# Patient Record
Sex: MALE | Race: WHITE | Employment: OTHER | URBAN - METROPOLITAN AREA
[De-identification: names, ages, dates, MRNs, and addresses within clinical notes are randomized per-mention and may not be internally consistent; named-entity substitution may affect disease eponyms.]

---

## 2017-01-16 ENCOUNTER — TELEPHONE (OUTPATIENT)
Dept: SLEEP MEDICINE | Age: 72
End: 2017-01-16

## 2017-01-16 NOTE — TELEPHONE ENCOUNTER
90 Day Adherence Download           Patients PAP machine was  remotely downloaded. Download was added to patients chart.

## 2017-01-16 NOTE — TELEPHONE ENCOUNTER
Patient is completing DOT physical and they are in need of pap download. Do we have remote access?  If not we can schedule clinic visit for download

## 2017-01-16 NOTE — TELEPHONE ENCOUNTER
Spoke with patients wife and notified her that download is with front staff and he could  download, I could mail it or fax it.  He will call back to let me know which one works best for him

## 2017-02-06 ENCOUNTER — TELEPHONE (OUTPATIENT)
Dept: SLEEP MEDICINE | Age: 72
End: 2017-02-06

## 2017-02-06 NOTE — TELEPHONE ENCOUNTER
90 Day Adherence Download           Patients PAP machine was downloaded per patient request .         Patient to  copy on Wednesday 2/8/2017

## 2017-02-07 ENCOUNTER — TELEPHONE (OUTPATIENT)
Dept: SLEEP MEDICINE | Age: 72
End: 2017-02-07

## 2017-02-07 ENCOUNTER — DOCUMENTATION ONLY (OUTPATIENT)
Dept: SLEEP MEDICINE | Age: 72
End: 2017-02-07

## 2017-02-07 DIAGNOSIS — G47.33 OBSTRUCTIVE SLEEP APNEA (ADULT) (PEDIATRIC): Primary | ICD-10-CM

## 2017-02-14 ENCOUNTER — OFFICE VISIT (OUTPATIENT)
Dept: CARDIOLOGY CLINIC | Age: 72
End: 2017-02-14

## 2017-02-14 VITALS
BODY MASS INDEX: 39.82 KG/M2 | HEIGHT: 72 IN | RESPIRATION RATE: 18 BRPM | HEART RATE: 75 BPM | DIASTOLIC BLOOD PRESSURE: 90 MMHG | SYSTOLIC BLOOD PRESSURE: 150 MMHG | WEIGHT: 294 LBS | OXYGEN SATURATION: 96 %

## 2017-02-14 DIAGNOSIS — Z95.5 S/P CORONARY ARTERY STENT PLACEMENT: ICD-10-CM

## 2017-02-14 DIAGNOSIS — I25.810 ATHEROSCLEROSIS OF CORONARY ARTERY BYPASS GRAFT OF NATIVE HEART WITHOUT ANGINA PECTORIS: ICD-10-CM

## 2017-02-14 DIAGNOSIS — E78.2 HYPERLIPIDEMIA, MIXED: ICD-10-CM

## 2017-02-14 DIAGNOSIS — R06.09 DOE (DYSPNEA ON EXERTION): Primary | ICD-10-CM

## 2017-02-14 RX ORDER — AMLODIPINE BESYLATE 2.5 MG/1
TABLET ORAL DAILY
COMMUNITY
End: 2017-12-08 | Stop reason: SDUPTHER

## 2017-02-14 NOTE — PROGRESS NOTES
Craven Cogan NP  Subjective/HPI:     Dixon Whittington is a 66 y.o. male is here for routine f/u. The patient denies chest pain/resting shortness of breath, orthopnea, PND, LE edema, palpitations, syncope, presyncope or fatigue. 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 He denies chest pain resting shortness of breath. He does report some fatigue and has some mild dyspnea on exertion that he feels is attributable to his weight. PCP Provider  Gregoria Sanders MD  Past Medical History   Diagnosis Date    AF (atrial fibrillation) [facility])      s/p DCCV after his CABG, no recurrence    Anxiety     Arrhythmia      a fib    Arthritis     CAD (coronary artery disease)      s/p CABG in 1997    GERD (gastroesophageal reflux disease)     Goiter, nontoxic, multinodular     Hypertension     Low blood potassium     Myocardial infarct ([facility] [address] ? 1997    MARIA ISABEL on CPAP     Other and unspecified hyperlipidemia     Unspecified sleep apnea       Past Surgical History   Procedure Laterality Date    Hx septoplasty  1980's    Hx cholecystectomy  1970's    Hx cataract removal  2006     bilateral    Pr cardiac surg procedure unlist  1997     bypass    Pr rt/lt heart catheters  12/2013     PTCA    Heart Dr. Pardeep Parker Pr right heart catheterization       x3 states pt. (can't remember when)    Hx shoulder arthroscopy  2012     left    Hx orthopaedic       Arthoscopic L. knee surgery      Allergies   Allergen Reactions    Demerol [Meperidine] Other (comments)     hallucinations      Family History   Problem Relation Age of Onset    Cancer Father      in his shoulder    Heart Attack Father     Heart Disease Father     Heart Disease Mother     Cancer Mother      abd     Cancer Son      wilm's tumor    Cancer Sister      abd    Thyroid Disease Neg Hx       Current Outpatient Prescriptions   Medication Sig    amLODIPine (NORVASC) 2.5 mg tablet Take  by mouth daily.  atorvastatin (LIPITOR) 40 mg tablet Take 1 Tab by mouth daily. Stop simvastatin    losartan (COZAAR) 50 mg tablet Take 1 Tab by mouth daily.  metoprolol succinate (TOPROL-XL) 50 mg XL tablet Take 1 Tab by mouth nightly.  isosorbide mononitrate ER (IMDUR) 60 mg CR tablet TAKE 1 TABLET EVERY DAY    pantoprazole (PROTONIX) 40 mg tablet Take 1 Tab by mouth daily. Stop prilosec.  nitroglycerin (NITROLINGUAL) 400 mcg/spray spray 1 Spray by SubLINGual route every five (5) minutes as needed for Chest Pain. Max 3 spray daily prn    Potassium Gluconate 595 mg (99 mg) tablet Take 595 mg by mouth two (2) times a day.  hydroxychloroquine (PLAQUENIL) 200 mg tablet Take 200 mg by mouth two (2) times a day.  OXYGEN-AIR DELIVERY SYSTEMS ([employer] NASAL CPAP SYSTEM) by Does Not Apply route.  acetaminophen (TYLENOL ARTHRITIS PAIN) 650 mg CR tablet Take 650 mg by mouth four (4) times daily.  ferrous sulfate (IRON) 325 mg (65 mg iron) tablet Take 325 mg by mouth Daily (before breakfast).  citalopram (CELEXA) 10 mg tablet Take 10 mg by mouth daily.  omega-3 fatty acids-vitamin e (FISH OIL) 1,000 mg Cap Take 2 Caps by mouth.  multivitamin, stress formula (STRESS TAB) tablet Take 1 Tab by mouth daily.  aspirin 81 mg tablet Take 162 mg by mouth daily.  GLUC HCL/GLUC DESHPANDE/AC-D-GLUCOS (GLUCOSAMINE COMPLEX PO) Take 1 Tab by mouth two (2) times a day.  ANTIOX #11/OM3/DHA/EPA/LUT/VIDYA (OCUVITE ADULT 50+ PO) Take 2 Caps by mouth daily.  hydrocortisone (HYTONE) 2.5 % topical cream      No current facility-administered medications for this visit. Vitals:    02/14/17 1427   BP: 150/90   Pulse: 75   Resp: 18   SpO2: 96%   Weight: 294 lb (133.4 kg)   Height: 6' (1.829 m)     Social History     Social History    Marital status:      Spouse name: N/A    Number of children: N/A    Years of education: N/A     Occupational History    Not on file.      Social History Main Topics    Smoking status: Former Smoker     Packs/day: 4.50     Years: 20.00     Types: Cigarettes     Quit date: 4/17/1970    Smokeless tobacco: Never Used    Alcohol use 0.0 oz/week     0 Standard drinks or equivalent per week      Comment: once a month may have some vodka    Drug use: No    Sexual activity: Not on file     Other Topics Concern    Not on file     Social History Narrative    Lives in [address] [place] [address] with wife. Has 2 sons and 1 daughter. Works part time for [employer]. Worked at [employer] as an . Likes to travel and fish. I have reviewed the nurses notes, vitals, problem list, allergy list, medical history, family, social history and medications. Review of Symptoms:    General: Pt denies excessive weight gain or loss. Pt is able to conduct ADL's  HEENT: Denies blurred vision, headaches, epistaxis and difficulty swallowing. Respiratory: Denies shortness of breath, HUSAIN, wheezing or stridor. Cardiovascular: Denies precordial pain, palpitations, edema or PND  Gastrointestinal: Denies poor appetite, indigestion, abdominal pain or blood in stool  Musculoskeletal: Denies pain or swelling from muscles or joints  Neurologic: Denies tremor, paresthesias, or sensory motor disturbance  Skin: Denies rash, itching or texture change. Physical Exam:      General: Well developed, in no acute distress, cooperative and alert  HEENT: No carotid bruits, no JVD, trach is midline. Neck Supple, PEERL, EOM intact. Heart:  Normal S1/S2 negative S3 or S4. Regular, no murmur, gallop or rub.   Respiratory: Clear bilaterally x 4, no wheezing or rales  Abdomen:   Soft, non-tender, no masses, bowel sounds are active.   Extremities:  No edema, normal cap refill, no cyanosis, atraumatic. Neuro: A&Ox3, speech clear, gait stable. Skin: Skin color is normal. No rashes or lesions.  Non diaphoretic  Vascular: 2+ pulses symmetric in all extremities    Cardiographics    ECG: Normal sinus rhythm  Results for orders placed or performed during the hospital encounter of 09/11/15   EKG, 12 LEAD, INITIAL   Result Value Ref Range    Ventricular Rate 67 BPM    Atrial Rate 67 BPM    P-R Interval 156 ms    QRS Duration 114 ms    Q-T Interval 440 ms    QTC Calculation (Bezet) 464 ms    Calculated P Axis 62 degrees    Calculated R Axis 15 degrees    Calculated T Axis 39 degrees    Diagnosis       Normal sinus rhythm  When compared with ECG of 11-SEP-2015 13:52,  No significant change was found  Confirmed by Stephanie Ramires ([id number]) on 9/12/2015 10:37:04 AM           Cardiology Labs:  Lab Results   Component Value Date/Time    Cholesterol, total 109 12/21/2013 03:33 AM    HDL Cholesterol 33 12/21/2013 03:33 AM    LDL, calculated 51.8 12/21/2013 03:33 AM    Triglyceride 121 12/21/2013 03:33 AM    CHOL/HDL Ratio 3.3 12/21/2013 03:33 AM       Lab Results   Component Value Date/Time    Sodium 135 09/12/2015 05:39 AM    Potassium 4.3 09/12/2015 05:39 AM    Chloride 103 09/12/2015 05:39 AM    CO2 25 09/12/2015 05:39 AM    Anion gap 7 09/12/2015 05:39 AM    Glucose 84 09/12/2015 05:39 AM    BUN 13 09/12/2015 05:39 AM    Creatinine 0.84 09/12/2015 05:39 AM    BUN/Creatinine ratio 15 09/12/2015 05:39 AM    GFR est AA >60 09/12/2015 05:39 AM    GFR est non-AA >60 09/12/2015 05:39 AM    Calcium 8.7 09/12/2015 05:39 AM    Bilirubin, total 0.6 09/09/2015 02:27 PM    AST (SGOT) 20 09/09/2015 02:27 PM    Alk. phosphatase 53 09/09/2015 02:27 PM    Protein, total 6.9 09/09/2015 02:27 PM    Albumin 4.6 09/09/2015 02:27 PM    Globulin 3.5 05/17/2015 10:15 AM    A-G Ratio 2.0 09/09/2015 02:27 PM    ALT (SGPT) 26 09/09/2015 02:27 PM           Assessment:     Assessment:     Mk Lagos was seen today for employment physical.    Diagnoses and all orders for this visit:    HUSAIN (dyspnea on exertion)    Hyperlipidemia, mixed  -     AMB POC EKG ROUTINE W/ 12 LEADS, INTER & REP  -     STRESS TEST CARDIOLITE, Clinic Performed;  Future    Atherosclerosis of coronary artery bypass graft of native heart without angina pectoris  -     STRESS TEST CARDIOLITE, Clinic Performed; Future    S/P coronary artery stent placement  -     STRESS TEST CARDIOLITE, Clinic Performed; Future        ICD-10-CM ICD-9-CM    1. HUSAIN (dyspnea on exertion) R06.09 786.09    2. Hyperlipidemia, mixed E78.2 272.2 AMB POC EKG ROUTINE W/ 12 LEADS, INTER & REP      STRESS TEST CARDIOLITE   3. Atherosclerosis of coronary artery bypass graft of native heart without angina pectoris I25.810 414.05 STRESS TEST CARDIOLITE   4. S/P coronary artery stent placement Z95.5 V45.82 STRESS TEST CARDIOLITE     Orders Placed This Encounter    AMB POC EKG ROUTINE W/ 12 LEADS, INTER & REP     Order Specific Question:   Reason for Exam:     Answer:   routine    STRESS TEST CARDIOLITE, Clinic Performed     Standing Status:   Future     Standing Expiration Date:   8/14/2017     Order Specific Question:   Reason for Exam:     Answer:   HUSAIN    amLODIPine (NORVASC) 2.5 mg tablet     Sig: Take  by mouth daily. Plan:     1. Atherosclerotic heart disease: Patient reports dyspnea on exertion will evaluate with stress Myoview. 2.  Hypertension: Repeat 132/76 right arm discussed with patient diet exercise and weight loss  3. Hyperlipidemia: LDL at target last year labs followed by primary care  4. 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  5.  Patient questions history of memory loss and chart: Discussed with patient this was part of the neurological Associates workup for when he went there for tremors he had also stated that he had some memory loss issues. Reviewed MRI with patient showing microvascular changes no significant cerebral disease indicated at that time. Patient presents awake alert orientated ×3 short-term and long-term memory are intact with basic questions answered today.   Discussed with patient he may benefit from following up with neurology regards to his initial consultation to further explain the diagnosis in his chart.     Palma Mohs, NP

## 2017-02-14 NOTE — MR AVS SNAPSHOT
Visit Information Date & Time Provider Department Dept. Phone Encounter #  
 2/14/2017  2:00 PM Nino Eddy, [address] [facility] [id number] Your Appointments 2/16/2017  9:00 AM  
STRESS TEST with NUCLEAR, [facility] [address] [facility]) Appt Note: Per Tevin $0CP REM 294LBS, 6', STRESS TEST CARDIOLITE [CTA1606 Custom] [address] [facility]  
[phone] [address] [address]  
  
    
 2/21/2017  9:00 AM  
STRESS TEST with NUCLEAR, [facility] [address] [place] [address]) Appt Note: Per Tevin $0CP REM 294LBS, 6', STRESS TEST CARDIOLITE [[id number] Custom] [address] [address]  
[phone]  
  
    
 5/8/2017  9:40 AM  
Follow Up with Jimena Stack MD  
Neurology Clinic at [address]) Appt Note: f/u tremors, $0cp jrb 10/10/2016  
 [address] [place] [address], 
[address] [place] [address], [address] [address]  
[address] [facility], [address] [place] [address], [address] [address] Upcoming Health Maintenance Date Due DTaP/Tdap/Td series (1 - Tdap) 11/26/1964 FOBT Q 1 YEAR AGE 50-75 11/26/1993 ZOSTER VACCINE AGE 60> 11/26/2003 GLAUCOMA SCREENING Q2Y 11/26/2008 Pneumococcal 65+ Low/Medium Risk (1 of 2 - PCV13) 11/26/2008 MEDICARE YEARLY EXAM 11/26/2008 INFLUENZA AGE 9 TO ADULT 8/1/2016 Allergies as of 2/14/2017  Review Complete On: 2/14/2017 By: Nino Eddy NP Severity Noted Reaction Type Reactions Demerol [Meperidine]  08/24/2010    Other (comments)  
 hallucinations Current Immunizations  Reviewed on 1/9/2014 Name Date Influenza Vaccine PF 12/21/2013 10:50 AM  
  
 Not reviewed this visit You Were Diagnosed With   
  
 Codes Comments  HUSAIN (dyspnea on exertion)    -  Primary ICD-10-CM: R06.09 
ICD-9-CM: 786.09   
 Hyperlipidemia, mixed     ICD-10-CM: E78.2 ICD-9-CM: 272.2 Atherosclerosis of coronary artery bypass graft of native heart without angina pectoris     ICD-10-CM: I25.810 ICD-9-CM: 414.05 S/P coronary artery stent placement     ICD-10-CM: Z95.5 ICD-9-CM: V45.82 Vitals BP Pulse Resp Height(growth percentile) Weight(growth percentile) SpO2  
 150/90 (BP 1 Location: Right arm, BP Patient Position: Sitting) 75 18 6' (1.829 m) 294 lb (133.4 kg) 96% BMI Smoking Status 39.87 kg/m2 Former Smoker Vitals History BMI and BSA Data Body Mass Index Body Surface Area  
 39.87 kg/m 2 2.6 m 2 Preferred Pharmacy Pharmacy Name Phone [facility] [address] [place] [address] [address] [facility] [address] [phone] Your Updated Medication List  
  
   
This list is accurate as of: 2/14/17  3:02 PM.  Always use your most recent med list.  
  
  
  
  
 aspirin 81 mg tablet Take 162 mg by mouth daily. atorvastatin 40 mg tablet Commonly known as:  LIPITOR Take 1 Tab by mouth daily. Stop simvastatin  
  
 citalopram 10 mg tablet Commonly known as:  Verlin Varun Take 10 mg by mouth daily. FISH OIL 1,000 mg Cap Generic drug:  omega-3 fatty acids-vitamin e Take 2 Caps by mouth. GLUCOSAMINE COMPLEX PO Take 1 Tab by mouth two (2) times a day. [employer] NASAL CPAP SYSTEM  
by Does Not Apply route. hydrocortisone 2.5 % topical cream  
Commonly known as:  HYTONE  
  
 hydroxychloroquine 200 mg tablet Commonly known as:  PLAQUENIL Take 200 mg by mouth two (2) times a day. Iron 325 mg (65 mg iron) tablet Generic drug:  ferrous sulfate Take 325 mg by mouth Daily (before breakfast). isosorbide mononitrate ER 60 mg CR tablet Commonly known as:  IMDUR  
TAKE 1 TABLET EVERY DAY  
  
 losartan 50 mg tablet Commonly known as:  COZAAR Take 1 Tab by mouth daily. metoprolol succinate 50 mg XL tablet Commonly known as:  TOPROL-XL Take 1 Tab by mouth nightly. multivitamin, stress formula tablet Commonly known as:  STRESS TAB Take 1 Tab by mouth daily. nitroglycerin 400 mcg/spray spray Commonly known as:  NITROLINGUAL  
1 [place] by SubLINGual route every five (5) minutes as needed for Chest Pain. Max 3 spray daily prn NORVASC 2.5 mg tablet Generic drug:  amLODIPine Take  by mouth daily. OCUVITE ADULT 50+ PO Take 2 Caps by mouth daily. pantoprazole 40 mg tablet Commonly known as:  PROTONIX Take 1 Tab by mouth daily. Stop prilosec. Potassium Gluconate 595 mg (99 mg) tablet Take 595 mg by mouth two (2) times a day. TYLENOL ARTHRITIS PAIN 650 mg CR tablet Generic drug:  acetaminophen Take 650 mg by mouth four (4) times daily. We Performed the Following AMB POC EKG ROUTINE W/ 12 LEADS, INTER & REP [06278 CPT(R)] To-Do List   
 02/15/2017 ECG:  STRESS TEST CARDIOLITE Introducing [facility]! [facility] introduces [employer] patient portal. Now you can access parts of your medical record, email your doctor's office, and request medication refills online. 1. In your internet browser, go to [email or web address] 2. Click on the First Time User? Click Here link in the Sign In box. You will see the New Member Sign Up page. 3. Enter your [employer] Access Code exactly as it appears below. You will not need to use this code after youve completed the sign-up process. If you do not sign up before the expiration date, you must request a new code. · [employer] Access Code: [email or web address] Expires: 5/15/2017  2:54 PM 
 
4. Enter the last four digits of your Social Security Number (xxxx) and Date of Birth (mm/dd/yyyy) as indicated and click Submit. You will be taken to the next sign-up page. 5. Create a [employer] ID.  This will be your [employer] login ID and cannot be changed, so think of one that is secure and easy to remember. 6. Create a [employer] password. You can change your password at any time. 7. Enter your Password Reset Question and Answer. This can be used at a later time if you forget your password. 8. Enter your e-mail address. You will receive e-mail notification when new information is available in [address] 9. Click Sign Up. You can now view and download portions of your medical record. 10. Click the Download Summary menu link to download a portable copy of your medical information. If you have questions, please visit the Frequently Asked Questions section of the [employer] website. Remember, [employer] is NOT to be used for urgent needs. For medical emergencies, dial 911. Now available from your iPhone and Android! Please provide this summary of care documentation to your next provider. Your primary care clinician is listed as [address]. If you have any questions after today's visit, please call [phone].

## 2017-02-16 ENCOUNTER — CLINICAL SUPPORT (OUTPATIENT)
Dept: CARDIOLOGY CLINIC | Age: 72
End: 2017-02-16

## 2017-02-16 DIAGNOSIS — R06.09 DOE (DYSPNEA ON EXERTION): Primary | ICD-10-CM

## 2017-02-17 ENCOUNTER — TELEPHONE (OUTPATIENT)
Dept: CARDIOLOGY CLINIC | Age: 72
End: 2017-02-17

## 2017-02-17 ENCOUNTER — CLINICAL SUPPORT (OUTPATIENT)
Dept: CARDIOLOGY CLINIC | Age: 72
End: 2017-02-17

## 2017-02-17 DIAGNOSIS — R93.1 ABNORMAL NUCLEAR CARDIAC IMAGING TEST: Primary | ICD-10-CM

## 2017-02-17 DIAGNOSIS — E78.2 HYPERLIPIDEMIA, MIXED: ICD-10-CM

## 2017-02-17 DIAGNOSIS — Z95.5 S/P CORONARY ARTERY STENT PLACEMENT: ICD-10-CM

## 2017-02-17 DIAGNOSIS — I25.810 ATHEROSCLEROSIS OF CORONARY ARTERY BYPASS GRAFT OF NATIVE HEART WITHOUT ANGINA PECTORIS: ICD-10-CM

## 2017-02-17 NOTE — TELEPHONE ENCOUNTER
Notes Recorded by Marylen Canner, LPN on 3/55/7605 at 9:24 PM  Verified patient with two identifiers.  Spoke with Natalie Riddle patient's wife informed her his stress test OK, spoke with Dr Cierra Stinson, he is cleared for DOT. She verbalized understanding.

## 2017-02-17 NOTE — PROGRESS NOTES
Verified patient with two identifiers. Spoke with Cait Hawkins patient's wife informed her his stress test OK, spoke with Dr Tom Maldonado, he is cleared for DOT. She verbalized understanding.

## 2017-02-17 NOTE — TELEPHONE ENCOUNTER
----- Message from Debra Mayorga MD sent at 2/17/2017  1:26 PM EST -----  His stress test is only mildly suspicious.   Have him come in to discuss and see how he is doing

## 2017-02-19 RX ORDER — METOPROLOL SUCCINATE 50 MG/1
TABLET, EXTENDED RELEASE ORAL
Qty: 90 TAB | Refills: 3 | Status: SHIPPED | OUTPATIENT
Start: 2017-02-19 | End: 2018-02-23 | Stop reason: SDUPTHER

## 2017-03-03 RX ORDER — PANTOPRAZOLE SODIUM 40 MG/1
TABLET, DELAYED RELEASE ORAL
Qty: 90 TAB | Refills: 3 | Status: SHIPPED | OUTPATIENT
Start: 2017-03-03 | End: 2018-02-22 | Stop reason: SDUPTHER

## 2017-03-23 RX ORDER — ISOSORBIDE MONONITRATE 60 MG/1
TABLET, EXTENDED RELEASE ORAL
Qty: 90 TAB | Refills: 2 | Status: SHIPPED | OUTPATIENT
Start: 2017-03-23 | End: 2018-02-22 | Stop reason: SDUPTHER

## 2017-12-08 RX ORDER — AMLODIPINE BESYLATE 2.5 MG/1
TABLET ORAL
Qty: 90 TAB | Refills: 3 | Status: SHIPPED | OUTPATIENT
Start: 2017-12-08 | End: 2018-10-12 | Stop reason: SDUPTHER

## 2018-01-15 RX ORDER — LOSARTAN POTASSIUM 50 MG/1
TABLET ORAL
Qty: 90 TAB | Refills: 3 | Status: SHIPPED | OUTPATIENT
Start: 2018-01-15 | End: 2019-01-17 | Stop reason: SDUPTHER

## 2018-01-15 RX ORDER — ATORVASTATIN CALCIUM 40 MG/1
TABLET, FILM COATED ORAL
Qty: 90 TAB | Refills: 3 | Status: SHIPPED | OUTPATIENT
Start: 2018-01-15 | End: 2019-01-17 | Stop reason: SDUPTHER

## 2018-02-22 RX ORDER — PANTOPRAZOLE SODIUM 40 MG/1
TABLET, DELAYED RELEASE ORAL
Qty: 90 TAB | Refills: 3 | Status: SHIPPED | OUTPATIENT
Start: 2018-02-22 | End: 2019-04-03 | Stop reason: SDUPTHER

## 2018-02-22 RX ORDER — ISOSORBIDE MONONITRATE 60 MG/1
TABLET, EXTENDED RELEASE ORAL
Qty: 90 TAB | Refills: 2 | Status: SHIPPED | OUTPATIENT
Start: 2018-02-22 | End: 2018-10-12 | Stop reason: SDUPTHER

## 2018-02-23 RX ORDER — METOPROLOL SUCCINATE 50 MG/1
TABLET, EXTENDED RELEASE ORAL
Qty: 90 TAB | Refills: 2 | Status: SHIPPED | OUTPATIENT
Start: 2018-02-23 | End: 2018-10-12 | Stop reason: SDUPTHER

## 2018-03-06 ENCOUNTER — OFFICE VISIT (OUTPATIENT)
Dept: CARDIOLOGY CLINIC | Age: 73
End: 2018-03-06

## 2018-03-06 VITALS
BODY MASS INDEX: 36.9 KG/M2 | DIASTOLIC BLOOD PRESSURE: 62 MMHG | RESPIRATION RATE: 20 BRPM | OXYGEN SATURATION: 96 % | HEART RATE: 68 BPM | HEIGHT: 72 IN | SYSTOLIC BLOOD PRESSURE: 110 MMHG | WEIGHT: 272.4 LBS

## 2018-03-06 DIAGNOSIS — I48.0 PAROXYSMAL ATRIAL FIBRILLATION (HCC): ICD-10-CM

## 2018-03-06 DIAGNOSIS — R41.3 MEMORY DIFFICULTIES: ICD-10-CM

## 2018-03-06 DIAGNOSIS — E78.2 HYPERLIPIDEMIA, MIXED: ICD-10-CM

## 2018-03-06 DIAGNOSIS — Z95.5 S/P CORONARY ARTERY STENT PLACEMENT: ICD-10-CM

## 2018-03-06 DIAGNOSIS — I25.810 ATHEROSCLEROSIS OF CORONARY ARTERY BYPASS GRAFT OF NATIVE HEART WITHOUT ANGINA PECTORIS: Primary | ICD-10-CM

## 2018-03-06 RX ORDER — NITROGLYCERIN 0.4 MG/1
0.4 TABLET SUBLINGUAL
Qty: 25 TAB | Refills: 1 | Status: SHIPPED | OUTPATIENT
Start: 2018-03-06 | End: 2020-12-04

## 2018-03-06 RX ORDER — OXYCODONE AND ACETAMINOPHEN 5; 325 MG/1; MG/1
TABLET ORAL
COMMUNITY
End: 2020-12-04

## 2018-03-06 NOTE — PROGRESS NOTES
1. Have you been to the ER, urgent care clinic since your last visit? Hospitalized since your last visit? YES [facility].     2. Have you seen or consulted any other health care providers outside of the [facility] since your last visit? Include any pap smears or colon screening. YES PCP. NO CARDIAC C/O.

## 2018-03-06 NOTE — PROGRESS NOTES
Prachi Werner DNP, ANP-BC  Subjective/HPI:     Surya Noriega is a 74 y.o. male is here for routine f/u. The patient denies chest pain/ shortness of breath, orthopnea, PND, LE edema, palpitations, syncope, presyncope or fatigue. Patient reports feeling well in his usual state of health, able to perform his outdoor working chores such as cutting wood with a chainsaw without difficulty. Patient states he has had lab work from primary care within the last 3 months. Wife is also accompanied patient for exam today, she mentions he is having increasing memory disturbances. Patient is wearing CPAP nightly, will snore at times when supine however resolves when turning over on his side. He has been keeping busy making wooden pens. PCP Provider  Lavell Beal MD  Past Medical History:   Diagnosis Date    AF (atrial fibrillation) (HCC)     s/p DCCV after his CABG, no recurrence    Anxiety     Arrhythmia     a fib    Arthritis     CAD (coronary artery disease)     s/p CABG in 1997    GERD (gastroesophageal reflux disease)     Goiter, nontoxic, multinodular     Hypertension     Low blood potassium     Myocardial infarct ? 1997    MARIA ISABEL on CPAP     Other and unspecified hyperlipidemia     Unspecified sleep apnea       Past Surgical History:   Procedure Laterality Date    CARDIAC SURG PROCEDURE UNLIST  1997    bypass    HX CATARACT REMOVAL  2006    bilateral    HX CHOLECYSTECTOMY  1970's    HX ORTHOPAEDIC      Arthoscopic L. knee surgery     HX SEPTOPLASTY  1980's    HX SHOULDER ARTHROSCOPY  2012    left    RIGHT HEART CATHETERIZATION      x3 states pt. (can't remember when)    RT/LT HEART CATHETERS  12/2013    PTCA    Heart Dr. Jessica Vega     Allergies   Allergen Reactions    Demerol [Meperidine] Other (comments)     hallucinations  hallucinations      Family History   Problem Relation Age of Onset    Cancer Father      in his shoulder    Heart Attack Father     Heart Disease Father     Heart Disease Mother     Cancer Mother      abd     Cancer Son      wilm's tumor    Cancer Sister      abd    Thyroid Disease Neg Hx       Current Outpatient Prescriptions   Medication Sig    oxyCODONE-acetaminophen (PERCOCET) 5-325 mg per tablet Take  by mouth every four (4) hours as needed for Pain.  metoprolol succinate (TOPROL-XL) 50 mg XL tablet TAKE 1 TABLET EVERY NIGHT    isosorbide mononitrate ER (IMDUR) 60 mg CR tablet TAKE 1 TABLET EVERY DAY    pantoprazole (PROTONIX) 40 mg tablet TAKE 1 TABLET EVERY DAY  (STOP  PRILOSEC)    losartan (COZAAR) 50 mg tablet TAKE 1 TABLET EVERY DAY    atorvastatin (LIPITOR) 40 mg tablet TAKE 1 TABLET EVERY DAY  STOP  SIMVASTATIN    amLODIPine (NORVASC) 2.5 mg tablet TAKE 1 TABLET EVERY DAY    Potassium Gluconate 595 mg (99 mg) tablet Take 595 mg by mouth two (2) times a day.  ANTIOX #11/OM3/DHA/EPA/LUT/VIDYA (OCUVITE ADULT 50+ PO) Take 2 Caps by mouth daily.  hydroxychloroquine (PLAQUENIL) 200 mg tablet Take 200 mg by mouth two (2) times a day.  hydrocortisone (HYTONE) 2.5 % topical cream     OXYGEN-AIR DELIVERY SYSTEMS (HORIZON NASAL CPAP SYSTEM) by Does Not Apply route.  acetaminophen (TYLENOL ARTHRITIS PAIN) 650 mg CR tablet Take 650 mg by mouth four (4) times daily.  ferrous sulfate (IRON) 325 mg (65 mg iron) tablet Take 325 mg by mouth Daily (before breakfast).  citalopram (CELEXA) 10 mg tablet Take 10 mg by mouth daily.  omega-3 fatty acids-vitamin e (FISH OIL) 1,000 mg Cap Take 2 Caps by mouth.  multivitamin, stress formula (STRESS TAB) tablet Take 1 Tab by mouth daily.  aspirin 81 mg tablet Take 162 mg by mouth daily.  GLUC HCL/GLUC DESHPANDE/AC-D-GLUCOS (GLUCOSAMINE COMPLEX PO) Take 1 Tab by mouth two (2) times a day.  nitroglycerin (NITROLINGUAL) 400 mcg/spray spray 1 Spray by SubLINGual route every five (5) minutes as needed for Chest Pain. Max 3 spray daily prn     No current facility-administered medications for this visit. Vitals:    03/06/18 1308 03/06/18 1322   BP: 110/64 110/62   Pulse: 68    Resp: 20    SpO2: 96%    Weight: 272 lb 6.4 oz (123.6 kg)    Height: 6' (1.829 m)      Social History     Social History    Marital status:      Spouse name: N/A    Number of children: N/A    Years of education: N/A     Occupational History    Not on file. Social History Main Topics    Smoking status: Former Smoker     Packs/day: 4.50     Years: 20.00     Types: Cigarettes     Quit date: 4/17/1970    Smokeless tobacco: Never Used    Alcohol use 0.0 oz/week     0 Standard drinks or equivalent per week      Comment: once a month may have some vodka    Drug use: No    Sexual activity: Not on file     Other Topics Concern    Not on file     Social History Narrative    Lives in [address] [place] [address] with wife. Has 2 sons and 1 daughter. Works part time for [employer]. Worked at [employer] as an . Likes to travel and fish. I have reviewed the nurses notes, vitals, problem list, allergy list, medical history, family, social history and medications. Review of Symptoms:    General: Pt denies excessive weight gain or loss. Pt is able to conduct ADL's  HEENT: Denies blurred vision, headaches, epistaxis and difficulty swallowing. Respiratory: Denies shortness of breath, HUSAIN, wheezing or stridor. Cardiovascular: Denies precordial pain, palpitations, edema or PND  Gastrointestinal: Denies poor appetite, indigestion, abdominal pain or blood in stool  Musculoskeletal: Denies pain or swelling from muscles or joints  Neurologic: Denies tremor, paresthesias, or sensory motor disturbance  Skin: Denies rash, itching or texture change. Physical Exam:      General: Well developed, in no acute distress, cooperative and alert  HEENT: No carotid bruits, no JVD, trach is midline. Neck Supple, PEERL, EOM intact. Heart:  Normal S1/S2 negative S3 or S4.  Regular, no murmur, gallop or rub.   Respiratory: Clear bilaterally x 4, no wheezing or rales  Abdomen:   Soft, non-tender, no masses, bowel sounds are active.   Extremities:  No edema, normal cap refill, no cyanosis, atraumatic. Neuro: A&Ox3, speech clear, gait stable. Skin: Skin color is normal. No rashes or lesions. Non diaphoretic. Diffuse bilateral forearm ecchymosis on contact points no other bruising. Vascular: 2+ pulses symmetric in all extremities    Cardiographics    ECG: Sinus rhythm with unifocal PVCs. Results for orders placed or performed during the hospital encounter of 09/11/15   EKG, 12 LEAD, INITIAL   Result Value Ref Range    Ventricular Rate 67 BPM    Atrial Rate 67 BPM    P-R Interval 156 ms    QRS Duration 114 ms    Q-T Interval 440 ms    QTC Calculation (Bezet) 464 ms    Calculated P Axis 62 degrees    Calculated R Axis 15 degrees    Calculated T Axis 39 degrees    Diagnosis       Normal sinus rhythm  When compared with ECG of 11-SEP-2015 13:52,  No significant change was found  Confirmed by Lazarus Benes ([id number]) on 9/12/2015 10:37:04 AM           Cardiology Labs:  Lab Results   Component Value Date/Time    Cholesterol, total 109 12/21/2013 03:33 AM    HDL Cholesterol 33 12/21/2013 03:33 AM    LDL, calculated 51.8 12/21/2013 03:33 AM    Triglyceride 121 12/21/2013 03:33 AM    CHOL/HDL Ratio 3.3 12/21/2013 03:33 AM       Lab Results   Component Value Date/Time    Sodium 135 (L) 09/12/2015 05:39 AM    Potassium 4.3 09/12/2015 05:39 AM    Chloride 103 09/12/2015 05:39 AM    CO2 25 09/12/2015 05:39 AM    Anion gap 7 09/12/2015 05:39 AM    Glucose 84 09/12/2015 05:39 AM    BUN 13 09/12/2015 05:39 AM    Creatinine 0.84 09/12/2015 05:39 AM    BUN/Creatinine ratio 15 09/12/2015 05:39 AM    GFR est AA >60 09/12/2015 05:39 AM    GFR est non-AA >60 09/12/2015 05:39 AM    Calcium 8.7 09/12/2015 05:39 AM    Bilirubin, total 0.6 09/09/2015 02:27 PM    AST (SGOT) 20 09/09/2015 02:27 PM    Alk.  phosphatase 53 09/09/2015 02:27 PM    Protein, total 6.9 09/09/2015 02:27 PM    Albumin 4.6 09/09/2015 02:27 PM    Globulin 3.5 05/17/2015 10:15 AM    A-G Ratio 2.0 09/09/2015 02:27 PM    ALT (SGPT) 26 09/09/2015 02:27 PM           Assessment:     Assessment:     Diagnoses and all orders for this visit:    1. Atherosclerosis of coronary artery bypass graft of native heart without angina pectoris    2. Paroxysmal atrial fibrillation (HCC)  -     AMB POC EKG ROUTINE W/ 12 LEADS, INTER & REP    3. Hyperlipidemia, mixed    4. S/P coronary artery stent placement    5. Memory difficulties        ICD-10-CM ICD-9-CM    1. Atherosclerosis of coronary artery bypass graft of native heart without angina pectoris I25.810 414.05    2. Paroxysmal atrial fibrillation (HCC) I48.0 427.31 AMB POC EKG ROUTINE W/ 12 LEADS, INTER & REP   3. Hyperlipidemia, mixed E78.2 272.2    4. S/P coronary artery stent placement Z95.5 V45.82    5. Memory difficulties R41.3 780.93      Orders Placed This Encounter    AMB POC EKG ROUTINE W/ 12 LEADS, INTER & REP     Order Specific Question:   Reason for Exam:     Answer:   routine    oxyCODONE-acetaminophen (PERCOCET) 5-325 mg per tablet     Sig: Take  by mouth every four (4) hours as needed for Pain. Plan:     1. Atherosclerotic heart disease: Clinically stable asymptomatic continue current medications, renew PRN nitrates  2. Hypertension: Controlled 110/62  3. Hyperlipidemia: On statin therapy, followed by primary care will request copy of labs. 4.  Paroxysmal atrial fibrillation: (transient post CABG) Maintaining NSR, on ASA. Fausto Chase NP    This note was created using voice recognition software. Despite editing, there may be syntax errors.

## 2018-03-06 NOTE — MR AVS SNAPSHOT
Ziggy Lopez [address] [facility] 
[phone] Patient: Keri Figueroa MRN: [id number] :1945 Visit Information Date & Time Provider Department Dept. Phone Encounter #  
 3/6/2018  1:15 PM Nery Altamirano NP [facility] [phone] [id number] Upcoming Health Maintenance Date Due DTaP/Tdap/Td series (1 - Tdap) 1964 FOBT Q 1 YEAR AGE 50-75 1993 ZOSTER VACCINE AGE 60> 2003 GLAUCOMA SCREENING Q2Y 2008 Pneumococcal 65+ Low/Medium Risk (1 of 2 - PCV13) 2008 MEDICARE YEARLY EXAM 2008 Influenza Age 5 to Adult 2017 Allergies as of 3/6/2018  Review Complete On: 3/6/2018 By: Nery Altamirano NP Severity Noted Reaction Type Reactions Demerol [Meperidine]  2010    Other (comments)  
 hallucinations 
hallucinations Current Immunizations  Reviewed on 2014 Name Date Influenza Vaccine PF 2013 10:50 AM  
  
 Not reviewed this visit You Were Diagnosed With   
  
 Codes Comments Atherosclerosis of coronary artery bypass graft of native heart without angina pectoris    -  Primary ICD-10-CM: I25.810 ICD-9-CM: 414.05 Paroxysmal atrial fibrillation (HCC)     ICD-10-CM: I48.0 ICD-9-CM: 427.31 Hyperlipidemia, mixed     ICD-10-CM: E78.2 ICD-9-CM: 272.2 S/P coronary artery stent placement     ICD-10-CM: Z95.5 ICD-9-CM: V45.82 Memory difficulties     ICD-10-CM: R41.3 ICD-9-CM: 780.93 Vitals BP Pulse Resp Height(growth percentile) Weight(growth percentile) SpO2  
 110/62 (BP 1 Location: Right arm, BP Patient Position: Sitting) 68 20 6' (1.829 m) 272 lb 6.4 oz (123.6 kg) 96% BMI Smoking Status 36.94 kg/m2 Former Smoker Vitals History BMI and BSA Data Body Mass Index Body Surface Area  
 36.94 kg/m 2 2.51 m 2 Preferred Pharmacy Pharmacy Name Phone [address] [phone] Your Updated Medication List  
  
   
This list is accurate as of 3/6/18  1:56 PM.  Always use your most recent med list. amLODIPine 2.5 mg tablet Commonly known as:  Guille Sigala TAKE 1 TABLET EVERY DAY  
  
 aspirin 81 mg tablet Take 162 mg by mouth daily. atorvastatin 40 mg tablet Commonly known as:  LIPITOR  
TAKE 1 TABLET EVERY DAY  STOP  SIMVASTATIN  
  
 citalopram 10 mg tablet Commonly known as:  Chris Beverly Take 10 mg by mouth daily. FISH OIL 1,000 mg Cap Generic drug:  omega-3 fatty acids-vitamin e Take 2 Caps by mouth. GLUCOSAMINE COMPLEX PO Take 1 Tab by mouth two (2) times a day. HORIZON NASAL CPAP SYSTEM  
by Does Not Apply route. hydrocortisone 2.5 % topical cream  
Commonly known as:  HYTONE  
  
 hydroxychloroquine 200 mg tablet Commonly known as:  PLAQUENIL Take 200 mg by mouth two (2) times a day. Iron 325 mg (65 mg iron) tablet Generic drug:  ferrous sulfate Take 325 mg by mouth Daily (before breakfast). isosorbide mononitrate ER 60 mg CR tablet Commonly known as:  IMDUR  
TAKE 1 TABLET EVERY DAY  
  
 losartan 50 mg tablet Commonly known as:  COZAAR  
TAKE 1 TABLET EVERY DAY  
  
 metoprolol succinate 50 mg XL tablet Commonly known as:  TOPROL-XL  
TAKE 1 TABLET EVERY NIGHT  
  
 multivitamin, stress formula tablet Commonly known as:  STRESS TAB Take 1 Tab by mouth daily. nitroglycerin 400 mcg/spray spray Commonly known as:  NITROLINGUAL  
1 [place] by SubLINGual route every five (5) minutes as needed for Chest Pain. Max 3 spray daily prn  
  
 OCUVITE ADULT 50+ PO Take 2 Caps by mouth daily. pantoprazole 40 mg tablet Commonly known as:  PROTONIX  
TAKE 1 TABLET EVERY DAY  (STOP  PRILOSEC) PERCOCET 5-325 mg per tablet Generic drug:  oxyCODONE-acetaminophen Take  by mouth every four (4) hours as needed for Pain. Potassium Gluconate 595 mg (99 mg) tablet Take 595 mg by mouth two (2) times a day. TYLENOL ARTHRITIS PAIN 650 mg Mercedez Spencer Generic drug:  acetaminophen Take 650 mg by mouth four (4) times daily. We Performed the Following AMB POC EKG ROUTINE W/ 12 LEADS, INTER & REP [88137 CPT(R)] Introducing [facility] & [facility] SERVICES! [address] introduces [employer] patient portal. Now you can access parts of your medical record, email your doctor's office, and request medication refills online. 1. In your internet browser, go to [email or web address] 2. Click on the First Time User? Click Here link in the Sign In box. You will see the New Member Sign Up page. 3. Enter your [employer] Access Code exactly as it appears below. You will not need to use this code after youve completed the sign-up process. If you do not sign up before the expiration date, you must request a new code. · [employer] Access Code: [email or web address] Expires: 6/4/2018  1:56 PM 
 
4. Enter the last four digits of your Social Security Number (xxxx) and Date of Birth (mm/dd/yyyy) as indicated and click Submit. You will be taken to the next sign-up page. 5. Create a [employer] ID. This will be your [employer] login ID and cannot be changed, so think of one that is secure and easy to remember. 6. Create a [employer] password. You can change your password at any time. 7. Enter your Password Reset Question and Answer. This can be used at a later time if you forget your password. 8. Enter your e-mail address. You will receive e-mail notification when new information is available in [address] 9. Click Sign Up. You can now view and download portions of your medical record. 10. Click the Download Summary menu link to download a portable copy of your medical information.  
 
If you have questions, please visit the Frequently Asked Questions section of the [employer]. Remember, [employer] is NOT to be used for urgent needs. For medical emergencies, dial 911. Now available from your iPhone and Android! Please provide this summary of care documentation to your next provider. Your primary care clinician is listed as [address]. If you have any questions after today's visit, please call [phone].

## 2018-09-04 ENCOUNTER — OFFICE VISIT (OUTPATIENT)
Dept: CARDIOLOGY CLINIC | Age: 73
End: 2018-09-04

## 2018-09-04 VITALS
HEIGHT: 72 IN | HEART RATE: 70 BPM | BODY MASS INDEX: 39.5 KG/M2 | RESPIRATION RATE: 16 BRPM | OXYGEN SATURATION: 97 % | DIASTOLIC BLOOD PRESSURE: 70 MMHG | WEIGHT: 291.6 LBS | SYSTOLIC BLOOD PRESSURE: 130 MMHG

## 2018-09-04 DIAGNOSIS — E78.2 HYPERLIPIDEMIA, MIXED: ICD-10-CM

## 2018-09-04 DIAGNOSIS — I25.810 ATHEROSCLEROSIS OF CORONARY ARTERY BYPASS GRAFT OF NATIVE HEART WITHOUT ANGINA PECTORIS: ICD-10-CM

## 2018-09-04 DIAGNOSIS — R06.09 DOE (DYSPNEA ON EXERTION): Primary | ICD-10-CM

## 2018-09-04 DIAGNOSIS — I48.0 PAROXYSMAL ATRIAL FIBRILLATION (HCC): ICD-10-CM

## 2018-09-04 NOTE — MR AVS SNAPSHOT
Ziggy Strange Jessica [address] [facility] 
[phone] Patient: Jessica Casanova MRN: [id number] :1945 Visit Information Date & Time Provider Department Dept. Phone Encounter #  
 2018  1:30 PM Larence Heimlich, NP [facility] [phone] Follow-up Instructions Return in about 6 months (around 3/4/2019). Routing History Follow-up and Disposition History Your Appointments 2019  1:00 PM  
ESTABLISHED PATIENT with Larence Heimlich, NP [facility]) Appt Note: p/MA scdl 6month fu. will contact sooner if needed [address] [facility]  
[phone] [address] [facility] Upcoming Health Maintenance Date Due DTaP/Tdap/Td series (1 - Tdap) 1964 FOBT Q 1 YEAR AGE 50-75 1993 ZOSTER VACCINE AGE 60> 2003 GLAUCOMA SCREENING Q2Y 2008 Pneumococcal 65+ Low/Medium Risk (1 of 2 - PCV13) 2008 MEDICARE YEARLY EXAM 3/14/2018 Influenza Age 5 to Adult 2018 Allergies as of 2018  Review Complete On: 2018 By: Larence Heimlich, NP Severity Noted Reaction Type Reactions Demerol [Meperidine]  2010    Other (comments)  
 hallucinations 
hallucinations Current Immunizations  Reviewed on 2014 Name Date Influenza Vaccine PF 2013 10:50 AM  
  
 Not reviewed this visit You Were Diagnosed With   
  
 Codes Comments HUSAIN (dyspnea on exertion)    -  Primary ICD-10-CM: R06.09 
ICD-9-CM: 786.09 Hyperlipidemia, mixed     ICD-10-CM: E78.2 ICD-9-CM: 272.2 Atherosclerosis of coronary artery bypass graft of native heart without angina pectoris     ICD-10-CM: I25.810 ICD-9-CM: 414.05 Paroxysmal atrial fibrillation (HCC)     ICD-10-CM: I48.0 ICD-9-CM: 427.31 Vitals BP Pulse Resp Height(growth percentile) Weight(growth percentile) SpO2  
 130/70 (BP Patient Position: Sitting) 70 16 6' (1.829 m) 291 lb 9.6 oz (132.3 kg) 97% BMI Smoking Status 39.55 kg/m2 Former Smoker BMI and BSA Data Body Mass Index Body Surface Area  
 39.55 kg/m 2 2.59 m 2 Preferred Pharmacy Pharmacy Name Phone [facility] [phone] Your Updated Medication List  
  
   
This list is accurate as of 9/4/18  2:24 PM.  Always use your most recent med list. amLODIPine 2.5 mg tablet Commonly known as:  Arlyss [place] TAKE 1 TABLET EVERY DAY  
  
 aspirin 81 mg tablet Take 162 mg by mouth daily. atorvastatin 40 mg tablet Commonly known as:  LIPITOR  
TAKE 1 TABLET EVERY DAY  STOP  SIMVASTATIN  
  
 citalopram 10 mg tablet Commonly known as:  Shilpi Mutters Take 10 mg by mouth daily. FISH OIL 1,000 mg Cap Generic drug:  omega-3 fatty acids-vitamin e Take 2 Caps by mouth. GLUCOSAMINE COMPLEX PO Take 1 Tab by mouth daily. HORIZON NASAL CPAP SYSTEM  
by Does Not Apply route. hydrocortisone 2.5 % topical cream  
Commonly known as:  HYTONE  
  
 hydroxychloroquine 200 mg tablet Commonly known as:  PLAQUENIL Take 200 mg by mouth two (2) times a day. Iron 325 mg (65 mg iron) tablet Generic drug:  ferrous sulfate Take 325 mg by mouth two (2) times a day. isosorbide mononitrate ER 60 mg CR tablet Commonly known as:  IMDUR  
TAKE 1 TABLET EVERY DAY  
  
 losartan 50 mg tablet Commonly known as:  COZAAR  
TAKE 1 TABLET EVERY DAY  
  
 metoprolol succinate 50 mg XL tablet Commonly known as:  TOPROL-XL  
TAKE 1 TABLET EVERY NIGHT  
  
 multivitamin, stress formula tablet Commonly known as:  STRESS TAB Take 1 Tab by mouth daily. nitroglycerin 0.4 mg SL tablet Commonly known as:  NITROSTAT 1 Tab by SubLINGual route every five (5) minutes as needed for Chest Pain (max 3 daily for chest pain). pantoprazole 40 mg tablet Commonly known as:  PROTONIX  
TAKE 1 TABLET EVERY DAY  (STOP  PRILOSEC) PERCOCET 5-325 mg per tablet Generic drug:  oxyCODONE-acetaminophen Take  by mouth every four (4) hours as needed for Pain. Potassium Gluconate 595 mg (99 mg) tablet Take 595 mg by mouth two (2) times a day. PRESERVISION AREDS 2 PO Take  by mouth two (2) times a day. TYLENOL ARTHRITIS PAIN 650 mg Davidson Sigala Generic drug:  acetaminophen Take 650 mg by mouth four (4) times daily. We Performed the Following 2D ECHO COMPLETE ADULT (TTE) W OR WO CONTR [87676 CPT(R)] AMB POC EKG ROUTINE W/ 12 LEADS, INTER & REP [43954 CPT(R)] CBC WITH AUTOMATED DIFF [29576 CPT(R)] LIPID PANEL [15198 CPT(R)] METABOLIC PANEL, COMPREHENSIVE [00221 CPT(R)] THYROID PANEL W/TSH [53537 CPT(R)] VITAMIN B12 & FOLATE [90748 CPT(R)] Follow-up Instructions Return in about 6 months (around 3/4/2019). To-Do List   
 09/12/2018 ECG:  STRESS TEST LEXISCAN/CARDIOLITE Introducing \[facility] [place] [facility]\"" & HEALTH SERVICES! Yudelka Lancaster introduces [employer] patient portal. Now you can access parts of your medical record, email your doctor's office, and request medication refills online. 1. In your internet browser, go to [email or web address] 2. Click on the First Time User? Click Here link in the Sign In box. You will see the New Member Sign Up page. 3. Enter your [employer] Access Code exactly as it appears below. You will not need to use this code after youve completed the sign-up process. If you do not sign up before the expiration date, you must request a new code. · [employer] Access Code: [email or web address] Expires: 12/3/2018  2:24 PM 
 
4.  Enter the last four digits of your Social Security Number (xxxx) and Date of Birth (mm/dd/yyyy) as indicated and click Submit. You will be taken to the next sign-up page. 5. Create a [employer] ID. This will be your [employer] login ID and cannot be changed, so think of one that is secure and easy to remember. 6. Create a [employer] password. You can change your password at any time. 7. Enter your Password Reset Question and Answer. This can be used at a later time if you forget your password. 8. Enter your e-mail address. You will receive e-mail notification when new information is available in [address] 9. Click Sign Up. You can now view and download portions of your medical record. 10. Click the Download Summary menu link to download a portable copy of your medical information. If you have questions, please visit the Frequently Asked Questions section of the [employer] website. Remember, [employer] is NOT to be used for urgent needs. For medical emergencies, dial 911. Now available from your iPhone and Android! Please provide this summary of care documentation to your next provider. Your primary care clinician is listed as [address]. If you have any questions after today's visit, please call [phone].

## 2018-09-04 NOTE — PROGRESS NOTES
Jay Agosto DNP, ANP-BC  Subjective/HPI:     Lou Schaumann is a 74 y.o. male is here for routine f/u. The patient denies resting chest pain/ shortness of breath, orthopnea, PND, LE edema, palpitations, syncope. Babatunde Laureano reports in the last 6 months increasing generalized fatigue weakness with dyspnea on exertion. He denies lightheadedness or dizziness. He is taking all medications as directed. We did discuss his concern and worry over his wife's upcoming aVR. PCP Provider  Namrata Ponce MD  Past Medical History:   Diagnosis Date    AF (atrial fibrillation) (HCC)     s/p DCCV after his CABG, no recurrence    Anxiety     Arrhythmia     a fib    Arthritis     CAD (coronary artery disease)     s/p CABG in 1997    GERD (gastroesophageal reflux disease)     Goiter, nontoxic, multinodular     Hypertension     Low blood potassium     Myocardial infarct ([address] [address] ? 1997    MARIA ISABEL on CPAP     Other and unspecified hyperlipidemia     Unspecified sleep apnea       Past Surgical History:   Procedure Laterality Date    CARDIAC SURG PROCEDURE UNLIST  1997    bypass    HX CATARACT REMOVAL  2006    bilateral    HX CHOLECYSTECTOMY  1970's    HX ORTHOPAEDIC      Arthoscopic L. knee surgery     HX SEPTOPLASTY  1980's    HX SHOULDER ARTHROSCOPY  2012    left    RIGHT HEART CATHETERIZATION      x3 states pt. (can't remember when)    RT/LT HEART CATHETERS  12/2013    PTCA    Heart Dr. Ramirez Rising   Allergen Reactions    Demerol [Meperidine] Other (comments)     hallucinations  hallucinations      Family History   Problem Relation Age of Onset    Cancer Father      in his shoulder    Heart Attack Father     Heart Disease Father     Heart Disease Mother     Cancer Mother      abd     Cancer Son      wilm's tumor    Cancer Sister      abd    Thyroid Disease Neg Hx       Current Outpatient Prescriptions   Medication Sig    vit C/E/Zn/coppr/lutein/zeaxan (PRESERVISION AREDS 2 PO) Take  by mouth two (2) times a day.  oxyCODONE-acetaminophen (PERCOCET) 5-325 mg per tablet Take  by mouth every four (4) hours as needed for Pain.  nitroglycerin (NITROSTAT) 0.4 mg SL tablet 1 Tab by SubLINGual route every five (5) minutes as needed for Chest Pain (max 3 daily for chest pain).  metoprolol succinate (TOPROL-XL) 50 mg XL tablet TAKE 1 TABLET EVERY NIGHT    isosorbide mononitrate ER (IMDUR) 60 mg CR tablet TAKE 1 TABLET EVERY DAY    pantoprazole (PROTONIX) 40 mg tablet TAKE 1 TABLET EVERY DAY  (STOP  PRILOSEC)    losartan (COZAAR) 50 mg tablet TAKE 1 TABLET EVERY DAY    atorvastatin (LIPITOR) 40 mg tablet TAKE 1 TABLET EVERY DAY  STOP  SIMVASTATIN    amLODIPine (NORVASC) 2.5 mg tablet TAKE 1 TABLET EVERY DAY    hydroxychloroquine (PLAQUENIL) 200 mg tablet Take 200 mg by mouth two (2) times a day.  hydrocortisone (HYTONE) 2.5 % topical cream     OXYGEN-AIR DELIVERY SYSTEMS (HORIZON NASAL CPAP SYSTEM) by Does Not Apply route.  acetaminophen (TYLENOL ARTHRITIS PAIN) 650 mg CR tablet Take 650 mg by mouth four (4) times daily.  ferrous sulfate (IRON) 325 mg (65 mg iron) tablet Take 325 mg by mouth two (2) times a day.  citalopram (CELEXA) 10 mg tablet Take 10 mg by mouth daily.  omega-3 fatty acids-vitamin e (FISH OIL) 1,000 mg Cap Take 2 Caps by mouth.  multivitamin, stress formula (STRESS TAB) tablet Take 1 Tab by mouth daily.  aspirin 81 mg tablet Take 162 mg by mouth daily.  GLUC HCL/GLUC DESHPANDE/AC-D-GLUCOS (GLUCOSAMINE COMPLEX PO) Take 1 Tab by mouth daily.  Potassium Gluconate 595 mg (99 mg) tablet Take 595 mg by mouth two (2) times a day. No current facility-administered medications for this visit.        Vitals:    09/04/18 1347   BP: 130/70   Pulse: 70   Resp: 16   SpO2: 97%   Weight: 291 lb 9.6 oz (132.3 kg)   Height: 6' (1.829 m)     Social History     Social History    Marital status:      Spouse name: N/A    Number of children: N/A    Years of education: N/A     Occupational History    Not on file. Social History Main Topics    Smoking status: Former Smoker     Packs/day: 4.50     Years: 20.00     Types: Cigarettes     Quit date: 4/17/1970    Smokeless tobacco: Never Used    Alcohol use 0.0 oz/week     0 Standard drinks or equivalent per week      Comment: once a month may have some vodka    Drug use: No    Sexual activity: Not on file     Other Topics Concern    Not on file     Social History Narrative    Lives in [address] [place] [address],[address] with wife. Has 2 sons and 1 daughter. Works part time for [employer]. Worked at [employer] as an . Likes to travel and fish. I have reviewed the nurses notes, vitals, problem list, allergy list, medical history, family, social history and medications. Review of Symptoms:    General: Pt denies excessive weight gain or loss. Pt is able to conduct ADL's + fatigue  HEENT: Denies blurred vision, headaches, epistaxis and difficulty swallowing. Respiratory: Denies shortness of breath, + HUSAIN, wheezing or stridor. Cardiovascular: Denies precordial pain, palpitations, edema or PND  Gastrointestinal: Denies poor appetite, indigestion, abdominal pain or blood in stool  Musculoskeletal: Denies pain or swelling from muscles or joints  Neurologic: Denies tremor, paresthesias, or sensory motor disturbance  Skin: Denies rash, itching or texture change. Physical Exam:      General: Well developed, in no acute distress, cooperative and alert  HEENT: No carotid bruits, no JVD, trach is midline. Neck Supple, PEERL, EOM intact. Heart:  Normal S1/S2 negative S3 or S4. Regular, no murmur, gallop or rub.   Respiratory: Clear bilaterally x 4, no wheezing or rales  Abdomen:   Soft, non-tender, no masses, bowel sounds are active.   Extremities:  No edema, normal cap refill, no cyanosis, atraumatic. Neuro: A&Ox3, speech clear, gait stable. Skin: Skin color is normal. No rashes or lesions.  Non diaphoretic  Vascular: 2+ pulses symmetric in all extremities    Cardiographics    ECG: Normal sinus rhythm  Results for orders placed or performed during the hospital encounter of 09/11/15   EKG, 12 LEAD, INITIAL   Result Value Ref Range    Ventricular Rate 67 BPM    Atrial Rate 67 BPM    P-R Interval 156 ms    QRS Duration 114 ms    Q-T Interval 440 ms    QTC Calculation (Bezet) 464 ms    Calculated P Axis 62 degrees    Calculated R Axis 15 degrees    Calculated T Axis 39 degrees    Diagnosis       Normal sinus rhythm  When compared with ECG of 11-SEP-2015 13:52,  No significant change was found  Confirmed by Andreas Rae ([id number]) on 9/12/2015 10:37:04 AM           Cardiology Labs:  Lab Results   Component Value Date/Time    Cholesterol, total 109 12/21/2013 03:33 AM    HDL Cholesterol 33 12/21/2013 03:33 AM    LDL, calculated 51.8 12/21/2013 03:33 AM    Triglyceride 121 12/21/2013 03:33 AM    CHOL/HDL Ratio 3.3 12/21/2013 03:33 AM       Lab Results   Component Value Date/Time    Sodium 135 (L) 09/12/2015 05:39 AM    Potassium 4.3 09/12/2015 05:39 AM    Chloride 103 09/12/2015 05:39 AM    CO2 25 09/12/2015 05:39 AM    Anion gap 7 09/12/2015 05:39 AM    Glucose 84 09/12/2015 05:39 AM    BUN 13 09/12/2015 05:39 AM    Creatinine 0.84 09/12/2015 05:39 AM    BUN/Creatinine ratio 15 09/12/2015 05:39 AM    GFR est AA >60 09/12/2015 05:39 AM    GFR est non-AA >60 09/12/2015 05:39 AM    Calcium 8.7 09/12/2015 05:39 AM    Bilirubin, total 0.6 09/09/2015 02:27 PM    AST (SGOT) 20 09/09/2015 02:27 PM    Alk. phosphatase 53 09/09/2015 02:27 PM    Protein, total 6.9 09/09/2015 02:27 PM    Albumin 4.6 09/09/2015 02:27 PM    Globulin 3.5 05/17/2015 10:15 AM    A-G Ratio 2.0 09/09/2015 02:27 PM    ALT (SGPT) 26 09/09/2015 02:27 PM           Assessment:     Assessment:     Diagnoses and all orders for this visit:    1.  HUSAIN (dyspnea on exertion)  -     METABOLIC PANEL, COMPREHENSIVE  -     CBC WITH AUTOMATED DIFF  -     THYROID PANEL W/TSH  - LIPID PANEL  -     VITAMIN B12 & FOLATE  -     2D ECHO COMPLETE ADULT (TTE) W OR WO CONTR  -     LEXISCAN/CARDIOLITE, Clinic Performed; Future    2. Hyperlipidemia, mixed  -     AMB POC EKG ROUTINE W/ 12 LEADS, INTER & REP  -     METABOLIC PANEL, COMPREHENSIVE  -     CBC WITH AUTOMATED DIFF  -     THYROID PANEL W/TSH  -     LIPID PANEL  -     VITAMIN B12 & FOLATE  -     2D ECHO COMPLETE ADULT (TTE) W OR WO CONTR  -     LEXISCAN/CARDIOLITE, Clinic Performed; Future    3. Atherosclerosis of coronary artery bypass graft of native heart without angina pectoris  -     METABOLIC PANEL, COMPREHENSIVE  -     CBC WITH AUTOMATED DIFF  -     THYROID PANEL W/TSH  -     LIPID PANEL  -     VITAMIN B12 & FOLATE  -     2D ECHO COMPLETE ADULT (TTE) W OR WO CONTR  -     LEXISCAN/CARDIOLITE, Clinic Performed; Future    4. Paroxysmal atrial fibrillation (HCC)  -     METABOLIC PANEL, COMPREHENSIVE  -     CBC WITH AUTOMATED DIFF  -     THYROID PANEL W/TSH  -     LIPID PANEL  -     VITAMIN B12 & FOLATE  -     2D ECHO COMPLETE ADULT (TTE) W OR WO CONTR  -     LEXISCAN/CARDIOLITE, Clinic Performed; Future        ICD-10-CM ICD-9-CM    1. HUSANI (dyspnea on exertion) F85.84 081.54 METABOLIC PANEL, COMPREHENSIVE      CBC WITH AUTOMATED DIFF      THYROID PANEL W/TSH      LIPID PANEL      VITAMIN B12 & FOLATE      2D ECHO COMPLETE ADULT (TTE) W OR WO CONTR      STRESS TEST LEXISCAN/CARDIOLITE   2. Hyperlipidemia, mixed E78.2 272.2 AMB POC EKG ROUTINE W/ 12 LEADS, INTER & REP      METABOLIC PANEL, COMPREHENSIVE      CBC WITH AUTOMATED DIFF      THYROID PANEL W/TSH      LIPID PANEL      VITAMIN B12 & FOLATE      2D ECHO COMPLETE ADULT (TTE) W OR WO CONTR      STRESS TEST LEXISCAN/CARDIOLITE   3.  Atherosclerosis of coronary artery bypass graft of native heart without angina pectoris R80.651 824.25 METABOLIC PANEL, COMPREHENSIVE      CBC WITH AUTOMATED DIFF      THYROID PANEL W/TSH      LIPID PANEL      VITAMIN B12 & FOLATE      2D ECHO COMPLETE ADULT (TTE) W OR WO CONTR      STRESS TEST LEXISCAN/CARDIOLITE   4. Paroxysmal atrial fibrillation (HCC) S93.2 957.23 METABOLIC PANEL, COMPREHENSIVE      CBC WITH AUTOMATED DIFF      THYROID PANEL W/TSH      LIPID PANEL      VITAMIN B12 & FOLATE      2D ECHO COMPLETE ADULT (TTE) W OR WO CONTR      STRESS TEST LEXISCAN/CARDIOLITE     Orders Placed This Encounter    METABOLIC PANEL, COMPREHENSIVE    CBC WITH AUTOMATED DIFF    THYROID PANEL W/TSH    LIPID PANEL    VITAMIN B12 & FOLATE    AMB POC EKG ROUTINE W/ 12 LEADS, INTER & REP     Order Specific Question:   Reason for Exam:     Answer:   routine    LEXISCAN/CARDIOLITE, Clinic Performed     Standing Status:   Future     Standing Expiration Date:   3/4/2019     Order Specific Question:   Reason for Exam:     Answer:   HUSAIN    2D ECHO COMPLETE ADULT (TTE) W OR WO CONTR     Order Specific Question:   Reason for Exam:     Answer:   HUSAIN     Order Specific Question:   Contrast Enhancement (Bubble Study, Definity, Optison) may be used if criteria listed in established evidence-based protocol has been identified. Answer: Yes    vit C/E/Zn/coppr/lutein/zeaxan (PRESERVISION AREDS 2 PO)     Sig: Take  by mouth two (2) times a day. Plan:     1. Atherosclerotic heart disease: History of multivessel PTCA stenting reporting increasing fatigue weakness dyspnea on exertion. Will evaluate for ischemia with Lexiscan nuclear stress test, he is to remain on beta-blocker. 2.  Hypertension controlled 130/70 continue current medications  3. Dyspnea on exertion: Checking CBC echocardiogram.  4.  Fatigue: We will check vitamin B12 CBC thyroid panel  5. Hyperlipidemia: On statin therapy will check lipid panel. We will follow up with patient once test results are in. Patient scheduled for general six-month follow-up. Spencer Chang NP    This note was created using voice recognition software. Despite editing, there may be syntax errors.

## 2018-09-04 NOTE — PROGRESS NOTES
1. Have you been to the ER, urgent care clinic since your last visit? Hospitalized since your last visit? No    2. Have you seen or consulted any other health care providers outside of the [address] [place] [address] since your last visit? Include any pap smears or colon screening. Yes [facility] 6/2018    Patient has C/O fatigue and SOB.

## 2018-09-06 ENCOUNTER — HOSPITAL ENCOUNTER (OUTPATIENT)
Dept: LAB | Age: 73
Discharge: HOME | End: 2018-09-06
Payer: MEDICARE

## 2018-09-06 ENCOUNTER — CLINICAL SUPPORT (OUTPATIENT)
Dept: CARDIOLOGY CLINIC | Age: 73
End: 2018-09-06

## 2018-09-06 DIAGNOSIS — R06.09 DOE (DYSPNEA ON EXERTION): Primary | ICD-10-CM

## 2018-09-06 PROCEDURE — 85025 COMPLETE CBC W/AUTO DIFF WBC: CPT

## 2018-09-06 PROCEDURE — 84443 ASSAY THYROID STIM HORMONE: CPT

## 2018-09-06 PROCEDURE — 82607 VITAMIN B-12: CPT

## 2018-09-06 PROCEDURE — 36415 COLL VENOUS BLD VENIPUNCTURE: CPT

## 2018-09-06 PROCEDURE — 80053 COMPREHEN METABOLIC PANEL: CPT

## 2018-09-06 PROCEDURE — 80061 LIPID PANEL: CPT

## 2018-09-07 LAB
ALBUMIN SERPL-MCNC: 4.5 G/DL (ref 3.5–4.8)
ALBUMIN/GLOB SERPL: 1.9 {RATIO} (ref 1.2–2.2)
ALP SERPL-CCNC: 46 IU/L (ref 39–117)
ALT SERPL-CCNC: 26 IU/L (ref 0–44)
AST SERPL-CCNC: 21 IU/L (ref 0–40)
BASOPHILS # BLD AUTO: 0 X10E3/UL (ref 0–0.2)
BASOPHILS NFR BLD AUTO: 0 %
BILIRUB SERPL-MCNC: 0.8 MG/DL (ref 0–1.2)
BUN SERPL-MCNC: 15 MG/DL (ref 8–27)
BUN/CREAT SERPL: 17 (ref 10–24)
CALCIUM SERPL-MCNC: 9.8 MG/DL (ref 8.6–10.2)
CHLORIDE SERPL-SCNC: 100 MMOL/L (ref 96–106)
CHOLEST SERPL-MCNC: 88 MG/DL (ref 100–199)
CO2 SERPL-SCNC: 24 MMOL/L (ref 20–29)
CREAT SERPL-MCNC: 0.86 MG/DL (ref 0.76–1.27)
EOSINOPHIL # BLD AUTO: 0.3 X10E3/UL (ref 0–0.4)
EOSINOPHIL NFR BLD AUTO: 6 %
ERYTHROCYTE [DISTWIDTH] IN BLOOD BY AUTOMATED COUNT: 13.7 % (ref 12.3–15.4)
FOLATE SERPL-MCNC: >20 NG/ML
FT4I SERPL CALC-MCNC: 1.6 (ref 1.2–4.9)
GLOBULIN SER CALC-MCNC: 2.4 G/DL (ref 1.5–4.5)
GLUCOSE SERPL-MCNC: 83 MG/DL (ref 65–99)
HCT VFR BLD AUTO: 41.4 % (ref 37.5–51)
HDLC SERPL-MCNC: 39 MG/DL
HGB BLD-MCNC: 13.7 G/DL (ref 13–17.7)
IMM GRANULOCYTES # BLD: 0 X10E3/UL (ref 0–0.1)
IMM GRANULOCYTES NFR BLD: 0 %
INTERPRETATION, 910389: NORMAL
LDLC SERPL CALC-MCNC: 30 MG/DL (ref 0–99)
LYMPHOCYTES # BLD AUTO: 1.3 X10E3/UL (ref 0.7–3.1)
LYMPHOCYTES NFR BLD AUTO: 28 %
MCH RBC QN AUTO: 31.8 PG (ref 26.6–33)
MCHC RBC AUTO-ENTMCNC: 33.1 G/DL (ref 31.5–35.7)
MCV RBC AUTO: 96 FL (ref 79–97)
MONOCYTES # BLD AUTO: 0.4 X10E3/UL (ref 0.1–0.9)
MONOCYTES NFR BLD AUTO: 8 %
NEUTROPHILS # BLD AUTO: 2.8 X10E3/UL (ref 1.4–7)
NEUTROPHILS NFR BLD AUTO: 58 %
PLATELET # BLD AUTO: 178 X10E3/UL (ref 150–379)
POTASSIUM SERPL-SCNC: 4.7 MMOL/L (ref 3.5–5.2)
PROT SERPL-MCNC: 6.9 G/DL (ref 6–8.5)
RBC # BLD AUTO: 4.31 X10E6/UL (ref 4.14–5.8)
SODIUM SERPL-SCNC: 139 MMOL/L (ref 134–144)
T3RU NFR SERPL: 26 % (ref 24–39)
T4 SERPL-MCNC: 6.3 UG/DL (ref 4.5–12)
TRIGL SERPL-MCNC: 93 MG/DL (ref 0–149)
TSH SERPL DL<=0.005 MIU/L-ACNC: 2.08 UIU/ML (ref 0.45–4.5)
VIT B12 SERPL-MCNC: 529 PG/ML (ref 232–1245)
VLDLC SERPL CALC-MCNC: 19 MG/DL (ref 5–40)
WBC # BLD AUTO: 4.7 X10E3/UL (ref 3.4–10.8)

## 2018-09-07 NOTE — PROGRESS NOTES
Daja: Please call patient let him know his lab work for fatigue is all normal, cholesterol is at goal, proceed with stress test and echocardiogram as planned.

## 2018-09-11 ENCOUNTER — TELEPHONE (OUTPATIENT)
Dept: CARDIOLOGY CLINIC | Age: 73
End: 2018-09-11

## 2018-09-11 NOTE — TELEPHONE ENCOUNTER
Patient returned your call. Read message from [facility] note. Patient coming in Friday for the [facility]. Thanks!

## 2018-09-11 NOTE — PROGRESS NOTES
Spoke to patient's spouse, Tara Macias on HIPAA using 2 identifiers. Per Titus Scheuermann, NP, she was made aware that lab work for fatigue is all normal, cholesterol at goal, proceed with stress test and echocardiogram as planned. She verbalized understanding.

## 2018-09-11 NOTE — PROGRESS NOTES
Spoke to patient's spouse, Cait Grew on HIPAA using 2 identifiers. Per Shelton Lazcano NP, she was made aware that echo is normal for him.

## 2018-09-14 ENCOUNTER — CLINICAL SUPPORT (OUTPATIENT)
Dept: CARDIOLOGY CLINIC | Age: 73
End: 2018-09-14

## 2018-09-14 DIAGNOSIS — R06.09 DOE (DYSPNEA ON EXERTION): Primary | ICD-10-CM

## 2018-09-17 ENCOUNTER — CLINICAL SUPPORT (OUTPATIENT)
Dept: CARDIOLOGY CLINIC | Age: 73
End: 2018-09-17

## 2018-09-17 DIAGNOSIS — E78.2 HYPERLIPIDEMIA, MIXED: ICD-10-CM

## 2018-09-17 DIAGNOSIS — I48.0 PAROXYSMAL ATRIAL FIBRILLATION (HCC): ICD-10-CM

## 2018-09-17 DIAGNOSIS — I25.810 ATHEROSCLEROSIS OF CORONARY ARTERY BYPASS GRAFT OF NATIVE HEART WITHOUT ANGINA PECTORIS: ICD-10-CM

## 2018-09-17 DIAGNOSIS — R06.09 DOE (DYSPNEA ON EXERTION): ICD-10-CM

## 2018-09-18 NOTE — PROGRESS NOTES
Spoke with patient's wife(verified HIPPA)  Verified patient with 2 patient identifiers  Informed stress test normal per Aimee Lowe NP  Wife verbalized understanding

## 2018-10-12 RX ORDER — AMLODIPINE BESYLATE 2.5 MG/1
TABLET ORAL
Qty: 90 TAB | Refills: 3 | Status: SHIPPED | OUTPATIENT
Start: 2018-10-12 | End: 2019-09-06 | Stop reason: SDUPTHER

## 2018-10-12 RX ORDER — METOPROLOL SUCCINATE 50 MG/1
TABLET, EXTENDED RELEASE ORAL
Qty: 90 TAB | Refills: 2 | Status: SHIPPED | OUTPATIENT
Start: 2018-10-12 | End: 2019-09-06 | Stop reason: SDUPTHER

## 2018-10-12 RX ORDER — ISOSORBIDE MONONITRATE 60 MG/1
TABLET, EXTENDED RELEASE ORAL
Qty: 90 TAB | Refills: 3 | Status: SHIPPED | OUTPATIENT
Start: 2018-10-12 | End: 2019-11-26

## 2019-01-17 RX ORDER — LOSARTAN POTASSIUM 50 MG/1
TABLET ORAL
Qty: 90 TAB | Refills: 3 | Status: SHIPPED | OUTPATIENT
Start: 2019-01-17 | End: 2019-03-06

## 2019-01-17 RX ORDER — ATORVASTATIN CALCIUM 40 MG/1
TABLET, FILM COATED ORAL
Qty: 90 TAB | Refills: 3 | Status: SHIPPED | OUTPATIENT
Start: 2019-01-17 | End: 2020-01-09

## 2019-03-06 ENCOUNTER — OFFICE VISIT (OUTPATIENT)
Dept: CARDIOLOGY CLINIC | Age: 74
End: 2019-03-06

## 2019-03-06 VITALS
DIASTOLIC BLOOD PRESSURE: 64 MMHG | HEIGHT: 72 IN | RESPIRATION RATE: 16 BRPM | WEIGHT: 292 LBS | OXYGEN SATURATION: 96 % | BODY MASS INDEX: 39.55 KG/M2 | HEART RATE: 68 BPM | SYSTOLIC BLOOD PRESSURE: 118 MMHG

## 2019-03-06 DIAGNOSIS — I25.810 ATHEROSCLEROSIS OF CORONARY ARTERY BYPASS GRAFT OF NATIVE HEART WITHOUT ANGINA PECTORIS: ICD-10-CM

## 2019-03-06 DIAGNOSIS — I48.0 PAROXYSMAL ATRIAL FIBRILLATION (HCC): ICD-10-CM

## 2019-03-06 DIAGNOSIS — I25.10 ASHD (ARTERIOSCLEROTIC HEART DISEASE): Primary | ICD-10-CM

## 2019-03-06 DIAGNOSIS — R06.09 DOE (DYSPNEA ON EXERTION): ICD-10-CM

## 2019-03-06 DIAGNOSIS — Z95.5 S/P CORONARY ARTERY STENT PLACEMENT: ICD-10-CM

## 2019-03-06 RX ORDER — LOSARTAN POTASSIUM 25 MG/1
25 TABLET ORAL DAILY
Qty: 90 TAB | Refills: 1 | Status: SHIPPED | OUTPATIENT
Start: 2019-03-06 | End: 2019-03-06 | Stop reason: SDUPTHER

## 2019-03-06 RX ORDER — LOSARTAN POTASSIUM 25 MG/1
25 TABLET ORAL DAILY
Qty: 90 TAB | Refills: 1 | Status: SHIPPED | OUTPATIENT
Start: 2019-03-06 | End: 2019-09-06 | Stop reason: SDUPTHER

## 2019-03-06 NOTE — PROGRESS NOTES
Saji Connolly DNP, ANP-BC  Subjective/HPI:     Andrew Lilly is a 74 y.o. male is here for routine f/u. The patient denies chest pain/resting shortness of breath, orthopnea, PND, LE edema, palpitations, syncope, presyncope or fatigue. Patient reports he has some mild shortness of breath when climbing the stairs. He also has some intermittent dizziness with positional change. PCP Provider  Den Hernández MD  Past Medical History:   Diagnosis Date    AF (atrial fibrillation) (HCC)     s/p DCCV after his CABG, no recurrence    Anxiety     Arrhythmia     a fib    Arthritis     CAD (coronary artery disease)     s/p CABG in 1997    GERD (gastroesophageal reflux disease)     Goiter, nontoxic, multinodular     Hypertension     Low blood potassium     Myocardial infarct ([address] ? 1997    MARIA ISABEL on CPAP     Other and unspecified hyperlipidemia     Unspecified sleep apnea       Past Surgical History:   Procedure Laterality Date    CARDIAC SURG PROCEDURE UNLIST  1997    bypass    HX CATARACT REMOVAL  2006    bilateral    HX CHOLECYSTECTOMY  1970's    HX ORTHOPAEDIC      Arthoscopic L. knee surgery     HX SEPTOPLASTY  1980's    HX SHOULDER ARTHROSCOPY  2012    left    RIGHT HEART CATHETERIZATION      x3 states pt. (can't remember when)    RT/LT HEART CATHETERS  12/2013    PTCA    Heart Dr. Jamee Vides     Allergies   Allergen Reactions    Demerol [Meperidine] Other (comments)     hallucinations  hallucinations      Family History   Problem Relation Age of Onset    Cancer Father         in his shoulder    Heart Attack Father     Heart Disease Father     Heart Disease Mother     Cancer Mother         abd     Cancer Son         wilm's tumor    Cancer Sister         abd    Thyroid Disease Neg Hx       Current Outpatient Medications   Medication Sig    losartan (COZAAR) 25 mg tablet Take 1 Tab by mouth daily.  Reduced 3/6/19    atorvastatin (LIPITOR) 40 mg tablet TAKE 1 TABLET EVERY DAY.  (STOP SIMVASTATIN)    amLODIPine (NORVASC) 2.5 mg tablet TAKE 1 TABLET EVERY DAY    isosorbide mononitrate ER (IMDUR) 60 mg CR tablet TAKE 1 TABLET EVERY DAY    metoprolol succinate (TOPROL-XL) 50 mg XL tablet TAKE 1 TABLET EVERY NIGHT    vit C/E/Zn/coppr/lutein/zeaxan (PRESERVISION AREDS 2 PO) Take  by mouth two (2) times a day.  nitroglycerin (NITROSTAT) 0.4 mg SL tablet 1 Tab by SubLINGual route every five (5) minutes as needed for Chest Pain (max 3 daily for chest pain).  pantoprazole (PROTONIX) 40 mg tablet TAKE 1 TABLET EVERY DAY  (STOP  PRILOSEC)    Potassium Gluconate 595 mg (99 mg) tablet Take 595 mg by mouth two (2) times a day.  hydrocortisone (HYTONE) 2.5 % topical cream     OXYGEN-AIR DELIVERY SYSTEMS (HORIZON NASAL CPAP SYSTEM) by Does Not Apply route.  acetaminophen (TYLENOL ARTHRITIS PAIN) 650 mg CR tablet Take 650 mg by mouth four (4) times daily.  ferrous sulfate (IRON) 325 mg (65 mg iron) tablet Take 325 mg by mouth two (2) times a day.  citalopram (CELEXA) 10 mg tablet Take 10 mg by mouth daily.  omega-3 fatty acids-vitamin e (FISH OIL) 1,000 mg Cap Take 2 Caps by mouth.  multivitamin, stress formula (STRESS TAB) tablet Take 1 Tab by mouth daily.  aspirin 81 mg tablet Take 162 mg by mouth daily.  GLUC HCL/GLUC DESHPANDE/AC-D-GLUCOS (GLUCOSAMINE COMPLEX PO) Take 1 Tab by mouth daily.  oxyCODONE-acetaminophen (PERCOCET) 5-325 mg per tablet Take  by mouth every four (4) hours as needed for Pain.  hydroxychloroquine (PLAQUENIL) 200 mg tablet Take 200 mg by mouth two (2) times a day. No current facility-administered medications for this visit.        Vitals:    03/06/19 1307 03/06/19 1317 03/06/19 1318   BP: 130/70 120/66 118/64   Pulse: 68     Resp: 16     SpO2: 96%     Weight: 292 lb (132.5 kg)     Height: 6' (1.829 m)       Social History     Socioeconomic History    Marital status:      Spouse name: Not on file    Number of children: Not on file    Years of education: Not on file    Highest education level: Not on file   Social Needs    Financial resource strain: Not on file    Food insecurity - worry: Not on file    Food insecurity - inability: Not on file    Transportation needs - medical: Not on file   [employer] needs - non-medical: Not on file   Occupational History    Not on file   Tobacco Use    Smoking status: Former Smoker     Packs/day: 4.50     Years: 20.00     Pack years: 90.00     Types: Cigarettes     Last attempt to quit: 1970     Years since quittin.9    Smokeless tobacco: Never Used   Substance and Sexual Activity    Alcohol use: Yes     Alcohol/week: 0.0 oz     Comment: once a month may have some vodka    Drug use: No    Sexual activity: Not on file   Other Topics Concern    Not on file   Social History Narrative    Lives in [address] [place] [address],[address] with wife. Has 2 sons and 1 daughter. Works part time for [employer]. Worked at [employer] and [employer] as an . Likes to travel and fish. I have reviewed the nurses notes, vitals, problem list, allergy list, medical history, family, social history and medications. Review of Symptoms:    General: Pt denies excessive weight gain or loss. Pt is able to conduct ADL's  HEENT: Denies blurred vision, headaches, epistaxis and difficulty swallowing. Respiratory: Denies shortness of breath, + mild HUSAIN, wheezing or stridor. Cardiovascular: Denies precordial pain, palpitations, edema or PND  Gastrointestinal: Denies poor appetite, indigestion, abdominal pain or blood in stool  Musculoskeletal: Denies pain or swelling from muscles or joints  Neurologic: Denies tremor, paresthesias, or sensory motor disturbance the exception of some very mild dizziness with positional change  Skin: Denies rash, itching or texture change. Physical Exam:      General: Well developed, in no acute distress, cooperative and alert  HEENT: No carotid bruits, no JVD, trach is midline.  Neck Supple, PEERL, EOM intact. Heart:  Normal S1/S2 negative S3 or S4. Regular, no murmur, gallop or rub.   Respiratory: Clear bilaterally x 4, no wheezing or rales  Abdomen:   Soft, non-tender, no masses, bowel sounds are active.   Extremities:  No edema, normal cap refill, no cyanosis, atraumatic. Neuro: A&Ox3, speech clear, gait stable. Skin: Skin color is normal. No rashes or lesions. Non diaphoretic  Vascular: 2+ pulses symmetric in all extremities,     Cardiographics    ECG: Normal sinus rhythm  Results for orders placed or performed during the hospital encounter of 09/11/15   EKG, 12 LEAD, INITIAL   Result Value Ref Range    Ventricular Rate 67 BPM    Atrial Rate 67 BPM    P-R Interval 156 ms    QRS Duration 114 ms    Q-T Interval 440 ms    QTC Calculation (Bezet) 464 ms    Calculated P Axis 62 degrees    Calculated R Axis 15 degrees    Calculated T Axis 39 degrees    Diagnosis       Normal sinus rhythm  When compared with ECG of 11-SEP-2015 13:52,  No significant change was found  Confirmed by Mohinder Carey ([id number]) on 9/12/2015 10:37:04 AM           Cardiology Labs:  Lab Results   Component Value Date/Time    Cholesterol, total 88 (L) 09/06/2018 09:40 AM    HDL Cholesterol 39 (L) 09/06/2018 09:40 AM    LDL, calculated 30 09/06/2018 09:40 AM    Triglyceride 93 09/06/2018 09:40 AM    CHOL/HDL Ratio 3.3 12/21/2013 03:33 AM       Lab Results   Component Value Date/Time    Sodium 139 09/06/2018 09:40 AM    Potassium 4.7 09/06/2018 09:40 AM    Chloride 100 09/06/2018 09:40 AM    CO2 24 09/06/2018 09:40 AM    Anion gap 7 09/12/2015 05:39 AM    Glucose 83 09/06/2018 09:40 AM    BUN 15 09/06/2018 09:40 AM    Creatinine 0.86 09/06/2018 09:40 AM    BUN/Creatinine ratio 17 09/06/2018 09:40 AM    GFR est AA 99 09/06/2018 09:40 AM    GFR est non-AA 85 09/06/2018 09:40 AM    Calcium 9.8 09/06/2018 09:40 AM    Bilirubin, total 0.8 09/06/2018 09:40 AM    AST (SGOT) 21 09/06/2018 09:40 AM    Alk.  phosphatase 46 09/06/2018 09:40 AM    Protein, total 6.9 09/06/2018 09:40 AM    Albumin 4.5 09/06/2018 09:40 AM    Globulin 3.5 05/17/2015 10:15 AM    A-G Ratio 1.9 09/06/2018 09:40 AM    ALT (SGPT) 26 09/06/2018 09:40 AM           Assessment:     Assessment:     Diagnoses and all orders for this visit:    1. ASHD (arteriosclerotic heart disease)    2. Paroxysmal atrial fibrillation (HCC)  -     AMB POC EKG ROUTINE W/ 12 LEADS, INTER & REP    3. Atherosclerosis of coronary artery bypass graft of native heart without angina pectoris    4. HUSAIN (dyspnea on exertion)    5. S/P coronary artery stent placement    Other orders  -     losartan (COZAAR) 25 mg tablet; Take 1 Tab by mouth daily. Reduced 3/6/19        ICD-10-CM ICD-9-CM    1. ASHD (arteriosclerotic heart disease) I25.10 414.00    2. Paroxysmal atrial fibrillation (HCC) I48.0 427.31 AMB POC EKG ROUTINE W/ 12 LEADS, INTER & REP   3. Atherosclerosis of coronary artery bypass graft of native heart without angina pectoris I25.810 414.05    4. HUSAIN (dyspnea on exertion) R06.09 786.09    5. S/P coronary artery stent placement Z95.5 V45.82      Orders Placed This Encounter    AMB POC EKG ROUTINE W/ 12 LEADS, INTER & REP     Order Specific Question:   Reason for Exam:     Answer:   ROUTINE    DISCONTD: losartan (COZAAR) 25 mg tablet     Sig: Take 1 Tab by mouth daily. Reduced 3/6/19     Dispense:  90 Tab     Refill:  1    losartan (COZAAR) 25 mg tablet     Sig: Take 1 Tab by mouth daily. Reduced 3/6/19     Dispense:  90 Tab     Refill:  1        Plan:     1. Atherosclerotic heart disease: History of CABG and multivessel PTCA stenting: September 2018 Lexiscan negative for ischemia. 2.  Paroxysmal atrial fibrillation: Transient postop CABG has remained in sinus rhythm, continue aspirin therapy  3. Hypertension: Controlled 118/64 has mild positional dizziness will decrease losartan from 50 mg down to 25 mg daily  4.   Hyperlipidemia: LDL at target September 2018 repeat at next 6-month follow-up if not performed by primary care  5. Obesity: Discussed with patient dyspnea on exertion given recent cardiac workup all normal is secondary to elevated BMI. His wife informs me she is putting him on Nutrisystem for 2 months striving to get him to lose 40 pounds this year. Follow-up for regular visit in 6 months. Discussed with Charansera Jinny once he reaches a 20 pound weight loss to come back for blood pressure recheck and will consider reducing some of his antihypertensives. Jesi Maria NP    This note was created using voice recognition software. Despite editing, there may be syntax errors.

## 2019-03-06 NOTE — PROGRESS NOTES
1. Have you been to the ER, urgent care clinic since your last visit? Hospitalized since your last visit? NO    2. Have you seen or consulted any other health care providers outside of the [facility] since your last visit? Include any pap smears or colon screening. YES, PCP.      6 MONTH FOLLOW UP. C/O LIGHTHEADEDNESS, SOB.

## 2019-04-04 RX ORDER — PANTOPRAZOLE SODIUM 40 MG/1
TABLET, DELAYED RELEASE ORAL
Qty: 90 TAB | Refills: 3 | Status: SHIPPED | OUTPATIENT
Start: 2019-04-04 | End: 2020-04-16

## 2019-09-03 ENCOUNTER — CLINICAL SUPPORT (OUTPATIENT)
Dept: CARDIOLOGY CLINIC | Age: 74
End: 2019-09-03

## 2019-09-03 ENCOUNTER — OFFICE VISIT (OUTPATIENT)
Dept: CARDIOLOGY CLINIC | Age: 74
End: 2019-09-03

## 2019-09-03 VITALS
SYSTOLIC BLOOD PRESSURE: 128 MMHG | OXYGEN SATURATION: 97 % | BODY MASS INDEX: 40.09 KG/M2 | HEART RATE: 71 BPM | WEIGHT: 296 LBS | HEIGHT: 72 IN | DIASTOLIC BLOOD PRESSURE: 72 MMHG | RESPIRATION RATE: 20 BRPM

## 2019-09-03 DIAGNOSIS — I25.810 ATHEROSCLEROSIS OF CORONARY ARTERY BYPASS GRAFT OF NATIVE HEART WITHOUT ANGINA PECTORIS: ICD-10-CM

## 2019-09-03 DIAGNOSIS — I48.91 ATRIAL FIBRILLATION, UNSPECIFIED TYPE (HCC): ICD-10-CM

## 2019-09-03 DIAGNOSIS — Z95.5 S/P CORONARY ARTERY STENT PLACEMENT: ICD-10-CM

## 2019-09-03 DIAGNOSIS — E78.2 HYPERLIPIDEMIA, MIXED: ICD-10-CM

## 2019-09-03 DIAGNOSIS — I25.810 ATHEROSCLEROSIS OF CORONARY ARTERY BYPASS GRAFT OF NATIVE HEART WITHOUT ANGINA PECTORIS: Primary | ICD-10-CM

## 2019-09-03 DIAGNOSIS — R42 DIZZINESS: ICD-10-CM

## 2019-09-03 DIAGNOSIS — I48.0 PAROXYSMAL ATRIAL FIBRILLATION (HCC): ICD-10-CM

## 2019-09-03 DIAGNOSIS — I49.9 IRREGULAR HEARTBEAT: ICD-10-CM

## 2019-09-03 RX ORDER — NAPROXEN 500 MG/1
250 TABLET ORAL 2 TIMES DAILY WITH MEALS
COMMUNITY
Start: 2019-08-15 | End: 2019-12-21

## 2019-09-03 NOTE — PROGRESS NOTES
1. Have you been to the ER, urgent care clinic since your last visit? Hospitalized since your last visit? No    2. Have you seen or consulted any other health care providers outside of the [address] [place] [address] since your last visit? Include any pap smears or colon screening.  No    Chief Complaint   Patient presents with    Cholesterol Problem     6 mo f/u    Irregular Heart Beat     6 mo f/u    Chest Pain (Angina)     pt c/o occasional chest discomfort and tightness x 6 wks    Shortness of Breath

## 2019-09-03 NOTE — PROGRESS NOTES
Carlos Enrique Groves DNP, ANP-BC  Subjective/HPI:     Junior Hearn is a 74 y.o. male is here for routine f/u. [facility] reports for the last 6 weeks he has been having intermittent episodes of chest tightness and discomfort that is precipitated by physical activity such as walking or going up some stairs. Reports intermittent dyspnea unchanged from previous visit. States he has been compliant with all medications. He is also reporting intermittent dizziness, occurs on a daily basis without any specific trigger. Denies any fall, syncope or presyncopal episodes. Has a history of obstructive sleep apnea states he is fully compliant wearing CPAP device, sleeping anywhere from 10 to 12 hours daily. He is also reporting fatigue. Reviewed flow chart regarding weight, he lost 6 pounds while he using Slim fast however gained the weight back. He is currently on Nutrisystem with his wife however admits to eating additional food than what is supplied by [facility] and has not seen any weight loss. Per patient he labs recently performed at PCP \"all normal\". PCP Provider  Rommel Rodgres MD  Past Medical History:   Diagnosis Date    [facility]at[facility] ([facility])     s/p DCCV after his CABG, no recurrence    Anxiety     Arrhythmia     a fib    Arthritis     CAD (coronary artery disease)     s/p CABG in 1997    GERD (gastroesophageal reflux disease)     Goiter, nontoxic, multinodular     Hypertension     Low blood potassium     Myocardial infarct ([facility] [address] ? 1997    MARIA ISABEL on CPAP     Other and unspecified hyperlipidemia     Unspecified sleep apnea       Past Surgical History:   Procedure Laterality Date    CARDIAC SURG PROCEDURE UNLIST  1997    bypass    HX CATARACT REMOVAL  2006    bilateral    HX CHOLECYSTECTOMY  1970's    HX ORTHOPAEDIC      Arthoscopic L. knee surgery     HX SEPTOPLASTY  1980's    HX SHOULDER ARTHROSCOPY  2012    left    RIGHT HEART CATHETERIZATION      x3 states pt. (can't remember when)  RT/LT HEART CATHETERS  12/2013    PTCA    Heart Dr. Chaz Valverde [Meper[facility]] Other (comments)     hallucinations  hallucinations      Family History   Problem Relation Age of Onset    Cancer Father         in his shoulder    Heart Attack Father     Heart Disease Father     Heart Disease Mother     Cancer Mother         abd     Cancer Son         wilm's tumor    Cancer Sister         abd    Thyroid Disease Neg Hx       Current Outpatient Medications   Medication Sig    naproxen (NAPROSYN) 500 mg tablet     pantoprazole (PROTONIX) 40 mg tablet TAKE 1 TABLET EVERY DAY  (STOP  PRILOSEC)    losartan (COZAAR) 25 mg tablet Take 1 Tab by mouth daily. Reduced 3/6/19    atorvastatin (LIPITOR) 40 mg tablet TAKE 1 TABLET EVERY DAY.  (STOP  SIMVASTATIN)    amLODIPine (NORVASC) 2.5 mg tablet TAKE 1 TABLET EVERY DAY    isosorbide mononitrate ER (IMDUR) 60 mg CR tablet TAKE 1 TABLET EVERY DAY    metoprolol succinate (TOPROL-XL) 50 mg XL tablet TAKE 1 TABLET EVERY NIGHT    vit C/E/Zn/coppr/lutein/zeaxan (PRESERVISION AREDS 2 PO) Take  by mouth two (2) times a day.  oxyCODONE-acetaminophen (PERCOCET) 5-325 mg per tablet Take  by mouth every four (4) hours as needed for Pain.  nitroglycerin (NITROSTAT) 0.4 mg SL tablet 1 Tab by SubLINGual route every five (5) minutes as needed for Chest Pain (max 3 daily for chest pain).  hydroxychloroquine (PLAQUENIL) 200 mg tablet Take 200 mg by mouth two (2) times a day.  hydrocortisone (HYTONE) 2.5 % topical cream     OXYGEN-AIR DELIVERY SYSTEMS (HORIZON NASAL CPAP SYSTEM) by Does Not Apply route.  acetaminophen (TYLENOL ARTHRITIS PAIN) 650 mg CR tablet Take 650 mg by mouth four (4) times daily.  ferrous sulfate (IRON) 325 mg (65 mg iron) tablet Take 325 mg by mouth two (2) times a day.  citalopram (CELEXA) 10 mg tablet Take 10 mg by mouth daily.     omega-3 fatty acids-vitamin e (FISH OIL) 1,000 mg Cap Take 2 Caps by mouth.  aspirin 81 mg tablet Take 162 mg by mouth daily.  GLUC HCL/GLUC DESHPANDE/AC-D-GLUCOS (GLUCOSAMINE COMPLEX PO) Take 1 Tab by mouth daily.  Potassium Gluconate 595 mg (99 mg) tablet Take 595 mg by mouth two (2) times a day.  multivitamin, stress formula (STRESS TAB) tablet Take 1 Tab by mouth daily. No current facility-administered medications for this visit. Vitals:    19 1332 19 1343   BP: 130/70 128/72   Pulse: 71    Resp: 20    SpO2: 97%    Weight: 296 lb (134.3 kg)    Height: 6' (1.829 m)      Social History     Socioeconomic History    Marital status:      Spouse name: Not on file    Number of children: Not on file    Years of education: Not on file    Highest education level: Not on file   Occupational History    Not on file   Social Needs    Financial resource strain: Not on file    Food insecurity:     Worry: Not on file     Inability: Not on file    Transportation needs:     Medical: Not on file     Non-medical: Not on file   Tobacco Use    Smoking status: Former Smoker     Packs/day: 4.50     Years: 20.00     Pack years: 90.00     Types: Cigarettes     Last attempt to quit: 1970     Years since quittin.4    Smokeless tobacco: Never Used   Substance and Sexual Activity    Alcohol use:  Yes     Alcohol/week: 0.0 standard drinks     Comment: once a month may have some vodka    Drug use: No    Sexual activity: Not on file   Lifestyle    Physical activity:     Days per week: Not on file     Minutes per session: Not on file    Stress: Not on file   Relationships    Social connections:     Talks on phone: Not on file     Gets together: Not on file     Attends [employer] service: Not on file     Active member of club or organization: Not on file     Attends meetings of clubs or organizations: Not on file     Relationship status: Not on file    Intimate partner violence:     Fear of current or ex partner: Not on file     Emotionally abused: Not on file     Physically abused: Not on file     Forced sexual activity: Not on file   Other Topics Concern    Not on file   Social History Narrative    Lives in [address] [place] [address] with wife. Has 2 sons and 1 daughter. Works part time for [employer]. Worked at [employer] as an . Likes to travel and fish. I have reviewed the nurses notes, vitals, problem list, allergy list, medical history, family, social history and medications. Review of Symptoms:    General: Pt denies excessive weight gain or loss. Pt is able to conduct ADL's  HEENT: Denies blurred vision, headaches, epistaxis and difficulty swallowing. Respiratory: Denies shortness of breath, HUSAIN, wheezing or stridor. Cardiovascular: Denies precordial pain, palpitations, edema or PND  Gastrointestinal: Denies poor appetite, indigestion, abdominal pain or blood in stool  Musculoskeletal: Denies pain or swelling from muscles or joints  Neurologic: Denies tremor, paresthesias, + intermittent dizziness see HPI  Skin: Denies rash, itching or texture change. Physical Exam:      General: Well developed, in no acute distress, cooperative and alert  HEENT: No carotid bruits, no JVD, trach is midline. Neck Supple, PEERL, EOM intact. Heart:  Normal S1/S2 negative S3 or S4. Regular, no murmur, gallop or rub.   Respiratory: Clear bilaterally x 4, no wheezing or rales  Abdomen:   Soft, non-tender, no masses, bowel sounds are active.   Extremities:  No edema, normal cap refill, no cyanosis, atraumatic. Neuro: A&Ox3, speech clear, gait stable. Skin: Skin color is normal. No rashes or lesions.  Non diaphoretic  Vascular: 2+ pulses symmetric in all extremities    Cardiographics    ECG: Right bundle branch block  Results for orders placed or performed during the hospital encounter of 09/11/15   EKG, 12 LEAD, INITIAL   Result Value Ref Range    Ventricular Rate 67 BPM    Atrial Rate 67 BPM    P-R Interval 156 ms    QRS Duration 114 ms    Q-T Interval 440 ms    QTC Calculation (Bezet) 464 ms    Calculated P Axis 62 degrees    Calculated R Axis 15 degrees    Calculated T Axis 39 degrees    Diagnosis       Normal sinus rhythm  When compared with ECG of 11-SEP-2015 13:52,  No significant change was found  Confirmed by Cyndie Bates ([id number]) on 9/12/2015 10:37:04 AM           Cardiology Labs:  Lab Results   Component Value Date/Time    Cholesterol, total 88 (L) 09/06/2018 09:40 AM    HDL Cholesterol 39 (L) 09/06/2018 09:40 AM    LDL, calculated 30 09/06/2018 09:40 AM    Triglyceride 93 09/06/2018 09:40 AM    CHOL/HDL Ratio 3.3 12/21/2013 03:33 AM       Lab Results   Component Value Date/Time    Sodium 139 09/06/2018 09:40 AM    Potassium 4.7 09/06/2018 09:40 AM    Chloride 100 09/06/2018 09:40 AM    CO2 24 09/06/2018 09:40 AM    Anion gap 7 09/12/2015 05:39 AM    Glucose 83 09/06/2018 09:40 AM    BUN 15 09/06/2018 09:40 AM    Creatinine 0.86 09/06/2018 09:40 AM    BUN/Creatinine ratio 17 09/06/2018 09:40 AM    GFR est AA 99 09/06/2018 09:40 AM    GFR est non-AA 85 09/06/2018 09:40 AM    Calcium 9.8 09/06/2018 09:40 AM    Bilirubin, total 0.8 09/06/2018 09:40 AM    AST (SGOT) 21 09/06/2018 09:40 AM    Alk. phosphatase 46 09/06/2018 09:40 AM    Protein, total 6.9 09/06/2018 09:40 AM    Albumin 4.5 09/06/2018 09:40 AM    Globulin 3.5 05/17/2015 10:15 AM    A-G Ratio 1.9 09/06/2018 09:40 AM    ALT (SGPT) 26 09/06/2018 09:40 AM           Assessment:     Assessment:     Diagnoses and all orders for this visit:    1. Atherosclerosis of coronary artery bypass graft of native heart without angina pectoris  -     NUCLEAR CARDIAC STRESS TEST; Future  -     DUPLEX CAROTID BILATERAL; Future  -     CARDIAC HOLTER MONITOR; Future    2. Hyperlipidemia, mixed  -     AMB POC EKG ROUTINE W/ 12 LEADS, INTER & REP  -     NUCLEAR CARDIAC STRESS TEST; Future  -     DUPLEX CAROTID BILATERAL; Future  -     CARDIAC HOLTER MONITOR; Future    3.  Paroxysmal atrial fibrillation ([facility] [address]  -     NUCLEAR CARDIAC STRESS TEST; Future  -     DUPLEX CAROTID BILATERAL; Future  -     CARDIAC HOLTER MONITOR; Future    4. S/P coronary artery stent placement  -     NUCLEAR CARDIAC STRESS TEST; Future  -     DUPLEX CAROTID BILATERAL; Future  -     CARDIAC HOLTER MONITOR; Future    5. Dizziness        ICD-10-CM ICD-9-CM    1. Atherosclerosis of coronary artery bypass graft of native heart without angina pectoris I25.810 414.05 NUCLEAR CARDIAC STRESS TEST      DUPLEX CAROTID BILATERAL      CARDIAC HOLTER MONITOR   2. Hyperlipidemia, mixed E78.2 272.2 AMB POC EKG ROUTINE W/ 12 LEADS, INTER & REP      NUCLEAR CARDIAC STRESS TEST      DUPLEX CAROTID BILATERAL      CARDIAC HOLTER MONITOR   3. Paroxysmal atrial fibrillation (HCC) I48.0 427.31 NUCLEAR CARDIAC STRESS TEST      DUPLEX CAROTID BILATERAL      CARDIAC HOLTER MONITOR   4. S/P coronary artery stent placement Z95.5 V45.82 NUCLEAR CARDIAC STRESS TEST      DUPLEX CAROTID BILATERAL      CARDIAC HOLTER MONITOR   5. Dizziness R42 780.4      Orders Placed This Encounter    AMB POC EKG ROUTINE W/ 12 LEADS, INTER & REP     Order Specific Question:   Reason for Exam:     Answer:   Routine    naproxen (NAPROSYN) 500 mg tablet        Plan:     1. Atherosclerotic heart disease: History of CABG, PTCA stenting reporting 6-week history of exertional related chest discomfort currently on 3 antianginal therapies will proceed with Lexiscan nuclear stress test to evaluate for ischemia  2. Hypertension: Controlled 128/72 continue current medications  3. Hyperlipidemia: On statin therapy labs checked by PCP, requesting copy  4. Dizziness: We will evaluate with Holter monitor as he states dizziness can occur daily, carotid duplex given history of vascular disease.   Follow-up when testing complete,                                                                                                                       ((6-month visit scheduled for 2020.))  Tony Gregg NP    This note was created using voice recognition software. Despite editing, there may be syntax errors.

## 2019-09-06 RX ORDER — ISOSORBIDE MONONITRATE 60 MG/1
TABLET, EXTENDED RELEASE ORAL
Qty: 90 TAB | Refills: 3 | Status: CANCELLED | OUTPATIENT
Start: 2019-09-06

## 2019-09-06 NOTE — PROGRESS NOTES
Spoke to patient using 2 identifiers. Per Kwame Rainey NP, patient was made aware he wants to reduce metoprolol to 12.5 mg. His monitor shows intermittent heart block with junctional rhythm which is probably the cause of his dizziness. Please schedule an appointment with Dr. Ankita Bhatt to discuss probable pacemaker placement. He will need to be on a beta-blocker given his heart history. Proceed with stress test and carotid duplex. Patient stated that he is switching to a new insurance perhaps around Oct. 1, 19. He would like to postpone probable pacemaker placement, stress test and carotid duplex until he receives new insurance.

## 2019-09-06 NOTE — PROGRESS NOTES
Daja: Please call patient, want to reduce metoprolol to 12.5 mg. His monitor shows intermittent heart block with junctional rhythm which is probably the cause of his dizziness. Please schedule an appointment with Dr. Ara Acharya to discuss probable pacemaker placement. He will need to be on a beta-blocker given his heart history. Proceed with stress test and carotid duplex. Wound Edema?: mild Detail Level: Simple Follow Up Units (Optional): 0 Wound Color?: red Sutures?: partially intact Patient To Follow-Up With?: our clinic Add 51129 Cpt? (Important Note: In 2017 The Use Of 12494 Is Being Tracked By Cms To Determine Future Global Period Reimbursement For Global Periods): yes Wound Granulation?: early Wound Crusting?: clean Wound Evaluated By (Optional): Dr. Goldberg

## 2019-09-09 ENCOUNTER — TELEPHONE (OUTPATIENT)
Dept: CARDIOLOGY CLINIC | Age: 74
End: 2019-09-09

## 2019-09-09 RX ORDER — AMLODIPINE BESYLATE 2.5 MG/1
TABLET ORAL
Qty: 90 TAB | Refills: 1 | Status: SHIPPED | OUTPATIENT
Start: 2019-09-09 | End: 2020-05-12

## 2019-09-09 RX ORDER — METOPROLOL SUCCINATE 25 MG/1
TABLET, EXTENDED RELEASE ORAL
Qty: 45 TAB | Refills: 1 | Status: SHIPPED | OUTPATIENT
Start: 2019-09-09 | End: 2020-01-14 | Stop reason: SDUPTHER

## 2019-09-09 RX ORDER — LOSARTAN POTASSIUM 25 MG/1
25 TABLET ORAL DAILY
Qty: 90 TAB | Refills: 1 | Status: SHIPPED | OUTPATIENT
Start: 2019-09-09 | End: 2020-04-16

## 2019-09-13 ENCOUNTER — TELEPHONE (OUTPATIENT)
Dept: CARDIOLOGY CLINIC | Age: 74
End: 2019-09-13

## 2019-09-13 NOTE — PROGRESS NOTES
Spoke to GERARD, pt's spouse on HIPAA using 2 identifiers. I discussed with her that he needs to proceed with stress test and carotid duplex. Per Ramya Coelho NP, she was made aware that he is at risk for passing out and does not recommend he wait until October. Changing insurance does not impact his plan of care. She stated she will tell him what we discussed and will contact the office for any questions.

## 2019-09-13 NOTE — TELEPHONE ENCOUNTER
Spoke to patient using 2 identifiers. Pt stated that he will proceed with stress test scheduled on 9/24/19-9/25/19. Per patient's request, will call imaging/US dept to schedule carotid duplex. Carotid duplex scheduled for 9/16/19 at 2:00 pm.  Patient was given all information needed.

## 2019-09-16 ENCOUNTER — HOSPITAL ENCOUNTER (OUTPATIENT)
Dept: VASCULAR SURGERY | Age: 74
Discharge: HOME | End: 2019-09-16
Attending: NURSE PRACTITIONER
Payer: MEDICARE

## 2019-09-16 DIAGNOSIS — I25.810 ATHEROSCLEROSIS OF CORONARY ARTERY BYPASS GRAFT OF NATIVE HEART WITHOUT ANGINA PECTORIS: ICD-10-CM

## 2019-09-16 DIAGNOSIS — E78.2 HYPERLIPIDEMIA, MIXED: ICD-10-CM

## 2019-09-16 DIAGNOSIS — I48.0 PAROXYSMAL ATRIAL FIBRILLATION (HCC): ICD-10-CM

## 2019-09-16 DIAGNOSIS — Z95.5 S/P CORONARY ARTERY STENT PLACEMENT: ICD-10-CM

## 2019-09-16 PROCEDURE — 93880 EXTRACRANIAL BILAT STUDY: CPT

## 2019-09-17 LAB
LEFT CCA DIST DIAS: 12.5 CM/S
LEFT CCA DIST SYS: 81.3 CM/S
LEFT CCA PROX DIAS: 13.6 CM/S
LEFT CCA PROX SYS: 99.2 CM/S
LEFT ECA DIAS: 0 CM/S
LEFT ECA SYS: 77.1 CM/S
LEFT ICA DIST DIAS: 12.7 CM/S
LEFT ICA DIST SYS: 43 CM/S
LEFT ICA MID DIAS: 13.4 CM/S
LEFT ICA MID SYS: 59.5 CM/S
LEFT ICA PROX DIAS: 10.6 CM/S
LEFT ICA PROX SYS: 36.1 CM/S
LEFT ICA/CCA SYS: 0.7
LEFT SUBCLAVIAN DIAS: 0 CM/S
LEFT SUBCLAVIAN SYS: 84.8 CM/S
LEFT VERTEBRAL DIAS: 7.7 CM/S
LEFT VERTEBRAL SYS: 40.8 CM/S
RIGHT CCA DIST DIAS: 13.1 CM/S
RIGHT CCA DIST SYS: 76.8 CM/S
RIGHT CCA PROX DIAS: 21.7 CM/S
RIGHT CCA PROX SYS: 85.4 CM/S
RIGHT ECA DIAS: 14.2 CM/S
RIGHT ECA SYS: 77.9 CM/S
RIGHT ICA DIST DIAS: 20.8 CM/S
RIGHT ICA DIST SYS: 69.1 CM/S
RIGHT ICA MID DIAS: 10.9 CM/S
RIGHT ICA MID SYS: 71.3 CM/S
RIGHT ICA PROX DIAS: 10 CM/S
RIGHT ICA PROX SYS: 56 CM/S
RIGHT ICA/CCA SYS: 0.9
RIGHT SUBCLAVIAN DIAS: 0 CM/S
RIGHT SUBCLAVIAN SYS: 86.2 CM/S
RIGHT VERTEBRAL DIAS: 12 CM/S
RIGHT VERTEBRAL SYS: 47.2 CM/S

## 2019-09-18 NOTE — PROGRESS NOTES
Spoke to patient using 2 identifiers. Per Nancy Landry NP, patient was made aware carotid duplex shows mild stenosis on the left side, normal on the right side.   Follow up as suggested with Dr. John Zeng.

## 2019-09-18 NOTE — PROGRESS NOTES
Daja: Please call patient carotid duplex shows mild stenosis on the left side, normal on the right side.   Follow-up as suggested with Dr. Tyrone Cervantes.

## 2019-09-20 ENCOUNTER — TELEPHONE (OUTPATIENT)
Dept: CARDIOLOGY CLINIC | Age: 74
End: 2019-09-20

## 2019-09-20 NOTE — TELEPHONE ENCOUNTER
Called patient to confirm Nuclear stress test for 09/24. Reviewed with patient the need to hold all caffeine containing food, beverages and medicines for 24 hours. Pt verbalized understanding.

## 2019-10-03 ENCOUNTER — TELEPHONE (OUTPATIENT)
Dept: SCHEDULING | Age: 74
End: 2019-10-03

## 2019-10-03 ENCOUNTER — OFFICE VISIT (OUTPATIENT)
Dept: CARDIOLOGY CLINIC | Age: 74
End: 2019-10-03

## 2019-10-03 VITALS
DIASTOLIC BLOOD PRESSURE: 62 MMHG | RESPIRATION RATE: 18 BRPM | SYSTOLIC BLOOD PRESSURE: 112 MMHG | HEART RATE: 84 BPM | HEIGHT: 72 IN | WEIGHT: 290.8 LBS | BODY MASS INDEX: 39.39 KG/M2 | OXYGEN SATURATION: 93 %

## 2019-10-03 DIAGNOSIS — I44.2 AIVR (ACCELERATED IDIOVENTRICULAR RHYTHM) (HCC): ICD-10-CM

## 2019-10-03 DIAGNOSIS — I49.9 IRREGULAR HEARTBEAT: Primary | ICD-10-CM

## 2019-10-03 DIAGNOSIS — E66.01 SEVERE OBESITY (HCC): ICD-10-CM

## 2019-10-03 DIAGNOSIS — I48.0 PAROXYSMAL ATRIAL FIBRILLATION (HCC): ICD-10-CM

## 2019-10-03 DIAGNOSIS — R00.1 JUNCTIONAL BRADYCARDIA: Primary | ICD-10-CM

## 2019-10-03 DIAGNOSIS — R00.1 JUNCTIONAL BRADYCARDIA: ICD-10-CM

## 2019-10-03 DIAGNOSIS — E78.2 HYPERLIPIDEMIA, MIXED: ICD-10-CM

## 2019-10-03 DIAGNOSIS — I44.1 MOBITZ II: ICD-10-CM

## 2019-10-03 DIAGNOSIS — Z95.5 S/P CORONARY ARTERY STENT PLACEMENT: ICD-10-CM

## 2019-10-03 NOTE — PROGRESS NOTES
Subjective:      Mert Sabillon is a 74 y.o. male is here for EP consult. He has sob and fatigue. Patient Active Problem List    Diagnosis Date Noted    Severe obesity ([address] 10/03/2019    Irregular heartbeat 10/11/2016    Benign essential tremor syndrome 10/10/2016    Memory difficulties 10/10/2016    B12 deficiency 10/10/2016    Vitamin D deficiency 10/10/2016    Hypothyroidism due to acquired atrophy of thyroid 10/10/2016    HUSAIN (dyspnea on exertion) 08/08/2014    Unstable angina ([address] 12/20/2013    S/P coronary artery stent placement 12/20/2013    Goiter, nontoxic, multinodular     Hyperlipidemia, mixed 07/11/2012    Atherosclerosis of coronary artery bypass graft 07/11/2012    Atrial fibrillation ([address] 07/11/2012      Darryle Bade, MD  Past Medical History:   Diagnosis Date    AF (atrial fibrillation) (HCC)     s/p DCCV after his CABG, no recurrence    Anxiety     Arrhythmia     a fib    Arthritis     CAD (coronary artery disease)     s/p CABG in 1997    GERD (gastroesophageal reflux disease)     Goiter, nontoxic, multinodular     Hypertension     Low blood potassium     Myocardial infarct ([address] ? 1997    MARIA ISABEL on CPAP     Other and unspecified hyperlipidemia     Unspecified sleep apnea       Past Surgical History:   Procedure Laterality Date    CARDIAC SURG PROCEDURE UNLIST  1997    bypass    HX CATARACT REMOVAL  2006    bilateral    HX CHOLECYSTECTOMY  1970's    HX ORTHOPAEDIC      Arthoscopic L. knee surgery     HX SEPTOPLASTY  1980's    HX SHOULDER ARTHROSCOPY  2012    left    RIGHT HEART CATHETERIZATION      x3 states pt. (can't remember when)    RT/LT HEART CATHETERS  12/2013    PTCA    Heart Dr. Yates Patches     Allergies   Allergen Reactions    Demerol [Meperidine] Other (comments)     hallucinations  hallucinations      Family History   Problem Relation Age of Onset    Cancer Father         in his shoulder    Heart Attack Father     Heart Disease Father    [facility] Heart Disease Mother     Cancer Mother         abd    [facility] Cancer Son         wilm's tumor    Cancer Sister         abd    Thyroid Disease Neg Hx     negative for cardiac disease  Social History     Socioeconomic History    Marital status:      Spouse name: Not on file    Number of children: Not on file    Years of education: Not on file    Highest education level: Not on file   Tobacco Use    Smoking status: Former Smoker     Packs/day: 4.50     Years: 20.00     Pack years: 90.00     Types: Cigarettes     Last attempt to quit: 1970     Years since quittin.4    Smokeless tobacco: Never Used   Substance and Sexual Activity    Alcohol use: Yes     Alcohol/week: 0.0 standard drinks     Comment: once a month may have some vodka    Drug use: No   Social History Narrative    Lives in [address] [place] [address],[address] with wife. Has 2 sons and 1 daughter. Works part time for [employer]. Worked at [employer] as an . Likes to travel and fish. Current Outpatient Medications   Medication Sig    amLODIPine (NORVASC) 2.5 mg tablet TAKE 1 TABLET EVERY DAY    metoprolol succinate (TOPROL-XL) 25 mg XL tablet TAKE ONE-HALF TABLET EVERY NIGHT.  losartan (COZAAR) 25 mg tablet Take 1 Tab by mouth daily. Reduced 3/6/19    naproxen (NAPROSYN) 500 mg tablet Take 250 mg by mouth two (2) times daily (with meals).  pantoprazole (PROTONIX) 40 mg tablet TAKE 1 TABLET EVERY DAY  (STOP  PRILOSEC)    atorvastatin (LIPITOR) 40 mg tablet TAKE 1 TABLET EVERY DAY.  (STOP  SIMVASTATIN)    isosorbide mononitrate ER (IMDUR) 60 mg CR tablet TAKE 1 TABLET EVERY DAY    vit C/E/Zn/coppr/lutein/zeaxan (PRESERVISION AREDS 2 PO) Take  by mouth two (2) times a day.  oxyCODONE-acetaminophen (PERCOCET) 5-325 mg per tablet Take  by mouth every four (4) hours as needed for Pain.     nitroglycerin (NITROSTAT) 0.4 mg SL tablet 1 Tab by SubLINGual route every five (5) minutes as needed for Chest Pain (max 3 daily for chest pain).  Potassium Gluconate 595 mg (99 mg) tablet Take 595 mg by mouth daily.  hydroxychloroquine (PLAQUENIL) 200 mg tablet Take 200 mg by mouth two (2) times a day.  hydrocortisone (HYTONE) 2.5 % topical cream     OXYGEN-AIR DELIVERY SYSTEMS (HORIZON NASAL CPAP SYSTEM) by Does Not Apply route.  acetaminophen (TYLENOL ARTHRITIS PAIN) 650 mg CR tablet Take 650 mg by mouth four (4) times daily.  ferrous sulfate (IRON) 325 mg (65 mg iron) tablet Take 325 mg by mouth two (2) times a day.  citalopram (CELEXA) 10 mg tablet Take 10 mg by mouth daily.  omega-3 fatty acids-vitamin e (FISH OIL) 1,000 mg Cap Take 2 Caps by mouth.  multivitamin, stress formula (STRESS TAB) tablet Take 1 Tab by mouth daily.  aspirin 81 mg tablet Take 162 mg by mouth daily.  GLUC HCL/GLUC DESHPANDE/AC-D-GLUCOS (GLUCOSAMINE COMPLEX PO) Take 1 Tab by mouth daily. No current facility-administered medications for this visit. Vitals:    10/03/19 1128   BP: 112/62   Pulse: 84   Resp: 18   SpO2: 93%   Weight: 290 lb 12.8 oz (131.9 kg)   Height: 6' (1.829 m)       I have reviewed the nurses notes, vitals, problem list, allergy list, medical history, family, social history and medications. Review of Symptoms:    General: Pt denies excessive weight gain or loss. Pt is able to conduct ADL's  HEENT: Denies blurred vision, headaches, epistaxis and difficulty swallowing. Respiratory: Denies shortness of breath, HUSAIN, wheezing or stridor. Cardiovascular: Denies precordial pain, palpitations, edema or PND  Gastrointestinal: Denies poor appetite, indigestion, abdominal pain or blood in stool  Urinary: Denies dysuria, pyuria  Musculoskeletal: Denies pain or swelling from muscles or joints  Neurologic: Denies tremor, paresthesias, or sensory motor disturbance  Skin: Denies rash, itching or texture change. Psych: Denies depression      Physical Exam:      General: Well developed, in no acute distress.   HEENT: Eyes - PERRL, no jvd  Heart:  Normal S1/S2 negative S3 or S4. Regular, no murmur, gallop or rub. Respiratory: Clear bilaterally x 4, no wheezing or rales  Abdomen:   Soft, non-tender, bowel sounds are active. Extremities:  No edema, normal cap refill, no cyanosis. Musculoskeletal: No clubbing  Neuro: A&Ox3, speech clear, gait stable. Skin: Skin color is normal. No rashes or lesions. Non diaphoretic  Vascular: 2+ pulses symmetric in all extremities    Cardiographics    Ekg: nsr, rbbb    Results for orders placed or performed during the hospital encounter of 09/11/15   EKG, 12 LEAD, INITIAL   Result Value Ref Range    Ventricular Rate 67 BPM    Atrial Rate 67 BPM    P-R Interval 156 ms    QRS Duration 114 ms    Q-T Interval 440 ms    QTC Calculation (Bezet) 464 ms    Calculated P Axis 62 degrees    Calculated R Axis 15 degrees    Calculated T Axis 39 degrees    Diagnosis       Normal sinus rhythm  When compared with ECG of 11-SEP-2015 13:52,  No significant change was found  Confirmed by Milla Avila ([id number]) on 9/12/2015 10:37:04 AM           Lab Results   Component Value Date/Time    WBC 4.7 09/06/2018 09:40 AM    Hemoglobin (POC) 15.0 02/10/2011 02:08 PM    HGB 13.7 09/06/2018 09:40 AM    HCT 41.4 09/06/2018 09:40 AM    PLATELET 809 70/78/1775 09:40 AM    MCV 96 09/06/2018 09:40 AM      Lab Results   Component Value Date/Time    Sodium 139 09/06/2018 09:40 AM    Potassium 4.7 09/06/2018 09:40 AM    Chloride 100 09/06/2018 09:40 AM    CO2 24 09/06/2018 09:40 AM    Anion gap 7 09/12/2015 05:39 AM    Glucose 83 09/06/2018 09:40 AM    BUN 15 09/06/2018 09:40 AM    Creatinine 0.86 09/06/2018 09:40 AM    BUN/Creatinine ratio 17 09/06/2018 09:40 AM    GFR est AA 99 09/06/2018 09:40 AM    GFR est non-AA 85 09/06/2018 09:40 AM    Calcium 9.8 09/06/2018 09:40 AM    Bilirubin, total 0.8 09/06/2018 09:40 AM    AST (SGOT) 21 09/06/2018 09:40 AM    Alk.  phosphatase 46 09/06/2018 09:40 AM    Protein, total 6.9 09/06/2018 09:40 AM    Albumin 4.5 09/06/2018 09:40 AM    Globulin 3.5 05/17/2015 10:15 AM    A-G Ratio 1.9 09/06/2018 09:40 AM    ALT (SGPT) 26 09/06/2018 09:40 AM         Assessment:     Assessment:        ICD-10-CM ICD-9-CM    1. Irregular heartbeat I49.9 427.9 AMB POC EKG ROUTINE W/ 12 LEADS, INTER & REP   2. Severe obesity ([address] E66.01 278.01    3. Paroxysmal atrial fibrillation (HCC) I48.0 427.31    4. S/P coronary artery stent placement Z95.5 V45.82    5. Mobitz II I44.1 426.12    6. AIVR (accelerated idioventricular rhythm) (HCC) I44.2 427.89    7. Hyperlipidemia, mixed E78.2 272.2      Orders Placed This Encounter    AMB POC EKG ROUTINE W/ 12 LEADS, INTER & REP     Order Specific Question:   Reason for Exam:     Answer:   routine        Plan:   Mr. Fidel Jimenez is here for EP consult after holter monitor demonstrated intermittent junctional bradycardia. He has recent complaints of dizziness and must be on bb therapy d/t CAD. EKG shows sinus rhythm with RBBB. He is candidate for pacemaker. I discussed the risks/benefits/alternatives of the procedure with the patient. Risks include (but are not limited to) bleeding, infection, cva/mi/tamponade/death. The patient understands and agrees to proceed. Thank you for this interesting consultation. We will schedule. Continue medical management for CAD. Thank you for allowing me to participate in Katherine Kruse 's care. Katharina Arndt NP    Patient seen and examined by me with nurse practitioner. I personally performed all components of the history, physical, and medical decision making and agree with the assessment and plan with minor modifications as noted. Pt with dizziness and brief mobitz II heart block and episodes of AIVR on his monitor. Needs to be on BB due to CAD. Candidate for pacemaker. Pt understands and agrees to proceed.     Denise Rod MD, [facility], [address] [address]

## 2019-10-03 NOTE — PROGRESS NOTES
1. Have you been to the ER, urgent care clinic since your last visit? Hospitalized since your last visit? No    2. Have you seen or consulted any other health care providers outside of the [address] [place] [address] since your last visit? Include any pap smears or colon screening. No    Chief Complaint   Patient presents with    Irregular Heart Beat     NP, ref by LIYAH White NP for possible PM.   C/O fatigue. See monitor results.

## 2019-10-03 NOTE — H&P (VIEW-ONLY)
Subjective:  
  
Mei Hi is a 74 y.o. male is here for EP consult. He has sob and fatigue. Patient Active Problem List  
 Diagnosis Date Noted  Severe obesity ([facility] 10/03/2019  Irregular heartbeat 10/11/2016  Benign essential tremor syndrome 10/10/2016  Memory difficulties 10/10/2016  
 B12 deficiency 10/10/2016  Vitamin D deficiency 10/10/2016  Hypothyroidism due to acquired atrophy of thyroid 10/10/2016  HUSAIN (dyspnea on exertion) 08/08/2014  Unstable angina ([address] 12/20/2013  S/P coronary artery stent placement 12/20/2013  Goiter, nontoxic, multinodular  Hyperlipidemia, mixed 07/11/2012  Atherosclerosis of coronary artery bypass graft 07/11/2012  Atrial fibrillation ([address] 07/11/2012 Rekha Dailey MD 
Past Medical History:  
Diagnosis Date  AF (atrial fibrillation) (HCC)   
 s/p DCCV after his CABG, no recurrence  Anxiety  Arrhythmia   
 a fib  Arthritis  CAD (coronary artery disease) s/p CABG in 1997  GERD (gastroesophageal reflux disease)  Goiter, nontoxic, multinodular  Hypertension  Low blood potassium  Myocardial infarct [facility]) ? 1997  MARIA ISABEL on CPAP   
 Other and unspecified hyperlipidemia  Unspecified sleep apnea Past Surgical History:  
Procedure Laterality Date [address]  
 bypass  HX CATARACT REMOVAL  2006  
 bilateral  
 HX CHOLECYSTECTOMY  1970's  HX ORTHOPAEDIC Arthoscopic L. knee surgery  HX SEPTOPLASTY  [id number]  HX SHOULDER ARTHROSCOPY  2012  
 left  RIGHT HEART CATHETERIZATION    
 x3 states pt. (can't remember when)  RT/LT HEART CATHETERS  12/2013 PTCA    Heart Dr. Dina Dyer Allergies Allergen Reactions  Demerol [Meperidine] Other (comments)  
  hallucinations 
hallucinations Family History Problem Relation Age of Onset  Cancer Father   
     in his shoulder  Heart Attack Father  Heart Disease Father  Heart Disease Mother  Cancer Mother   
     abd  Cancer Son   
     wilm's tumor  Cancer Sister   
     abd  Thyroid Disease Neg Hx   
 negative for cardiac disease Social History Socioeconomic History  Marital status:  Spouse name: Not on file  Number of children: Not on file  Years of education: Not on file  Highest education level: Not on file Tobacco Use  Smoking status: Former Smoker Packs/day: 4.50 Years: 20.00 Pack years: 90.00 Types: Cigarettes Last attempt to quit: 1970 Years since quittin.4  Smokeless tobacco: Never Used Substance and Sexual Activity  Alcohol use: Yes Alcohol/week: 0.0 standard drinks Comment: once a month may have some vodka  Drug use: No  
Social History Narrative Lives in [address] [place] [address],[address] with wife. Has 2 sons and 1 daughter. Works part time for [employer]. Worked at [employer] as an . Likes to travel and fish. Current Outpatient Medications Medication Sig  
 amLODIPine (NORVASC) 2.5 mg tablet TAKE 1 TABLET EVERY DAY  metoprolol succinate (TOPROL-XL) 25 mg XL tablet TAKE ONE-HALF TABLET EVERY NIGHT.  losartan (COZAAR) 25 mg tablet Take 1 Tab by mouth daily. Reduced 3/6/19  
 naproxen (NAPROSYN) 500 mg tablet Take 250 mg by mouth two (2) times daily (with meals).  pantoprazole (PROTONIX) 40 mg tablet TAKE 1 TABLET EVERY DAY  (STOP  PRILOSEC)  atorvastatin (LIPITOR) 40 mg tablet TAKE 1 TABLET EVERY DAY.  (STOP  SIMVASTATIN)  isosorbide mononitrate ER (IMDUR) 60 mg CR tablet TAKE 1 TABLET EVERY DAY  vit C/E/Zn/coppr/lutein/zeaxan (PRESERVISION AREDS 2 PO) Take  by mouth two (2) times a day.  oxyCODONE-acetaminophen (PERCOCET) 5-325 mg per tablet Take  by mouth every four (4) hours as needed for Pain.   
 nitroglycerin (NITROSTAT) 0.4 mg SL tablet 1 Tab by SubLINGual route every five (5) minutes as needed for Chest Pain (max 3 daily for chest pain).  Potassium Gluconate 595 mg (99 mg) tablet Take 595 mg by mouth daily.  hydroxychloroquine (PLAQUENIL) 200 mg tablet Take 200 mg by mouth two (2) times a day.  hydrocortisone (HYTONE) 2.5 % topical cream   
 OXYGEN-AIR DELIVERY SYSTEMS (HORIZON NASAL CPAP SYSTEM) by Does Not Apply route.  acetaminophen (TYLENOL ARTHRITIS PAIN) 650 mg CR tablet Take 650 mg by mouth four (4) times daily.  ferrous sulfate (IRON) 325 mg (65 mg iron) tablet Take 325 mg by mouth two (2) times a day.  citalopram (CELEXA) 10 mg tablet Take 10 mg by mouth daily.  omega-3 fatty acids-vitamin e (FISH OIL) 1,000 mg Cap Take 2 Caps by mouth.  multivitamin, stress formula (STRESS TAB) tablet Take 1 Tab by mouth daily.  aspirin 81 mg tablet Take 162 mg by mouth daily.  GLUC HCL/GLUC DESHPANDE/AC-D-GLUCOS (GLUCOSAMINE COMPLEX PO) Take 1 Tab by mouth daily. No current facility-administered medications for this visit. Vitals:  
 10/03/19 1128 BP: 112/62 Pulse: 84 Resp: 18 SpO2: 93% Weight: 290 lb 12.8 oz (131.9 kg) Height: 6' (1.829 m) I have reviewed the nurses notes, vitals, problem list, allergy list, medical history, family, social history and medications. Review of Symptoms: 
 
General: Pt denies excessive weight gain or loss. Pt is able to conduct ADL's HEENT: Denies blurred vision, headaches, epistaxis and difficulty swallowing. Respiratory: Denies shortness of breath, HUSAIN, wheezing or stridor. Cardiovascular: Denies precordial pain, palpitations, edema or PND Gastrointestinal: Denies poor appetite, indigestion, abdominal pain or blood in stool Urinary: Denies dysuria, pyuria Musculoskeletal: Denies pain or swelling from muscles or joints Neurologic: Denies tremor, paresthesias, or sensory motor disturbance Skin: Denies rash, itching or texture change. Psych: Denies depression Physical Exam: General: Well developed, in no acute distress. HEENT: Eyes - PERRL, no jvd Heart:  Normal S1/S2 negative S3 or S4. Regular, no murmur, gallop or rub. Respiratory: Clear bilaterally x 4, no wheezing or rales Abdomen:   Soft, non-tender, bowel sounds are active. Extremities:  No edema, normal cap refill, no cyanosis. Musculoskeletal: No clubbing Neuro: A&Ox3, speech clear, gait stable. Skin: Skin color is normal. No rashes or lesions. Non diaphoretic Vascular: 2+ pulses symmetric in all extremities Cardiographics Ekg: nsr, rbbb Results for orders placed or performed during the hospital encounter of 09/11/15 EKG, 12 LEAD, INITIAL Result Value Ref Range Ventricular Rate 67 BPM  
 Atrial Rate 67 BPM  
 P-R Interval 156 ms QRS Duration 114 ms Q-T Interval 440 ms QTC Calculation (Bezet) 464 ms Calculated P Axis 62 degrees Calculated R Axis 15 degrees Calculated T Axis 39 degrees Diagnosis Normal sinus rhythm When compared with ECG of 11-SEP-2015 13:52, No significant change was found Confirmed by Demarcus Mcnamara ([id number]) on 9/12/2015 10:37:04 AM 
  
 
 
 
Lab Results Component Value Date/Time WBC 4.7 09/06/2018 09:40 AM  
 Hemoglobin (POC) 15.0 02/10/2011 02:08 PM  
 HGB 13.7 09/06/2018 09:40 AM  
 HCT 41.4 09/06/2018 09:40 AM  
 PLATELET 228 90/04/5456 09:40 AM  
 MCV 96 09/06/2018 09:40 AM  
  
Lab Results Component Value Date/Time  Sodium 139 09/06/2018 09:40 AM  
 Potassium 4.7 09/06/2018 09:40 AM  
 Chloride 100 09/06/2018 09:40 AM  
 CO2 24 09/06/2018 09:40 AM  
 Anion gap 7 09/12/2015 05:39 AM  
 Glucose 83 09/06/2018 09:40 AM  
 BUN 15 09/06/2018 09:40 AM  
 Creatinine 0.86 09/06/2018 09:40 AM  
 BUN/Creatinine ratio 17 09/06/2018 09:40 AM  
 GFR est AA 99 09/06/2018 09:40 AM  
 GFR est non-AA 85 09/06/2018 09:40 AM  
 Calcium 9.8 09/06/2018 09:40 AM  
 Bilirubin, total 0.8 09/06/2018 09:40 AM  
 AST (SGOT) 21 09/06/2018 09:40 AM  
 Alk. phosphatase 46 09/06/2018 09:40 AM  
 Protein, total 6.9 09/06/2018 09:40 AM  
 Albumin 4.5 09/06/2018 09:40 AM  
 Globulin 3.5 05/17/2015 10:15 AM  
 A-G Ratio 1.9 09/06/2018 09:40 AM  
 ALT (SGPT) 26 09/06/2018 09:40 AM  
  
 
 Assessment: 
 
 Assessment: ICD-10-CM ICD-9-CM 1. Irregular heartbeat I49.9 427.9 AMB POC EKG ROUTINE W/ 12 LEADS, INTER & REP 2. Severe obesity ([address] E66.01 278.01   
3. Paroxysmal atrial fibrillation (HCC) I48.0 427.31   
4. S/P coronary artery stent placement Z95.5 V45.82   
5. Mobitz II I44.1 426.12   
6. AIVR (accelerated idioventricular rhythm) ([facility]) I44.2 427.89   
7. Hyperlipidemia, mixed E78.2 272.2 Orders Placed This Encounter  AMB POC EKG ROUTINE W/ 12 LEADS, INTER & REP Order Specific Question:   Reason for Exam: Answer:   routine Plan:  
Mr. Britton Corea is here for EP consult after holter monitor demonstrated intermittent junctional bradycardia. He has recent complaints of dizziness and must be on bb therapy d/t CAD. EKG shows sinus rhythm with RBBB. He is candidate for pacemaker. I discussed the risks/benefits/alternatives of the procedure with the patient. Risks include (but are not limited to) bleeding, infection, cva/mi/tamponade/death. The patient understands and agrees to proceed. Thank you for this interesting consultation. We will schedule. Continue medical management for CAD. Thank you for allowing me to participate in [facility] 's care. Luis Hopper NP Patient seen and examined by me with nurse practitioner. I personally performed all components of the history, physical, and medical decision making and agree with the assessment and plan with minor modifications as noted. Pt with dizziness and brief mobitz II heart block and episodes of AIVR on his monitor. Needs to be on BB due to CAD. Candidate for pacemaker. Pt understands and agrees to proceed. Ivon Marin MD, [facility] Cpt [id number]

## 2019-10-04 ENCOUNTER — OFFICE VISIT (OUTPATIENT)
Dept: CARDIOLOGY CLINIC | Age: 74
End: 2019-10-04

## 2019-10-04 VITALS
OXYGEN SATURATION: 96 % | HEIGHT: 72 IN | BODY MASS INDEX: 39.37 KG/M2 | HEART RATE: 83 BPM | DIASTOLIC BLOOD PRESSURE: 70 MMHG | WEIGHT: 290.7 LBS | RESPIRATION RATE: 20 BRPM | SYSTOLIC BLOOD PRESSURE: 122 MMHG

## 2019-10-04 DIAGNOSIS — I25.708 ATHEROSCLEROSIS OF CORONARY ARTERY BYPASS GRAFT OF NATIVE HEART WITH OTHER FORMS OF ANGINA PECTORIS (HCC): Primary | ICD-10-CM

## 2019-10-04 DIAGNOSIS — I48.91 ATRIAL FIBRILLATION, UNSPECIFIED TYPE (HCC): ICD-10-CM

## 2019-10-04 DIAGNOSIS — R06.09 DOE (DYSPNEA ON EXERTION): ICD-10-CM

## 2019-10-04 NOTE — H&P (VIEW-ONLY)
Sunday Round DNP, ANP-BC Subjective/HPI:  
 
Kyra Law is a 74 y.o. male is here for stress test follow-up showing progression of infarct compared to previous study. Patient remains symptomatic with dyspnea on exertion, fatigue and chest tightness with exertional activities. Event monitor showed intermittent junctional rhythm corresponding to dizziness is pending pacemaker placement at the end of the month. PCP Provider Maris Bustamante MD 
Past Medical History:  
Diagnosis Date  AF (atrial fibrillation) (HCC)   
 s/p DCCV after his CABG, no recurrence  Anxiety  Arrhythmia   
 a fib  Arthritis  CAD (coronary artery disease) s/p CABG in 1997  GERD (gastroesophageal reflux disease)  Goiter, nontoxic, multinodular  Hypertension  Low blood potassium  Myocardial infarct [facility]) ? 1997  MARIA ISABEL on CPAP   
 Other and unspecified hyperlipidemia  Unspecified sleep apnea Past Surgical History:  
Procedure Laterality Date [facility]  
 bypass  HX CATARACT REMOVAL  2006  
 bilateral  
 HX CHOLECYSTECTOMY  1970's  HX ORTHOPAEDIC Arthoscopic L. knee surgery  HX SEPTOPLASTY  [id number]  HX SHOULDER ARTHROSCOPY  2012  
 left  RIGHT HEART CATHETERIZATION    
 x3 states pt. (can't remember when)  RT/LT HEART CATHETERS  12/2013 [facility]    Heart Dr. Princess Strange Allergies Allergen Reactions  Demerol [Meperidine] Other (comments)  
  hallucinations 
hallucinations Family History Problem Relation Age of Onset  Cancer Father   
     in his shoulder  Heart Attack Father  Heart Disease Father  Heart Disease Mother  Cancer Mother   
     abd  Cancer Son   
     wilm's tumor  Cancer Sister   
     abd  Thyroid Disease Neg Hx Current Outpatient Medications Medication Sig  
 amLODIPine (NORVASC) 2.5 mg tablet TAKE 1 TABLET EVERY DAY  
  metoprolol succinate (TOPROL-XL) 25 mg XL tablet TAKE ONE-HALF TABLET EVERY NIGHT.  losartan (COZAAR) 25 mg tablet Take 1 Tab by mouth daily. Reduced 3/6/19  
 naproxen (NAPROSYN) 500 mg tablet Take 250 mg by mouth two (2) times daily (with meals).  pantoprazole (PROTONIX) 40 mg tablet TAKE 1 TABLET EVERY DAY  (STOP  PRILOSEC)  atorvastatin (LIPITOR) 40 mg tablet TAKE 1 TABLET EVERY DAY.  (STOP  SIMVASTATIN)  isosorbide mononitrate ER (IMDUR) 60 mg CR tablet TAKE 1 TABLET EVERY DAY  vit C/E/Zn/coppr/lutein/zeaxan (PRESERVISION AREDS 2 PO) Take  by mouth two (2) times a day.  oxyCODONE-acetaminophen (PERCOCET) 5-325 mg per tablet Take  by mouth every four (4) hours as needed for Pain.  nitroglycerin (NITROSTAT) 0.4 mg SL tablet 1 Tab by SubLINGual route every five (5) minutes as needed for Chest Pain (max 3 daily for chest pain).  Potassium Gluconate 595 mg (99 mg) tablet Take 595 mg by mouth daily.  hydroxychloroquine (PLAQUENIL) 200 mg tablet Take 200 mg by mouth two (2) times a day.  hydrocortisone (HYTONE) 2.5 % topical cream   
 OXYGEN-AIR DELIVERY SYSTEMS (HORIZON NASAL CPAP SYSTEM) by Does Not Apply route.  acetaminophen (TYLENOL ARTHRITIS PAIN) 650 mg CR tablet Take 650 mg by mouth four (4) times daily.  ferrous sulfate (IRON) 325 mg (65 mg iron) tablet Take 325 mg by mouth two (2) times a day.  citalopram (CELEXA) 10 mg tablet Take 10 mg by mouth daily.  omega-3 fatty acids-vitamin e (FISH OIL) 1,000 mg Cap Take 2 Caps by mouth.  multivitamin, stress formula (STRESS TAB) tablet Take 1 Tab by mouth daily.  aspirin 81 mg tablet Take 162 mg by mouth daily.  GLUC HCL/GLUC DESHPANDE/AC-D-GLUCOS (GLUCOSAMINE COMPLEX PO) Take 1 Tab by mouth daily. No current facility-administered medications for this visit. Vitals:  
 10/04/19 1046 10/04/19 1052 BP: 126/78 122/70 Pulse: 83 Resp: 20 SpO2: 96% Weight: 290 lb 11.2 oz (131.9 kg) Height: 6' (1.829 m) Social History Socioeconomic History  Marital status:  Spouse name: Not on file  Number of children: Not on file  Years of education: Not on file  Highest education level: Not on file Occupational History  Not on file Social Needs  Financial resource strain: Not on file  Food insecurity:  
  Worry: Not on file Inability: Not on file  Transportation needs:  
  Medical: Not on file Non-medical: Not on file Tobacco Use  Smoking status: Former Smoker Packs/day: 4.50 Years: 20.00 Pack years: 90.00 Types: Cigarettes Last attempt to quit: 1970 Years since quittin.4  Smokeless tobacco: Never Used Substance and Sexual Activity  Alcohol use: Yes Alcohol/week: 0.0 standard drinks Comment: once a month may have some vodka  Drug use: No  
 Sexual activity: Not on file Lifestyle  Physical activity:  
  Days per week: Not on file Minutes per session: Not on file  Stress: Not on file Relationships  Social connections:  
  Talks on phone: Not on file Gets together: Not on file Attends Yazdanism service: Not on file Active member of club or organization: Not on file Attends meetings of clubs or organizations: Not on file Relationship status: Not on file  Intimate partner violence:  
  Fear of current or ex partner: Not on file Emotionally abused: Not on file Physically abused: Not on file Forced sexual activity: Not on file Other Topics Concern  Not on file Social History Narrative Lives in [address] [place] [address],[address] with wife. Has 2 sons and 1 daughter. Works part time for [employer]. Worked at [employer] as an . Likes to travel and fish. I have reviewed the nurses notes, vitals, problem list, allergy list, medical history, family, social history and medications. Review of Symptoms: General: Pt denies excessive weight gain or loss. Pt is able to conduct ADL's HEENT: Denies blurred vision, headaches, epistaxis and difficulty swallowing. Respiratory: Denies shortness of breath, + HUSAIN, wheezing or stridor. Cardiovascular: + Chest discomfort, denies palpitations, edema or PND Gastrointestinal: Denies poor appetite, indigestion, abdominal pain or blood in stool Musculoskeletal: Denies pain or swelling from muscles or joints Neurologic: Denies tremor, paresthesias, or sensory motor disturbance Skin: Denies rash, itching or texture change. Physical Exam:   
 
General: Well developed, in no acute distress, cooperative and alert HEENT: No carotid bruits, no JVD, trach is midline. Neck Supple, . Heart:  Normal S1/S2 negative S3 or S4. Regular, no murmur, gallop or rub. Respiratory: Clear bilaterally x 4, no wheezing or rales Abdomen:   Soft, non-tender, no masses, bowel sounds are active. Extremities:  No edema, normal cap refill, no cyanosis, atraumatic. Neuro: A&Ox3, speech clear, gait stable. Skin: Skin color is normal. No rashes or lesions. Non diaphoretic Vascular: 2+ pulses symmetric in all extremities Cardiographics ECG: Sinus right bundle Results for orders placed or performed during the hospital encounter of 09/11/15 EKG, 12 LEAD, INITIAL Result Value Ref Range Ventricular Rate 67 BPM  
 Atrial Rate 67 BPM  
 P-R Interval 156 ms QRS Duration 114 ms Q-T Interval 440 ms QTC Calculation (Bezet) 464 ms Calculated P Axis 62 degrees Calculated R Axis 15 degrees Calculated T Axis 39 degrees Diagnosis Normal sinus rhythm When compared with ECG of 11-SEP-2015 13:52, No significant change was found Confirmed by Britney Mcdonald ([id number]) on 9/12/2015 10:37:04 AM 
  
 
 
 
Cardiology Labs: 
Lab Results Component Value Date/Time  Cholesterol, total 88 (L) 09/06/2018 09:40 AM  
 HDL Cholesterol 39 (L) 09/06/2018 09:40 AM  
 LDL, calculated 30 09/06/2018 09:40 AM  
 Triglyceride 93 09/06/2018 09:40 AM  
 CHOL/HDL Ratio 3.3 12/21/2013 03:33 AM  
 
 
Lab Results Component Value Date/Time Sodium 139 09/06/2018 09:40 AM  
 Potassium 4.7 09/06/2018 09:40 AM  
 Chloride 100 09/06/2018 09:40 AM  
 CO2 24 09/06/2018 09:40 AM  
 Anion gap 7 09/12/2015 05:39 AM  
 Glucose 83 09/06/2018 09:40 AM  
 BUN 15 09/06/2018 09:40 AM  
 Creatinine 0.86 09/06/2018 09:40 AM  
 BUN/Creatinine ratio 17 09/06/2018 09:40 AM  
 GFR est AA 99 09/06/2018 09:40 AM  
 GFR est non-AA 85 09/06/2018 09:40 AM  
 Calcium 9.8 09/06/2018 09:40 AM  
 Bilirubin, total 0.8 09/06/2018 09:40 AM  
 AST (SGOT) 21 09/06/2018 09:40 AM  
 Alk. phosphatase 46 09/06/2018 09:40 AM  
 Protein, total 6.9 09/06/2018 09:40 AM  
 Albumin 4.5 09/06/2018 09:40 AM  
 Globulin 3.5 05/17/2015 10:15 AM  
 A-G Ratio 1.9 09/06/2018 09:40 AM  
 ALT (SGPT) 26 09/06/2018 09:40 AM  
  
 
 
 Assessment: 
 
 Assessment:  
 
Diagnoses and all orders for this visit: 1. Atherosclerosis of coronary artery bypass graft of native heart with other forms of angina pectoris ([facility] [place] [facility] -     METABOLIC PANEL, COMPREHENSIVE 
-     CBC WITH AUTOMATED DIFF 
-     PROTHROMBIN TIME + INR 
-     CASE REQUEST CATH LAB; Future 2. Atrial fibrillation, unspecified type ([facility] [place] [facility] -     METABOLIC PANEL, COMPREHENSIVE 
-     CBC WITH AUTOMATED DIFF 
-     PROTHROMBIN TIME + INR 
-     CASE REQUEST CATH LAB; Future 3. HUSAIN (dyspnea on exertion) -     METABOLIC PANEL, COMPREHENSIVE 
-     CBC WITH AUTOMATED DIFF 
-     PROTHROMBIN TIME + INR 
-     CASE REQUEST CATH LAB; Future ICD-10-CM ICD-9-CM 1. Atherosclerosis of coronary artery bypass graft of native heart with other forms of angina pectoris (HCC) V61.250 975.44 METABOLIC PANEL, COMPREHENSIVE  
  413.9 CBC WITH AUTOMATED DIFF PROTHROMBIN TIME + INR  
   CASE REQUEST CATH LAB 2.  Atrial fibrillation, unspecified type ([facility] [place] [facility] K85.36 042.22 METABOLIC PANEL, COMPREHENSIVE  
   CBC WITH AUTOMATED DIFF PROTHROMBIN TIME + INR  
   CASE REQUEST CATH LAB 3. HUSAIN (dyspnea on exertion) M28.88 880.02 METABOLIC PANEL, COMPREHENSIVE  
   CBC WITH AUTOMATED DIFF PROTHROMBIN TIME + INR  
   CASE REQUEST CATH LAB Orders Placed This Encounter  METABOLIC PANEL, COMPREHENSIVE  
 CBC WITH AUTOMATED DIFF  
 PROTHROMBIN TIME + INR Plan:  
 
Patient is a 75-year-old male with history of atherosclerotic heart disease CABG x3 presenting with larger infarct on nuclear stress test reporting dyspnea on exertion and exertional chest pain. Discussed risk and benefits of coronary angiography PTCA stenting and is willing to proceed. Hypertension: Controlled 122/70 Junctional rhythm: Seen by EP yesterday pending pacemaker placement at the end of the month Follow-up after catheterization complete CPT code 33597 Krishan Flor MD 
 
This note was created using voice recognition software. Despite editing, there may be syntax errors.

## 2019-10-04 NOTE — PROGRESS NOTES
Bree Verma DNP, ANP-BC  Subjective/HPI:     Robert Fabian is a 74 y.o. male is here for stress test follow-up showing progression of infarct compared to previous study. Patient remains symptomatic with dyspnea on exertion, fatigue and chest tightness with exertional activities. Event monitor showed intermittent junctional rhythm corresponding to dizziness is pending pacemaker placement at the end of the month. PCP Provider  Raymundo Avilez MD  Past Medical History:   Diagnosis Date    AF (atrial fibrillation) (HCC)     s/p DCCV after his CABG, no recurrence    Anxiety     Arrhythmia     a fib    Arthritis     CAD (coronary artery disease)     s/p CABG in 1997    GERD (gastroesophageal reflux disease)     Goiter, nontoxic, multinodular     Hypertension     Low blood potassium     Myocardial infarct ([address] ? 1997    MARIA ISABEL on CPAP     Other and unspecified hyperlipidemia     Unspecified sleep apnea       Past Surgical History:   Procedure Laterality Date    CARDIAC SURG PROCEDURE UNLIST  1997    bypass    HX CATARACT REMOVAL  2006    bilateral    HX CHOLECYSTECTOMY  1970's    HX ORTHOPAEDIC      Arthoscopic L. knee surgery     HX SEPTOPLASTY  1980's    HX SHOULDER ARTHROSCOPY  2012    left    RIGHT HEART CATHETERIZATION      x3 states pt. (can't remember when)    RT/LT HEART CATHETERS  12/2013    [facility]    Heart Dr. Tyson Slice   Allergen Reactions    Demerol [Meperidine] Other (comments)     hallucinations  hallucinations      Family History   Problem Relation Age of Onset    Cancer Father         in his shoulder    Heart Attack Father     Heart Disease Father     Heart Disease Mother     Cancer Mother         abd     Cancer Son         wilm's tumor    Cancer Sister         abd    Thyroid Disease Neg Hx       Current Outpatient Medications   Medication Sig    amLODIPine (NORVASC) 2.5 mg tablet TAKE 1 TABLET EVERY DAY    metoprolol succinate (TOPROL-XL) 25 mg XL tablet TAKE ONE-HALF TABLET EVERY NIGHT.  losartan (COZAAR) 25 mg tablet Take 1 Tab by mouth daily. Reduced 3/6/19    naproxen (NAPROSYN) 500 mg tablet Take 250 mg by mouth two (2) times daily (with meals).  pantoprazole (PROTONIX) 40 mg tablet TAKE 1 TABLET EVERY DAY  (STOP  PRILOSEC)    atorvastatin (LIPITOR) 40 mg tablet TAKE 1 TABLET EVERY DAY.  (STOP  SIMVASTATIN)    isosorbide mononitrate ER (IMDUR) 60 mg CR tablet TAKE 1 TABLET EVERY DAY    vit C/E/Zn/coppr/lutein/zeaxan (PRESERVISION AREDS 2 PO) Take  by mouth two (2) times a day.  oxyCODONE-acetaminophen (PERCOCET) 5-325 mg per tablet Take  by mouth every four (4) hours as needed for Pain.  nitroglycerin (NITROSTAT) 0.4 mg SL tablet 1 Tab by SubLINGual route every five (5) minutes as needed for Chest Pain (max 3 daily for chest pain).  Potassium Gluconate 595 mg (99 mg) tablet Take 595 mg by mouth daily.  hydroxychloroquine (PLAQUENIL) 200 mg tablet Take 200 mg by mouth two (2) times a day.  hydrocortisone (HYTONE) 2.5 % topical cream     OXYGEN-AIR DELIVERY SYSTEMS (HORIZON NASAL CPAP SYSTEM) by Does Not Apply route.  acetaminophen (TYLENOL ARTHRITIS PAIN) 650 mg CR tablet Take 650 mg by mouth four (4) times daily.  ferrous sulfate (IRON) 325 mg (65 mg iron) tablet Take 325 mg by mouth two (2) times a day.  citalopram (CELEXA) 10 mg tablet Take 10 mg by mouth daily.  omega-3 fatty acids-vitamin e (FISH OIL) 1,000 mg Cap Take 2 Caps by mouth.  multivitamin, stress formula (STRESS TAB) tablet Take 1 Tab by mouth daily.  aspirin 81 mg tablet Take 162 mg by mouth daily.  GLUC HCL/GLUC DESHPANDE/AC-D-GLUCOS (GLUCOSAMINE COMPLEX PO) Take 1 Tab by mouth daily. No current facility-administered medications for this visit.        Vitals:    10/04/19 1046 10/04/19 1052   BP: 126/78 122/70   Pulse: 83    Resp: 20    SpO2: 96%    Weight: 290 lb 11.2 oz (131.9 kg)    Height: 6' (1.829 m)      Social History     Socioeconomic History    Marital status:      Spouse name: Not on file    Number of children: Not on file    Years of education: Not on file    Highest education level: Not on file   Occupational History    Not on file   Social Needs    Financial resource strain: Not on file    Food insecurity:     Worry: Not on file     Inability: Not on file    Transportation needs:     Medical: Not on file     Non-medical: Not on file   Tobacco Use    Smoking status: Former Smoker     Packs/day: 4.50     Years: 20.00     Pack years: 90.00     Types: Cigarettes     Last attempt to quit: 1970     Years since quittin.4    Smokeless tobacco: Never Used   Substance and Sexual Activity    Alcohol use: Yes     Alcohol/week: 0.0 standard drinks     Comment: once a month may have some vodka    Drug use: No    Sexual activity: Not on file   Lifestyle    Physical activity:     Days per week: Not on file     Minutes per session: Not on file    Stress: Not on file   Relationships    Social connections:     Talks on phone: Not on file     Gets together: Not on file     Attends Zoroastrian service: Not on file     Active member of club or organization: Not on file     Attends meetings of clubs or organizations: Not on file     Relationship status: Not on file    Intimate partner violence:     Fear of current or ex partner: Not on file     Emotionally abused: Not on file     Physically abused: Not on file     Forced sexual activity: Not on file   Other Topics Concern    Not on file   Social History Narrative    Lives in [address] [place] [address],[address] with wife. Has 2 sons and 1 daughter. Works part time for [employer]. Worked at [employer] as an . Likes to travel and fish. I have reviewed the nurses notes, vitals, problem list, allergy list, medical history, family, social history and medications. Review of Symptoms:    General: Pt denies excessive weight gain or loss.  Pt is able to conduct ADL's  HEENT: Denies blurred vision, headaches, epistaxis and difficulty swallowing. Respiratory: Denies shortness of breath, + HUSAIN, wheezing or stridor. Cardiovascular: + Chest discomfort, denies palpitations, edema or PND  Gastrointestinal: Denies poor appetite, indigestion, abdominal pain or blood in stool  Musculoskeletal: Denies pain or swelling from muscles or joints  Neurologic: Denies tremor, paresthesias, or sensory motor disturbance  Skin: Denies rash, itching or texture change. Physical Exam:      General: Well developed, in no acute distress, cooperative and alert  HEENT: No carotid bruits, no JVD, trach is midline. Neck Supple, . Heart:  Normal S1/S2 negative S3 or S4. Regular, no murmur, gallop or rub. Respiratory: Clear bilaterally x 4, no wheezing or rales  Abdomen:   Soft, non-tender, no masses, bowel sounds are active. Extremities:  No edema, normal cap refill, no cyanosis, atraumatic. Neuro: A&Ox3, speech clear, gait stable. Skin: Skin color is normal. No rashes or lesions.  Non diaphoretic  Vascular: 2+ pulses symmetric in all extremities    Cardiographics    ECG: Sinus right bundle  Results for orders placed or performed during the hospital encounter of 09/11/15   EKG, 12 LEAD, INITIAL   Result Value Ref Range    Ventricular Rate 67 BPM    Atrial Rate 67 BPM    P-R Interval 156 ms    QRS Duration 114 ms    Q-T Interval 440 ms    QTC Calculation (Bezet) 464 ms    Calculated P Axis 62 degrees    Calculated R Axis 15 degrees    Calculated T Axis 39 degrees    Diagnosis       Normal sinus rhythm  When compared with ECG of 11-SEP-2015 13:52,  No significant change was found  Confirmed by Franklin Horowitz ([id number]) on 9/12/2015 10:37:04 AM           Cardiology Labs:  Lab Results   Component Value Date/Time    Cholesterol, total 88 (L) 09/06/2018 09:40 AM    HDL Cholesterol 39 (L) 09/06/2018 09:40 AM    LDL, calculated 30 09/06/2018 09:40 AM    Triglyceride 93 09/06/2018 09:40 AM    CHOL/HDL Ratio 3.3 12/21/2013 03:33 AM       Lab Results   Component Value Date/Time    Sodium 139 09/06/2018 09:40 AM    Potassium 4.7 09/06/2018 09:40 AM    Chloride 100 09/06/2018 09:40 AM    CO2 24 09/06/2018 09:40 AM    Anion gap 7 09/12/2015 05:39 AM    Glucose 83 09/06/2018 09:40 AM    BUN 15 09/06/2018 09:40 AM    Creatinine 0.86 09/06/2018 09:40 AM    BUN/Creatinine ratio 17 09/06/2018 09:40 AM    GFR est AA 99 09/06/2018 09:40 AM    GFR est non-AA 85 09/06/2018 09:40 AM    Calcium 9.8 09/06/2018 09:40 AM    Bilirubin, total 0.8 09/06/2018 09:40 AM    AST (SGOT) 21 09/06/2018 09:40 AM    Alk. phosphatase 46 09/06/2018 09:40 AM    Protein, total 6.9 09/06/2018 09:40 AM    Albumin 4.5 09/06/2018 09:40 AM    Globulin 3.5 05/17/2015 10:15 AM    A-G Ratio 1.9 09/06/2018 09:40 AM    ALT (SGPT) 26 09/06/2018 09:40 AM           Assessment:     Assessment:     Diagnoses and all orders for this visit:    1. Atherosclerosis of coronary artery bypass graft of native heart with other forms of angina pectoris ([facility]  -     METABOLIC PANEL, COMPREHENSIVE  -     CBC WITH AUTOMATED DIFF  -     PROTHROMBIN TIME + INR  -     CASE REQUEST CATH LAB; Future    2. Atrial fibrillation, unspecified type ([facility]  -     METABOLIC PANEL, COMPREHENSIVE  -     CBC WITH AUTOMATED DIFF  -     PROTHROMBIN TIME + INR  -     CASE REQUEST CATH LAB; Future    3. HUSAIN (dyspnea on exertion)  -     METABOLIC PANEL, COMPREHENSIVE  -     CBC WITH AUTOMATED DIFF  -     PROTHROMBIN TIME + INR  -     CASE REQUEST CATH LAB; Future        ICD-10-CM ICD-9-CM    1. Atherosclerosis of coronary artery bypass graft of native heart with other forms of angina pectoris (HCC) V35.425 298.78 METABOLIC PANEL, COMPREHENSIVE     413.9 CBC WITH AUTOMATED DIFF      PROTHROMBIN TIME + INR      CASE REQUEST CATH LAB   2.  Atrial fibrillation, unspecified type ([facility] M69.77 918.37 METABOLIC PANEL, COMPREHENSIVE      CBC WITH AUTOMATED DIFF      PROTHROMBIN TIME + INR      CASE REQUEST CATH LAB 3. HUSAIN (dyspnea on exertion) N05.25 173.02 METABOLIC PANEL, COMPREHENSIVE      CBC WITH AUTOMATED DIFF      PROTHROMBIN TIME + INR      CASE REQUEST CATH LAB     Orders Placed This Encounter    METABOLIC PANEL, COMPREHENSIVE    CBC WITH AUTOMATED DIFF    PROTHROMBIN TIME + INR        Plan:     Patient is a 75-year-old male with history of atherosclerotic heart disease CABG x3 presenting with larger infarct on nuclear stress test reporting dyspnea on exertion and exertional chest pain. Discussed risk and benefits of coronary angiography PTCA stenting and is willing to proceed. Hypertension: Controlled 122/70  Junctional rhythm: Seen by EP yesterday pending pacemaker placement at the end of the month  Follow-up after catheterization complete    CPT code 286 ALINA Suazo MD    This note was created using voice recognition software. Despite editing, there may be syntax errors.

## 2019-10-04 NOTE — PROGRESS NOTES
1. Have you been to the ER, urgent care clinic since your last visit? Hospitalized since your last visit? No    2. Have you seen or consulted any other health care providers outside of the [address] [place] [address] since your last visit? Include any pap smears or colon screening.  No    Chief Complaint   Patient presents with    Results     for abnormal stress test

## 2019-10-07 PROBLEM — I20.8 ANGINA OF EFFORT (HCC): Status: ACTIVE | Noted: 2019-10-07

## 2019-10-08 ENCOUNTER — HOSPITAL ENCOUNTER (OUTPATIENT)
Age: 74
Discharge: HOME | End: 2019-10-09
Attending: INTERNAL MEDICINE | Admitting: INTERNAL MEDICINE
Payer: MEDICARE

## 2019-10-08 DIAGNOSIS — R06.09 DOE (DYSPNEA ON EXERTION): ICD-10-CM

## 2019-10-08 DIAGNOSIS — I20.8 ANGINA OF EFFORT (HCC): ICD-10-CM

## 2019-10-08 DIAGNOSIS — I25.708 ATHEROSCLEROSIS OF CORONARY ARTERY BYPASS GRAFT OF NATIVE HEART WITH OTHER FORMS OF ANGINA PECTORIS (HCC): ICD-10-CM

## 2019-10-08 DIAGNOSIS — I48.91 ATRIAL FIBRILLATION, UNSPECIFIED TYPE (HCC): ICD-10-CM

## 2019-10-08 LAB
ALBUMIN SERPL-MCNC: 4.4 G/DL (ref 3.5–5)
ALBUMIN/GLOB SERPL: 1.3 {RATIO} (ref 1.1–2.2)
ALP SERPL-CCNC: 58 U/L (ref 45–117)
ALT SERPL-CCNC: 38 U/L (ref 12–78)
ANION GAP SERPL CALC-SCNC: 3 MMOL/L (ref 5–15)
AST SERPL-CCNC: 23 U/L (ref 15–37)
BASOPHILS # BLD: 0 K/UL (ref 0–0.1)
BASOPHILS NFR BLD: 0 % (ref 0–1)
BILIRUB SERPL-MCNC: 0.9 MG/DL (ref 0.2–1)
BUN SERPL-MCNC: 22 MG/DL (ref 6–20)
BUN/CREAT SERPL: 24 (ref 12–20)
CALCIUM SERPL-MCNC: 9.4 MG/DL (ref 8.5–10.1)
CHLORIDE SERPL-SCNC: 105 MMOL/L (ref 97–108)
CO2 SERPL-SCNC: 28 MMOL/L (ref 21–32)
CREAT SERPL-MCNC: 0.92 MG/DL (ref 0.7–1.3)
DIFFERENTIAL METHOD BLD: NORMAL
EOSINOPHIL # BLD: 0.1 K/UL (ref 0–0.4)
EOSINOPHIL NFR BLD: 2 % (ref 0–7)
ERYTHROCYTE [DISTWIDTH] IN BLOOD BY AUTOMATED COUNT: 12.6 % (ref 11.5–14.5)
GLOBULIN SER CALC-MCNC: 3.3 G/DL (ref 2–4)
GLUCOSE SERPL-MCNC: 86 MG/DL (ref 65–100)
HCT VFR BLD AUTO: 43.4 % (ref 36.6–50.3)
HGB BLD-MCNC: 14.4 G/DL (ref 12.1–17)
IMM GRANULOCYTES # BLD AUTO: 0 K/UL (ref 0–0.04)
IMM GRANULOCYTES NFR BLD AUTO: 0 % (ref 0–0.5)
INR PPP: 1 (ref 0.9–1.1)
LYMPHOCYTES # BLD: 1.3 K/UL (ref 0.8–3.5)
LYMPHOCYTES NFR BLD: 28 % (ref 12–49)
MCH RBC QN AUTO: 31.8 PG (ref 26–34)
MCHC RBC AUTO-ENTMCNC: 33.2 G/DL (ref 30–36.5)
MCV RBC AUTO: 95.8 FL (ref 80–99)
MONOCYTES # BLD: 0.4 K/UL (ref 0–1)
MONOCYTES NFR BLD: 8 % (ref 5–13)
NEUTS SEG # BLD: 3 K/UL (ref 1.8–8)
NEUTS SEG NFR BLD: 62 % (ref 32–75)
NRBC # BLD: 0 K/UL (ref 0–0.01)
NRBC BLD-RTO: 0 PER 100 WBC
PLATELET # BLD AUTO: 180 K/UL (ref 150–400)
PMV BLD AUTO: 10.2 FL (ref 8.9–12.9)
POTASSIUM SERPL-SCNC: 4.3 MMOL/L (ref 3.5–5.1)
PROT SERPL-MCNC: 7.7 G/DL (ref 6.4–8.2)
PROTHROMBIN TIME: 10.4 SEC (ref 9–11.1)
RBC # BLD AUTO: 4.53 M/UL (ref 4.1–5.7)
SODIUM SERPL-SCNC: 136 MMOL/L (ref 136–145)
WBC # BLD AUTO: 4.8 K/UL (ref 4.1–11.1)

## 2019-10-08 PROCEDURE — 99153 MOD SED SAME PHYS/QHP EA: CPT | Performed by: INTERNAL MEDICINE

## 2019-10-08 PROCEDURE — 74011250637 HC RX REV CODE- 250/637: Performed by: NURSE PRACTITIONER

## 2019-10-08 PROCEDURE — 74011636320 HC RX REV CODE- 636/320: Performed by: INTERNAL MEDICINE

## 2019-10-08 PROCEDURE — C1769 GUIDE WIRE: HCPCS | Performed by: INTERNAL MEDICINE

## 2019-10-08 PROCEDURE — C1894 INTRO/SHEATH, NON-LASER: HCPCS | Performed by: INTERNAL MEDICINE

## 2019-10-08 PROCEDURE — 85610 PROTHROMBIN TIME: CPT

## 2019-10-08 PROCEDURE — 74011250636 HC RX REV CODE- 250/636: Performed by: INTERNAL MEDICINE

## 2019-10-08 PROCEDURE — 74011000258 HC RX REV CODE- 258: Performed by: INTERNAL MEDICINE

## 2019-10-08 PROCEDURE — 76937 US GUIDE VASCULAR ACCESS: CPT | Performed by: INTERNAL MEDICINE

## 2019-10-08 PROCEDURE — C1874 STENT, COATED/COV W/DEL SYS: HCPCS | Performed by: INTERNAL MEDICINE

## 2019-10-08 PROCEDURE — 74011250637 HC RX REV CODE- 250/637: Performed by: INTERNAL MEDICINE

## 2019-10-08 PROCEDURE — 74011250636 HC RX REV CODE- 250/636: Performed by: NURSE PRACTITIONER

## 2019-10-08 PROCEDURE — 77030019569 HC BND COMPR RAD TERU -B: Performed by: INTERNAL MEDICINE

## 2019-10-08 PROCEDURE — 77030004549 HC CATH ANGI DX PRF MRTM -A: Performed by: INTERNAL MEDICINE

## 2019-10-08 PROCEDURE — 85025 COMPLETE CBC W/AUTO DIFF WBC: CPT

## 2019-10-08 PROCEDURE — 77030019697 HC SYR ANGI INFL MRTM -B: Performed by: INTERNAL MEDICINE

## 2019-10-08 PROCEDURE — C1753 CATH, INTRAVAS ULTRASOUND: HCPCS | Performed by: INTERNAL MEDICINE

## 2019-10-08 PROCEDURE — 90471 IMMUNIZATION ADMIN: CPT

## 2019-10-08 PROCEDURE — 77030019698 HC SYR ANGI MDLON MRTM -A: Performed by: INTERNAL MEDICINE

## 2019-10-08 PROCEDURE — 93005 ELECTROCARDIOGRAM TRACING: CPT

## 2019-10-08 PROCEDURE — 36415 COLL VENOUS BLD VENIPUNCTURE: CPT

## 2019-10-08 PROCEDURE — 92928 PRQ TCAT PLMT NTRAC ST 1 LES: CPT | Performed by: INTERNAL MEDICINE

## 2019-10-08 PROCEDURE — 77030015766: Performed by: INTERNAL MEDICINE

## 2019-10-08 PROCEDURE — 77030010221 HC SPLNT WR POS TELE -B: Performed by: INTERNAL MEDICINE

## 2019-10-08 PROCEDURE — 77030012468 HC VLV BLEEDBK CNTRL ABBT -B: Performed by: INTERNAL MEDICINE

## 2019-10-08 PROCEDURE — 74011000250 HC RX REV CODE- 250: Performed by: INTERNAL MEDICINE

## 2019-10-08 PROCEDURE — C1725 CATH, TRANSLUMIN NON-LASER: HCPCS | Performed by: INTERNAL MEDICINE

## 2019-10-08 PROCEDURE — 80053 COMPREHEN METABOLIC PANEL: CPT

## 2019-10-08 PROCEDURE — 99152 MOD SED SAME PHYS/QHP 5/>YRS: CPT | Performed by: INTERNAL MEDICINE

## 2019-10-08 PROCEDURE — C1887 CATHETER, GUIDING: HCPCS | Performed by: INTERNAL MEDICINE

## 2019-10-08 PROCEDURE — 77030008543 HC TBNG MON PRSS MRTM -A: Performed by: INTERNAL MEDICINE

## 2019-10-08 PROCEDURE — 93571 IV DOP VEL&/PRESS C FLO 1ST: CPT | Performed by: INTERNAL MEDICINE

## 2019-10-08 PROCEDURE — 90686 IIV4 VACC NO PRSV 0.5 ML IM: CPT | Performed by: NURSE PRACTITIONER

## 2019-10-08 DEVICE — XIENCE SIERRA™ EVEROLIMUS ELUTING CORONARY STENT SYSTEM 3.00 MM X 23 MM / RAPID-EXCHANGE
Type: IMPLANTABLE DEVICE | Status: FUNCTIONAL
Brand: XIENCE SIERRA™

## 2019-10-08 RX ORDER — CITALOPRAM 20 MG/1
10 TABLET, FILM COATED ORAL DAILY
Status: DISCONTINUED | OUTPATIENT
Start: 2019-10-09 | End: 2019-10-09 | Stop reason: HOSPADM

## 2019-10-08 RX ORDER — LANOLIN ALCOHOL/MO/W.PET/CERES
325 CREAM (GRAM) TOPICAL 2 TIMES DAILY
Status: DISCONTINUED | OUTPATIENT
Start: 2019-10-08 | End: 2019-10-09 | Stop reason: HOSPADM

## 2019-10-08 RX ORDER — ASPIRIN 81 MG/1
81 TABLET ORAL DAILY
Status: DISCONTINUED | OUTPATIENT
Start: 2019-10-09 | End: 2019-10-09 | Stop reason: HOSPADM

## 2019-10-08 RX ORDER — MIDAZOLAM HYDROCHLORIDE 1 MG/ML
INJECTION, SOLUTION INTRAMUSCULAR; INTRAVENOUS AS NEEDED
Status: DISCONTINUED | OUTPATIENT
Start: 2019-10-08 | End: 2019-10-08 | Stop reason: HOSPADM

## 2019-10-08 RX ORDER — LIDOCAINE HYDROCHLORIDE 10 MG/ML
INJECTION, SOLUTION EPIDURAL; INFILTRATION; INTRACAUDAL; PERINEURAL AS NEEDED
Status: DISCONTINUED | OUTPATIENT
Start: 2019-10-08 | End: 2019-10-08 | Stop reason: HOSPADM

## 2019-10-08 RX ORDER — HEPARIN SODIUM 200 [USP'U]/100ML
INJECTION, SOLUTION INTRAVENOUS
Status: COMPLETED | OUTPATIENT
Start: 2019-10-08 | End: 2019-10-08

## 2019-10-08 RX ORDER — CLOPIDOGREL BISULFATE 75 MG/1
75 TABLET ORAL DAILY
Status: DISCONTINUED | OUTPATIENT
Start: 2019-10-09 | End: 2019-10-09 | Stop reason: HOSPADM

## 2019-10-08 RX ORDER — SODIUM CHLORIDE 0.9 % (FLUSH) 0.9 %
5-40 SYRINGE (ML) INJECTION AS NEEDED
Status: DISCONTINUED | OUTPATIENT
Start: 2019-10-08 | End: 2019-10-09 | Stop reason: HOSPADM

## 2019-10-08 RX ORDER — ATORVASTATIN CALCIUM 40 MG/1
40 TABLET, FILM COATED ORAL
Status: DISCONTINUED | OUTPATIENT
Start: 2019-10-08 | End: 2019-10-09 | Stop reason: HOSPADM

## 2019-10-08 RX ORDER — OXYCODONE AND ACETAMINOPHEN 5; 325 MG/1; MG/1
1 TABLET ORAL
Status: DISCONTINUED | OUTPATIENT
Start: 2019-10-08 | End: 2019-10-09 | Stop reason: HOSPADM

## 2019-10-08 RX ORDER — SODIUM CHLORIDE 0.9 % (FLUSH) 0.9 %
5-40 SYRINGE (ML) INJECTION EVERY 8 HOURS
Status: DISCONTINUED | OUTPATIENT
Start: 2019-10-08 | End: 2019-10-09 | Stop reason: HOSPADM

## 2019-10-08 RX ORDER — VERAPAMIL HYDROCHLORIDE 2.5 MG/ML
INJECTION, SOLUTION INTRAVENOUS AS NEEDED
Status: DISCONTINUED | OUTPATIENT
Start: 2019-10-08 | End: 2019-10-08 | Stop reason: HOSPADM

## 2019-10-08 RX ORDER — GUAIFENESIN 100 MG/5ML
81 LIQUID (ML) ORAL ONCE
Status: COMPLETED | OUTPATIENT
Start: 2019-10-08 | End: 2019-10-08

## 2019-10-08 RX ORDER — LOSARTAN POTASSIUM 25 MG/1
25 TABLET ORAL DAILY
Status: DISCONTINUED | OUTPATIENT
Start: 2019-10-09 | End: 2019-10-09 | Stop reason: HOSPADM

## 2019-10-08 RX ORDER — HEPARIN SODIUM 1000 [USP'U]/ML
INJECTION, SOLUTION INTRAVENOUS; SUBCUTANEOUS AS NEEDED
Status: DISCONTINUED | OUTPATIENT
Start: 2019-10-08 | End: 2019-10-08 | Stop reason: HOSPADM

## 2019-10-08 RX ORDER — ISOSORBIDE MONONITRATE 30 MG/1
30 TABLET, EXTENDED RELEASE ORAL DAILY
Status: DISCONTINUED | OUTPATIENT
Start: 2019-10-09 | End: 2019-10-09 | Stop reason: HOSPADM

## 2019-10-08 RX ORDER — CLOPIDOGREL BISULFATE 75 MG/1
TABLET ORAL AS NEEDED
Status: DISCONTINUED | OUTPATIENT
Start: 2019-10-08 | End: 2019-10-08 | Stop reason: HOSPADM

## 2019-10-08 RX ORDER — AMLODIPINE BESYLATE 2.5 MG/1
2.5 TABLET ORAL DAILY
Status: DISCONTINUED | OUTPATIENT
Start: 2019-10-09 | End: 2019-10-09 | Stop reason: HOSPADM

## 2019-10-08 RX ORDER — METOPROLOL SUCCINATE 25 MG/1
12.5 TABLET, EXTENDED RELEASE ORAL
Status: DISCONTINUED | OUTPATIENT
Start: 2019-10-08 | End: 2019-10-09 | Stop reason: HOSPADM

## 2019-10-08 RX ORDER — PANTOPRAZOLE SODIUM 40 MG/1
40 TABLET, DELAYED RELEASE ORAL
Status: DISCONTINUED | OUTPATIENT
Start: 2019-10-09 | End: 2019-10-09 | Stop reason: HOSPADM

## 2019-10-08 RX ORDER — PANTOPRAZOLE SODIUM 40 MG/1
40 TABLET, DELAYED RELEASE ORAL ONCE
Status: COMPLETED | OUTPATIENT
Start: 2019-10-08 | End: 2019-10-08

## 2019-10-08 RX ORDER — POTASSIUM CHLORIDE 750 MG/1
10 TABLET, FILM COATED, EXTENDED RELEASE ORAL DAILY
Status: DISCONTINUED | OUTPATIENT
Start: 2019-10-09 | End: 2019-10-09 | Stop reason: HOSPADM

## 2019-10-08 RX ORDER — ACETAMINOPHEN 325 MG/1
650 TABLET ORAL
Status: DISCONTINUED | OUTPATIENT
Start: 2019-10-08 | End: 2019-10-09 | Stop reason: HOSPADM

## 2019-10-08 RX ORDER — FENTANYL CITRATE 50 UG/ML
INJECTION, SOLUTION INTRAMUSCULAR; INTRAVENOUS AS NEEDED
Status: DISCONTINUED | OUTPATIENT
Start: 2019-10-08 | End: 2019-10-08 | Stop reason: HOSPADM

## 2019-10-08 RX ADMIN — ASPIRIN 81 MG CHEWABLE TABLET 81 MG: 81 TABLET CHEWABLE at 13:34

## 2019-10-08 RX ADMIN — METOPROLOL SUCCINATE 12.5 MG: 25 TABLET, EXTENDED RELEASE ORAL at 21:16

## 2019-10-08 RX ADMIN — PANTOPRAZOLE SODIUM 40 MG: 40 TABLET, DELAYED RELEASE ORAL at 18:12

## 2019-10-08 RX ADMIN — INFLUENZA VIRUS VACCINE 0.5 ML: 15; 15; 15; 15 SUSPENSION INTRAMUSCULAR at 21:16

## 2019-10-08 RX ADMIN — ATORVASTATIN CALCIUM 40 MG: 40 TABLET, FILM COATED ORAL at 21:16

## 2019-10-08 RX ADMIN — FERROUS SULFATE TAB 325 MG (65 MG ELEMENTAL FE) 325 MG: 325 (65 FE) TAB at 18:12

## 2019-10-08 RX ADMIN — OXYCODONE AND ACETAMINOPHEN 1 TABLET: 5; 325 TABLET ORAL at 21:16

## 2019-10-08 RX ADMIN — Medication 10 ML: at 21:18

## 2019-10-08 RX ADMIN — Medication 10 ML: at 18:53

## 2019-10-08 NOTE — INTERVAL H&P NOTE
H&P Update:  Del Dill was seen and examined. History and physical has been reviewed. The patient has been examined.  There have been no significant clinical changes since the completion of the originally dated History and Physical.

## 2019-10-08 NOTE — Clinical Note
Lesion located in the Mid LAD. Balloon inflated using multiple inflations inflation technique. Pressure = 18 socorro; Duration = 10 sec. Inflation 2: Pressure: 18 socorro; Duration: 10 sec.

## 2019-10-08 NOTE — Clinical Note
Lesion: Located in the Mid LAD. Stent deployed. Multiple inflations used. First inflation pressure = 14 socorro; inflation time: 16 sec. Second inflation pressure: 16 socorro; inflation time: 12 sec.

## 2019-10-08 NOTE — ROUTINE PROCESS
1717: Pt just arrived to IVCU, site CDI, no complaints    1800: confirmed FULL code status wish with pt.

## 2019-10-08 NOTE — Clinical Note
TRANSFER - OUT REPORT:  
 
Verbal report given to: Daphnie Christensen RN. Report consisted of patient's Situation, Background, Assessment and  
Recommendations(SBAR). Opportunity for questions and clarification was provided. Patient transported to: IVCU.

## 2019-10-08 NOTE — Clinical Note
Lesion located in the Mid LAD. Balloon inflated using single inflation technique. Pressure = 12 socorro; Duration = 15 sec.

## 2019-10-09 VITALS
TEMPERATURE: 98.4 F | SYSTOLIC BLOOD PRESSURE: 114 MMHG | DIASTOLIC BLOOD PRESSURE: 77 MMHG | BODY MASS INDEX: 37.93 KG/M2 | WEIGHT: 280 LBS | HEIGHT: 72 IN | OXYGEN SATURATION: 97 % | HEART RATE: 77 BPM | RESPIRATION RATE: 16 BRPM

## 2019-10-09 LAB
ANION GAP SERPL CALC-SCNC: 4 MMOL/L (ref 5–15)
ATRIAL RATE: 57 BPM
BUN SERPL-MCNC: 20 MG/DL (ref 6–20)
BUN/CREAT SERPL: 19 (ref 12–20)
CALCIUM SERPL-MCNC: 8.7 MG/DL (ref 8.5–10.1)
CALCULATED P AXIS, ECG09: 69 DEGREES
CALCULATED R AXIS, ECG10: 32 DEGREES
CALCULATED T AXIS, ECG11: -37 DEGREES
CHLORIDE SERPL-SCNC: 105 MMOL/L (ref 97–108)
CO2 SERPL-SCNC: 29 MMOL/L (ref 21–32)
CREAT SERPL-MCNC: 1.05 MG/DL (ref 0.7–1.3)
DIAGNOSIS, 93000: NORMAL
GLUCOSE SERPL-MCNC: 81 MG/DL (ref 65–100)
P-R INTERVAL, ECG05: 184 MS
POTASSIUM SERPL-SCNC: 4 MMOL/L (ref 3.5–5.1)
Q-T INTERVAL, ECG07: 500 MS
QRS DURATION, ECG06: 150 MS
QTC CALCULATION (BEZET), ECG08: 486 MS
SODIUM SERPL-SCNC: 138 MMOL/L (ref 136–145)
VENTRICULAR RATE, ECG03: 57 BPM

## 2019-10-09 PROCEDURE — 80048 BASIC METABOLIC PNL TOTAL CA: CPT

## 2019-10-09 PROCEDURE — 36415 COLL VENOUS BLD VENIPUNCTURE: CPT

## 2019-10-09 PROCEDURE — 74011250637 HC RX REV CODE- 250/637: Performed by: NURSE PRACTITIONER

## 2019-10-09 RX ORDER — CLOPIDOGREL BISULFATE 75 MG/1
75 TABLET ORAL DAILY
Qty: 30 TAB | Refills: 11 | Status: SHIPPED | OUTPATIENT
Start: 2019-10-09 | End: 2019-11-26 | Stop reason: SDUPTHER

## 2019-10-09 RX ADMIN — ASPIRIN 81 MG: 81 TABLET, COATED ORAL at 08:09

## 2019-10-09 RX ADMIN — LOSARTAN POTASSIUM 25 MG: 25 TABLET ORAL at 08:11

## 2019-10-09 RX ADMIN — Medication 10 ML: at 05:31

## 2019-10-09 RX ADMIN — PANTOPRAZOLE SODIUM 40 MG: 40 TABLET, DELAYED RELEASE ORAL at 08:12

## 2019-10-09 RX ADMIN — FERROUS SULFATE TAB 325 MG (65 MG ELEMENTAL FE) 325 MG: 325 (65 FE) TAB at 08:09

## 2019-10-09 RX ADMIN — CLOPIDOGREL BISULFATE 75 MG: 75 TABLET ORAL at 08:10

## 2019-10-09 RX ADMIN — ACETAMINOPHEN 650 MG: 325 TABLET ORAL at 08:11

## 2019-10-09 NOTE — PROGRESS NOTES
[address] [place] [address]  [phone]      Cardiology Progress Note      10/9/2019 8:56 AM    Admit Date: 10/8/2019    Admit Diagnosis:   Angina of effort [facility]) [I20.8]    Subjective:     Chevy Lockett is s/p PCI/KARRIE to LAD. No c/o CP, SOB    Visit Vitals  /77 (BP 1 Location: Left arm, BP Patient Position: At rest)   Pulse 77   Temp 98.4 °F (36.9 °C)   Resp 16   Ht 6' (1.829 m)   Wt 127 kg (280 lb)   SpO2 97%   BMI 37.97 kg/m²       Current Facility-Administered Medications   Medication Dose Route Frequency    amLODIPine (NORVASC) tablet 2.5 mg  2.5 mg Oral DAILY    aspirin delayed-release tablet 81 mg  81 mg Oral DAILY    atorvastatin (LIPITOR) tablet 40 mg  40 mg Oral QHS    citalopram (CELEXA) tablet 10 mg  10 mg Oral DAILY    ferrous sulfate tablet 325 mg  325 mg Oral BID    isosorbide mononitrate ER (IMDUR) tablet 30 mg  30 mg Oral DAILY    losartan (COZAAR) tablet 25 mg  25 mg Oral DAILY    metoprolol succinate (TOPROL-XL) XL tablet 12.5 mg  12.5 mg Oral QHS    oxyCODONE-acetaminophen (PERCOCET) 5-325 mg per tablet 1 Tab  1 Tab Oral Q4H PRN    pantoprazole (PROTONIX) tablet 40 mg  40 mg Oral ACB    potassium chloride SR (KLOR-CON 10) tablet 10 mEq  10 mEq Oral DAILY    clopidogrel (PLAVIX) tablet 75 mg  75 mg Oral DAILY    sodium chloride (NS) flush 5-40 mL  5-40 mL IntraVENous Q8H    sodium chloride (NS) flush 5-40 mL  5-40 mL IntraVENous PRN    acetaminophen (TYLENOL) tablet 650 mg  650 mg Oral Q4H PRN       Objective:      Physical Exam:  General Appearance:  obese older  male in on acute distress  Chest:   Clear  Cardiovascular:  Regular rate and rhythm, no murmur. Abdomen:   Soft, non-tender, bowel sounds are active. Extremities: right radial site D/I, no hematoma  Skin:  Warm and dry.      Data Review:   Recent Labs     10/08/19  1401   WBC 4.8   HGB 14.4   HCT 43.4        Recent Labs     10/09/19  0354 10/08/19  1401    136 K 4.0 4.3    105   CO2 29 28   GLU 81 86   BUN 20 22*   CREA 1.05 0.92   CA 8.7 9.4   ALB  --  4.4   TBILI  --  0.9   SGOT  --  23   ALT  --  38   INR  --  1.0       No results for input(s): TROIQ, CPK, CKMB in the last 72 hours. Intake/Output Summary (Last 24 hours) at 10/9/2019 0856  Last data filed at 10/8/2019 1853  Gross per 24 hour   Intake --   Output 500 ml   Net -500 ml        Telemetry: SR, paced      Assessment:     Principal Problem:    Angina of effort ([facility] [address] (10/7/2019)      Overview: Added automatically from request for surgery [id number]    Active Problems:    S/P coronary artery stent placement (12/20/2013)      Overview: 10/8/19: PCI LAD      9/11/15 PCI/KARRIE to LAD      12/20/13 75% ostial LAD with abnormal FFR (0.67); successful KARRIE              Plan:     Angina class III:  S/p PCI/KARRIE to LAD  Start Plavix 75 mg daily x 1 year, continue on ASA, BB, statin.      Discharge to home with f/u Dr. Brittany Wan 2 weeks or as scheduled    Jayne Avendaño [facility]  Cardiology

## 2019-10-09 NOTE — DISCHARGE INSTRUCTIONS
z                 [address]  [phone]        Patient ID:  Miranda Mckeon  [id number]  89 y.o.  1945    Admit Date: 10/8/2019    Discharge Date: 10/9/2019     Admitting Physician: Levi Lin MD     Discharge Physician: Gab Juares NP    Admission Diagnoses:   Angina of effort [facility]) [I20.8]    Discharge Diagnoses:   Principal Problem:    Angina of effort [facility]) (10/7/2019)      Overview: Added automatically from request for surgery [id number]    Active Problems:    S/P coronary artery stent placement (12/20/2013)      Overview: 10/8/19: PCI LAD      9/11/15 PCI/KARRIE to LAD      12/20/13 75% ostial LAD with abnormal FFR (0.67); successful KARRIE              Discharge Condition: Good    Cardiology Procedures this Admission:  Left heart catheterization with PCI    Disposition: home    Reference discharge instructions provided by nursing for diet and activity. Signed:  Gab Juares NP  10/9/2019  8:03 AM      Radial Cardiac Catheterization/Angiography Discharge Instructions    It is normal to feel tired the first couple days. Take it easy and follow the physicians instructions. CHECK THE CATHETER INSERTION SITE DAILY:    Remove the wrist dressing 24 hours after the procedure. You may shower 24 hours after the procedure. Wash with soap and water and pat dry. Gentle cleaning of the site with soap and water is sufficient, cover with a dry clean dressing or bandage. Do not apply creams or powders to the area. No soaking the wrist for 3 days. Leave the puncture site open to air after 24 hours post-procedure. CALL THE PHYSICIANS:     If the site becomes red, swollen or feels warm to the touch  If there is bleeding or drainage or if there is unusual pain at the radial site. If there is any minor oozing, you may apply a band-aid and remove after 12 hours.    If the bleeding continues, hold pressure with the middle finger against the puncture site and the thumb against the back of the wrist,call 911 to be transported to the hospital.  DO NOT DRIVE YOURSELF, [id number]. ACTIVITY:   For the first 24 hours do not manipulate the wrist.  No lifting, pushing or pulling over 3-5 pounds with the affected wrist for 7 daysand no straining the insertion site. Do not life grocery bags or the garbage can, do not run the vacuum  or  for 7 days. Start with short walks as in the hospital and gradually increase as tolerated each day. It is recommended to walk 30 minutes 5-7 days per week. Follow your physicians instructions on activity. Avoid walking outside in extremes of heat or cold. Walk inside when it is cold and windy or hot and humid. Things to keep in mind:  No driving for at least 24 hours, or as designated by your physician. Limit the number of times you go up and down the stairs  Take rests and pace yourself with activity. Be careful and do not strain with bowel movements. MEDICATIONS:    Take all medications as prescribed  Call your physician if you have any questions  Keep an updated list of your medications with you at all times and give a list to your physician and pharmacist    SIGNS AND SYMPTOMS:   Be cautious of symptoms of angina or recurrent symptoms such as chest discomfort, unusual shortness of breath or fatigue. These could be symptoms of restenosis, a new blockage or a heart attack. If your symptoms are relieved with rest it is still recommended that you notify your physician of recurrent chest pain or discomfort. For CHEST PAIN or symptoms of angina not relieved with rest:  If the discomfort is not relieved with rest, and you have been prescribed Nitroglycerin, take as directed (taken under the tongue, one at a time 5 minutes apart for a total of 3 doses). If the discomfort is not relieved after the 3rd nitroglycerin, call 911.   If you have not been prescribed Nitroglycerin  and your chest discomfort is not relieved with rest, call 911. AFTER CARE:   Follow up with your physician as instructed. Follow a heart healthy diet with proper portion control, daily stress management, daily exercise, blood pressure and cholesterol control , and smoking cessation.

## 2019-10-09 NOTE — CARDIO/PULMONARY
Cardiac Rehab Note: chart review     S/P PCI/stenting 10/8/2019    Smoking history assessed. Patient is a former smoker. Previous participant in Cardiac rehab -states he will try it again but did not like it a few years ago when he participated. encouraged to give it another try. EF 51%  on 9/25/2019 per nuc stress    CAD education folder, with heart heathy diet, warning signs, heart facts, catheterization brochure, and out patient cardiac rehab program provided to Mei Hi on 10/9/2019                                              Educated using teach back method. Reviewed CAD diagnosis definition and purpose of intervention. Discussed risk factors for CAD to include the following: family history, elevated BMI, hyperlipidemia, hypertension, diabetes, and stress. Discussed Heart Healthy/Low Sodium (less than 2000 mg) diet. Reviewed the importance of medication compliance(Plavix), follow up appointments with cardiologist, signs and symptoms of angina, and what to report to physician after discharge. Emphasized the value of cardiac rehab. Discussed Cardiac Rehab Program format, benefits, and encouraged enrollment to assist with risk modification and management. Initial Cardiac Rehab Program intake appointment scheduled for November 19,2019 at 1 pm and appointment information is on the AVS. Patient provided orientation packet, instructed to complete packet a couple days prior to appointment, wear gym appropriate clothing and shoes, and bring current list of medications. Patient is to call if unable to keep appointment, need to reschedule or additional questions. Mei Hi verbalized understanding with questions answered.   Ammon Novoa RN

## 2019-10-09 NOTE — PROGRESS NOTES
1900: Received report from day shift nurseCharisse. Pt resting in bed, family/visitor at bedside. TR band on R Radial site, CDI, no bleeding/hematoma. [place] stopped at 1900. No complaints. 2100: Pt ambulated in the room. Pt ambulated with steady gait. Ambulated from the bed to the bathroom to void. No complaints. No SOB or CP.     2150: Pt returned to bed. TR band off, gauze and tegaderm to cover. CDI, no bleeding/hematoma. Pt resting in bed. Pt reporting L shoulder pain 8/10 from past injury. PRN percocet given. 2300: Pt resting in bed. R Radial site CDI, no bleeding/hematoma. No complaints. 0400: VSS. Labs drawn. Pt ambulated from the bed to the bathroom to void. No complaints. 0700: Bedside report given to day shift nurseHeydi. Reviewed MAR, site information, and discharge planning.

## 2019-10-09 NOTE — PROGRESS NOTES
7:10 AM Bedside report received from HEIKE El.    7:30 AM Patient up ambulating in hallway with standby assist and tolerating well. Patient specifically denies pain, shortness of breath and dizziness. Patient assisted back to room and sitting up in chair for breakfast. Will continue to monitor. 8:00 AM PRN PO tylenol given for patients c/o pain. See MAR.    9:45 AM PIV removed, tip intact. Discharge instructions reviewed with patient including, but not limited to, post cath site care, medications and follow up appointments. Patient given opportunity for questions and verbalized understanding of all instructions. All belongings and discharge instructions with patient on discharge. Patient escorted to private vehicle via wheelchair for discharge. Patient's son to drive him home.

## 2019-10-10 ENCOUNTER — HOSPITAL ENCOUNTER (EMERGENCY)
Age: 74
Discharge: HOME | End: 2019-10-10
Attending: EMERGENCY MEDICINE
Payer: MEDICARE

## 2019-10-10 ENCOUNTER — APPOINTMENT (OUTPATIENT)
Dept: GENERAL RADIOLOGY | Age: 74
End: 2019-10-10
Attending: EMERGENCY MEDICINE
Payer: MEDICARE

## 2019-10-10 VITALS
HEART RATE: 76 BPM | WEIGHT: 290 LBS | RESPIRATION RATE: 15 BRPM | HEIGHT: 72 IN | OXYGEN SATURATION: 97 % | BODY MASS INDEX: 39.28 KG/M2 | SYSTOLIC BLOOD PRESSURE: 128 MMHG | TEMPERATURE: 98.2 F | DIASTOLIC BLOOD PRESSURE: 79 MMHG

## 2019-10-10 DIAGNOSIS — I20.9 ANGINA PECTORIS (HCC): Primary | ICD-10-CM

## 2019-10-10 LAB
ALBUMIN SERPL-MCNC: 3.8 G/DL (ref 3.5–5)
ALBUMIN/GLOB SERPL: 1.3 {RATIO} (ref 1.1–2.2)
ALP SERPL-CCNC: 54 U/L (ref 45–117)
ALT SERPL-CCNC: 35 U/L (ref 12–78)
ANION GAP SERPL CALC-SCNC: 6 MMOL/L (ref 5–15)
AST SERPL-CCNC: 21 U/L (ref 15–37)
BASOPHILS # BLD: 0 K/UL (ref 0–0.1)
BASOPHILS NFR BLD: 0 % (ref 0–1)
BILIRUB SERPL-MCNC: 0.5 MG/DL (ref 0.2–1)
BUN SERPL-MCNC: 25 MG/DL (ref 6–20)
BUN/CREAT SERPL: 29 (ref 12–20)
CALCIUM SERPL-MCNC: 8.7 MG/DL (ref 8.5–10.1)
CHLORIDE SERPL-SCNC: 104 MMOL/L (ref 97–108)
CO2 SERPL-SCNC: 25 MMOL/L (ref 21–32)
CREAT SERPL-MCNC: 0.85 MG/DL (ref 0.7–1.3)
DIFFERENTIAL METHOD BLD: NORMAL
EOSINOPHIL # BLD: 0.2 K/UL (ref 0–0.4)
EOSINOPHIL NFR BLD: 3 % (ref 0–7)
ERYTHROCYTE [DISTWIDTH] IN BLOOD BY AUTOMATED COUNT: 12.5 % (ref 11.5–14.5)
GLOBULIN SER CALC-MCNC: 2.9 G/DL (ref 2–4)
GLUCOSE SERPL-MCNC: 88 MG/DL (ref 65–100)
HCT VFR BLD AUTO: 39.7 % (ref 36.6–50.3)
HGB BLD-MCNC: 13.7 G/DL (ref 12.1–17)
IMM GRANULOCYTES # BLD AUTO: 0 K/UL (ref 0–0.04)
IMM GRANULOCYTES NFR BLD AUTO: 0 % (ref 0–0.5)
LYMPHOCYTES # BLD: 1.4 K/UL (ref 0.8–3.5)
LYMPHOCYTES NFR BLD: 23 % (ref 12–49)
MCH RBC QN AUTO: 33 PG (ref 26–34)
MCHC RBC AUTO-ENTMCNC: 34.5 G/DL (ref 30–36.5)
MCV RBC AUTO: 95.7 FL (ref 80–99)
MONOCYTES # BLD: 0.6 K/UL (ref 0–1)
MONOCYTES NFR BLD: 10 % (ref 5–13)
NEUTS SEG # BLD: 3.8 K/UL (ref 1.8–8)
NEUTS SEG NFR BLD: 64 % (ref 32–75)
NRBC # BLD: 0 K/UL (ref 0–0.01)
NRBC BLD-RTO: 0 PER 100 WBC
PLATELET # BLD AUTO: 167 K/UL (ref 150–400)
PMV BLD AUTO: 9.9 FL (ref 8.9–12.9)
POTASSIUM SERPL-SCNC: 3.8 MMOL/L (ref 3.5–5.1)
PROT SERPL-MCNC: 6.7 G/DL (ref 6.4–8.2)
RBC # BLD AUTO: 4.15 M/UL (ref 4.1–5.7)
SODIUM SERPL-SCNC: 135 MMOL/L (ref 136–145)
TROPONIN I SERPL-MCNC: 0.05 NG/ML
TROPONIN I SERPL-MCNC: <0.05 NG/ML
WBC # BLD AUTO: 5.9 K/UL (ref 4.1–11.1)

## 2019-10-10 PROCEDURE — 99285 EMERGENCY DEPT VISIT HI MDM: CPT

## 2019-10-10 PROCEDURE — 85025 COMPLETE CBC W/AUTO DIFF WBC: CPT

## 2019-10-10 PROCEDURE — 80053 COMPREHEN METABOLIC PANEL: CPT

## 2019-10-10 PROCEDURE — 71046 X-RAY EXAM CHEST 2 VIEWS: CPT

## 2019-10-10 PROCEDURE — 36415 COLL VENOUS BLD VENIPUNCTURE: CPT

## 2019-10-10 PROCEDURE — 93005 ELECTROCARDIOGRAM TRACING: CPT

## 2019-10-10 PROCEDURE — 84484 ASSAY OF TROPONIN QUANT: CPT

## 2019-10-11 LAB
ATRIAL RATE: 70 BPM
ATRIAL RATE: 72 BPM
CALCULATED P AXIS, ECG09: 58 DEGREES
CALCULATED P AXIS, ECG09: 61 DEGREES
CALCULATED R AXIS, ECG10: -38 DEGREES
CALCULATED R AXIS, ECG10: -39 DEGREES
CALCULATED T AXIS, ECG11: 57 DEGREES
CALCULATED T AXIS, ECG11: 63 DEGREES
DIAGNOSIS, 93000: NORMAL
DIAGNOSIS, 93000: NORMAL
P-R INTERVAL, ECG05: 168 MS
P-R INTERVAL, ECG05: 184 MS
Q-T INTERVAL, ECG07: 438 MS
Q-T INTERVAL, ECG07: 466 MS
QRS DURATION, ECG06: 148 MS
QRS DURATION, ECG06: 152 MS
QTC CALCULATION (BEZET), ECG08: 479 MS
QTC CALCULATION (BEZET), ECG08: 503 MS
VENTRICULAR RATE, ECG03: 70 BPM
VENTRICULAR RATE, ECG03: 72 BPM

## 2019-10-11 NOTE — DISCHARGE INSTRUCTIONS
Your chest pain today may be due to angina. There is no evidence of a heart attack at this time, but if you develop worsening or recurrent chest pain, you should call Dr. Alfredo Rubin and return to the emergency department immediately for evaluation.

## 2019-10-11 NOTE — ED PROVIDER NOTES
EMERGENCY DEPARTMENT HISTORY AND PHYSICAL EXAM      Date: 10/10/2019  Patient Name: Stacy Benz  Patient Age and Sex: 74 y.o. male    History of Presenting Illness     Chief Complaint   Patient presents with    Chest Pain     substernal chest pain while going upstairs and then sitting in his chair. Cardiac stent in LAD 2 days ago by Dr. Deidre Ibarra. took 81 mg asa at home. Pain is resolved       History Provided By: Patient    Ability to gather history was limited by: None    HPI: Stacy Benz, 74 y.o. male with history of coronary artery disease, multiple stents placed including a stent placed to the LAD 2 days ago, complains of sudden onset chest pain, central chest and left chest, starting about 1 hour prior to ED arrival.  Pain was sharp in nature, nonradiating, no significant shortness of breath or nausea or vomiting or diaphoresis. Patient did not take any nitroglycerin. He called 911, symptoms began resolving after just a few minutes, and at the time of my H&P in the emergency department, his symptoms were completely resolved, no chest pain. Location: Central chest  Quality:    Sharp chest pain  Severity: Moderate to severe  Duration: A few minutes, less than an hour until fully resolved  Timing:    Sudden onset  Context: Stent placed 2 days ago  Modifying factors: Did not take nitroglycerin  Associated symptoms: None    Pt denies any other alleviating or exacerbating factors. There are no other complaints, changes or physical findings at this time. Past Medical History:   Diagnosis Date    AF (atrial fibrillation) ([facility])     s/p DCCV after his CABG, no recurrence    Anxiety     Arrhythmia     a fib    Arthritis     CAD (coronary artery disease)     s/p CABG in 1997    GERD (gastroesophageal reflux disease)     Goiter, nontoxic, multinodular     Hypertension     Low blood potassium     Myocardial infarct ([facility] [address] ? 1997    MARIA ISABEL on CPAP     Other and unspecified hyperlipidemia     Unspecified sleep apnea      Past Surgical History:   Procedure Laterality Date    CARDIAC SURG PROCEDURE UNLIST  1997    bypass    HX CATARACT REMOVAL  2006    bilateral    HX CHOLECYSTECTOMY  1970's    HX ORTHOPAEDIC      Arthoscopic L. knee surgery     HX SEPTOPLASTY  1980's    HX SHOULDER ARTHROSCOPY  2012    left    RIGHT HEART CATHETERIZATION      x3 states pt. (can't remember when)    RT/LT HEART CATHETERS  12/2013    PTCA    Heart Dr. Angel Gaytan       PCP: Sergo Swann MD    Past History   Past Medical History:  Past Medical History:   Diagnosis Date    AF (atrial fibrillation) ([address]     s/p DCCV after his CABG, no recurrence    Anxiety     Arrhythmia     a fib    Arthritis     CAD (coronary artery disease)     s/p CABG in 1997    GERD (gastroesophageal reflux disease)     Goiter, nontoxic, multinodular     Hypertension     Low blood potassium     Myocardial infarct ([address] ? 1997    MARIA ISABEL on CPAP     Other and unspecified hyperlipidemia     Unspecified sleep apnea        Past Surgical History:  Past Surgical History:   Procedure Laterality Date    CARDIAC SURG PROCEDURE UNLIST  1997    bypass    HX CATARACT REMOVAL  2006    bilateral    HX CHOLECYSTECTOMY  1970's    HX ORTHOPAEDIC      Arthoscopic L. knee surgery     HX SEPTOPLASTY  1980's    HX SHOULDER ARTHROSCOPY  2012    left    RIGHT HEART CATHETERIZATION      x3 states pt. (can't remember when)    RT/LT HEART CATHETERS  12/2013    PTCA    Heart Dr. Angel Gaytan       Family History:  Family History   Problem Relation Age of Onset    Cancer Father         in his shoulder    Heart Attack Father     Heart Disease Father     Heart Disease Mother    Oscar Madison Mother         abd    [address] [place] [address] Patel Cancer Son         wilm's tumor    Cancer Sister         abd    Thyroid Disease Neg Hx        Social History:  Social History     Tobacco Use    Smoking status: Former Smoker     Packs/day: 4.50     Years: 20.00     Pack years: 90.00     Types: Cigarettes     Last attempt to quit: 1970     Years since quittin.5    Smokeless tobacco: Never Used   Substance Use Topics    Alcohol use: Yes     Alcohol/week: 0.0 standard drinks     Comment: once a month may have some vodka    Drug use: No       Allergies: Allergies   Allergen Reactions    Demerol [Meperidine] Other (comments)     hallucinations  hallucinations       Current Medications:  No current facility-administered medications on file prior to encounter. Current Outpatient Medications on File Prior to Encounter   Medication Sig Dispense Refill    clopidogrel (PLAVIX) 75 mg tab Take 1 Tab by mouth daily. 30 Tab 11    amLODIPine (NORVASC) 2.5 mg tablet TAKE 1 TABLET EVERY DAY 90 Tab 1    metoprolol succinate (TOPROL-XL) 25 mg XL tablet TAKE ONE-HALF TABLET EVERY NIGHT. 45 Tab 1    losartan (COZAAR) 25 mg tablet Take 1 Tab by mouth daily. Reduced 3/6/19 90 Tab 1    naproxen (NAPROSYN) 500 mg tablet Take 250 mg by mouth two (2) times daily (with meals).  pantoprazole (PROTONIX) 40 mg tablet TAKE 1 TABLET EVERY DAY  (STOP  PRILOSEC) 90 Tab 3    atorvastatin (LIPITOR) 40 mg tablet TAKE 1 TABLET EVERY DAY.  (STOP  SIMVASTATIN) 90 Tab 3    isosorbide mononitrate ER (IMDUR) 60 mg CR tablet TAKE 1 TABLET EVERY DAY 90 Tab 3    vit C/E/Zn/coppr/lutein/zeaxan (PRESERVISION AREDS 2 PO) Take  by mouth two (2) times a day.  oxyCODONE-acetaminophen (PERCOCET) 5-325 mg per tablet Take  by mouth every four (4) hours as needed for Pain.  nitroglycerin (NITROSTAT) 0.4 mg SL tablet 1 Tab by SubLINGual route every five (5) minutes as needed for Chest Pain (max 3 daily for chest pain). 25 Tab 1    Potassium Gluconate 595 mg (99 mg) tablet Take 595 mg by mouth daily.  hydroxychloroquine (PLAQUENIL) 200 mg tablet Take 200 mg by mouth two (2) times a day.  hydrocortisone (HYTONE) 2.5 % topical cream       OXYGEN-AIR DELIVERY SYSTEMS (HORIZON NASAL CPAP SYSTEM) by Does Not Apply route.       acetaminophen (TYLENOL ARTHRITIS PAIN) 650 mg CR tablet Take 650 mg by mouth four (4) times daily.  ferrous sulfate (IRON) 325 mg (65 mg iron) tablet Take 325 mg by mouth two (2) times a day.  citalopram (CELEXA) 10 mg tablet Take 10 mg by mouth daily.  omega-3 fatty acids-vitamin e (FISH OIL) 1,000 mg Cap Take 2 Caps by mouth.  multivitamin, stress formula (STRESS TAB) tablet Take 1 Tab by mouth daily.  aspirin 81 mg tablet Take 162 mg by mouth daily.  GLUC HCL/GLUC DESHPANDE/AC-D-GLUCOS (GLUCOSAMINE COMPLEX PO) Take 1 Tab by mouth daily. Review of Systems   Review of Systems   Constitutional: Negative for fever. Respiratory: Negative for shortness of breath. Cardiovascular: Positive for chest pain. Gastrointestinal: Negative for nausea. Neurological: Negative for headaches. All other systems reviewed and are negative. Physical Exam   Vital Signs  Patient Vitals for the past 24 hrs:   Temp Pulse Resp BP SpO2   10/10/19 2145 -- 72 27 137/71 97 %   10/10/19 2030 -- 63 21 144/74 95 %   10/10/19 2015 -- 72 27 140/64 95 %   10/10/19 2000 -- 69 26 141/64 96 %   10/10/19 1945 -- 70 23 139/63 97 %   10/10/19 1830 -- 68 20 135/72 96 %   10/10/19 1815 -- 70 25 134/62 --   10/10/19 1800 -- 70 23 129/62 --   10/10/19 1745 -- 73 25 136/65 96 %   10/10/19 1730 -- 74 16 133/64 98 %   10/10/19 1721 98.2 °F (36.8 °C) 75 20 137/69 96 %       Physical Exam   Constitutional: He is oriented to person, place, and time. He appears well-developed and well-nourished. No distress. HENT:   Head: Normocephalic and atraumatic. Eyes: Conjunctivae are normal. Right eye exhibits no discharge. Left eye exhibits no discharge. Neck: Normal range of motion. Neck supple. Cardiovascular: Normal rate, regular rhythm and normal heart sounds. No murmur heard. Pulmonary/Chest: Effort normal and breath sounds normal. No respiratory distress. He has no wheezes. Abdominal: Soft.  He exhibits no distension. There is no tenderness. Musculoskeletal: Normal range of motion. He exhibits no deformity. Neurological: He is alert and oriented to person, place, and time. Skin: Skin is warm and dry. No rash noted. Psychiatric: He has a normal mood and affect. His behavior is normal. Thought content normal.   Nursing note and vitals reviewed. Diagnostic Study Results   Labs  Recent Results (from the past 24 hour(s))   EKG, 12 LEAD, INITIAL    Collection Time: 10/10/19  5:27 PM   Result Value Ref Range    Ventricular Rate 72 BPM    Atrial Rate 72 BPM    P-R Interval 168 ms    QRS Duration 152 ms    Q-T Interval 438 ms    QTC Calculation (Bezet) 479 ms    Calculated P Axis 58 degrees    Calculated R Axis -38 degrees    Calculated T Axis 63 degrees    Diagnosis       Normal sinus rhythm  Left axis deviation  Right bundle branch block  When compared with ECG of 08-OCT-2019 17:25,  T wave inversion no longer evident in Inferior leads  T wave inversion no longer evident in Anterior leads     TROPONIN I    Collection Time: 10/10/19  6:02 PM   Result Value Ref Range    Troponin-I, Qt. <0.05 <0.05 ng/mL   CBC WITH AUTOMATED DIFF    Collection Time: 10/10/19  6:02 PM   Result Value Ref Range    WBC 5.9 4.1 - 11.1 K/uL    RBC 4.15 4.10 - 5.70 M/uL    HGB 13.7 12.1 - 17.0 g/dL    HCT 39.7 36.6 - 50.3 %    MCV 95.7 80.0 - 99.0 FL    MCH 33.0 26.0 - 34.0 PG    MCHC 34.5 30.0 - 36.5 g/dL    RDW 12.5 11.5 - 14.5 %    PLATELET 118 502 - 127 K/uL    MPV 9.9 8.9 - 12.9 FL    NRBC 0.0 0  WBC    ABSOLUTE NRBC 0.00 0.00 - 0.01 K/uL    NEUTROPHILS 64 32 - 75 %    LYMPHOCYTES 23 12 - 49 %    MONOCYTES 10 5 - 13 %    EOSINOPHILS 3 0 - 7 %    BASOPHILS 0 0 - 1 %    IMMATURE GRANULOCYTES 0 0.0 - 0.5 %    ABS. NEUTROPHILS 3.8 1.8 - 8.0 K/UL    ABS. LYMPHOCYTES 1.4 0.8 - 3.5 K/UL    ABS. MONOCYTES 0.6 0.0 - 1.0 K/UL    ABS. EOSINOPHILS 0.2 0.0 - 0.4 K/UL    ABS. BASOPHILS 0.0 0.0 - 0.1 K/UL    ABS. IMM.  GRANS. 0.0 0.00 - 0.04 K/UL    DF AUTOMATED     METABOLIC PANEL, COMPREHENSIVE    Collection Time: 10/10/19  6:02 PM   Result Value Ref Range    Sodium 135 (L) 136 - 145 mmol/L    Potassium 3.8 3.5 - 5.1 mmol/L    Chloride 104 97 - 108 mmol/L    CO2 25 21 - 32 mmol/L    Anion gap 6 5 - 15 mmol/L    Glucose 88 65 - 100 mg/dL    BUN 25 (H) 6 - 20 MG/DL    Creatinine 0.85 0.70 - 1.30 MG/DL    BUN/Creatinine ratio 29 (H) 12 - 20      GFR est AA >60 >60 ml/min/1.73m2    GFR est non-AA >60 >60 ml/min/1.73m2    Calcium 8.7 8.5 - 10.1 MG/DL    Bilirubin, total 0.5 0.2 - 1.0 MG/DL    ALT (SGPT) 35 12 - 78 U/L    AST (SGOT) 21 15 - 37 U/L    Alk. phosphatase 54 45 - 117 U/L    Protein, total 6.7 6.4 - 8.2 g/dL    Albumin 3.8 3.5 - 5.0 g/dL    Globulin 2.9 2.0 - 4.0 g/dL    A-G Ratio 1.3 1.1 - 2.2     TROPONIN I    Collection Time: 10/10/19  9:00 PM   Result Value Ref Range    Troponin-I, Qt. 0.05 (H) <0.05 ng/mL   EKG, 12 LEAD, INITIAL    Collection Time: 10/10/19  9:24 PM   Result Value Ref Range    Ventricular Rate 70 BPM    Atrial Rate 70 BPM    P-R Interval 184 ms    QRS Duration 148 ms    Q-T Interval 466 ms    QTC Calculation (Bezet) 503 ms    Calculated P Axis 61 degrees    Calculated R Axis -39 degrees    Calculated T Axis 57 degrees    Diagnosis       Normal sinus rhythm  Left axis deviation  Right bundle branch block  When compared with ECG of 10-OCT-2019 17:27,  MANUAL COMPARISON REQUIRED, DATA IS UNCONFIRMED         Radiologic Studies  XR CHEST PA LAT   Final Result   IMPRESSION: Cardiomegaly with shallow inspiration and bibasilar atelectasis. CT Results  (Last 48 hours)    None        CXR Results  (Last 48 hours)               10/10/19 1844  XR CHEST PA LAT Final result    Impression:  IMPRESSION: Cardiomegaly with shallow inspiration and bibasilar atelectasis. Narrative:  EXAM:  XR CHEST PA LAT. INDICATION: Chest pain, resolving, history of ACS. COMPARISON: 5/17/2015.        FINDINGS:    PA and lateral radiographs of the chest were obtained. Lungs: There is increased atelectasis at the lung bases. The lungs are clear of   mass, nodule, airspace disease or edema. Pleura: There is no pleural effusion or pneumothorax. Mediastinum: The cardiac silhouette is enlarged and unchanged. Bones and soft tissues: The bones and soft tissues are within normal limits. There are surgical clips in the upper abdomen. Procedures     EKG  Date/Time: 10/10/2019 8:53 PM  Performed by: Robert Pretty MD  Authorized by: Robert Pretty MD     ECG reviewed by ED Physician in the absence of a cardiologist: yes    Previous ECG:     Previous ECG:  Compared to current    Similarity:  No change  Interpretation:     Interpretation: non-specific    Rate:     ECG rate assessment: normal    Rhythm:     Rhythm: sinus rhythm    Ectopy:     Ectopy: none    QRS:     QRS axis:  Normal  Conduction:     Conduction: abnormal      Abnormal conduction: complete RBBB    ST segments:     ST segments:  Normal  T waves:     T waves: normal      EKG  Date/Time: 10/10/2019 10:06 PM  Performed by: Robert Pretty MD  Authorized by: Robert Pretty MD     ECG reviewed by ED Physician in the absence of a cardiologist: yes    Previous ECG:     Previous ECG:  Compared to current    Similarity:  No change  Interpretation:     Interpretation: non-specific    Rate:     ECG rate assessment: normal    Rhythm:     Rhythm: sinus rhythm    Ectopy:     Ectopy: none    QRS:     QRS axis:  Normal  Conduction:     Conduction: abnormal      Abnormal conduction: complete RBBB    ST segments:     ST segments:  Normal  T waves:     T waves: normal          Medical Decision Making     Provider Notes (Medical Decision Making):    75-year-old male with acute onset sharp chest pain, resolving within 1 hour, and fully resolved at the time of my H&P. No significant associated symptoms such as shortness of breath or diaphoresis.     His EKG does not demonstrate signs of acute ischemia. Troponin is negative. I repeated his EKG and troponin at 3 hours, which remained nonischemic. I also spoke with the cardiologist on-call for Dr. Shanna Thompson, who agreed that this sounds like a relatively low risk scenario, appropriate for outpatient management and no further telemetry monitoring, given patient's multiple hours of absence of any symptoms. He will return if worsening chest pain or shortness of breath. Paulina Son MD  8:49 PM    Repeat EKG without signs of acute ischemia. Subtle ST depressions precordially, present on prior EKG's. Troponin 0.05, would not consider this a meaningful change or elevation. Pt has continued to remain completely asymptomatic, without chest pain, since ED arrival, and is eager to leave. F/u Dr. Shanna Thompson. Consult required? Yes cardiology, agrees with plan for repeat troponin at 3 hours and EKG and discharge      Medications Administered During ED Course:  Medications - No data to display       Diagnosis     Disposition:  Discharged    Clinical Impression:   1. Angina pectoris ([address]        Attestation:  I personally performed the services described in this documentation on this date 10/10/2019 for patient Daxa Thomason. Paulina Son MD        I was the first provider for this patient on this visit. To the best of my ability I reviewed relevant prior medical records, electrocardiograms, laboratories, and radiologic studies. The patient's presenting problems were discussed, and the patient was in agreement with the care plan formulated and outlined with them. Paulina Son MD    Please note that this dictation was completed with Dragon voice recognition software. Quite often unanticipated grammatical, syntax, homophones, and other interpretive errors are inadvertently transcribed by the computer software. Please disregard these errors and excuse any errors that have escaped final proofreading.

## 2019-10-11 NOTE — ED NOTES
MD Damian Monique at bedside to provide discharge paperwork to patient. Vital signs stable. Patient in no apparent distress at this time. Mental status at baseline. Discharge paperwork in hand and prescription instructions if applicable. Accompanied by friend.

## 2019-10-16 ENCOUNTER — HOSPITAL ENCOUNTER (OUTPATIENT)
Dept: GENERAL RADIOLOGY | Age: 74
Discharge: HOME | End: 2019-10-16
Payer: MEDICARE

## 2019-10-16 PROCEDURE — 71046 X-RAY EXAM CHEST 2 VIEWS: CPT

## 2019-10-17 ENCOUNTER — DOCUMENTATION ONLY (OUTPATIENT)
Dept: CARDIOLOGY CLINIC | Age: 74
End: 2019-10-17

## 2019-10-17 LAB
ALBUMIN SERPL-MCNC: 4.5 G/DL (ref 3.5–4.8)
ALBUMIN/GLOB SERPL: 2.1 {RATIO} (ref 1.2–2.2)
ALP SERPL-CCNC: 50 IU/L (ref 39–117)
ALT SERPL-CCNC: 25 IU/L (ref 0–44)
AST SERPL-CCNC: 18 IU/L (ref 0–40)
BASOPHILS # BLD AUTO: 0 X10E3/UL (ref 0–0.2)
BASOPHILS NFR BLD AUTO: 0 %
BILIRUB SERPL-MCNC: 0.6 MG/DL (ref 0–1.2)
BUN SERPL-MCNC: 23 MG/DL (ref 8–27)
BUN/CREAT SERPL: 26 (ref 10–24)
CALCIUM SERPL-MCNC: 9.7 MG/DL (ref 8.6–10.2)
CHLORIDE SERPL-SCNC: 99 MMOL/L (ref 96–106)
CO2 SERPL-SCNC: 23 MMOL/L (ref 20–29)
CREAT SERPL-MCNC: 0.88 MG/DL (ref 0.76–1.27)
EOSINOPHIL # BLD AUTO: 0 X10E3/UL (ref 0–0.4)
EOSINOPHIL NFR BLD AUTO: 1 %
ERYTHROCYTE [DISTWIDTH] IN BLOOD BY AUTOMATED COUNT: 13.8 % (ref 12.3–15.4)
GLOBULIN SER CALC-MCNC: 2.1 G/DL (ref 1.5–4.5)
GLUCOSE SERPL-MCNC: 97 MG/DL (ref 65–99)
HCT VFR BLD AUTO: 42.6 % (ref 37.5–51)
HGB BLD-MCNC: 14.5 G/DL (ref 13–17.7)
IMM GRANULOCYTES # BLD AUTO: 0 X10E3/UL (ref 0–0.1)
IMM GRANULOCYTES NFR BLD AUTO: 0 %
INR PPP: 1 (ref 0.8–1.2)
LYMPHOCYTES # BLD AUTO: 1.5 X10E3/UL (ref 0.7–3.1)
LYMPHOCYTES NFR BLD AUTO: 23 %
MCH RBC QN AUTO: 33.7 PG (ref 26.6–33)
MCHC RBC AUTO-ENTMCNC: 34 G/DL (ref 31.5–35.7)
MCV RBC AUTO: 99 FL (ref 79–97)
MONOCYTES # BLD AUTO: 0.5 X10E3/UL (ref 0.1–0.9)
MONOCYTES NFR BLD AUTO: 8 %
NEUTROPHILS # BLD AUTO: 4.4 X10E3/UL (ref 1.4–7)
NEUTROPHILS NFR BLD AUTO: 68 %
PLATELET # BLD AUTO: 209 X10E3/UL (ref 150–450)
POTASSIUM SERPL-SCNC: 3.9 MMOL/L (ref 3.5–5.2)
PROT SERPL-MCNC: 6.6 G/DL (ref 6–8.5)
PROTHROMBIN TIME: 10.8 SEC (ref 9.1–12)
RBC # BLD AUTO: 4.3 X10E6/UL (ref 4.14–5.8)
SODIUM SERPL-SCNC: 140 MMOL/L (ref 134–144)
WBC # BLD AUTO: 6.5 X10E3/UL (ref 3.4–10.8)

## 2019-10-17 NOTE — PROGRESS NOTES
Cardiac rehab OP referral form completed and signed and faxed to [phone] with confirmation received.

## 2019-10-30 ENCOUNTER — OFFICE VISIT (OUTPATIENT)
Dept: CARDIOLOGY CLINIC | Age: 74
End: 2019-10-30

## 2019-10-30 VITALS
RESPIRATION RATE: 16 BRPM | HEART RATE: 68 BPM | OXYGEN SATURATION: 94 % | SYSTOLIC BLOOD PRESSURE: 140 MMHG | HEIGHT: 72 IN | DIASTOLIC BLOOD PRESSURE: 70 MMHG | WEIGHT: 297 LBS | BODY MASS INDEX: 40.23 KG/M2

## 2019-10-30 DIAGNOSIS — Z95.0 PACEMAKER: ICD-10-CM

## 2019-10-30 DIAGNOSIS — E78.2 HYPERLIPIDEMIA, MIXED: ICD-10-CM

## 2019-10-30 DIAGNOSIS — I25.10 ASHD (ARTERIOSCLEROTIC HEART DISEASE): Primary | ICD-10-CM

## 2019-10-30 DIAGNOSIS — I25.708 ATHEROSCLEROSIS OF CORONARY ARTERY BYPASS GRAFT OF NATIVE HEART WITH OTHER FORMS OF ANGINA PECTORIS (HCC): ICD-10-CM

## 2019-10-30 DIAGNOSIS — I48.91 ATRIAL FIBRILLATION, UNSPECIFIED TYPE (HCC): ICD-10-CM

## 2019-10-30 DIAGNOSIS — I49.5 SSS (SICK SINUS SYNDROME) (HCC): ICD-10-CM

## 2019-10-30 NOTE — PROGRESS NOTES
Alberta Sam DNP, ANP-BC  Subjective/HPI:     Radha Cho is a 74 y.o. male is here for follow-up from PTCA stenting of the mid LAD. Patient is pending pacemaker placement November 1 for intermittent junctional rhythm symptomatic. Conclusion     Patent SVG to RCA     Successful PCI LAD with positive FFR, OCT guided   Coronary Findings     Diagnostic   Dominance: Right   Left Main   Mid LM lesion 25% stenosed. .   Left Anterior Descending   Mid LAD lesion 60% stenosed. . The pre-interventional distal flow is normal (ELMER 3). The lesion is type C. Pressure wire/FFR was performed on the lesion. Flow Reserve: 0.75. Left Circumflex   Prox Cx lesion 30% stenosed. .   Right Coronary Artery   Dist RCA lesion 99% stenosed. .   Graft to RPDA   The graft was visualized by angiography and is moderate in size. The graft is angiographically normal.   Intervention     Mid LAD lesion   Angioplasty   Angioplasty using a standard balloon was performed prior to stent deployment. The balloon used was a CATH BLLN RX TREK 3.0X20MM -- . Balloon inflated using single inflation technique. Stent   A single stent was placed. Drug-eluting stent was successfully placed. The stent used was a STENT SYS COR 3X23MM -- XIENCE MARCUS. Angioplasty   Angioplasty using a standard balloon was performed following stent deployment. The balloon used was a CATH BLLN COR DIL 3X15MM -- NC TREK RAPID-EXCHANGE. Balloon inflated using multiple inflations inflation technique. Post-Intervention Lesion Assessment   The intervention was successful. The guidewire crossed the lesion. Device was deployed. Post-intervention ELMER flow is 3. There were no complications. Optical coherence tomography (OCT) was performed. OCT demonstrated previous stents were well expanded.  Vessel sized at 3.0 mm   Supplies used: STENT SYS COR 3X23MM -- XIENCE MARCUS; CATH BLLN COR DIL 3X15MM -- NC TREK RAPID-EXCHANGE; CATH BLLN RX TREK 3.0X20MM --    There is a 0% residual stenosis post intervention. PCP Provider  Donnie Chahal MD  Past Medical History:   Diagnosis Date    AF (atrial fibrillation) (HCC)     s/p DCCV after his CABG, no recurrence    Anxiety     Arrhythmia     a fib    Arthritis     CAD (coronary artery disease)     s/p CABG in 1997    GERD (gastroesophageal reflux disease)     Goiter, nontoxic, multinodular     Hypertension     Low blood potassium     Myocardial infarct ([address] ? 1997    MARIA ISABEL on CPAP     Other and unspecified hyperlipidemia     Unspecified sleep apnea       Past Surgical History:   Procedure Laterality Date    CARDIAC SURG PROCEDURE UNLIST  1997    bypass    HX CATARACT REMOVAL  2006    bilateral    HX CHOLECYSTECTOMY  1970's    HX ORTHOPAEDIC      Arthoscopic L. knee surgery     HX SEPTOPLASTY  1980's    HX SHOULDER ARTHROSCOPY  2012    left    RIGHT HEART CATHETERIZATION      x3 states pt. (can't remember when)    RT/LT HEART CATHETERS  12/2013    PTCA    Heart Dr. John Serrano   Allergen Reactions    Demerol [Meperidine] Other (comments)     hallucinations  hallucinations      Family History   Problem Relation Age of Onset    Cancer Father         in his shoulder    Heart Attack Father     Heart Disease Father     Heart Disease Mother     Cancer Mother         abd     Cancer Son         wilm's tumor    Cancer Sister         abd    Thyroid Disease Neg Hx       Current Outpatient Medications   Medication Sig    clopidogrel (PLAVIX) 75 mg tab Take 1 Tab by mouth daily.  amLODIPine (NORVASC) 2.5 mg tablet TAKE 1 TABLET EVERY DAY    metoprolol succinate (TOPROL-XL) 25 mg XL tablet TAKE ONE-HALF TABLET EVERY NIGHT.  losartan (COZAAR) 25 mg tablet Take 1 Tab by mouth daily. Reduced 3/6/19    naproxen (NAPROSYN) 500 mg tablet Take 250 mg by mouth two (2) times daily (with meals).     pantoprazole (PROTONIX) 40 mg tablet TAKE 1 TABLET EVERY DAY  (STOP  PRILOSEC)    atorvastatin (LIPITOR) 40 mg tablet TAKE 1 TABLET EVERY DAY.  (STOP  SIMVASTATIN)    isosorbide mononitrate ER (IMDUR) 60 mg CR tablet TAKE 1 TABLET EVERY DAY    vit C/E/Zn/coppr/lutein/zeaxan (PRESERVISION AREDS 2 PO) Take  by mouth two (2) times a day.  oxyCODONE-acetaminophen (PERCOCET) 5-325 mg per tablet Take  by mouth every four (4) hours as needed for Pain.  nitroglycerin (NITROSTAT) 0.4 mg SL tablet 1 Tab by SubLINGual route every five (5) minutes as needed for Chest Pain (max 3 daily for chest pain).  Potassium Gluconate 595 mg (99 mg) tablet Take 595 mg by mouth daily.  hydrocortisone (HYTONE) 2.5 % topical cream     OXYGEN-AIR DELIVERY SYSTEMS (HORIZON NASAL CPAP SYSTEM) by Does Not Apply route.  acetaminophen (TYLENOL ARTHRITIS PAIN) 650 mg CR tablet Take 650 mg by mouth four (4) times daily.  ferrous sulfate (IRON) 325 mg (65 mg iron) tablet Take 325 mg by mouth two (2) times a day.  citalopram (CELEXA) 10 mg tablet Take 10 mg by mouth daily.  omega-3 fatty acids-vitamin e (FISH OIL) 1,000 mg Cap Take 2 Caps by mouth.  multivitamin, stress formula (STRESS TAB) tablet Take 1 Tab by mouth daily.  aspirin 81 mg tablet Take 162 mg by mouth daily.  GLUC HCL/GLUC DESHPANDE/AC-D-GLUCOS (GLUCOSAMINE COMPLEX PO) Take 1 Tab by mouth daily.  hydroxychloroquine (PLAQUENIL) 200 mg tablet Take 200 mg by mouth two (2) times a day. No current facility-administered medications for this visit.        Vitals:    10/30/19 1018 10/30/19 1019   BP: 130/70 140/70   Pulse: 68    Resp: 16    SpO2: 94%    Weight: 297 lb (134.7 kg)    Height: 6' (1.829 m)      Social History     Socioeconomic History    Marital status:      Spouse name: Not on file    Number of children: Not on file    Years of education: Not on file    Highest education level: Not on file   Occupational History    Not on file   Social Needs    Financial resource strain: Not on file    Food insecurity:     Worry: Not on file     Inability: Not on file   [employer] needs:     Medical: Not on file     Non-medical: Not on file   Tobacco Use    Smoking status: Former Smoker     Packs/day: 4.50     Years: 20.00     Pack years: 90.00     Types: Cigarettes     Last attempt to quit: 1970     Years since quittin.5    Smokeless tobacco: Never Used   Substance and Sexual Activity    Alcohol use: Yes     Alcohol/week: 0.0 standard drinks     Comment: once a month may have some vodka    Drug use: No    Sexual activity: Not on file   Lifestyle    Physical activity:     Days per week: Not on file     Minutes per session: Not on file    Stress: Not on file   Relationships    Social connections:     Talks on phone: Not on file     Gets together: Not on file     Attends Mosque service: Not on file     Active member of club or organization: Not on file     Attends meetings of clubs or organizations: Not on file     Relationship status: Not on file    Intimate partner violence:     Fear of current or ex partner: Not on file     Emotionally abused: Not on file     Physically abused: Not on file     Forced sexual activity: Not on file   Other Topics Concern    Not on file   Social History Narrative    Lives in [address] [place] [address],[address] with wife. Has 2 sons and 1 daughter. Works part time for [employer]. Worked at [employer] as an . Likes to travel and fish. I have reviewed the nurses notes, vitals, problem list, allergy list, medical history, family, social history and medications. Review of Symptoms:    General: Pt denies excessive weight gain or loss. Pt is able to conduct ADL's  HEENT: Denies blurred vision, headaches, epistaxis and difficulty swallowing. Respiratory: Denies shortness of breath, HUSAIN, wheezing or stridor.   Cardiovascular: Denies precordial pain, palpitations, edema or PND  Gastrointestinal: Denies poor appetite, indigestion, abdominal pain or blood in stool  Musculoskeletal: Denies pain or swelling from muscles or joints  Neurologic: Denies tremor, paresthesias, or sensory motor disturbance  Skin: Denies rash, itching or texture change. Physical Exam:      General: Well developed, in no acute distress, cooperative and alert  HEENT: No carotid bruits, no JVD, trach is midline. Neck Supple, PERRL,  Heart:  Normal S1/S2 negative S3 or S4. Regular, no murmur, gallop or rub. Respiratory: Clear bilaterally x 4, no wheezing or rales  Abdomen:   Soft, non-tender, no masses, bowel sounds are active. Extremities:  No edema, normal cap refill, no cyanosis, atraumatic. Neuro: A&Ox3, speech clear, gait stable. Skin: Skin color is normal. No rashes or lesions.  Non diaphoretic  Vascular: 2+ pulses symmetric in all extremities, radial access site well-healed neurovascularly intact    Cardiographics    ECG: sinus   Results for orders placed or performed during the hospital encounter of 10/10/19   EKG, 12 LEAD, INITIAL   Result Value Ref Range    Ventricular Rate 70 BPM    Atrial Rate 70 BPM    P-R Interval 184 ms    QRS Duration 148 ms    Q-T Interval 466 ms    QTC Calculation (Bezet) 503 ms    Calculated P Axis 61 degrees    Calculated R Axis -39 degrees    Calculated T Axis 57 degrees    Diagnosis       Normal sinus rhythm  Left axis deviation  Right bundle branch block    Confirmed by Alexandra Fitzgerald ([id number]) on 10/11/2019 9:56:23 AM           Cardiology Labs:  Lab Results   Component Value Date/Time    Cholesterol, total 88 (L) 09/06/2018 09:40 AM    HDL Cholesterol 39 (L) 09/06/2018 09:40 AM    LDL, calculated 30 09/06/2018 09:40 AM    Triglyceride 93 09/06/2018 09:40 AM    CHOL/HDL Ratio 3.3 12/21/2013 03:33 AM       Lab Results   Component Value Date/Time    Sodium 140 10/16/2019 09:52 AM    Potassium 3.9 10/16/2019 09:52 AM    Chloride 99 10/16/2019 09:52 AM    CO2 23 10/16/2019 09:52 AM    Anion gap 6 10/10/2019 06:02 PM    Glucose 97 10/16/2019 09:52 AM    BUN 23 10/16/2019 09:52 AM    Creatinine 0.88 10/16/2019 09:52 AM    BUN/Creatinine ratio 26 (H) 10/16/2019 09:52 AM    GFR est AA 97 10/16/2019 09:52 AM    GFR est non-AA 84 10/16/2019 09:52 AM    Calcium 9.7 10/16/2019 09:52 AM    Bilirubin, total 0.6 10/16/2019 09:52 AM    AST (SGOT) 18 10/16/2019 09:52 AM    Alk. phosphatase 50 10/16/2019 09:52 AM    Protein, total 6.6 10/16/2019 09:52 AM    Albumin 4.5 10/16/2019 09:52 AM    Globulin 2.9 10/10/2019 06:02 PM    A-G Ratio 2.1 10/16/2019 09:52 AM    ALT (SGPT) 25 10/16/2019 09:52 AM           Assessment:     Assessment:     Diagnoses and all orders for this visit:    1. ASHD (arteriosclerotic heart disease)    2. Atrial fibrillation, unspecified type ([facility])  -     AMB POC EKG ROUTINE W/ 12 LEADS, INTER & REP    3. Atherosclerosis of coronary artery bypass graft of native heart with other forms of angina pectoris ([facility]    4. Hyperlipidemia, mixed    5. SSS (sick sinus syndrome) ([facility])    6. Pacemaker        ICD-10-CM ICD-9-CM    1. ASHD (arteriosclerotic heart disease) I25.10 414.00    2. Atrial fibrillation, unspecified type ([facility] I48.91 427.31 AMB POC EKG ROUTINE W/ 12 LEADS, INTER & REP   3. Atherosclerosis of coronary artery bypass graft of native heart with other forms of angina pectoris ([facility]) I25.708 414.05      413.9    4. Hyperlipidemia, mixed E78.2 272.2    5. SSS (sick sinus syndrome) ([facility]) I49.5 427.81    6. Pacemaker Z95.0 V45.01      Orders Placed This Encounter    AMB POC EKG ROUTINE W/ 12 LEADS, INTER & REP     Order Specific Question:   Reason for Exam:     Answer:   routine        Plan:     1. Atherosclerotic heart disease: History of CABG, recent PTCA stenting of the LAD continue Plavix uninterrupted for 1 year. 2.  Atrial fibrillation: Paroxysmal post CABG, has remained in sinus rhythm with the exception of intermittent junctional where he is pending pacemaker placement on Friday. 3.  Intermittent symptomatic junctional rhythm sick sinus syndrome: he is pending pacemaker this Friday.   4. Hyperlipidemia: On high intensity statin. Stable from cardiac perspective follow-up in 6 months    Leldon Jeans, MD      Please note that this dictation was completed with [employer], the computer voice recognition software. Quite often unanticipated grammatical, syntax, homophones, and other interpretive errors are inadvertently transcribed by the computer software. Please disregard these errors. Please excuse any errors that have escaped final proofreading. Thank you.

## 2019-10-30 NOTE — PROGRESS NOTES
Chief Complaint   Patient presents with   [facility] Follow Up     SSS       1. Have you been to the ER, urgent care clinic since your last visit? Hospitalized since your last visit? Yes  cardiac stenting & chest pain    2. Have you seen or consulted any other health care providers outside of the [address] [place] [address] since your last visit? Include any pap smears or colon screening.  Yes PCP 10/2019

## 2019-11-01 ENCOUNTER — APPOINTMENT (OUTPATIENT)
Dept: GENERAL RADIOLOGY | Age: 74
End: 2019-11-01
Attending: INTERNAL MEDICINE
Payer: MEDICARE

## 2019-11-01 ENCOUNTER — HOSPITAL ENCOUNTER (OUTPATIENT)
Age: 74
Discharge: HOME | End: 2019-11-01
Attending: INTERNAL MEDICINE | Admitting: INTERNAL MEDICINE
Payer: MEDICARE

## 2019-11-01 VITALS
SYSTOLIC BLOOD PRESSURE: 157 MMHG | OXYGEN SATURATION: 97 % | HEART RATE: 69 BPM | WEIGHT: 280 LBS | TEMPERATURE: 97.9 F | HEIGHT: 72 IN | RESPIRATION RATE: 23 BRPM | BODY MASS INDEX: 37.93 KG/M2 | DIASTOLIC BLOOD PRESSURE: 65 MMHG

## 2019-11-01 DIAGNOSIS — I49.5 SSS (SICK SINUS SYNDROME) (HCC): ICD-10-CM

## 2019-11-01 LAB
GLUCOSE BLD STRIP.AUTO-MCNC: 89 MG/DL (ref 65–100)
SERVICE CMNT-IMP: NORMAL

## 2019-11-01 PROCEDURE — C1894 INTRO/SHEATH, NON-LASER: HCPCS | Performed by: INTERNAL MEDICINE

## 2019-11-01 PROCEDURE — 77030031139 HC SUT VCRL2 J&J -A: Performed by: INTERNAL MEDICINE

## 2019-11-01 PROCEDURE — C1893 INTRO/SHEATH, FIXED,NON-PEEL: HCPCS | Performed by: INTERNAL MEDICINE

## 2019-11-01 PROCEDURE — 74011250636 HC RX REV CODE- 250/636: Performed by: INTERNAL MEDICINE

## 2019-11-01 PROCEDURE — 77030028698 HC BLD TISS PLSM MEDT -D: Performed by: INTERNAL MEDICINE

## 2019-11-01 PROCEDURE — 82962 GLUCOSE BLOOD TEST: CPT

## 2019-11-01 PROCEDURE — 99152 MOD SED SAME PHYS/QHP 5/>YRS: CPT | Performed by: INTERNAL MEDICINE

## 2019-11-01 PROCEDURE — 77030002996 HC SUT SLK J&J -A: Performed by: INTERNAL MEDICINE

## 2019-11-01 PROCEDURE — 77030018729 HC ELECTRD DEFIB PAD CARD -B: Performed by: INTERNAL MEDICINE

## 2019-11-01 PROCEDURE — 77030018673: Performed by: INTERNAL MEDICINE

## 2019-11-01 PROCEDURE — 99153 MOD SED SAME PHYS/QHP EA: CPT | Performed by: INTERNAL MEDICINE

## 2019-11-01 PROCEDURE — 71045 X-RAY EXAM CHEST 1 VIEW: CPT

## 2019-11-01 PROCEDURE — C1785 PMKR, DUAL, RATE-RESP: HCPCS | Performed by: INTERNAL MEDICINE

## 2019-11-01 PROCEDURE — 77030037400 HC ADH TISS HI VISC EXOFIN CHMP -B: Performed by: INTERNAL MEDICINE

## 2019-11-01 PROCEDURE — 74011250636 HC RX REV CODE- 250/636: Performed by: NURSE PRACTITIONER

## 2019-11-01 PROCEDURE — 33208 INSRT HEART PM ATRIAL & VENT: CPT | Performed by: INTERNAL MEDICINE

## 2019-11-01 PROCEDURE — A4565 SLINGS: HCPCS

## 2019-11-01 PROCEDURE — 74011000250 HC RX REV CODE- 250: Performed by: INTERNAL MEDICINE

## 2019-11-01 PROCEDURE — C1898 LEAD, PMKR, OTHER THAN TRANS: HCPCS | Performed by: INTERNAL MEDICINE

## 2019-11-01 PROCEDURE — 77030037713 HC CLOSR DEV INCIS ZIP STRY -B: Performed by: INTERNAL MEDICINE

## 2019-11-01 DEVICE — LEAD PCMKR CAPSUR FIX NOVUS 58 --: Type: IMPLANTABLE DEVICE | Status: FUNCTIONAL

## 2019-11-01 DEVICE — LEAD PCMKR CAPSUR FIX NOVUS 52 --: Type: IMPLANTABLE DEVICE | Status: FUNCTIONAL

## 2019-11-01 DEVICE — PCMKR AZURE XT DR MRI --: Type: IMPLANTABLE DEVICE | Status: FUNCTIONAL

## 2019-11-01 RX ORDER — ACETAMINOPHEN 325 MG/1
650 TABLET ORAL
Status: DISCONTINUED | OUTPATIENT
Start: 2019-11-01 | End: 2019-11-01 | Stop reason: HOSPADM

## 2019-11-01 RX ORDER — BACITRACIN 50000 [IU]/1
INJECTION, POWDER, FOR SOLUTION INTRAMUSCULAR AS NEEDED
Status: DISCONTINUED | OUTPATIENT
Start: 2019-11-01 | End: 2019-11-01 | Stop reason: HOSPADM

## 2019-11-01 RX ORDER — LIDOCAINE HYDROCHLORIDE 10 MG/ML
INJECTION INFILTRATION; PERINEURAL AS NEEDED
Status: DISCONTINUED | OUTPATIENT
Start: 2019-11-01 | End: 2019-11-01 | Stop reason: HOSPADM

## 2019-11-01 RX ORDER — CEFAZOLIN SODIUM/WATER 2 G/20 ML
SYRINGE (ML) INTRAVENOUS AS NEEDED
Status: DISCONTINUED | OUTPATIENT
Start: 2019-11-01 | End: 2019-11-01 | Stop reason: HOSPADM

## 2019-11-01 RX ORDER — HEPARIN SODIUM 200 [USP'U]/100ML
INJECTION, SOLUTION INTRAVENOUS
Status: COMPLETED | OUTPATIENT
Start: 2019-11-01 | End: 2019-11-01

## 2019-11-01 RX ORDER — SODIUM CHLORIDE 0.9 % (FLUSH) 0.9 %
5-40 SYRINGE (ML) INJECTION EVERY 8 HOURS
Status: DISCONTINUED | OUTPATIENT
Start: 2019-11-01 | End: 2019-11-01 | Stop reason: HOSPADM

## 2019-11-01 RX ORDER — MIDAZOLAM HYDROCHLORIDE 1 MG/ML
INJECTION, SOLUTION INTRAMUSCULAR; INTRAVENOUS AS NEEDED
Status: DISCONTINUED | OUTPATIENT
Start: 2019-11-01 | End: 2019-11-01 | Stop reason: HOSPADM

## 2019-11-01 RX ORDER — SODIUM CHLORIDE 0.9 % (FLUSH) 0.9 %
5-40 SYRINGE (ML) INJECTION AS NEEDED
Status: DISCONTINUED | OUTPATIENT
Start: 2019-11-01 | End: 2019-11-01 | Stop reason: HOSPADM

## 2019-11-01 RX ORDER — FENTANYL CITRATE 50 UG/ML
INJECTION, SOLUTION INTRAMUSCULAR; INTRAVENOUS AS NEEDED
Status: DISCONTINUED | OUTPATIENT
Start: 2019-11-01 | End: 2019-11-01 | Stop reason: HOSPADM

## 2019-11-01 RX ORDER — HYDRALAZINE HYDROCHLORIDE 20 MG/ML
10 INJECTION INTRAMUSCULAR; INTRAVENOUS ONCE
Status: COMPLETED | OUTPATIENT
Start: 2019-11-01 | End: 2019-11-01

## 2019-11-01 RX ORDER — NALOXONE HYDROCHLORIDE 0.4 MG/ML
0.4 INJECTION, SOLUTION INTRAMUSCULAR; INTRAVENOUS; SUBCUTANEOUS AS NEEDED
Status: DISCONTINUED | OUTPATIENT
Start: 2019-11-01 | End: 2019-11-01 | Stop reason: HOSPADM

## 2019-11-01 RX ADMIN — HYDRALAZINE HYDROCHLORIDE 10 MG: 20 INJECTION INTRAMUSCULAR; INTRAVENOUS at 10:13

## 2019-11-01 NOTE — Clinical Note
TRANSFER - OUT REPORT:  
 
Verbal report given to: [facility] Recovery. Report consisted of patient's Situation, Background, Assessment and  
Recommendations(SBAR). Opportunity for questions and clarification was provided. Patient transported with a Registered Nurse. Patient transported to: [facility] Recovery.

## 2019-11-01 NOTE — PROGRESS NOTES
TRANSFER - IN REPORT:    Verbal report received from Janelle Batista RN on Martinez Gibson  being received from EP for routine progression of care. Report consisted of patients Situation, Background, Assessment and Recommendations(SBAR). Information from the following report(s) Procedure Summary and MAR was reviewed with the receiving clinician. Opportunity for questions and clarification was provided. Assessment completed upon patients arrival to [address] [place] [address] and care [facility]. Cardiac Cath Lab Recovery Arrival Note:    Juan Myles arrived to [facility] recovery area. Patient procedure= Dual PPM. Patient on cardiac monitor, non-invasive blood pressure, SPO2 monitor. On O2 @ 2 lpm via nc. Patient status doing well without problems. Patient is A&Ox 3. Patient reports no c/o. PROCEDURE SITE CHECK:    Procedure site:without any bleeding and no hematoma, no pain/discomfort reported at procedure site. No change in patient status. Continue to monitor patient and status.

## 2019-11-01 NOTE — PROGRESS NOTES
Ambulate with pt in CCL recovery. Gait steady. Left anterior chest wall dressing with slight ooze. Pt denies c/o. Dressing changed per NP orders. No further oozing. DC instructions reviewed with pt and friend. Both verbalize understanding. SL dc'd without difficulty. Pt dc'd to home with friend via car.

## 2019-11-01 NOTE — INTERVAL H&P NOTE
H&P Update:  Daxa Thomason was seen and examined. History and physical has been reviewed. The patient has been examined.  There have been no significant clinical changes since the completion of the originally dated History and Physical.

## 2019-11-01 NOTE — DISCHARGE INSTRUCTIONS
[address], [address] [place] [address], [address] [facility]  [phone]        NEW PACEMAKER IMPLANT DISCHARGE INSTRUCTIONS    Patient ID:  Jonathan Baer  [id number]  17 y.o.  1945    Admit Date: 11/1/2019    Discharge Date: 11/1/2019     Admitting Physician: Natali Lopez MD     Discharge Physician: Natali Lopez MD    Admission Diagnoses:   SSS (sick sinus syndrome) [facility]) [I49.5]    Discharge Diagnoses: Active Problems:    * No active hospital problems. *      Discharge Condition: Good    Cardiology Procedures this Admission:  Pacemaker insertion. Disposition: home    Reference discharge instructions provided by nursing for diet and activity. Follow-up with device clinic in three weeks. Call [phone] to make an appointment. Signed:  PAWAN Ramos  11/1/2019  8:20 AM      DISCHARGE INSTRUCTIONS FOR PATIENTS WITH PACEMAKERS    1. Remember to call for an appointment for 3 weeks [phone] to check healing and implant programming. 2. Medic Alert Bracelets are available from your pharmacist to wear at all times if you choose to wear one. 3. Carry your ID card for pacemaker with you at all times. This card will be given to you in the hospital or mailed to you. 4. The pacemaker will bulge slightly under your skin. The bulge will decrease in size over the next few weeks. Please notify the doctor's office if you notice any of the following around your site:   A.  A bruise that does not go away. B.  Soreness or yellow, green, or brown drainage from the site. C. Any swelling from the site. D. If you have a fever of 100 degrees or higher that lasts for a few days. INCISION CARE       1.  Leave the dressing over your site until your follow up visit. 2.  You may not shower until after follow up visit. 3.  For comfort, wear loose fitting clothing. 1. 4.  Ice pack to affected shoulder for first 24 hours, wear your sling for 2 days.   2. 5.  Report any signs of infection, fever, pain, swelling, redness, oozing, or heat at site especially if these symptoms increase after the first 3 to 4 days. ACTIVITY PRECAUTIONS     1. Avoid rough contact with the implant site. 2. No driving for 14 days. 3. Avoid lifting your arm over your head, carrying anything on the affected side, or lifting over 10 pounds for 30 days. For the first 2 days only bend your arm at the elbow. 4. Any extreme activity such as golf, weight lifting or exercise biking should be restricted for 60 days. 5. Do not carry objects by holding them against your implant site. 6.  No shooting rifles or any type of gun with the affected shoulder permanently. SPECIAL PRECAUTIONS     1. You should avoid all strong magnetic fields, such as arc welding, large transformers, large motors. 2.  You may or may not (depending on your device) have an MRI which uses a strong magnet to take pictures. 3.  Treatments or surgery that requires diathermy or electrocautery should be discussed with your doctor before scheduled. 4. Avoid radio frequency transmitters, including radar. 5. Advise dentist or other medical personnel you see that you have a pacemaker. 6.  Cell phones and microwave oven use is okay. 7.  If you plan to move or take a trip to a new area, the doctor's office will give you a name of a doctor to contact for any problems. ANTIBIOTIC THERAPY    During the first 8 weeks after your pacemaker insertion, you may need antibiotics before any dental work or certain tests or operations. Let the dentist or doctor who is caring for you know that you have had an implanted device.

## 2019-11-01 NOTE — PROGRESS NOTES
Cardiac Cath Lab Recovery Arrival Note:      Anthony Hudson arrived to Cardiac Cath Lab, Recovery Area. Staff introduced to patient. Patient identifiers verified with NAME and DATE OF BIRTH. Procedure verified with patient. Consent forms reviewed and signed by patient or authorized representative and verified. Allergies verified. Patient and family oriented to department. Patient and family informed of procedure and plan of care. Questions answered with review. Patient prepped for procedure, per orders from physician, prior to arrival.    Patient on cardiac monitor, non-invasive blood pressure, SPO2 monitor. On room air. Patient is A&Ox 3. Patient reports no c/o. Patient in stretcher, in low position, with side rails up, call bell within reach, patient instructed to call if assistance as needed. Patient prep in: [address], [place] [address]. Patient family has pager # none  Family in: [address] [facility] waiting area.    Prep by: Jack Desouza RN

## 2019-11-19 ENCOUNTER — HOSPITAL ENCOUNTER (OUTPATIENT)
Dept: CARDIAC REHAB | Age: 74
Discharge: HOME | End: 2019-11-19
Attending: INTERNAL MEDICINE
Payer: MEDICARE

## 2019-11-19 VITALS — WEIGHT: 296.13 LBS | HEIGHT: 72 IN | BODY MASS INDEX: 40.11 KG/M2

## 2019-11-19 DIAGNOSIS — Z95.5 STENTED CORONARY ARTERY: ICD-10-CM

## 2019-11-19 PROCEDURE — 93798 PHYS/QHP OP CAR RHAB W/ECG: CPT

## 2019-11-19 NOTE — CARDIO/PULMONARY
Cardiopulmonary Rehab Intake Note:  Met with Mr. Ryan Middleton for the initial session. Mr. Royal Michael is a 74year old patient of Dr. Chase Dong who presents to rehab for cardiac strengthening and conditioning, S/P stent on 10/8/19. He also had pacemaker placement on 11/1/19 for sick sinus syndrome. LVEF 51% on nuclear stress test on 9/24/19. PMH significant for CAD, previous CABG in 1997, previous cardiac stents, HTN, MARIA ISABEL on CPAP, obesity, hyperlipidemia, hypothyroidism, rheumatoid arthritis and anxiety.        Lungs were clear to auscultation. He has occasional cough from what he thinks is sinus drainage. No BLE edema. He has a bandage over pacemaker insertion site.        Limitations to exercise are primarily related to deconditioning, obesity, rheumatoid arthritis, and angina. He states he has issues with arthritis in his hips, knees, shoulders, back and neck.          Pt walked 4.5 minutes on the treadmill before stopping due to chest pain and some dizziness. He also felt pain in the middle of his back. He rated pain as 3-4 out of 10. He does carry nitroglycerin with him but, with rest, his pain resolved and nitroglycerin was not taken. Pain and dizziness completely resolved. He did walk 230 meters with RPE of 13, RPD 0 and METS 2.5 when on the treadmill.       Psychosocial: Pt scored 2 on PHQ9. He denies any depression. He takes Celexa for what he says helps him with \"coping. \"  Chart indicates patient has a history of anxiety. He is retired. He is  and has a supportive wife. He has a son and daughter who live in the neighborhood. He enjoys fishing and woodworking. He is also involved in [employer] study.        Patient was given an overview of cardiac rehab program, placement of electrode/leads, and rationale for program.  An education notebook was given.       Heart rhythm on the monitor was a SR/BBB with rare PVC's. Oxygen saturation was 96% on room air.    BMI 40.2.  Patient's identified goals are:  1. Home exercise beginning with walking 10-15 min 3 x week. 2.  Weight loss.

## 2019-11-21 ENCOUNTER — CLINICAL SUPPORT (OUTPATIENT)
Dept: CARDIOLOGY CLINIC | Age: 74
End: 2019-11-21

## 2019-11-21 DIAGNOSIS — I49.9 IRREGULAR HEARTBEAT: Primary | ICD-10-CM

## 2019-11-21 DIAGNOSIS — Z95.0 PACEMAKER: ICD-10-CM

## 2019-11-21 DIAGNOSIS — I49.5 SSS (SICK SINUS SYNDROME) (HCC): ICD-10-CM

## 2019-11-21 DIAGNOSIS — I49.5 SSS (SICK SINUS SYNDROME) (HCC): Primary | ICD-10-CM

## 2019-11-21 DIAGNOSIS — Z51.89 VISIT FOR WOUND CHECK: ICD-10-CM

## 2019-11-21 NOTE — PROGRESS NOTES
Shiela Rincon  1945  Dusty Snell MD          Subjective:    Patient is here for 2 week site check and device interrogation after pacemaker implantation. The patient denies chest pain/shortness of breath or fevers. Patient Active Problem List    Diagnosis Date Noted    SSS (sick sinus syndrome) ([address] 10/30/2019    Angina of effort ([address] 10/07/2019    Severe obesity ([address] 10/03/2019    Irregular heartbeat 10/11/2016    Benign essential tremor syndrome 10/10/2016    Memory difficulties 10/10/2016    B12 deficiency 10/10/2016    Vitamin D deficiency 10/10/2016    Hypothyroidism due to acquired atrophy of thyroid 10/10/2016    HUSAIN (dyspnea on exertion) 08/08/2014    Unstable angina ([address] 12/20/2013    S/P coronary artery stent placement 12/20/2013    Goiter, nontoxic, multinodular     Hyperlipidemia, mixed 07/11/2012    Atherosclerosis of coronary artery bypass graft 07/11/2012    Atrial fibrillation ([address] 07/11/2012      Past Medical History:   Diagnosis Date    [facility]     s/p DCCV after his CABG, no recurrence    Anxiety     Arrhythmia     a fib    Arthritis     CAD (coronary artery disease)     s/p CABG in 1997    GERD (gastroesophageal reflux disease)     Goiter, nontoxic, multinodular     Hypertension     Low blood potassium     Myocardial infarct ([address] ? 1997    MARIA ISABEL on CPAP     Other and unspecified hyperlipidemia     Unspecified sleep apnea       Past Surgical History:   Procedure Laterality Date    CARDIAC SURG PROCEDURE UNLIST  1997    bypass    HX CATARACT REMOVAL  2006    bilateral    HX CHOLECYSTECTOMY  1970's    HX ORTHOPAEDIC      Arthoscopic L. knee surgery     HX SEPTOPLASTY  1980's    HX SHOULDER ARTHROSCOPY  2012    left    [place] INS NEW/RPLCMT PRM PM W/TRANSV ELTRD ATRIAL&VENT N/A 11/1/2019    INSERT PPM DUAL performed by Azam Hernandez MD at [facility] CARDIAC C[facility]    RIGHT HEART CATHETERIZATION      x3 states pt. (can't remember when)    RT/LT HEART CATHETERS  12/2013    PTCA    Heart Dr. Libby Stewart [Meperidine] Other (comments)     hallucinations  hallucinations      Family History   Problem Relation Age of Onset    Cancer Father         in his shoulder    Heart Attack Father     Heart Disease Father     Heart Disease Mother     Cancer Mother         abd     Cancer Son         wilm's tumor    Cancer Sister         abd    Thyroid Disease Neg Hx       Current Outpatient Medications   Medication Sig    clopidogrel (PLAVIX) 75 mg tab Take 1 Tab by mouth daily.  amLODIPine (NORVASC) 2.5 mg tablet TAKE 1 TABLET EVERY DAY    metoprolol succinate (TOPROL-XL) 25 mg XL tablet TAKE ONE-HALF TABLET EVERY NIGHT.  losartan (COZAAR) 25 mg tablet Take 1 Tab by mouth daily. Reduced 3/6/19    naproxen (NAPROSYN) 500 mg tablet Take 250 mg by mouth two (2) times daily (with meals).  pantoprazole (PROTONIX) 40 mg tablet TAKE 1 TABLET EVERY DAY  (STOP  PRILOSEC)    atorvastatin (LIPITOR) 40 mg tablet TAKE 1 TABLET EVERY DAY.  (STOP  SIMVASTATIN)    isosorbide mononitrate ER (IMDUR) 60 mg CR tablet TAKE 1 TABLET EVERY DAY    vit C/E/Zn/coppr/lutein/zeaxan (PRESERVISION AREDS 2 PO) Take  by mouth two (2) times a day.  oxyCODONE-acetaminophen (PERCOCET) 5-325 mg per tablet Take  by mouth every four (4) hours as needed for Pain.  nitroglycerin (NITROSTAT) 0.4 mg SL tablet 1 Tab by SubLINGual route every five (5) minutes as needed for Chest Pain (max 3 daily for chest pain).  Potassium Gluconate 595 mg (99 mg) tablet Take 595 mg by mouth daily.  hydrocortisone (HYTONE) 2.5 % topical cream     OXYGEN-AIR DELIVERY SYSTEMS (HORIZON NASAL CPAP SYSTEM) by Does Not Apply route.  acetaminophen (TYLENOL ARTHRITIS PAIN) 650 mg CR tablet Take 650 mg by mouth four (4) times daily.  ferrous sulfate (IRON) 325 mg (65 mg iron) tablet Take 325 mg by mouth two (2) times a day.     citalopram (CELEXA) 10 mg tablet Take 10 mg by mouth daily.  omega-3 fatty acids-vitamin e (FISH OIL) 1,000 mg Cap Take 2 Caps by mouth.  multivitamin, stress formula (STRESS TAB) tablet Take 1 Tab by mouth daily.  aspirin 81 mg tablet Take 162 mg by mouth daily.  GLUC HCL/GLUC DESHPANDE/AC-D-GLUCOS (GLUCOSAMINE COMPLEX PO) Take 1 Tab by mouth daily. No current facility-administered medications for this visit. Review of Systems:    General: Pt denies excessive weight gain or loss. Pt is able to conduct ADL's  Respiratory: Denies shortness of breath, HUSAIN, wheezing or stridor. Cardiovascular: Denies precordial pain, palpitations, edema or PND        Physical ExamPhysical Exam:      General: Well developed, in no acute distress. .  Chest: left subclavian pacer site approximated well  Neuro: A&Ox3, speech clear, gait stable. Assessment:        ICD-10-CM ICD-9-CM    1. Irregular heartbeat I49.9 427.9    2. SSS (sick sinus syndrome) ([facility]) I49.5 427.81    3. Pacemaker Z95.0 V45.01    4. Visit for wound check Z51.89 V58.89         Plan:     Patient feels well following pacemaker implantation. Left subclavian pacemaker site approximated well, no discharge. No erythema or heat.  Follow up as planned in 3 mo with Dr Derrick Valadez, ANP

## 2019-11-26 ENCOUNTER — OFFICE VISIT (OUTPATIENT)
Dept: CARDIOLOGY CLINIC | Age: 74
End: 2019-11-26

## 2019-11-26 ENCOUNTER — TELEPHONE (OUTPATIENT)
Dept: CARDIOLOGY CLINIC | Age: 74
End: 2019-11-26

## 2019-11-26 VITALS
OXYGEN SATURATION: 95 % | BODY MASS INDEX: 40.06 KG/M2 | SYSTOLIC BLOOD PRESSURE: 120 MMHG | RESPIRATION RATE: 19 BRPM | HEART RATE: 81 BPM | DIASTOLIC BLOOD PRESSURE: 70 MMHG | WEIGHT: 295.8 LBS | HEIGHT: 72 IN

## 2019-11-26 DIAGNOSIS — I48.91 ATRIAL FIBRILLATION, UNSPECIFIED TYPE (HCC): ICD-10-CM

## 2019-11-26 DIAGNOSIS — I25.708 ATHEROSCLEROSIS OF CORONARY ARTERY BYPASS GRAFT OF NATIVE HEART WITH OTHER FORMS OF ANGINA PECTORIS (HCC): Primary | ICD-10-CM

## 2019-11-26 DIAGNOSIS — I25.708 ATHEROSCLEROSIS OF CORONARY ARTERY BYPASS GRAFT OF NATIVE HEART WITH OTHER FORMS OF ANGINA PECTORIS (HCC): ICD-10-CM

## 2019-11-26 DIAGNOSIS — I49.5 SSS (SICK SINUS SYNDROME) (HCC): ICD-10-CM

## 2019-11-26 DIAGNOSIS — Z95.0 PACEMAKER: ICD-10-CM

## 2019-11-26 DIAGNOSIS — I25.10 ASHD (ARTERIOSCLEROTIC HEART DISEASE): Primary | ICD-10-CM

## 2019-11-26 DIAGNOSIS — E78.2 HYPERLIPIDEMIA, MIXED: ICD-10-CM

## 2019-11-26 RX ORDER — CLOPIDOGREL BISULFATE 75 MG/1
75 TABLET ORAL DAILY
Qty: 90 TAB | Refills: 3 | Status: SHIPPED | OUTPATIENT
Start: 2019-11-26 | End: 2020-11-05

## 2019-11-26 RX ORDER — ISOSORBIDE MONONITRATE 60 MG/1
60 TABLET, EXTENDED RELEASE ORAL
Qty: 90 TAB | Refills: 3 | Status: SHIPPED | OUTPATIENT
Start: 2019-11-26 | End: 2020-11-05

## 2019-11-26 NOTE — H&P (VIEW-ONLY)
[address] [place] [address] 60, [place], [address] [facility] Mei Shane is a 66 y.o. male. Last seen by me 1 month ago. Subjective:  
 
Mei Shane reports he went to cardio rehab. He was on the treadmill for about 4.5 minutes before he had angina and had to quit. Patient denies any dyspnea, palpitations, syncope, orthopnea, edema or paroxysmal nocturnal dyspnea. Patient Active Problem List  
 Diagnosis Date Noted  SSS (sick sinus syndrome) ([address] 10/30/2019  Angina of effort ([address] 10/07/2019  Severe obesity ([address] 10/03/2019  Irregular heartbeat 10/11/2016  Benign essential tremor syndrome 10/10/2016  Memory difficulties 10/10/2016  
 B12 deficiency 10/10/2016  Vitamin D deficiency 10/10/2016  Hypothyroidism due to acquired atrophy of thyroid 10/10/2016  HUSAIN (dyspnea on exertion) 08/08/2014  Unstable angina ([address] 12/20/2013  S/P coronary artery stent placement 12/20/2013  Goiter, nontoxic, multinodular  Hyperlipidemia, mixed 07/11/2012  Atherosclerosis of coronary artery bypass graft 07/11/2012  Atrial fibrillation ([address] 07/11/2012 Ramses Agarwal MD 
Past Medical History:  
Diagnosis Date  AF [facility]atrial fibrillation) ([facility])   
 s/p DCCV after his CABG, no recurrence  Anxiety  Arrhythmia   
 a fib  Arthritis  CAD (coronary artery disease) s/p CABG in 1997  GERD (gastroesophageal reflux disease)  Goiter, nontoxic, multinodular  Hypertension  Low blood potassium  Myocardial infarct [facility]) ? 1997  MARIA ISABEL on CPAP   
 Other and unspecified hyperlipidemia  Unspecified sleep apnea Past Surgical History:  
Procedure Laterality Date [address]  
 bypass  HX CATARACT REMOVAL  2006  
 bilateral  
 HX CHOLECYSTECTOMY  1970's  HX ORTHOPAEDIC Arthoscopic L. knee surgery  HX SEPTOPLASTY  [id number]  HX SHOULDER ARTHROSCOPY  2012  
 left  [place] INS NEW/RPLCMT PRM PM W/TRANSV ELTRD ATRIAL&VENT N/A 2019 INSERT PPM DUAL performed by Karely Hung MD at [facility] CARDIAC CATH LAB  RIGHT HEART CATHETERIZATION    
 x3 states pt. (can't remember when)  RT/LT HEART CATHETERS  2013 PTCA    Heart Dr. [place] Allergies Allergen Reactions  Demerol [Meperidine] Other (comments)  
  hallucinations 
hallucinations Family History Problem Relation Age of Onset  Cancer Father   
     in his shoulder  Heart Attack Father  Heart Disease Father  Heart Disease Mother  Cancer Mother   
     abd  Cancer Son   
     wilm's tumor  Cancer Sister   
     abd  Thyroid Disease Neg Hx Social History Socioeconomic History  Marital status:  Spouse name: Not on file  Number of children: Not on file  Years of education: Not on file  Highest education level: Not on file Occupational History  Not on file Social Needs  Financial resource strain: Not on file  Food insecurity:  
  Worry: Not on file Inability: Not on file  Transportation needs:  
  Medical: Not on file Non-medical: Not on file Tobacco Use  Smoking status: Former Smoker Packs/day: 4.50 Years: 20.00 Pack years: 90.00 Types: Cigarettes Last attempt to quit: 1970 Years since quittin.6  Smokeless tobacco: Never Used Substance and Sexual Activity  Alcohol use: Yes Alcohol/week: 0.0 standard drinks Comment: 2 oz at night every so often  Drug use: No  
 Sexual activity: Not on file Lifestyle  Physical activity:  
  Days per week: Not on file Minutes per session: Not on file  Stress: Not on file Relationships  Social connections:  
  Talks on phone: Not on file Gets together: Not on file Attends L[employer]-day service: Not on file Active member of club or organization: Not on file Attends meetings of clubs or organizations: Not on file Relationship status: Not on file  Intimate partner violence:  
  Fear of current or ex partner: Not on file Emotionally abused: Not on file Physically abused: Not on file Forced sexual activity: Not on file Other Topics Concern  Not on file Social History Narrative Lives in [address] [place] [address] with wife. Has 2 sons and 1 daughter. Works part time for [employer]. Worked at [employer] as an . Likes to travel and fish. Current Outpatient Medications Medication Sig  clopidogrel (PLAVIX) 75 mg tab Take 1 Tab by mouth daily.  isosorbide mononitrate ER (IMDUR) 60 mg CR tablet Take 1 Tab by mouth every morning.  amLODIPine (NORVASC) 2.5 mg tablet TAKE 1 TABLET EVERY DAY  metoprolol succinate (TOPROL-XL) 25 mg XL tablet TAKE ONE-HALF TABLET EVERY NIGHT.  losartan (COZAAR) 25 mg tablet Take 1 Tab by mouth daily. Reduced 3/6/19  
 naproxen (NAPROSYN) 500 mg tablet Take 250 mg by mouth two (2) times daily (with meals).  pantoprazole (PROTONIX) 40 mg tablet TAKE 1 TABLET EVERY DAY  (STOP  PRILOSEC)  atorvastatin (LIPITOR) 40 mg tablet TAKE 1 TABLET EVERY DAY.  (STOP  SIMVASTATIN)  vit C/E/Zn/coppr/lutein/zeaxan (PRESERVISION AREDS 2 PO) Take  by mouth two (2) times a day.  oxyCODONE-acetaminophen (PERCOCET) 5-325 mg per tablet Take  by mouth every four (4) hours as needed for Pain.  nitroglycerin (NITROSTAT) 0.4 mg SL tablet 1 Tab by SubLINGual route every five (5) minutes as needed for Chest Pain (max 3 daily for chest pain).  Potassium Gluconate 595 mg (99 mg) tablet Take 595 mg by mouth daily.  hydrocortisone (HYTONE) 2.5 % topical cream   
 OXYGEN-AIR DELIVERY SYSTEMS (HORIZON NASAL CPAP SYSTEM) by Does Not Apply route.  acetaminophen (TYLENOL ARTHRITIS PAIN) 650 mg CR tablet Take 650 mg by mouth four (4) times daily.  ferrous sulfate (IRON) 325 mg (65 mg iron) tablet Take 325 mg by mouth two (2) times a day.  citalopram (CELEXA) 10 mg tablet Take 10 mg by mouth daily.  omega-3 fatty acids-vitamin e (FISH OIL) 1,000 mg Cap Take 2 Caps by mouth.  multivitamin, stress formula (STRESS TAB) tablet Take 1 Tab by mouth daily.  aspirin 81 mg tablet Take 162 mg by mouth daily.  GLUC HCL/GLUC DESHPANDE/AC-D-GLUCOS (GLUCOSAMINE COMPLEX PO) Take 1 Tab by mouth daily. No current facility-administered medications for this visit. Review of Systems Constitutional: Negative for fever, chills, malaise/fatigue and diaphoresis. Respiratory: Negative for cough, hemoptysis, sputum production, shortness of breath and wheezing. Cardiovascular: Negative for palpitations, orthopnea, claudication, leg swelling and PND. +exertional chest pain Gastrointestinal: Negative for heartburn, nausea, vomiting, blood in stool and melena. Genitourinary: Negative for dysuria and flank pain. Musculoskeletal: Negative for joint pain and back pain. Skin: Negative for rash. Neurological: Negative for focal weakness, seizures, loss of consciousness, weakness and headaches. Endo/Heme/Allergies: Does not bruise/bleed easily. Psychiatric/Behavioral: Negative for memory loss. The patient does not have insomnia. Physical Exam:   
Visit Vitals /70 (BP 1 Location: Left arm, BP Patient Position: Sitting) Pulse 81 Resp 19 Ht 6' (1.829 m) Wt 295 lb 12.8 oz (134.2 kg) SpO2 95% BMI 40.12 kg/m² Wt Readings from Last 3 Encounters:  
11/26/19 295 lb 12.8 oz (134.2 kg)  
11/19/19 296 lb 2 oz (134.3 kg) 11/01/19 280 lb (127 kg) Gen: NAD Mental Status - Alert. General Appearance - Not in acute distress. Neck - no JVD Chest and Lung Exam  
Inspection: Accessory muscles - No use of accessory muscles in breathing. Auscultation:  
Breath sounds: - Normal.  
 
Cardiovascular Inspection: Jugular vein - Bilateral - Inspection Normal.  
Palpation/Percussion:  
Apical Impulse: - Normal.  
Auscultation: Rhythm - Regular. Heart Sounds - S1 WNL and S2 WNL. No S3 or S4. Murmurs & Other Heart Sounds: Auscultation of the heart reveals - No Murmurs. Peripheral Vascular Upper Extremity: Inspection - Bilateral - No Cyanotic nailbeds or Digital clubbing. Lower Extremity:  
Palpation: Edema - Bilateral - No edema. Abdomen: Soft, non-tender, bowel sounds are active. Neuro: A&O times 3, CN and motor grossly WNL Cardiographics EKG 11/26/19 -  SR, RBBB, LAHB, no change Assessment:  
 
Encounter Diagnoses Name Primary?  ASHD (arteriosclerotic heart disease) Yes  Atrial fibrillation, unspecified type ([address]  Atherosclerosis of coronary artery bypass graft of native heart with other forms of angina pectoris ([address]  Hyperlipidemia, mixed  SSS (sick sinus syndrome) ([address]  Pacemaker Plan: 1. Atherosclerotic heart disease: History of CABG, recent PTCA stenting of the LAD continue Plavix uninterrupted for 1 year. 2.  Atrial fibrillation: Paroxysmal post CABG, has remained in sinus rhythm with the exception of intermittent junctional where he is pending pacemaker placement on Friday. 3.  Intermittent symptomatic junctional rhythm sick sinus syndrome: he is pending pacemaker this Friday. 4.  Hyperlipidemia: On high intensity statin. Will look at his films and f/u after deciding on whether to cath again or not. Stable from cardiac perspective follow-up in 6 months 
 
Written by Alexandra Lynn, as dictated by Antionette Cooper MD. Films reviewed. May FFR distal RCA to see if SVG adequate. FFR LMT as well.   Was sl bigger than 6 mm2 by OCT

## 2019-11-26 NOTE — LETTER
11/26/19 Patient: Neptali Byrne YOB: 1945 Date of Visit: 11/26/2019 [address] [place] [address] VIA In Basket Dear [address] Joanna Evans MD, Thank you for referring Mr. Ember Nguyen to [facility] for evaluation. My notes for this consultation are attached. If you have questions, please do not hesitate to call me. I look forward to following your patient along with you.  
 
 
Sincerely, 
 
Issa Lara MD

## 2019-11-26 NOTE — PROGRESS NOTES
1. Have you been to the ER, urgent care clinic since your last visit? Hospitalized since your last visit? No.    2. Have you seen or consulted any other health care providers outside of the [address] [place] [address] since your last visit? Include any pap smears or colon screening. No.      Chief Complaint   Patient presents with    Follow-up     having chest pain on exertion at cardiac rehab and other times, rivera     SEND IMDUR AND PLAVIX TO [facility] FOR 90 DAY.

## 2019-11-26 NOTE — PROGRESS NOTES
[address] [place] [address] 60, [place], [address] [facility]     Kyra Law is a 66 y.o. male. Last seen by me 1 month ago. Subjective:     Kyra Law reports he went to cardio rehab. He was on the treadmill for about 4.5 minutes before he had angina and had to quit. Patient denies any dyspnea, palpitations, syncope, orthopnea, edema or paroxysmal nocturnal dyspnea. Patient Active Problem List    Diagnosis Date Noted    SSS (sick sinus syndrome) ([address] 10/30/2019    Angina of effort ([address] 10/07/2019    Severe obesity ([address] 10/03/2019    Irregular heartbeat 10/11/2016    Benign essential tremor syndrome 10/10/2016    Memory difficulties 10/10/2016    B12 deficiency 10/10/2016    Vitamin D deficiency 10/10/2016    Hypothyroidism due to acquired atrophy of thyroid 10/10/2016    HUSAIN (dyspnea on exertion) 08/08/2014    Unstable angina ([address] 12/20/2013    S/P coronary artery stent placement 12/20/2013    Goiter, nontoxic, multinodular     Hyperlipidemia, mixed 07/11/2012    Atherosclerosis of coronary artery bypass graft 07/11/2012    Atrial fibrillation ([address] 07/11/2012      Maris Bustamante MD  Past Medical History:   Diagnosis Date    AF (atrial fibrillation) (HCC)     s/p DCCV after his CABG, no recurrence    Anxiety     Arrhythmia     a fib    Arthritis     CAD (coronary artery disease)     s/p CABG in 1997    GERD (gastroesophageal reflux disease)     Goiter, nontoxic, multinodular     Hypertension     Low blood potassium     Myocardial infarct ([address] ? 1997    MARIA ISABEL on CPAP     Other and unspecified hyperlipidemia     Unspecified sleep apnea       Past Surgical History:   Procedure Laterality Date    CARDIAC SURG PROCEDURE UNLIST  1997    bypass    HX CATARACT REMOVAL  2006    bilateral    HX CHOLECYSTECTOMY  1970's    HX ORTHOPAEDIC      Arthoscopic L. knee surgery     HX SEPTOPLASTY  1980's    HX SHOULDER ARTHROSCOPY  2012    left    LA INS NEW/RPLCMT PRM PM W/TRANSV ELTRD ATRIAL&VENT N/A 2019    INSERT PPM DUAL performed by [facility]well [facility] at [facility] CARDIAC CATH LAB    RIGHT HEART CATHETERIZATION      x3 states pt. (can't remember when)    RT/LT HEART CATHETERS  2013    PTCA    Heart Dr. Petit Nine [Meperidine] Other (comments)     hallucinations  hallucinations      Family History   Problem Relation Age of Onset    Cancer Father         in his shoulder    Heart Attack Father     Heart Disease Father     Heart Disease Mother    [place] Many Mother         abd    [facility] Cancer Son         wilm's tumor    Cancer Sister         abd    Thyroid Disease Neg Hx       Social History     Socioeconomic History    Marital status:      Spouse name: Not on file    Number of children: Not on file    Years of education: Not on file    Highest education level: Not on file   Occupational History    Not on file   Social Needs    Financial resource strain: Not on file    Food insecurity:     Worry: Not on file     Inability: Not on file    Transportation needs:     Medical: Not on file     Non-medical: Not on file   Tobacco Use    Smoking status: Former Smoker     Packs/day: 4.50     Years: 20.00     Pack years: 90.00     Types: Cigarettes     Last attempt to quit: 1970     Years since quittin.6    Smokeless tobacco: Never Used   Substance and Sexual Activity    Alcohol use:  Yes     Alcohol/week: 0.0 standard drinks     Comment: 2 oz at night every so often    Drug use: No    Sexual activity: Not on file   Lifestyle    Physical activity:     Days per week: Not on file     Minutes per session: Not on file    Stress: Not on file   Relationships    Social connections:     Talks on phone: Not on file     Gets together: Not on file     Attends Mormon service: Not on file     Active member of club or organization: Not on file     Attends meetings of clubs or organizations: Not on file     Relationship status: Not on file    Intimate partner violence:     Fear of current or ex partner: Not on file     Emotionally abused: Not on file     Physically abused: Not on file     Forced sexual activity: Not on file   Other Topics Concern    Not on file   Social History Narrative    Lives in [address] [place] [address] with wife. Has 2 sons and 1 daughter. Works part time for [employer]. Worked at [employer] as an . Likes to travel and fish. Current Outpatient Medications   Medication Sig    clopidogrel (PLAVIX) 75 mg tab Take 1 Tab by mouth daily.  isosorbide mononitrate ER (IMDUR) 60 mg CR tablet Take 1 Tab by mouth every morning.  amLODIPine (NORVASC) 2.5 mg tablet TAKE 1 TABLET EVERY DAY    metoprolol succinate (TOPROL-XL) 25 mg XL tablet TAKE ONE-HALF TABLET EVERY NIGHT.  losartan (COZAAR) 25 mg tablet Take 1 Tab by mouth daily. Reduced 3/6/19    naproxen (NAPROSYN) 500 mg tablet Take 250 mg by mouth two (2) times daily (with meals).  pantoprazole (PROTONIX) 40 mg tablet TAKE 1 TABLET EVERY DAY  (STOP  PRILOSEC)    atorvastatin (LIPITOR) 40 mg tablet TAKE 1 TABLET EVERY DAY.  (STOP  SIMVASTATIN)    vit C/E/Zn/coppr/lutein/zeaxan (PRESERVISION AREDS 2 PO) Take  by mouth two (2) times a day.  oxyCODONE-acetaminophen (PERCOCET) 5-325 mg per tablet Take  by mouth every four (4) hours as needed for Pain.  nitroglycerin (NITROSTAT) 0.4 mg SL tablet 1 Tab by SubLINGual route every five (5) minutes as needed for Chest Pain (max 3 daily for chest pain).  Potassium Gluconate 595 mg (99 mg) tablet Take 595 mg by mouth daily.  hydrocortisone (HYTONE) 2.5 % topical cream     OXYGEN-AIR DELIVERY SYSTEMS (HORIZON NASAL CPAP SYSTEM) by Does Not Apply route.  acetaminophen (TYLENOL ARTHRITIS PAIN) 650 mg CR tablet Take 650 mg by mouth four (4) times daily.  ferrous sulfate (IRON) 325 mg (65 mg iron) tablet Take 325 mg by mouth two (2) times a day.     citalopram (CELEXA) 10 mg tablet Take 10 mg by mouth daily.  omega-3 fatty acids-vitamin e (FISH OIL) 1,000 mg Cap Take 2 Caps by mouth.  multivitamin, stress formula (STRESS TAB) tablet Take 1 Tab by mouth daily.  aspirin 81 mg tablet Take 162 mg by mouth daily.  GLUC HCL/GLUC DESHPANDE/AC-D-GLUCOS (GLUCOSAMINE COMPLEX PO) Take 1 Tab by mouth daily. No current facility-administered medications for this visit. Review of Systems  Constitutional: Negative for fever, chills, malaise/fatigue and diaphoresis. Respiratory: Negative for cough, hemoptysis, sputum production, shortness of breath and wheezing. Cardiovascular: Negative for palpitations, orthopnea, claudication, leg swelling and PND. +exertional chest pain  Gastrointestinal: Negative for heartburn, nausea, vomiting, blood in stool and melena. Genitourinary: Negative for dysuria and flank pain. Musculoskeletal: Negative for joint pain and back pain. Skin: Negative for rash. Neurological: Negative for focal weakness, seizures, loss of consciousness, weakness and headaches. Endo/Heme/Allergies: Does not bruise/bleed easily. Psychiatric/Behavioral: Negative for memory loss. The patient does not have insomnia. Physical Exam:    Visit Vitals  /70 (BP 1 Location: Left arm, BP Patient Position: Sitting)   Pulse 81   Resp 19   Ht 6' (1.829 m)   Wt 295 lb 12.8 oz (134.2 kg)   SpO2 95%   BMI 40.12 kg/m²     Wt Readings from Last 3 Encounters:   11/26/19 295 lb 12.8 oz (134.2 kg)   11/19/19 296 lb 2 oz (134.3 kg)   11/01/19 280 lb (127 kg)       Gen: NAD    Mental Status - Alert. General Appearance - Not in acute distress. Neck - no JVD    Chest and Lung Exam   Inspection: Accessory muscles - No use of accessory muscles in breathing. Auscultation:   Breath sounds: - Normal.     Cardiovascular   Inspection: Jugular vein - Bilateral - Inspection Normal.   Palpation/Percussion:   Apical Impulse: - Normal.   Auscultation: Rhythm - Regular. Heart Sounds - S1 WNL and S2 WNL. No S3 or S4. Murmurs & Other Heart Sounds: Auscultation of the heart reveals - No Murmurs. Peripheral Vascular   Upper Extremity: Inspection - Bilateral - No Cyanotic nailbeds or Digital clubbing. Lower Extremity:   Palpation: Edema - Bilateral - No edema. Abdomen: Soft, non-tender, bowel sounds are active. Neuro: A&O times 3, CN and motor grossly WNL    Cardiographics  EKG 11/26/19 -  SR, RBBB, LAHB, no change     Assessment:     Encounter Diagnoses   Name Primary?  ASHD (arteriosclerotic heart disease) Yes    Atrial fibrillation, unspecified type ([address]     Atherosclerosis of coronary artery bypass graft of native heart with other forms of angina pectoris (HCC)     Hyperlipidemia, mixed     SSS (sick sinus syndrome) ([facility]     Pacemaker           Plan:       1. Atherosclerotic heart disease: History of CABG, recent PTCA stenting of the LAD continue Plavix uninterrupted for 1 year. 2.  Atrial fibrillation: Paroxysmal post CABG, has remained in sinus rhythm with the exception of intermittent junctional where he is pending pacemaker placement on Friday. 3.  Intermittent symptomatic junctional rhythm sick sinus syndrome: he is pending pacemaker this Friday. 4.  Hyperlipidemia: On high intensity statin. Will look at his films and f/u after deciding on whether to cath again or not. Stable from cardiac perspective follow-up in 6 months    Written by Kenyatta Lizarraga, as dictated by Chase Valladares MD.    Films reviewed. May FFR distal RCA to see if SVG adequate. FFR LMT as well.   Was sl bigger than 6 mm2 by OCT

## 2019-11-27 ENCOUNTER — APPOINTMENT (OUTPATIENT)
Dept: CARDIAC REHAB | Age: 74
End: 2019-11-27
Attending: INTERNAL MEDICINE
Payer: MEDICARE

## 2019-12-04 ENCOUNTER — APPOINTMENT (OUTPATIENT)
Dept: CARDIAC REHAB | Age: 74
End: 2019-12-04
Attending: INTERNAL MEDICINE

## 2019-12-04 ENCOUNTER — HOSPITAL ENCOUNTER (OUTPATIENT)
Dept: LAB | Age: 74
Discharge: HOME | End: 2019-12-04
Payer: MEDICARE

## 2019-12-04 PROCEDURE — 85027 COMPLETE CBC AUTOMATED: CPT

## 2019-12-04 PROCEDURE — 80053 COMPREHEN METABOLIC PANEL: CPT

## 2019-12-04 PROCEDURE — 36415 COLL VENOUS BLD VENIPUNCTURE: CPT

## 2019-12-04 PROCEDURE — 85610 PROTHROMBIN TIME: CPT

## 2019-12-05 ENCOUNTER — APPOINTMENT (OUTPATIENT)
Dept: CARDIAC REHAB | Age: 74
End: 2019-12-05
Attending: INTERNAL MEDICINE

## 2019-12-05 LAB
ALBUMIN SERPL-MCNC: 4.3 G/DL (ref 3.5–4.8)
ALBUMIN/GLOB SERPL: 1.9 {RATIO} (ref 1.2–2.2)
ALP SERPL-CCNC: 50 IU/L (ref 39–117)
ALT SERPL-CCNC: 31 IU/L (ref 0–44)
AST SERPL-CCNC: 27 IU/L (ref 0–40)
BILIRUB SERPL-MCNC: 0.7 MG/DL (ref 0–1.2)
BUN SERPL-MCNC: 14 MG/DL (ref 8–27)
BUN/CREAT SERPL: 15 (ref 10–24)
CALCIUM SERPL-MCNC: 9.7 MG/DL (ref 8.6–10.2)
CHLORIDE SERPL-SCNC: 100 MMOL/L (ref 96–106)
CO2 SERPL-SCNC: 21 MMOL/L (ref 20–29)
CREAT SERPL-MCNC: 0.93 MG/DL (ref 0.76–1.27)
ERYTHROCYTE [DISTWIDTH] IN BLOOD BY AUTOMATED COUNT: 12.4 % (ref 12.3–15.4)
GLOBULIN SER CALC-MCNC: 2.3 G/DL (ref 1.5–4.5)
GLUCOSE SERPL-MCNC: 81 MG/DL (ref 65–99)
HCT VFR BLD AUTO: 39.3 % (ref 37.5–51)
HGB BLD-MCNC: 13.8 G/DL (ref 13–17.7)
INR PPP: 1 (ref 0.8–1.2)
MCH RBC QN AUTO: 33.2 PG (ref 26.6–33)
MCHC RBC AUTO-ENTMCNC: 35.1 G/DL (ref 31.5–35.7)
MCV RBC AUTO: 95 FL (ref 79–97)
PLATELET # BLD AUTO: 196 X10E3/UL (ref 150–450)
POTASSIUM SERPL-SCNC: 4.8 MMOL/L (ref 3.5–5.2)
PROT SERPL-MCNC: 6.6 G/DL (ref 6–8.5)
PROTHROMBIN TIME: 10.7 SEC (ref 9.1–12)
RBC # BLD AUTO: 4.16 X10E6/UL (ref 4.14–5.8)
SODIUM SERPL-SCNC: 137 MMOL/L (ref 134–144)
WBC # BLD AUTO: 4.5 X10E3/UL (ref 3.4–10.8)

## 2019-12-11 ENCOUNTER — APPOINTMENT (OUTPATIENT)
Dept: CARDIAC REHAB | Age: 74
End: 2019-12-11
Attending: INTERNAL MEDICINE

## 2019-12-12 ENCOUNTER — APPOINTMENT (OUTPATIENT)
Dept: CARDIAC REHAB | Age: 74
End: 2019-12-12
Attending: INTERNAL MEDICINE

## 2019-12-18 ENCOUNTER — APPOINTMENT (OUTPATIENT)
Dept: CARDIAC REHAB | Age: 74
End: 2019-12-18
Attending: INTERNAL MEDICINE

## 2019-12-19 ENCOUNTER — APPOINTMENT (OUTPATIENT)
Dept: CARDIAC REHAB | Age: 74
End: 2019-12-19
Attending: INTERNAL MEDICINE

## 2019-12-20 ENCOUNTER — HOSPITAL ENCOUNTER (OUTPATIENT)
Age: 74
Discharge: HOME | End: 2019-12-21
Attending: INTERNAL MEDICINE | Admitting: INTERNAL MEDICINE
Payer: MEDICARE

## 2019-12-20 DIAGNOSIS — R07.9 CHEST PAIN, UNSPECIFIED TYPE: ICD-10-CM

## 2019-12-20 LAB
ATRIAL RATE: 62 BPM
CALCULATED P AXIS, ECG09: 74 DEGREES
CALCULATED R AXIS, ECG10: -6 DEGREES
CALCULATED T AXIS, ECG11: -39 DEGREES
DIAGNOSIS, 93000: NORMAL
P-R INTERVAL, ECG05: 234 MS
Q-T INTERVAL, ECG07: 452 MS
QRS DURATION, ECG06: 154 MS
QTC CALCULATION (BEZET), ECG08: 458 MS
VENTRICULAR RATE, ECG03: 62 BPM

## 2019-12-20 PROCEDURE — 74011636320 HC RX REV CODE- 636/320: Performed by: INTERNAL MEDICINE

## 2019-12-20 PROCEDURE — C1769 GUIDE WIRE: HCPCS | Performed by: INTERNAL MEDICINE

## 2019-12-20 PROCEDURE — C1874 STENT, COATED/COV W/DEL SYS: HCPCS | Performed by: INTERNAL MEDICINE

## 2019-12-20 PROCEDURE — 77030015766: Performed by: INTERNAL MEDICINE

## 2019-12-20 PROCEDURE — 99152 MOD SED SAME PHYS/QHP 5/>YRS: CPT | Performed by: INTERNAL MEDICINE

## 2019-12-20 PROCEDURE — C1894 INTRO/SHEATH, NON-LASER: HCPCS | Performed by: INTERNAL MEDICINE

## 2019-12-20 PROCEDURE — 93005 ELECTROCARDIOGRAM TRACING: CPT

## 2019-12-20 PROCEDURE — C1753 CATH, INTRAVAS ULTRASOUND: HCPCS | Performed by: INTERNAL MEDICINE

## 2019-12-20 PROCEDURE — 74011250637 HC RX REV CODE- 250/637: Performed by: NURSE PRACTITIONER

## 2019-12-20 PROCEDURE — 74011000258 HC RX REV CODE- 258: Performed by: INTERNAL MEDICINE

## 2019-12-20 PROCEDURE — C1725 CATH, TRANSLUMIN NON-LASER: HCPCS | Performed by: INTERNAL MEDICINE

## 2019-12-20 PROCEDURE — 77030019569 HC BND COMPR RAD TERU -B: Performed by: INTERNAL MEDICINE

## 2019-12-20 PROCEDURE — 74011000250 HC RX REV CODE- 250: Performed by: INTERNAL MEDICINE

## 2019-12-20 PROCEDURE — 99153 MOD SED SAME PHYS/QHP EA: CPT | Performed by: INTERNAL MEDICINE

## 2019-12-20 PROCEDURE — 93455 CORONARY ART/GRFT ANGIO S&I: CPT | Performed by: INTERNAL MEDICINE

## 2019-12-20 PROCEDURE — C1887 CATHETER, GUIDING: HCPCS | Performed by: INTERNAL MEDICINE

## 2019-12-20 PROCEDURE — 92928 PRQ TCAT PLMT NTRAC ST 1 LES: CPT | Performed by: INTERNAL MEDICINE

## 2019-12-20 PROCEDURE — 74011250636 HC RX REV CODE- 250/636: Performed by: INTERNAL MEDICINE

## 2019-12-20 PROCEDURE — 74011250637 HC RX REV CODE- 250/637: Performed by: INTERNAL MEDICINE

## 2019-12-20 PROCEDURE — 77030028837 HC SYR ANGI PWR INJ COEU -A: Performed by: INTERNAL MEDICINE

## 2019-12-20 PROCEDURE — 77030004549 HC CATH ANGI DX PRF MRTM -A: Performed by: INTERNAL MEDICINE

## 2019-12-20 DEVICE — XIENCE SIERRA™ EVEROLIMUS ELUTING CORONARY STENT SYSTEM 4.00 MM X 15 MM / RAPID-EXCHANGE
Type: IMPLANTABLE DEVICE | Status: FUNCTIONAL
Brand: XIENCE SIERRA™

## 2019-12-20 RX ORDER — HEPARIN SODIUM 200 [USP'U]/100ML
INJECTION, SOLUTION INTRAVENOUS
Status: COMPLETED | OUTPATIENT
Start: 2019-12-20 | End: 2019-12-20

## 2019-12-20 RX ORDER — METOPROLOL SUCCINATE 25 MG/1
25 TABLET, EXTENDED RELEASE ORAL ONCE
Status: COMPLETED | OUTPATIENT
Start: 2019-12-20 | End: 2019-12-20

## 2019-12-20 RX ORDER — AMLODIPINE BESYLATE 2.5 MG/1
2.5 TABLET ORAL ONCE
Status: COMPLETED | OUTPATIENT
Start: 2019-12-20 | End: 2019-12-20

## 2019-12-20 RX ORDER — ISOSORBIDE MONONITRATE 30 MG/1
60 TABLET, EXTENDED RELEASE ORAL
Status: DISCONTINUED | OUTPATIENT
Start: 2019-12-21 | End: 2019-12-21 | Stop reason: HOSPADM

## 2019-12-20 RX ORDER — NITROGLYCERIN 0.4 MG/1
0.4 TABLET SUBLINGUAL
Status: DISCONTINUED | OUTPATIENT
Start: 2019-12-20 | End: 2019-12-21 | Stop reason: HOSPADM

## 2019-12-20 RX ORDER — HEPARIN SODIUM 1000 [USP'U]/ML
INJECTION, SOLUTION INTRAVENOUS; SUBCUTANEOUS AS NEEDED
Status: DISCONTINUED | OUTPATIENT
Start: 2019-12-20 | End: 2019-12-20 | Stop reason: HOSPADM

## 2019-12-20 RX ORDER — ATORVASTATIN CALCIUM 40 MG/1
40 TABLET, FILM COATED ORAL
Status: DISCONTINUED | OUTPATIENT
Start: 2019-12-20 | End: 2019-12-21 | Stop reason: HOSPADM

## 2019-12-20 RX ORDER — AMLODIPINE BESYLATE 2.5 MG/1
2.5 TABLET ORAL DAILY
Status: DISCONTINUED | OUTPATIENT
Start: 2019-12-21 | End: 2019-12-21 | Stop reason: HOSPADM

## 2019-12-20 RX ORDER — GUAIFENESIN 100 MG/5ML
162 LIQUID (ML) ORAL DAILY
Status: DISCONTINUED | OUTPATIENT
Start: 2019-12-21 | End: 2019-12-21 | Stop reason: HOSPADM

## 2019-12-20 RX ORDER — CLOPIDOGREL BISULFATE 75 MG/1
75 TABLET ORAL DAILY
Status: DISCONTINUED | OUTPATIENT
Start: 2019-12-21 | End: 2019-12-21 | Stop reason: HOSPADM

## 2019-12-20 RX ORDER — FENTANYL CITRATE 50 UG/ML
INJECTION, SOLUTION INTRAMUSCULAR; INTRAVENOUS AS NEEDED
Status: DISCONTINUED | OUTPATIENT
Start: 2019-12-20 | End: 2019-12-20 | Stop reason: HOSPADM

## 2019-12-20 RX ORDER — PANTOPRAZOLE SODIUM 40 MG/1
40 TABLET, DELAYED RELEASE ORAL
Status: DISCONTINUED | OUTPATIENT
Start: 2019-12-21 | End: 2019-12-21 | Stop reason: HOSPADM

## 2019-12-20 RX ORDER — CITALOPRAM 20 MG/1
10 TABLET, FILM COATED ORAL DAILY
Status: DISCONTINUED | OUTPATIENT
Start: 2019-12-21 | End: 2019-12-21 | Stop reason: HOSPADM

## 2019-12-20 RX ORDER — LOSARTAN POTASSIUM 25 MG/1
25 TABLET ORAL DAILY
Status: DISCONTINUED | OUTPATIENT
Start: 2019-12-21 | End: 2019-12-21 | Stop reason: HOSPADM

## 2019-12-20 RX ORDER — LIDOCAINE HYDROCHLORIDE 10 MG/ML
INJECTION, SOLUTION EPIDURAL; INFILTRATION; INTRACAUDAL; PERINEURAL AS NEEDED
Status: DISCONTINUED | OUTPATIENT
Start: 2019-12-20 | End: 2019-12-20 | Stop reason: HOSPADM

## 2019-12-20 RX ORDER — CLOPIDOGREL BISULFATE 75 MG/1
75 TABLET ORAL ONCE
Status: COMPLETED | OUTPATIENT
Start: 2019-12-20 | End: 2019-12-20

## 2019-12-20 RX ORDER — LANOLIN ALCOHOL/MO/W.PET/CERES
325 CREAM (GRAM) TOPICAL 2 TIMES DAILY
Status: DISCONTINUED | OUTPATIENT
Start: 2019-12-20 | End: 2019-12-21 | Stop reason: HOSPADM

## 2019-12-20 RX ORDER — CLOPIDOGREL BISULFATE 75 MG/1
TABLET ORAL AS NEEDED
Status: DISCONTINUED | OUTPATIENT
Start: 2019-12-20 | End: 2019-12-20 | Stop reason: HOSPADM

## 2019-12-20 RX ORDER — MIDAZOLAM HYDROCHLORIDE 1 MG/ML
INJECTION, SOLUTION INTRAMUSCULAR; INTRAVENOUS AS NEEDED
Status: DISCONTINUED | OUTPATIENT
Start: 2019-12-20 | End: 2019-12-20 | Stop reason: HOSPADM

## 2019-12-20 RX ORDER — ACETAMINOPHEN 325 MG/1
650 TABLET ORAL
Status: DISCONTINUED | OUTPATIENT
Start: 2019-12-20 | End: 2019-12-21 | Stop reason: HOSPADM

## 2019-12-20 RX ORDER — LOSARTAN POTASSIUM 25 MG/1
25 TABLET ORAL ONCE
Status: COMPLETED | OUTPATIENT
Start: 2019-12-20 | End: 2019-12-20

## 2019-12-20 RX ORDER — VERAPAMIL HYDROCHLORIDE 2.5 MG/ML
INJECTION, SOLUTION INTRAVENOUS AS NEEDED
Status: DISCONTINUED | OUTPATIENT
Start: 2019-12-20 | End: 2019-12-20 | Stop reason: HOSPADM

## 2019-12-20 RX ORDER — METOPROLOL SUCCINATE 25 MG/1
25 TABLET, EXTENDED RELEASE ORAL DAILY
Status: DISCONTINUED | OUTPATIENT
Start: 2019-12-21 | End: 2019-12-21 | Stop reason: HOSPADM

## 2019-12-20 RX ADMIN — CLOPIDOGREL BISULFATE 75 MG: 75 TABLET ORAL at 13:06

## 2019-12-20 RX ADMIN — METOPROLOL SUCCINATE 25 MG: 25 TABLET, EXTENDED RELEASE ORAL at 18:19

## 2019-12-20 RX ADMIN — AMLODIPINE BESYLATE 2.5 MG: 2.5 TABLET ORAL at 18:19

## 2019-12-20 RX ADMIN — FERROUS SULFATE TAB 325 MG (65 MG ELEMENTAL FE) 325 MG: 325 (65 FE) TAB at 18:19

## 2019-12-20 RX ADMIN — LOSARTAN POTASSIUM 25 MG: 25 TABLET ORAL at 18:19

## 2019-12-20 RX ADMIN — ATORVASTATIN CALCIUM 40 MG: 40 TABLET, FILM COATED ORAL at 22:51

## 2019-12-20 RX ADMIN — ACETAMINOPHEN 650 MG: 325 TABLET ORAL at 18:19

## 2019-12-20 NOTE — ROUTINE PROCESS
1520: Pt just arrived back to IVCU, site CDI, no complaints. 1755:Spoke w/on-call provider. Will give pt his normal night time BP meds.

## 2019-12-20 NOTE — Clinical Note
TRANSFER - OUT REPORT:     Verbal report given to: Zuly. Report consisted of patient's Situation, Background, Assessment and   Recommendations(SBAR). Opportunity for questions and clarification was provided. Patient transported with a Registered Nurse.

## 2019-12-20 NOTE — Clinical Note
Lesion located in the Mid LM. Balloon inserted. Balloon inflated using multiple inflations inflation technique. Pressure = 8 socorro; Duration = 10 sec.

## 2019-12-20 NOTE — Clinical Note
Lesion: Located in the Mid LM. Stent inserted. Multiple inflations used. First inflation pressure = 16 socorro; inflation time: 10 sec.

## 2019-12-20 NOTE — INTERVAL H&P NOTE
H&P Update:  Lou Diop was seen and examined. History and physical has been reviewed. The patient has been examined.  There have been no significant clinical changes since the completion of the originally dated History and Physical.

## 2019-12-20 NOTE — Clinical Note
Lesion located in the Mid LM. Balloon inserted. Balloon inflated using multiple inflations inflation technique. Pressure = 18 socorro; Duration = 10 sec. Inflation 2: Pressure: 18 soocrro; Duration: 2 sec.

## 2019-12-21 VITALS
RESPIRATION RATE: 18 BRPM | TEMPERATURE: 98.2 F | BODY MASS INDEX: 39.01 KG/M2 | HEIGHT: 72 IN | DIASTOLIC BLOOD PRESSURE: 74 MMHG | HEART RATE: 66 BPM | OXYGEN SATURATION: 94 % | WEIGHT: 288 LBS | SYSTOLIC BLOOD PRESSURE: 135 MMHG

## 2019-12-21 PROCEDURE — 74011250637 HC RX REV CODE- 250/637: Performed by: INTERNAL MEDICINE

## 2019-12-21 PROCEDURE — 77030027138 HC INCENT SPIROMETER -A

## 2019-12-21 RX ADMIN — FERROUS SULFATE TAB 325 MG (65 MG ELEMENTAL FE) 325 MG: 325 (65 FE) TAB at 08:30

## 2019-12-21 RX ADMIN — AMLODIPINE BESYLATE 2.5 MG: 2.5 TABLET ORAL at 08:31

## 2019-12-21 RX ADMIN — ACETAMINOPHEN 650 MG: 325 TABLET ORAL at 03:46

## 2019-12-21 RX ADMIN — CLOPIDOGREL BISULFATE 75 MG: 75 TABLET ORAL at 08:30

## 2019-12-21 RX ADMIN — ASPIRIN 81 MG 162 MG: 81 TABLET ORAL at 08:36

## 2019-12-21 RX ADMIN — METOPROLOL SUCCINATE 25 MG: 25 TABLET, EXTENDED RELEASE ORAL at 08:31

## 2019-12-21 RX ADMIN — CITALOPRAM HYDROBROMIDE 10 MG: 20 TABLET ORAL at 08:31

## 2019-12-21 RX ADMIN — LOSARTAN POTASSIUM 25 MG: 25 TABLET ORAL at 08:31

## 2019-12-21 RX ADMIN — PANTOPRAZOLE SODIUM 40 MG: 40 TABLET, DELAYED RELEASE ORAL at 07:00

## 2019-12-21 RX ADMIN — ISOSORBIDE MONONITRATE 60 MG: 30 TABLET, EXTENDED RELEASE ORAL at 07:00

## 2019-12-21 NOTE — PROGRESS NOTES
Cardiac Electrophysiology Progress Note            [address], [place], [address] [facility]  [phone]    12/21/2019 9:43 AM    Admit Date: 12/20/2019    Admit Diagnosis: Chest pain, unspecified type [R07.9]    Subjective:     Mei Hi   denies chest pain, chest pressure/discomfort, dyspnea, palpitations. Visit Vitals  /74   Pulse 66   Temp 98.2 °F (36.8 °C)   Resp 18   Ht 6' (1.829 m)   Wt 288 lb (130.6 kg)   SpO2 94%   BMI 39.06 kg/m²     Current Facility-Administered Medications   Medication Dose Route Frequency    pantoprazole (PROTONIX) tablet 40 mg  40 mg Oral ACB    aspirin chewable tablet 162 mg  162 mg Oral DAILY    citalopram (CELEXA) tablet 10 mg  10 mg Oral DAILY    ferrous sulfate tablet 325 mg  325 mg Oral BID    nitroglycerin (NITROSTAT) tablet 0.4 mg  0.4 mg SubLINGual Q5MIN PRN    atorvastatin (LIPITOR) tablet 40 mg  40 mg Oral QHS    amLODIPine (NORVASC) tablet 2.5 mg  2.5 mg Oral DAILY    metoprolol succinate (TOPROL-XL) XL tablet 25 mg  25 mg Oral DAILY    losartan (COZAAR) tablet 25 mg  25 mg Oral DAILY    clopidogrel (PLAVIX) tablet 75 mg  75 mg Oral DAILY    isosorbide mononitrate ER (IMDUR) tablet 60 mg  60 mg Oral 7am    acetaminophen (TYLENOL) tablet 650 mg  650 mg Oral Q6H PRN         Objective:      Visit Vitals  /74   Pulse 66   Temp 98.2 °F (36.8 °C)   Resp 18   Ht 6' (1.829 m)   Wt 288 lb (130.6 kg)   SpO2 94%   BMI 39.06 kg/m²       Physical Exam:  Abdomen: soft, non-tender  Extremities: extremities normal  Heart: regular rate and rhythm  Lungs: clear to auscultation bilaterally  Pulses: 2+ and symmetric    Data Review:   Labs:    No results for input(s): WBC, HGB, HCT, PLT, HGBEXT, HCTEXT, PLTEXT in the last 72 hours. No results for input(s): NA, K, CL, CO2, GLU, BUN, CREA, CA, MG, PHOS, ALB, TBIL, TBILI, SGOT, ALT, INR, INREXT in the last 72 hours.     No lab exists for component:  BNP    No results for input(s): TROIQ, CPK, CKMB in the last 72 hours. Intake/Output Summary (Last 24 hours) at 12/21/2019 0943  Last data filed at 12/20/2019 2300  Gross per 24 hour   Intake --   Output 950 ml   Net -950 ml        Telemetry: SR 78    Assessment:     Active Problems:    * No active hospital problems. *  cad  Chest pain    Plan:     Arma Hammans is s/[p [facility] to [facility]. Right radial dressing dry and intact. He remains in sinus, normotensive. Edgardo Fields for d/c today with follow up outpatient. Jessica Luevano, ANP  Harriett Mir MD, [facility]  12/21/2019  9:43 AM    Patient seen and examined by me with nurse practitioner. I personally performed all components of the history, physical, and medical decision making and agree with the assessment and plan with minor modifications as noted. Sp PCI of LM. Doing well. Asymptomatic. Edgardo Fields for [facility]. F/u in 2 weeks as outpt.  Cont med rx of asa/plavix/bb/statin    Harriett Mir MD, [facility]

## 2019-12-23 ENCOUNTER — TELEPHONE (OUTPATIENT)
Dept: CARDIAC REHAB | Age: 74
End: 2019-12-23

## 2019-12-26 ENCOUNTER — APPOINTMENT (OUTPATIENT)
Dept: CARDIAC REHAB | Age: 74
End: 2019-12-26
Attending: INTERNAL MEDICINE

## 2019-12-27 NOTE — CARDIO/PULMONARY
Pt noted to have missed appts since 11/19 which was his orientation. Noted that wife was hospitalized with serious condition on November 23,2019. Will call pt once wife discharged.

## 2020-01-02 ENCOUNTER — APPOINTMENT (OUTPATIENT)
Dept: CARDIAC REHAB | Age: 75
End: 2020-01-02
Attending: INTERNAL MEDICINE
Payer: MEDICARE

## 2020-01-03 ENCOUNTER — TELEPHONE (OUTPATIENT)
Dept: CARDIAC REHAB | Age: 75
End: 2020-01-03

## 2020-01-03 NOTE — TELEPHONE ENCOUNTER
Noted that pt's wife still in CCU. Pt enrolled in [facility]iac [facility]- will call to follow up and try to get pt back to attending when wife discharged from hospital if he is able .

## 2020-01-06 NOTE — PROGRESS NOTES
[address] [place] [address] 60, [place], [address] [facility]     Eduarda Hines is a 66 y.o. male. Last seen by me 6 weeks ago. Subjective:     Eduarda Hines reports he is feeling well overall. He continues to go to cardiac rehab with no exertional sxs. Patient denies any dyspnea, palpitations, syncope, orthopnea, edema or paroxysmal nocturnal dyspnea. Patient Active Problem List    Diagnosis Date Noted    SSS (sick sinus syndrome) ([facility] 10/30/2019    Angina of effort ([address] 10/07/2019    Severe obesity ([address] 10/03/2019    Irregular heartbeat 10/11/2016    Benign essential tremor syndrome 10/10/2016    Memory difficulties 10/10/2016    B12 deficiency 10/10/2016    Vitamin D deficiency 10/10/2016    Hypothyroidism due to acquired atrophy of thyroid 10/10/2016    HUSAIN (dyspnea on exertion) 08/08/2014    Unstable angina ([address] 12/20/2013    S/P coronary artery stent placement 12/20/2013    Goiter, nontoxic, multinodular     Hyperlipidemia, mixed 07/11/2012    Atherosclerosis of coronary artery bypass graft 07/11/2012    Atrial fibrillation ([address] 07/11/2012      Zuleyka Nettles MD  Past Medical History:   Diagnosis Date    AF (atrial fibrillation) (HCC)     s/p DCCV after his CABG, no recurrence    Anxiety     Arrhythmia     a fib    Arthritis     CAD (coronary artery disease)     s/p CABG in 1997    GERD (gastroesophageal reflux disease)     Goiter, nontoxic, multinodular     Hypertension     Low blood potassium     Myocardial infarct ([address] ? 1997    MARIA ISABEL on CPAP     Other and unspecified hyperlipidemia     Unspecified sleep apnea       Past Surgical History:   Procedure Laterality Date    CARDIAC SURG PROCEDURE UNLIST  1997    bypass    HX CATARACT REMOVAL  2006    bilateral    HX CHOLECYSTECTOMY  1970's    HX ORTHOPAEDIC      Arthoscopic L. knee surgery     HX SEPTOPLASTY  1980's    HX SHOULDER ARTHROSCOPY  2012    left    [place] INS NEW/RPLCMT PRM PM W/TRANSV ELTRD ATRIAL&VENT N/A 2019    INSERT PPM DUAL performed by Natalie Agarwal MD at [facility] CARDIAC CATH LAB    RIGHT HEART CATHETERIZATION      x3 states pt. (can't remember when)    RT/LT HEART CATHETERS  2013    PTCA    Heart Dr. Steven Barbour [Meperidine] Other (comments)     hallucinations  hallucinations      Family History   Problem Relation Age of Onset    Cancer Father         in his shoulder    Heart Attack Father     Heart Disease Father     Heart Disease Mother    [place] Elders Mother         abd    [place] Cancer Son         wilm's tumor    Cancer Sister         abd    Thyroid Disease Neg Hx       Social History     Socioeconomic History    Marital status:      Spouse name: Not on file    Number of children: Not on file    Years of education: Not on file    Highest education level: Not on file   Occupational History    Not on file   Social Needs    Financial resource strain: Not on file    Food insecurity:     Worry: Not on file     Inability: Not on file    Transportation needs:     Medical: Not on file     Non-medical: Not on file   Tobacco Use    Smoking status: Former Smoker     Packs/day: 4.50     Years: 20.00     Pack years: 90.00     Types: Cigarettes     Last attempt to quit: 1970     Years since quittin.7    Smokeless tobacco: Never Used   Substance and Sexual Activity    Alcohol use:  Yes     Alcohol/week: 0.0 standard drinks     Comment: 2 oz at night every so often    Drug use: No    Sexual activity: Not on file   Lifestyle    Physical activity:     Days per week: Not on file     Minutes per session: Not on file    Stress: Not on file   Relationships    Social connections:     Talks on phone: Not on file     Gets together: Not on file     Attends Gnosticist service: Not on file     Active member of club or organization: Not on file     Attends meetings of clubs or organizations: Not on file     Relationship status: Not on file    Intimate partner violence:     Fear of current or ex partner: Not on file     Emotionally abused: Not on file     Physically abused: Not on file     Forced sexual activity: Not on file   Other Topics Concern    Not on file   Social History Narrative    Lives in [address] [place] [address],[address] with wife. Has 2 sons and 1 daughter. Works part time for [employer]. Worked at [employer] as an . Likes to travel and fish. Current Outpatient Medications   Medication Sig    clopidogrel (PLAVIX) 75 mg tab Take 1 Tab by mouth daily.  isosorbide mononitrate ER (IMDUR) 60 mg CR tablet Take 1 Tab by mouth every morning.  amLODIPine (NORVASC) 2.5 mg tablet TAKE 1 TABLET EVERY DAY    metoprolol succinate (TOPROL-XL) 25 mg XL tablet TAKE ONE-HALF TABLET EVERY NIGHT. (Patient taking differently: Take 25 mg by mouth daily. TAKE ONE-HALF TABLET EVERY NIGHT.)    losartan (COZAAR) 25 mg tablet Take 1 Tab by mouth daily. Reduced 3/6/19    pantoprazole (PROTONIX) 40 mg tablet TAKE 1 TABLET EVERY DAY  (STOP  PRILOSEC)    atorvastatin (LIPITOR) 40 mg tablet TAKE 1 TABLET EVERY DAY.  (STOP  SIMVASTATIN)    vit C/E/Zn/coppr/lutein/zeaxan (PRESERVISION AREDS 2 PO) Take  by mouth two (2) times a day.  oxyCODONE-acetaminophen (PERCOCET) 5-325 mg per tablet Take  by mouth every four (4) hours as needed for Pain.  nitroglycerin (NITROSTAT) 0.4 mg SL tablet 1 Tab by SubLINGual route every five (5) minutes as needed for Chest Pain (max 3 daily for chest pain).  Potassium Gluconate 595 mg (99 mg) tablet Take 595 mg by mouth daily.  hydrocortisone (HYTONE) 2.5 % topical cream     OXYGEN-AIR DELIVERY SYSTEMS (HORIZON NASAL CPAP SYSTEM) by Does Not Apply route.  acetaminophen (TYLENOL ARTHRITIS PAIN) 650 mg CR tablet Take 650 mg by mouth four (4) times daily.  ferrous sulfate (IRON) 325 mg (65 mg iron) tablet Take 325 mg by mouth daily.  citalopram (CELEXA) 10 mg tablet Take 10 mg by mouth daily.     omega-3 fatty acids-vitamin e (FISH OIL) 1,000 mg Cap Take 2 Caps by mouth.  multivitamin, stress formula (STRESS TAB) tablet Take 1 Tab by mouth daily.  aspirin 81 mg tablet Take 162 mg by mouth daily.  GLUC HCL/GLUC DESHPANDE/AC-D-GLUCOS (GLUCOSAMINE COMPLEX PO) Take 1 Tab by mouth daily. No current facility-administered medications for this visit. Review of Systems  Constitutional: Negative for fever, chills, malaise/fatigue and diaphoresis. Respiratory: Negative for cough, hemoptysis, sputum production, shortness of breath and wheezing. Cardiovascular: Negative for palpitations, orthopnea, claudication, leg swelling and PND. Gastrointestinal: Negative for heartburn, nausea, vomiting, blood in stool and melena. Genitourinary: Negative for dysuria and flank pain. Musculoskeletal: Negative for joint pain and back pain. Skin: Negative for rash. Neurological: Negative for focal weakness, seizures, loss of consciousness, weakness and headaches. Endo/Heme/Allergies: Does not bruise/bleed easily. Psychiatric/Behavioral: Negative for memory loss. The patient does not have insomnia. Physical Exam:    Visit Vitals  /68 (BP 1 Location: Left arm, BP Patient Position: Sitting)   Pulse 78   Resp 18   Ht 6' (1.829 m)   Wt 290 lb 3.2 oz (131.6 kg)   SpO2 96%   BMI 39.36 kg/m²     Wt Readings from Last 3 Encounters:   01/07/20 290 lb 3.2 oz (131.6 kg)   12/20/19 288 lb (130.6 kg)   11/26/19 295 lb 12.8 oz (134.2 kg)       Gen: NAD    Mental Status - Alert. General Appearance - Not in acute distress. Neck - no JVD    Chest and Lung Exam   Inspection: Accessory muscles - No use of accessory muscles in breathing. Auscultation:   Breath sounds: - Normal.     Cardiovascular   Inspection: Jugular vein - Bilateral - Inspection Normal.   Palpation/Percussion:   Apical Impulse: - Normal.   Auscultation: Rhythm - Regular. Heart Sounds - S1 WNL and S2 WNL. No S3 or S4.    Murmurs & Other Heart Sounds: Auscultation of the heart reveals - No Murmurs. Peripheral Vascular   Upper Extremity: Inspection - Bilateral - No Cyanotic nailbeds or Digital clubbing. Lower Extremity:   Palpation: Edema - Bilateral - No edema. Abdomen: Soft, non-tender, bowel sounds are active. Neuro: A&O times 3, CN and motor grossly WNL    Cardiographics  EKG 1/7/20 - normal     Assessment:     Encounter Diagnoses   Name Primary?  ASHD (arteriosclerotic heart disease) Yes    S/P coronary artery stent placement     Atrial fibrillation, unspecified type ([address]     SSS (sick sinus syndrome) ([address]     Hyperlipidemia, mixed     Pacemaker     Severe obesity ([facility]           Plan:       1. Atherosclerotic heart disease: History of CABG, recent PTCA stenting of the LAD continue Plavix uninterrupted for 1 year. 2.  Atrial fibrillation: Paroxysmal post CABG, has remained in sinus rhythm with the exception of intermittent junctional where he is pending pacemaker placement on Friday. 3.  Intermittent symptomatic junctional rhythm sick sinus syndrome: he is pending pacemaker this Friday. 4.  Hyperlipidemia: On high intensity statin. Today he is feeling well. His exam and EKG are normal. He continues to go to cardiac rehab with no exertional sxs. Continue current treatment    F/u in 6 months or PRN.     Written by Oswald Ricks, as dictated by Leon Wilburn MD.

## 2020-01-07 ENCOUNTER — OFFICE VISIT (OUTPATIENT)
Dept: CARDIOLOGY CLINIC | Age: 75
End: 2020-01-07

## 2020-01-07 VITALS
BODY MASS INDEX: 39.31 KG/M2 | SYSTOLIC BLOOD PRESSURE: 120 MMHG | HEIGHT: 72 IN | RESPIRATION RATE: 18 BRPM | HEART RATE: 78 BPM | OXYGEN SATURATION: 96 % | WEIGHT: 290.2 LBS | DIASTOLIC BLOOD PRESSURE: 68 MMHG

## 2020-01-07 DIAGNOSIS — E66.01 SEVERE OBESITY (HCC): ICD-10-CM

## 2020-01-07 DIAGNOSIS — Z95.5 S/P CORONARY ARTERY STENT PLACEMENT: ICD-10-CM

## 2020-01-07 DIAGNOSIS — I25.10 ASHD (ARTERIOSCLEROTIC HEART DISEASE): Primary | ICD-10-CM

## 2020-01-07 DIAGNOSIS — Z95.0 PACEMAKER: ICD-10-CM

## 2020-01-07 DIAGNOSIS — I49.5 SSS (SICK SINUS SYNDROME) (HCC): ICD-10-CM

## 2020-01-07 DIAGNOSIS — E78.2 HYPERLIPIDEMIA, MIXED: ICD-10-CM

## 2020-01-07 DIAGNOSIS — I48.91 ATRIAL FIBRILLATION, UNSPECIFIED TYPE (HCC): ICD-10-CM

## 2020-01-07 NOTE — PROGRESS NOTES
1. Have you been to the ER, urgent care clinic since your last visit? Hospitalized since your last visit? No.    2. Have you seen or consulted any other health care providers outside of the [address] [place] [address] since your last visit? Include any pap smears or colon screening.    No.      Chief Complaint   Patient presents with   [facility] Follow Up     f/u from cardiac cath- pt denies any cardiac symptoms

## 2020-01-07 NOTE — LETTER
1/7/20 Patient: Melissa Cooper YOB: 1945 Date of Visit: 1/7/2020 [address] [facility] [address] VIA In Basket Dear 130 Joanna Evans MD, Thank you for referring Mr. Blanche Salinas to [facility] for evaluation. My notes for this consultation are attached. If you have questions, please do not hesitate to call me. I look forward to following your patient along with you.  
 
 
Sincerely, 
 
Dunia Brewer MD

## 2020-01-08 ENCOUNTER — APPOINTMENT (OUTPATIENT)
Dept: CARDIAC REHAB | Age: 75
End: 2020-01-08
Attending: INTERNAL MEDICINE
Payer: MEDICARE

## 2020-01-08 ENCOUNTER — TELEPHONE (OUTPATIENT)
Dept: CARDIOLOGY CLINIC | Age: 75
End: 2020-01-08

## 2020-01-08 NOTE — LETTER
1/9/2020 9:36 AM 
 
Mr. Lyndon Fregoso [address] Sun [address] [facility] [id number] Mr. Lindsay Turner is under the care of [facility]. He is cleared to return to cardiac rehab. If you have any questions/concerns, please do not hesitate to call our office.  
 
 
 
Sincerely, 
 
 
Cornell Scott NP

## 2020-01-09 ENCOUNTER — APPOINTMENT (OUTPATIENT)
Dept: CARDIAC REHAB | Age: 75
End: 2020-01-09
Attending: INTERNAL MEDICINE
Payer: MEDICARE

## 2020-01-09 RX ORDER — ATORVASTATIN CALCIUM 40 MG/1
TABLET, FILM COATED ORAL
Qty: 90 TAB | Refills: 3 | Status: SHIPPED | OUTPATIENT
Start: 2020-01-09 | End: 2021-03-17

## 2020-01-09 NOTE — TELEPHONE ENCOUNTER
Clearance letter for cardiac rehab faxed to [facility] at [phone] with confirmation received.

## 2020-01-14 RX ORDER — METOPROLOL SUCCINATE 25 MG/1
TABLET, EXTENDED RELEASE ORAL
Qty: 45 TAB | Refills: 1 | Status: SHIPPED | OUTPATIENT
Start: 2020-01-14 | End: 2020-05-12

## 2020-01-15 ENCOUNTER — APPOINTMENT (OUTPATIENT)
Dept: CARDIAC REHAB | Age: 75
End: 2020-01-15
Attending: INTERNAL MEDICINE
Payer: MEDICARE

## 2020-01-16 ENCOUNTER — APPOINTMENT (OUTPATIENT)
Dept: CARDIAC REHAB | Age: 75
End: 2020-01-16
Attending: INTERNAL MEDICINE
Payer: MEDICARE

## 2020-01-23 ENCOUNTER — HOSPITAL ENCOUNTER (OUTPATIENT)
Dept: CARDIAC REHAB | Age: 75
Discharge: HOME | End: 2020-01-23
Attending: INTERNAL MEDICINE
Payer: MEDICARE

## 2020-01-23 VITALS — BODY MASS INDEX: 39.47 KG/M2 | WEIGHT: 291 LBS

## 2020-01-23 PROCEDURE — 93798 PHYS/QHP OP CAR RHAB W/ECG: CPT

## 2020-01-23 NOTE — CARDIO/PULMONARY
Baseline strip faxed to [facility] office due to change from EKG. Patient asymptomatic.

## 2020-01-27 ENCOUNTER — HOSPITAL ENCOUNTER (OUTPATIENT)
Dept: CARDIAC REHAB | Age: 75
Discharge: HOME | End: 2020-01-27
Attending: INTERNAL MEDICINE
Payer: MEDICARE

## 2020-01-27 VITALS — BODY MASS INDEX: 39.82 KG/M2 | WEIGHT: 293.6 LBS

## 2020-01-27 PROCEDURE — 93798 PHYS/QHP OP CAR RHAB W/ECG: CPT

## 2020-01-29 ENCOUNTER — HOSPITAL ENCOUNTER (OUTPATIENT)
Dept: CARDIAC REHAB | Age: 75
Discharge: HOME | End: 2020-01-29
Attending: INTERNAL MEDICINE
Payer: MEDICARE

## 2020-01-29 VITALS — BODY MASS INDEX: 39.68 KG/M2 | WEIGHT: 292.6 LBS

## 2020-01-29 PROCEDURE — 93798 PHYS/QHP OP CAR RHAB W/ECG: CPT

## 2020-02-03 ENCOUNTER — APPOINTMENT (OUTPATIENT)
Dept: CARDIAC REHAB | Age: 75
End: 2020-02-03
Attending: INTERNAL MEDICINE
Payer: MEDICARE

## 2020-02-05 ENCOUNTER — HOSPITAL ENCOUNTER (OUTPATIENT)
Dept: CARDIAC REHAB | Age: 75
Discharge: HOME | End: 2020-02-05
Attending: INTERNAL MEDICINE
Payer: MEDICARE

## 2020-02-05 VITALS — BODY MASS INDEX: 39.47 KG/M2 | WEIGHT: 291 LBS

## 2020-02-05 PROCEDURE — 93798 PHYS/QHP OP CAR RHAB W/ECG: CPT

## 2020-02-10 ENCOUNTER — HOSPITAL ENCOUNTER (OUTPATIENT)
Dept: CARDIAC REHAB | Age: 75
Discharge: HOME | End: 2020-02-10
Attending: INTERNAL MEDICINE
Payer: MEDICARE

## 2020-02-10 VITALS — BODY MASS INDEX: 39.58 KG/M2 | WEIGHT: 291.8 LBS

## 2020-02-10 PROCEDURE — 93798 PHYS/QHP OP CAR RHAB W/ECG: CPT

## 2020-02-14 ENCOUNTER — APPOINTMENT (OUTPATIENT)
Dept: GENERAL RADIOLOGY | Age: 75
End: 2020-02-14
Attending: EMERGENCY MEDICINE
Payer: MEDICARE

## 2020-02-14 ENCOUNTER — HOSPITAL ENCOUNTER (EMERGENCY)
Age: 75
Discharge: HOME | End: 2020-02-14
Attending: EMERGENCY MEDICINE
Payer: MEDICARE

## 2020-02-14 VITALS
HEART RATE: 96 BPM | RESPIRATION RATE: 12 BRPM | BODY MASS INDEX: 38.57 KG/M2 | WEIGHT: 291 LBS | DIASTOLIC BLOOD PRESSURE: 94 MMHG | SYSTOLIC BLOOD PRESSURE: 156 MMHG | HEIGHT: 73 IN | OXYGEN SATURATION: 100 %

## 2020-02-14 DIAGNOSIS — R42 LIGHTHEADEDNESS: ICD-10-CM

## 2020-02-14 DIAGNOSIS — R07.9 CHEST PAIN, UNSPECIFIED TYPE: Primary | ICD-10-CM

## 2020-02-14 DIAGNOSIS — F43.9 STRESS: ICD-10-CM

## 2020-02-14 LAB
ALBUMIN SERPL-MCNC: 4.1 G/DL (ref 3.5–5)
ALBUMIN/GLOB SERPL: 1.3 {RATIO} (ref 1.1–2.2)
ALP SERPL-CCNC: 56 U/L (ref 45–117)
ALT SERPL-CCNC: 43 U/L (ref 12–78)
ANION GAP SERPL CALC-SCNC: 6 MMOL/L (ref 5–15)
APPEARANCE UR: CLEAR
AST SERPL-CCNC: 24 U/L (ref 15–37)
BACTERIA URNS QL MICRO: NEGATIVE /HPF
BASOPHILS # BLD: 0 K/UL (ref 0–0.1)
BASOPHILS NFR BLD: 0 % (ref 0–1)
BILIRUB SERPL-MCNC: 0.6 MG/DL (ref 0.2–1)
BILIRUB UR QL: NEGATIVE
BUN SERPL-MCNC: 18 MG/DL (ref 6–20)
BUN/CREAT SERPL: 19 (ref 12–20)
CALCIUM SERPL-MCNC: 9.1 MG/DL (ref 8.5–10.1)
CHLORIDE SERPL-SCNC: 104 MMOL/L (ref 97–108)
CO2 SERPL-SCNC: 26 MMOL/L (ref 21–32)
COLOR UR: NORMAL
CREAT SERPL-MCNC: 0.94 MG/DL (ref 0.7–1.3)
DIFFERENTIAL METHOD BLD: NORMAL
EOSINOPHIL # BLD: 0.2 K/UL (ref 0–0.4)
EOSINOPHIL NFR BLD: 3 % (ref 0–7)
EPITH CASTS URNS QL MICRO: NORMAL /LPF
ERYTHROCYTE [DISTWIDTH] IN BLOOD BY AUTOMATED COUNT: 12.4 % (ref 11.5–14.5)
GLOBULIN SER CALC-MCNC: 3.1 G/DL (ref 2–4)
GLUCOSE SERPL-MCNC: 83 MG/DL (ref 65–100)
GLUCOSE UR STRIP.AUTO-MCNC: NEGATIVE MG/DL
HCT VFR BLD AUTO: 41.6 % (ref 36.6–50.3)
HGB BLD-MCNC: 14.1 G/DL (ref 12.1–17)
HGB UR QL STRIP: NEGATIVE
HYALINE CASTS URNS QL MICRO: NORMAL /LPF (ref 0–5)
IMM GRANULOCYTES # BLD AUTO: 0 K/UL (ref 0–0.04)
IMM GRANULOCYTES NFR BLD AUTO: 0 % (ref 0–0.5)
KETONES UR QL STRIP.AUTO: NEGATIVE MG/DL
LEUKOCYTE ESTERASE UR QL STRIP.AUTO: NEGATIVE
LYMPHOCYTES # BLD: 1.5 K/UL (ref 0.8–3.5)
LYMPHOCYTES NFR BLD: 25 % (ref 12–49)
MCH RBC QN AUTO: 32.4 PG (ref 26–34)
MCHC RBC AUTO-ENTMCNC: 33.9 G/DL (ref 30–36.5)
MCV RBC AUTO: 95.6 FL (ref 80–99)
MONOCYTES # BLD: 0.5 K/UL (ref 0–1)
MONOCYTES NFR BLD: 8 % (ref 5–13)
NEUTS SEG # BLD: 3.9 K/UL (ref 1.8–8)
NEUTS SEG NFR BLD: 64 % (ref 32–75)
NITRITE UR QL STRIP.AUTO: NEGATIVE
NRBC # BLD: 0 K/UL (ref 0–0.01)
NRBC BLD-RTO: 0 PER 100 WBC
PH UR STRIP: 6.5 [PH] (ref 5–8)
PLATELET # BLD AUTO: 178 K/UL (ref 150–400)
PMV BLD AUTO: 10.1 FL (ref 8.9–12.9)
POTASSIUM SERPL-SCNC: 4.1 MMOL/L (ref 3.5–5.1)
PROT SERPL-MCNC: 7.2 G/DL (ref 6.4–8.2)
PROT UR STRIP-MCNC: NEGATIVE MG/DL
RBC # BLD AUTO: 4.35 M/UL (ref 4.1–5.7)
RBC #/AREA URNS HPF: NORMAL /HPF (ref 0–5)
SODIUM SERPL-SCNC: 136 MMOL/L (ref 136–145)
SP GR UR REFRACTOMETRY: 1.02 (ref 1–1.03)
TROPONIN I SERPL-MCNC: <0.05 NG/ML
UA: UC IF INDICATED,UAUC: NORMAL
UROBILINOGEN UR QL STRIP.AUTO: 0.2 EU/DL (ref 0.2–1)
WBC # BLD AUTO: 6 K/UL (ref 4.1–11.1)
WBC URNS QL MICRO: NORMAL /HPF (ref 0–4)

## 2020-02-14 PROCEDURE — 93005 ELECTROCARDIOGRAM TRACING: CPT

## 2020-02-14 PROCEDURE — 84484 ASSAY OF TROPONIN QUANT: CPT

## 2020-02-14 PROCEDURE — 85025 COMPLETE CBC W/AUTO DIFF WBC: CPT

## 2020-02-14 PROCEDURE — 80053 COMPREHEN METABOLIC PANEL: CPT

## 2020-02-14 PROCEDURE — 71045 X-RAY EXAM CHEST 1 VIEW: CPT

## 2020-02-14 PROCEDURE — 81001 URINALYSIS AUTO W/SCOPE: CPT

## 2020-02-14 PROCEDURE — 36415 COLL VENOUS BLD VENIPUNCTURE: CPT

## 2020-02-14 PROCEDURE — 99284 EMERGENCY DEPT VISIT MOD MDM: CPT

## 2020-02-14 RX ORDER — SODIUM CHLORIDE 0.9 % (FLUSH) 0.9 %
5-40 SYRINGE (ML) INJECTION EVERY 8 HOURS
Status: DISCONTINUED | OUTPATIENT
Start: 2020-02-14 | End: 2020-02-14 | Stop reason: HOSPADM

## 2020-02-14 RX ORDER — SODIUM CHLORIDE 0.9 % (FLUSH) 0.9 %
5-40 SYRINGE (ML) INJECTION AS NEEDED
Status: DISCONTINUED | OUTPATIENT
Start: 2020-02-14 | End: 2020-02-14 | Stop reason: HOSPADM

## 2020-02-14 NOTE — ED PROVIDER NOTES
EMERGENCY DEPARTMENT HISTORY AND PHYSICAL EXAM      Date: 2/14/2020  Patient Name: Melissa Cooper  Patient Age and Sex: 66 y.o. male     History of Presenting Illness     Chief Complaint   Patient presents with    Fall     Pt fell while visiting his wife, recently stented and defib several weeks ago. Wife is dying pt is expressing anxiety . Pt became light headed and fell no LOC no head injury.  Chest Pain       History Provided By: Patient    HPI: Melissa Cooper 53-year-old male presenting for chest tightness. Patient states that his wife has been in the hospital for the past several weeks and is very sick. Today he was in the hospital with her and got very emotional.  Then started to get very lightheaded, with chest tightness. States that he did not have any shortness of breath, syncopal event, nausea, vomiting associated with it. Patient states he has been very anxious and stressed out over the entire issue with his wife who is nearing hospice care. There are no other complaints, changes, or physical findings at this time. PCP: Bernadette Villela MD    No current facility-administered medications on file prior to encounter. Current Outpatient Medications on File Prior to Encounter   Medication Sig Dispense Refill    metoprolol succinate (TOPROL-XL) 25 mg XL tablet TAKE ONE-HALF TABLET EVERY NIGHT. 45 Tab 1    atorvastatin (LIPITOR) 40 mg tablet TAKE 1 TABLET EVERY DAY.  (STOP  SIMVASTATIN) 90 Tab 3    clopidogrel (PLAVIX) 75 mg tab Take 1 Tab by mouth daily. 90 Tab 3    isosorbide mononitrate ER (IMDUR) 60 mg CR tablet Take 1 Tab by mouth every morning. 90 Tab 3    amLODIPine (NORVASC) 2.5 mg tablet TAKE 1 TABLET EVERY DAY 90 Tab 1    losartan (COZAAR) 25 mg tablet Take 1 Tab by mouth daily.  Reduced 3/6/19 90 Tab 1    pantoprazole (PROTONIX) 40 mg tablet TAKE 1 TABLET EVERY DAY  (STOP  PRILOSEC) 90 Tab 3    vit C/E/Zn/coppr/lutein/zeaxan (PRESERVISION AREDS 2 PO) Take  by mouth two (2) times a day.      oxyCODONE-acetaminophen (PERCOCET) 5-325 mg per tablet Take  by mouth every four (4) hours as needed for Pain.  nitroglycerin (NITROSTAT) 0.4 mg SL tablet 1 Tab by SubLINGual route every five (5) minutes as needed for Chest Pain (max 3 daily for chest pain). 25 Tab 1    Potassium Gluconate 595 mg (99 mg) tablet Take 595 mg by mouth daily.  hydrocortisone (HYTONE) 2.5 % topical cream       OXYGEN-AIR DELIVERY SYSTEMS (HORIZON NASAL CPAP SYSTEM) by Does Not Apply route.  acetaminophen (TYLENOL ARTHRITIS PAIN) 650 mg CR tablet Take 650 mg by mouth four (4) times daily.  ferrous sulfate (IRON) 325 mg (65 mg iron) tablet Take 325 mg by mouth daily.  citalopram (CELEXA) 10 mg tablet Take 10 mg by mouth daily.  omega-3 fatty acids-vitamin e (FISH OIL) 1,000 mg Cap Take 2 Caps by mouth.  multivitamin, stress formula (STRESS TAB) tablet Take 1 Tab by mouth daily.  aspirin 81 mg tablet Take 162 mg by mouth daily.  GLUC HCL/GLUC DESHPANDE/AC-D-GLUCOS (GLUCOSAMINE COMPLEX PO) Take 1 Tab by mouth daily. Past History     Past Medical History:  Past Medical History:   Diagnosis Date    AF (atrial fibrillation) (HCC)     s/p DCCV after his CABG, no recurrence    Anxiety     Arrhythmia     a fib    Arthritis     CAD (coronary artery disease)     s/p CABG in 1997    GERD (gastroesophageal reflux disease)     Goiter, nontoxic, multinodular     Hypertension     Low blood potassium     Myocardial infarct ([facility] [address] ? 1997    MARIA ISABEL on CPAP     Other and unspecified hyperlipidemia     Unspecified sleep apnea        Past Surgical History:  Past Surgical History:   Procedure Laterality Date    CARDIAC SURG PROCEDURE UNLIST  1997    bypass    HX CATARACT REMOVAL  2006    bilateral    HX CHOLECYSTECTOMY  1970's    HX ORTHOPAEDIC      Arthoscopic L. knee surgery     HX SEPTOPLASTY  1980's    HX SHOULDER ARTHROSCOPY  2012    left    AL INS NEW/RPLCMT PRM PM W/TRANSV ELTRD ATRIAL&VENT N/A 2019    INSERT PPM DUAL performed by Wilma Newell MD at [facility] CARDIAC CATH LAB    RIGHT HEART CATHETERIZATION      x3 states pt. (can't remember when)    RT/LT HEART CATHETERS  2013    PTCA    Heart Dr. Bridges Laeduardo       Family History:  Family History   Problem Relation Age of Onset   [address] [facility] Cancer Father         in his shoulder    Heart Attack Father     Heart Disease Father     Heart Disease Mother    Aries Goo Mother         abd    [facility] Cancer Son         wilm's tumor    Cancer Sister         abd    Thyroid Disease Neg Hx        Social History:  Social History     Tobacco Use    Smoking status: Former Smoker     Packs/day: 4.50     Years: 20.00     Pack years: 90.00     Types: Cigarettes     Last attempt to quit: 1970     Years since quittin.8    Smokeless tobacco: Never Used   Substance Use Topics    Alcohol use: Yes     Alcohol/week: 0.0 standard drinks     Comment: 2 oz at night every so often    Drug use: No       Allergies: Allergies   Allergen Reactions    Demerol [Meperidine] Other (comments)     hallucinations  hallucinations         Review of Systems   Review of Systems   Constitutional: Negative for chills and fever. Respiratory: Positive for chest tightness. Negative for cough and shortness of breath. Cardiovascular: Negative for chest pain. Gastrointestinal: Negative for abdominal pain, constipation, diarrhea, nausea and vomiting. Neurological: Positive for light-headedness. Negative for syncope, weakness and numbness. All other systems reviewed and are negative. Physical Exam   Physical Exam  Vitals signs and nursing note reviewed. Constitutional:       Appearance: He is well-developed. Comments: Patient does get emotional when talking about his wife   HENT:      Head: Normocephalic and atraumatic. Eyes:      Conjunctiva/sclera: Conjunctivae normal.   Neck:      Musculoskeletal: Normal range of motion and neck supple. Cardiovascular:      Rate and Rhythm: Normal rate and regular rhythm. Pulmonary:      Effort: Pulmonary effort is normal. No respiratory distress. Breath sounds: Normal breath sounds. Abdominal:      General: There is no distension. Palpations: Abdomen is soft. Tenderness: There is no abdominal tenderness. Musculoskeletal: Normal range of motion. Skin:     General: Skin is warm and dry. Neurological:      Mental Status: He is alert and oriented to person, place, and time. Diagnostic Study Results     Labs -     Recent Results (from the past 12 hour(s))   EKG, 12 LEAD, INITIAL    Collection Time: 02/14/20  3:38 PM   Result Value Ref Range    Ventricular Rate 77 BPM    Atrial Rate 77 BPM    P-R Interval 176 ms    QRS Duration 146 ms    Q-T Interval 446 ms    QTC Calculation (Bezet) 504 ms    Calculated P Axis 57 degrees    Calculated R Axis -40 degrees    Calculated T Axis 61 degrees    Diagnosis       Normal sinus rhythm  Left axis deviation  Right bundle branch block  When compared with ECG of 20-DEC-2019 15:18,  IA interval has decreased  T wave inversion no longer evident in Inferior leads  T wave inversion less evident in Anterolateral leads     METABOLIC PANEL, COMPREHENSIVE    Collection Time: 02/14/20  4:27 PM   Result Value Ref Range    Sodium 136 136 - 145 mmol/L    Potassium 4.1 3.5 - 5.1 mmol/L    Chloride 104 97 - 108 mmol/L    CO2 26 21 - 32 mmol/L    Anion gap 6 5 - 15 mmol/L    Glucose 83 65 - 100 mg/dL    BUN 18 6 - 20 MG/DL    Creatinine 0.94 0.70 - 1.30 MG/DL    BUN/Creatinine ratio 19 12 - 20      GFR est AA >60 >60 ml/min/1.73m2    GFR est non-AA >60 >60 ml/min/1.73m2    Calcium 9.1 8.5 - 10.1 MG/DL    Bilirubin, total 0.6 0.2 - 1.0 MG/DL    ALT (SGPT) 43 12 - 78 U/L    AST (SGOT) 24 15 - 37 U/L    Alk.  phosphatase 56 45 - 117 U/L    Protein, total 7.2 6.4 - 8.2 g/dL    Albumin 4.1 3.5 - 5.0 g/dL    Globulin 3.1 2.0 - 4.0 g/dL    A-G Ratio 1.3 1.1 - 2.2     CBC WITH AUTOMATED DIFF    Collection Time: 02/14/20  4:27 PM   Result Value Ref Range    WBC 6.0 4.1 - 11.1 K/uL    RBC 4.35 4.10 - 5.70 M/uL    HGB 14.1 12.1 - 17.0 g/dL    HCT 41.6 36.6 - 50.3 %    MCV 95.6 80.0 - 99.0 FL    MCH 32.4 26.0 - 34.0 PG    MCHC 33.9 30.0 - 36.5 g/dL    RDW 12.4 11.5 - 14.5 %    PLATELET 139 530 - 689 K/uL    MPV 10.1 8.9 - 12.9 FL    NRBC 0.0 0  WBC    ABSOLUTE NRBC 0.00 0.00 - 0.01 K/uL    NEUTROPHILS 64 32 - 75 %    LYMPHOCYTES 25 12 - 49 %    MONOCYTES 8 5 - 13 %    EOSINOPHILS 3 0 - 7 %    BASOPHILS 0 0 - 1 %    IMMATURE GRANULOCYTES 0 0.0 - 0.5 %    ABS. NEUTROPHILS 3.9 1.8 - 8.0 K/UL    ABS. LYMPHOCYTES 1.5 0.8 - 3.5 K/UL    ABS. MONOCYTES 0.5 0.0 - 1.0 K/UL    ABS. EOSINOPHILS 0.2 0.0 - 0.4 K/UL    ABS. BASOPHILS 0.0 0.0 - 0.1 K/UL    ABS. IMM. GRANS. 0.0 0.00 - 0.04 K/UL    DF AUTOMATED     TROPONIN I    Collection Time: 02/14/20  4:27 PM   Result Value Ref Range    Troponin-I, Qt. <0.05 <0.05 ng/mL   URINALYSIS W/ REFLEX CULTURE    Collection Time: 02/14/20  5:06 PM   Result Value Ref Range    Color YELLOW/STRAW      Appearance CLEAR CLEAR      Specific gravity 1.016 1.003 - 1.030      pH (UA) 6.5 5.0 - 8.0      Protein NEGATIVE  NEG mg/dL    Glucose NEGATIVE  NEG mg/dL    Ketone NEGATIVE  NEG mg/dL    Bilirubin NEGATIVE  NEG      Blood NEGATIVE  NEG      Urobilinogen 0.2 0.2 - 1.0 EU/dL    Nitrites NEGATIVE  NEG      Leukocyte Esterase NEGATIVE  NEG      WBC 0-4 0 - 4 /hpf    RBC 0-5 0 - 5 /hpf    Epithelial cells FEW FEW /lpf    Bacteria NEGATIVE  NEG /hpf    UA:UC IF INDICATED CULTURE NOT INDICATED BY UA RESULT      Hyaline cast 0-2 0 - 5 /lpf       Radiologic Studies -   XR CHEST PORT   Final Result   IMPRESSION: No acute findings seen. CT Results  (Last 48 hours)    None        CXR Results  (Last 48 hours)               02/14/20 1741  XR CHEST PORT Final result    Impression:  IMPRESSION: No acute findings seen.        Narrative:  Clinical indication: Chest pain.       Portable AP upright view of the chest is obtained, comparison November 1, 2019. A cardiac pacemaker is in place. The heart size is normal. There is no acute   infiltrate. Inspiration is shallow likely related to body habitus. Medical Decision Making   I am the first provider for this patient. I reviewed the vital signs, available nursing notes, past medical history, past surgical history, family history and social history. Vital Signs-Reviewed the patient's vital signs. Patient Vitals for the past 12 hrs:   Pulse Resp BP SpO2   02/14/20 1524 96 12 (!) 156/94 100 %       Records Reviewed: Nursing Notes and Old Medical Records    Provider Notes (Medical Decision Making):   Patient presenting with chest tightness and lightheadedness. Most likely related to the anxiety and stress in dealing with his dying wife. Less likely ACS but given his age and risk factors, will do EKG and lab work. Lungs are clear and less likely pneumonia. ED Course:   Initial assessment performed. The patients presenting problems have been discussed, and they are in agreement with the care plan formulated and outlined with them. I have encouraged them to ask questions as they arise throughout their visit. Critical Care Time:   0    Disposition:  Discharge Note:  The patient has been re-evaluated and is ready for discharge. Reviewed available results with patient. Counseled patient on diagnosis and care plan. Patient has expressed understanding, and all questions have been answered. Patient agrees with plan and agrees to follow up as recommended, or to return to the ED if their symptoms worsen. Discharge instructions have been provided and explained to the patient, along with reasons to return to the ED.       PLAN:  Discharge Medication List as of 2/14/2020  6:13 PM      1.   2.   Follow-up Information     Follow up With Specialties Details Why Contact Info    Sil Hightower MD [facility] As needed [address] [facility]  [phone]          3. Return to ED if worse     Diagnosis     Clinical Impression:   1. Chest pain, unspecified type    2. Stress    3. Lightheadedness        Attestations:    Chandler Jovel M.D. Please note that this dictation was completed with [facility], the computer voice recognition software. Quite often unanticipated grammatical, syntax, homophones, and other interpretive errors are inadvertently transcribed by the computer software. Please disregard these errors. Please excuse any errors that have escaped final proofreading. Thank you.

## 2020-02-14 NOTE — DISCHARGE INSTRUCTIONS

## 2020-02-15 LAB
ATRIAL RATE: 77 BPM
CALCULATED P AXIS, ECG09: 57 DEGREES
CALCULATED R AXIS, ECG10: -40 DEGREES
CALCULATED T AXIS, ECG11: 61 DEGREES
DIAGNOSIS, 93000: NORMAL
P-R INTERVAL, ECG05: 176 MS
Q-T INTERVAL, ECG07: 446 MS
QRS DURATION, ECG06: 146 MS
QTC CALCULATION (BEZET), ECG08: 504 MS
VENTRICULAR RATE, ECG03: 77 BPM

## 2020-02-17 ENCOUNTER — HOSPITAL ENCOUNTER (OUTPATIENT)
Dept: CARDIAC REHAB | Age: 75
End: 2020-02-17
Attending: INTERNAL MEDICINE
Payer: MEDICARE

## 2020-02-21 ENCOUNTER — TELEPHONE (OUTPATIENT)
Dept: CARDIAC REHAB | Age: 75
End: 2020-02-21

## 2020-03-05 ENCOUNTER — OFFICE VISIT (OUTPATIENT)
Dept: CARDIOLOGY CLINIC | Age: 75
End: 2020-03-05

## 2020-03-05 ENCOUNTER — CLINICAL SUPPORT (OUTPATIENT)
Dept: CARDIOLOGY CLINIC | Age: 75
End: 2020-03-05

## 2020-03-05 VITALS
RESPIRATION RATE: 18 BRPM | WEIGHT: 291.4 LBS | HEIGHT: 73 IN | SYSTOLIC BLOOD PRESSURE: 138 MMHG | BODY MASS INDEX: 38.62 KG/M2 | OXYGEN SATURATION: 94 % | HEART RATE: 83 BPM | DIASTOLIC BLOOD PRESSURE: 72 MMHG

## 2020-03-05 DIAGNOSIS — I49.9 IRREGULAR HEARTBEAT: ICD-10-CM

## 2020-03-05 DIAGNOSIS — I25.10 ASHD (ARTERIOSCLEROTIC HEART DISEASE): ICD-10-CM

## 2020-03-05 DIAGNOSIS — I10 ESSENTIAL HYPERTENSION: ICD-10-CM

## 2020-03-05 DIAGNOSIS — I49.5 SSS (SICK SINUS SYNDROME) (HCC): ICD-10-CM

## 2020-03-05 DIAGNOSIS — E78.2 HYPERLIPIDEMIA, MIXED: ICD-10-CM

## 2020-03-05 DIAGNOSIS — I49.5 SSS (SICK SINUS SYNDROME) (HCC): Primary | ICD-10-CM

## 2020-03-05 DIAGNOSIS — Z95.0 CARDIAC PACEMAKER IN SITU: Primary | ICD-10-CM

## 2020-03-05 RX ORDER — UREA 10 %
5 LOTION (ML) TOPICAL
COMMUNITY

## 2020-03-05 NOTE — PROGRESS NOTES
Subjective:      Adam Urbano is a 66 y.o. male is here for EP consult. The patient denies chest pain/ shortness of breath, orthopnea, PND, LE edema, palpitations, syncope, presyncope or fatigue. Patient Active Problem List    Diagnosis Date Noted    SSS (sick sinus syndrome) ([address] 10/30/2019    Angina of effort ([address] 10/07/2019    Severe obesity ([address] 10/03/2019    Irregular heartbeat 10/11/2016    Benign essential tremor syndrome 10/10/2016    Memory difficulties 10/10/2016    B12 deficiency 10/10/2016    Vitamin D deficiency 10/10/2016    Hypothyroidism due to acquired atrophy of thyroid 10/10/2016    HUSAIN (dyspnea on exertion) 08/08/2014    Unstable angina ([address] 12/20/2013    S/P coronary artery stent placement 12/20/2013    Goiter, nontoxic, multinodular     Hyperlipidemia, mixed 07/11/2012    Atherosclerosis of coronary artery bypass graft 07/11/2012    Atrial fibrillation ([address] 07/11/2012      Elba Foley MD  Past Medical History:   Diagnosis Date    AF (atrial fibrillation) (HCC)     s/p DCCV after his CABG, no recurrence    Anxiety     Arrhythmia     a fib    Arthritis     CAD (coronary artery disease)     s/p CABG in 1997    GERD (gastroesophageal reflux disease)     Goiter, nontoxic, multinodular     Hypertension     Low blood potassium     Myocardial infarct ([address] ? 1997    MARIA ISABEL on CPAP     Other and unspecified hyperlipidemia     Unspecified sleep apnea       Past Surgical History:   Procedure Laterality Date    CARDIAC SURG PROCEDURE UNLIST  1997    bypass    HX CATARACT REMOVAL  2006    bilateral    HX CHOLECYSTECTOMY  1970's    HX ORTHOPAEDIC      Arthoscopic L. knee surgery     HX SEPTOPLASTY  1980's    HX SHOULDER ARTHROSCOPY  2012    left    NE INS NEW/RPLCMT PRM PM W/TRANSV ELTRD ATRIAL&VENT N/A 11/1/2019    INSERT PPM DUAL performed by Larry Mendieta MD at [facility] CARDIAC CATH LAB    RIGHT HEART CATHETERIZATION      x3 states pt. (can't remember when)    RT/LT HEART CATHETERS  2013    PTCA    Heart Dr. Shilpa Fulton [Meperidine] Other (comments)     hallucinations  hallucinations      Family History   Problem Relation Age of Onset    Cancer Father         in his shoulder    Heart Attack Father     Heart Disease Father     Heart Disease Mother     Cancer Mother         abd    [facility] Cancer Son         wilm's tumor    Cancer Sister         abd    Thyroid Disease Neg Hx     negative for cardiac disease  Social History     Socioeconomic History    Marital status:      Spouse name: Not on file    Number of children: Not on file    Years of education: Not on file    Highest education level: Not on file   Tobacco Use    Smoking status: Former Smoker     Packs/day: 4.50     Years: 20.00     Pack years: 90.00     Types: Cigarettes     Last attempt to quit: 1970     Years since quittin.9    Smokeless tobacco: Never Used   Substance and Sexual Activity    Alcohol use: Yes     Alcohol/week: 0.0 standard drinks     Comment: 2 oz at night every so often    Drug use: No   Social History Narrative    Lives in [address] [place] [address] with wife. Has 2 sons and 1 daughter. Works part time for [employer]. Worked at [employer] as an . Likes to travel and fish. Current Outpatient Medications   Medication Sig    melatonin 1 mg tablet Take 1 mg by mouth nightly.  metoprolol succinate (TOPROL-XL) 25 mg XL tablet TAKE ONE-HALF TABLET EVERY NIGHT.  atorvastatin (LIPITOR) 40 mg tablet TAKE 1 TABLET EVERY DAY.  (STOP  SIMVASTATIN)    clopidogrel (PLAVIX) 75 mg tab Take 1 Tab by mouth daily.  isosorbide mononitrate ER (IMDUR) 60 mg CR tablet Take 1 Tab by mouth every morning.  amLODIPine (NORVASC) 2.5 mg tablet TAKE 1 TABLET EVERY DAY    losartan (COZAAR) 25 mg tablet Take 1 Tab by mouth daily.  Reduced 3/6/19    pantoprazole (PROTONIX) 40 mg tablet TAKE 1 TABLET EVERY DAY (STOP  PRILOSEC)    vit C/E/Zn/coppr/lutein/zeaxan (PRESERVISION AREDS 2 PO) Take  by mouth two (2) times a day.  oxyCODONE-acetaminophen (PERCOCET) 5-325 mg per tablet Take  by mouth every four (4) hours as needed for Pain.  nitroglycerin (NITROSTAT) 0.4 mg SL tablet 1 Tab by SubLINGual route every five (5) minutes as needed for Chest Pain (max 3 daily for chest pain).  Potassium Gluconate 595 mg (99 mg) tablet Take 595 mg by mouth daily.  hydrocortisone (HYTONE) 2.5 % topical cream     OXYGEN-AIR DELIVERY SYSTEMS (HORIZON NASAL CPAP SYSTEM) by Does Not Apply route.  acetaminophen (TYLENOL ARTHRITIS PAIN) 650 mg CR tablet Take 650 mg by mouth four (4) times daily.  ferrous sulfate (IRON) 325 mg (65 mg iron) tablet Take 325 mg by mouth daily.  citalopram (CELEXA) 10 mg tablet Take 10 mg by mouth daily.  omega-3 fatty acids-vitamin e (FISH OIL) 1,000 mg Cap Take 2 Caps by mouth.  multivitamin, stress formula (STRESS TAB) tablet Take 1 Tab by mouth daily.  aspirin 81 mg tablet Take 162 mg by mouth daily.  GLUC HCL/GLUC DESHPANDE/AC-D-GLUCOS (GLUCOSAMINE COMPLEX PO) Take 1 Tab by mouth daily. No current facility-administered medications for this visit. Vitals:    03/05/20 1110   BP: 138/72   Pulse: 83   Resp: 18   SpO2: 94%   Weight: 291 lb 6.4 oz (132.2 kg)   Height: 6' 1\" (1.854 m)       I have reviewed the nurses notes, vitals, problem list, allergy list, medical history, family, social history and medications. Review of Symptoms:    General: Pt denies excessive weight gain or loss. Pt is able to conduct ADL's  HEENT: Denies blurred vision, headaches, epistaxis and difficulty swallowing. Respiratory: Denies shortness of breath, HUSAIN, wheezing or stridor.   Cardiovascular: Denies precordial pain, palpitations, edema or PND  Gastrointestinal: Denies poor appetite, indigestion, abdominal pain or blood in stool  Urinary: Denies dysuria, pyuria  Musculoskeletal: Denies pain or swelling from muscles or joints  Neurologic: Denies tremor, paresthesias, or sensory motor disturbance  Skin: Denies rash, itching or texture change. Psych: Denies depression      Physical Exam:      General: Well developed, in no acute distress. HEENT: Eyes - PERRL, no jvd  Heart:  Normal S1/S2 negative S3 or S4. Regular, no murmur, gallop or rub.   Respiratory: Clear bilaterally x 4, no wheezing or rales  Abdomen:   Soft, non-tender, bowel sounds are active.   Extremities:  No edema, normal cap refill, no cyanosis. Musculoskeletal: No clubbing  Neuro: A&Ox3, speech clear, gait stable. Skin: Skin color is normal. No rashes or lesions.  Non diaphoretic  Vascular: 2+ pulses symmetric in all extremities    Cardiographics    Ekg: nsr    Pacer - A paced 17%    Results for orders placed or performed during the hospital encounter of 02/14/20   EKG, 12 LEAD, INITIAL   Result Value Ref Range    Ventricular Rate 77 BPM    Atrial Rate 77 BPM    P-R Interval 176 ms    QRS Duration 146 ms    Q-T Interval 446 ms    QTC Calculation (Bezet) 504 ms    Calculated P Axis 57 degrees    Calculated R Axis -40 degrees    Calculated T Axis 61 degrees    Diagnosis       Normal sinus rhythm  Left axis deviation  Right bundle branch block  Nonspecific ST and T wave abnormality  When compared with ECG of 20-DEC-2019 15:18,  NM interval has decreased    Confirmed by Zahraa Villalpando MD, Enrico Bullock ([id number]) on 2/15/2020 1:27:32 PM           Lab Results   Component Value Date/Time    WBC 6.0 02/14/2020 04:27 PM    Hemoglobin (POC) 15.0 02/10/2011 02:08 PM    HGB 14.1 02/14/2020 04:27 PM    HCT 41.6 02/14/2020 04:27 PM    PLATELET 922 09/66/8396 04:27 PM    MCV 95.6 02/14/2020 04:27 PM      Lab Results   Component Value Date/Time    Sodium 136 02/14/2020 04:27 PM    Potassium 4.1 02/14/2020 04:27 PM    Chloride 104 02/14/2020 04:27 PM    CO2 26 02/14/2020 04:27 PM    Anion gap 6 02/14/2020 04:27 PM    Glucose 83 02/14/2020 04:27 PM    BUN 18 02/14/2020 04:27 PM    Creatinine 0.94 02/14/2020 04:27 PM    BUN/Creatinine ratio 19 02/14/2020 04:27 PM    GFR est AA >60 02/14/2020 04:27 PM    GFR est non-AA >60 02/14/2020 04:27 PM    Calcium 9.1 02/14/2020 04:27 PM    Bilirubin, total 0.6 02/14/2020 04:27 PM    AST (SGOT) 24 02/14/2020 04:27 PM    Alk. phosphatase 56 02/14/2020 04:27 PM    Protein, total 7.2 02/14/2020 04:27 PM    Albumin 4.1 02/14/2020 04:27 PM    Globulin 3.1 02/14/2020 04:27 PM    A-G Ratio 1.3 02/14/2020 04:27 PM    ALT (SGPT) 43 02/14/2020 04:27 PM         Assessment:     Assessment:        ICD-10-CM ICD-9-CM    1. SSS (sick sinus syndrome) (HCC) I49.5 427.81 AMB POC EKG ROUTINE W/ 12 LEADS, INTER & REP   2. Irregular heartbeat I49.9 427.9 AMB POC EKG ROUTINE W/ 12 LEADS, INTER & REP   3. Hyperlipidemia, mixed E78.2 272.2 AMB POC EKG ROUTINE W/ 12 LEADS, INTER & REP   4. ASHD (arteriosclerotic heart disease) I25.10 414.00    5. Essential hypertension I10 401.9      Orders Placed This Encounter    AMB POC EKG ROUTINE W/ 12 LEADS, INTER & REP     Order Specific Question:   Reason for Exam:     Answer:   routine    melatonin 1 mg tablet     Sig: Take 1 mg by mouth nightly. Plan:   Mr Mile Orr recently lost his wife. He is A paced 17% for sick sinus. He is on med rx for htn and hyperlipidemia. F/u in one year. Continue medical management for sss, htn and hyperlipidemia. Thank you for allowing me to participate in Eliane Lani 's care.     Monty Pathak MD, Lisbeth Steiner

## 2020-03-05 NOTE — PROGRESS NOTES
1. Have you been to the ER, urgent care clinic since your last visit? Hospitalized since your last visit? No.    2. Have you seen or consulted any other health care providers outside of the [address] [place] [address] since your last visit? Include any pap smears or colon screening.    No.      Chief Complaint   Patient presents with    Follow-up     3 month- pt denies any cardiac symptoms

## 2020-03-26 ENCOUNTER — TELEPHONE (OUTPATIENT)
Dept: CARDIAC REHAB | Age: 75
End: 2020-03-26

## 2020-03-26 NOTE — TELEPHONE ENCOUNTER
Spoke with Raleigh Zavala on 3/26/2020 for follow up Cardiopulmonary telephone counseling. Psychosocial: Patient reports having minimal stressors at this time. Discussed stress management techniques. Pt reports having a [employer] membership and is watching shows or movies, not spending all of his time watching the news. Smoking: Patient does not have current or recent  tobacco use. Discussed current adjunct therapy and interventions including  Reviewed the need to continue smoking cessation/avoidance interventions to maintain progress towards complete smoking cessation. Exercise: Discussed patient's current home exercise routine. He reports that they are doing some home PT exercises. He reports having a fall and sustained a hip injury and was going to out-patient PT until that facility closed. Suggested exercise options such as use of exercise bands. Reported barriers to exercise at this time include hip pain/injury. Plans to stay active during extended stay at home include home PT and light walking. Nutrition: \"My daughter  Has been making my meals and doing my grocery shopping since I can't get out. \"    Toni Hoover RN

## 2020-04-09 ENCOUNTER — TELEPHONE (OUTPATIENT)
Dept: CARDIAC REHAB | Age: 75
End: 2020-04-09

## 2020-04-09 NOTE — TELEPHONE ENCOUNTER
Spoke with Kathleen Castellon on 4/9/2020 for follow up Cardiopulmonary telephone counseling. Psychosocial: Patient reports having low stressors at this time. Discussed stress management techniques such as deep breathing, meditation, prayer, or sitting outside. Smoking: Patient does not have current or recent  tobacco use. Exercise: Discussed patient's current home exercise routine. He reports that he is still having a lot of hip pain from his fall and is having troubles doing any walking. Suggested to patient that he call the physical therapy office that was treating him and see if they are doing any tele health or even answering questions over the phone so that he can get some relief. He has been doing the heating pad and some of the exercises they gave him. He confirmed that he will try and call the physical therapy clinic. Suggested exercise options such as doing his physical therapy exercises and very short walks to avoid becoming overly stiff. Reported barriers to exercise at this time include hip pain and injury. Plans to stay active during extended stay at home include PT exercises and hopefully adding walking. Nutrition: Patient is trying to eat healthy. His daughter has done some cooking for him and brought him fresh fruits. Encouraged him to continue eating his salads and fresh fruit and do the best he can at this time.     Toni Granados RN

## 2020-04-16 RX ORDER — LOSARTAN POTASSIUM 25 MG/1
TABLET ORAL
Qty: 90 TAB | Refills: 1 | Status: SHIPPED | OUTPATIENT
Start: 2020-04-16 | End: 2020-05-12

## 2020-04-16 RX ORDER — PANTOPRAZOLE SODIUM 40 MG/1
TABLET, DELAYED RELEASE ORAL
Qty: 90 TAB | Refills: 3 | Status: SHIPPED | OUTPATIENT
Start: 2020-04-16 | End: 2021-04-05

## 2020-05-12 RX ORDER — METOPROLOL SUCCINATE 25 MG/1
TABLET, EXTENDED RELEASE ORAL
Qty: 45 TAB | Refills: 1 | Status: SHIPPED | OUTPATIENT
Start: 2020-05-12 | End: 2020-11-07

## 2020-05-12 RX ORDER — LOSARTAN POTASSIUM 25 MG/1
TABLET ORAL
Qty: 90 TAB | Refills: 1 | Status: SHIPPED | OUTPATIENT
Start: 2020-05-12 | End: 2020-11-07

## 2020-05-12 RX ORDER — AMLODIPINE BESYLATE 2.5 MG/1
TABLET ORAL
Qty: 90 TAB | Refills: 1 | Status: SHIPPED | OUTPATIENT
Start: 2020-05-12 | End: 2020-11-05

## 2020-05-18 ENCOUNTER — HOSPITAL ENCOUNTER (OUTPATIENT)
Dept: MRI IMAGING | Age: 75
Discharge: HOME | End: 2020-05-18
Attending: ORTHOPAEDIC SURGERY
Payer: MEDICARE

## 2020-05-18 DIAGNOSIS — M79.604 PAIN OF RIGHT LOWER EXTREMITY: ICD-10-CM

## 2020-05-18 DIAGNOSIS — M54.41 RIGHT-SIDED LOW BACK PAIN WITH RIGHT-SIDED SCIATICA, UNSPECIFIED CHRONICITY: ICD-10-CM

## 2020-05-18 PROCEDURE — 72148 MRI LUMBAR SPINE W/O DYE: CPT

## 2020-06-12 NOTE — PROGRESS NOTES
Subjective:     Ernestina Montgomery is here today for a f/u appt. He lost his wife earlier this year. He hasnt been able to participate in cardiac rehab since the COVID19 closure. Patient denies any dyspnea, palpitations, syncope, orthopnea, edema or paroxysmal nocturnal dyspnea. Patient Active Problem List    Diagnosis Date Noted    SSS (sick sinus syndrome) ([address] 10/30/2019    Angina of effort ([address] 10/07/2019    Severe obesity ([address] 10/03/2019    Irregular heartbeat 10/11/2016    Benign essential tremor syndrome 10/10/2016    Memory difficulties 10/10/2016    B12 deficiency 10/10/2016    Vitamin D deficiency 10/10/2016    Hypothyroidism due to acquired atrophy of thyroid 10/10/2016    HUSAIN (dyspnea on exertion) 08/08/2014    Unstable angina ([address] 12/20/2013    S/P coronary artery stent placement 12/20/2013    Goiter, nontoxic, multinodular     Hyperlipidemia, mixed 07/11/2012    Atherosclerosis of coronary artery bypass graft 07/11/2012    Atrial fibrillation ([address] 07/11/2012      Ronel Sylvester MD  Past Medical History:   Diagnosis Date    AF (atrial fibrillation) (HCC)     s/p DCCV after his CABG, no recurrence    Anxiety     Arrhythmia     a fib    Arthritis     CAD (coronary artery disease)     s/p CABG in 1997    GERD (gastroesophageal reflux disease)     Goiter, nontoxic, multinodular     Hypertension     Low blood potassium     Myocardial infarct ([address] ? 1997    MARIA ISABEL on CPAP     Other and unspecified hyperlipidemia     Unspecified sleep apnea       Past Surgical History:   Procedure Laterality Date    CARDIAC SURG PROCEDURE UNLIST  1997    bypass    HX CATARACT REMOVAL  2006    bilateral    HX CHOLECYSTECTOMY  1970's    HX ORTHOPAEDIC      Arthoscopic L. knee surgery     HX SEPTOPLASTY  1980's    HX SHOULDER ARTHROSCOPY  2012    left    NH INS NEW/RPLCMT PRM PM W/TRANSV ELTRD ATRIAL&VENT N/A 11/1/2019    INSERT PPM DUAL performed by Roz Hodge [facility] at [facility] CARDIAC CATH LAB    RIGHT HEART CATHETERIZATION      x3 states pt. (can't remember when)    RT/LT HEART CATHETERS  2013    PTCA    Heart Dr. Chelsea Barnhart [Meperidine] Other (comments)     hallucinations  hallucinations      Family History   Problem Relation Age of Onset    Cancer Father         in his shoulder    Heart Attack Father     Heart Disease Father     Heart Disease Mother     Cancer Mother         abd    [de-identified] Cancer Son         wilm's tumor    Cancer Sister         abd    Thyroid Disease Neg Hx       Social History     Socioeconomic History    Marital status:      Spouse name: Not on file    Number of children: Not on file    Years of education: Not on file    Highest education level: Not on file   Occupational History    Not on file   Social Needs    Financial resource strain: Not on file    Food insecurity     Worry: Not on file     Inability: Not on file    Transportation needs     Medical: Not on file     Non-medical: Not on file   Tobacco Use    Smoking status: Former Smoker     Packs/day: 4.50     Years: 20.00     Pack years: 90.00     Types: Cigarettes     Last attempt to quit: 1970     Years since quittin.2    Smokeless tobacco: Never Used   Substance and Sexual Activity    Alcohol use:  Yes     Alcohol/week: 0.0 standard drinks     Comment: 2 oz at night every so often    Drug use: No    Sexual activity: Not on file   Lifestyle    Physical activity     Days per week: Not on file     Minutes per session: Not on file    Stress: Not on file   Relationships    Social connections     Talks on phone: Not on file     Gets together: Not on file     Attends Church service: Not on file     Active member of club or organization: Not on file     Attends meetings of clubs or organizations: Not on file     Relationship status: Not on file    Intimate partner violence     Fear of current or ex partner: Not on file Emotionally abused: Not on file     Physically abused: Not on file     Forced sexual activity: Not on file   Other Topics Concern    Not on file   Social History Narrative    Lives in [address] [place] [address] with wife. Has 2 sons and 1 daughter. Works part time for [employer]. Worked at [email or web address] as an . Likes to travel and fish. Current Outpatient Medications   Medication Sig    diclofenac (VOLTAREN) 1 % gel Apply  to affected area four (4) times daily.  amLODIPine (NORVASC) 2.5 mg tablet TAKE 1 TABLET EVERY DAY    metoprolol succinate (TOPROL-XL) 25 mg XL tablet TAKE 1/2 TABLET EVERY NIGHT    losartan (COZAAR) 25 mg tablet TAKE 1 TABLET EVERY DAY    pantoprazole (PROTONIX) 40 mg tablet TAKE 1 TABLET EVERY DAY  (STOP  PRILOSEC)    melatonin 1 mg tablet Take 1 mg by mouth nightly.  atorvastatin (LIPITOR) 40 mg tablet TAKE 1 TABLET EVERY DAY.  (STOP  SIMVASTATIN)    clopidogrel (PLAVIX) 75 mg tab Take 1 Tab by mouth daily.  isosorbide mononitrate ER (IMDUR) 60 mg CR tablet Take 1 Tab by mouth every morning.  vit C/E/Zn/coppr/lutein/zeaxan (PRESERVISION AREDS 2 PO) Take  by mouth two (2) times a day.  oxyCODONE-acetaminophen (PERCOCET) 5-325 mg per tablet Take  by mouth every four (4) hours as needed for Pain.  nitroglycerin (NITROSTAT) 0.4 mg SL tablet 1 Tab by SubLINGual route every five (5) minutes as needed for Chest Pain (max 3 daily for chest pain).  Potassium Gluconate 595 mg (99 mg) tablet Take 595 mg by mouth two (2) times a day.  hydrocortisone (HYTONE) 2.5 % topical cream     OXYGEN-AIR DELIVERY SYSTEMS (HORIZON NASAL CPAP SYSTEM) by Does Not Apply route.  acetaminophen (TYLENOL ARTHRITIS PAIN) 650 mg CR tablet Take 650 mg by mouth four (4) times daily.  ferrous sulfate (IRON) 325 mg (65 mg iron) tablet Take 325 mg by mouth two (2) times a day.  citalopram (CELEXA) 10 mg tablet Take 10 mg by mouth daily.     omega-3 fatty acids-vitamin e (FISH OIL) 1,000 mg Cap Take 1 Cap by mouth.  multivitamin, stress formula (STRESS TAB) tablet Take 1 Tab by mouth daily.  aspirin 81 mg tablet Take 162 mg by mouth daily.  GLUC HCL/GLUC DESHPANDE/AC-D-GLUCOS (GLUCOSAMINE COMPLEX PO) Take 1 Tab by mouth daily. No current facility-administered medications for this visit. Review of Systems  Constitutional: Negative for fever, chills, malaise/fatigue and diaphoresis. Respiratory: Negative for cough, hemoptysis, sputum production, shortness of breath and wheezing. Cardiovascular: Negative for palpitations, orthopnea, claudication, leg swelling and PND. Gastrointestinal: Negative for heartburn, nausea, vomiting, blood in stool and melena. Genitourinary: Negative for dysuria and flank pain. Musculoskeletal: Negative for joint pain and back pain. Skin: Negative for rash. Neurological: Negative for focal weakness, seizures, loss of consciousness, weakness and headaches. Endo/Heme/Allergies: Does not bruise/bleed easily. Psychiatric/Behavioral: Negative for memory loss. The patient does not have insomnia. Physical Exam:    Visit Vitals  /60 (BP 1 Location: Left arm, BP Patient Position: Sitting) Comment: lg cuff   Pulse 83   Resp 18   Ht 6' 1\" (1.854 m)   Wt 299 lb 14.4 oz (136 kg)   SpO2 97%   BMI 39.57 kg/m²     Wt Readings from Last 3 Encounters:   06/16/20 299 lb 14.4 oz (136 kg)   03/05/20 291 lb 6.4 oz (132.2 kg)   02/14/20 291 lb (132 kg)       Gen: NAD    Mental Status - Alert. General Appearance - Not in acute distress. Neck - no JVD    Chest and Lung Exam   Inspection: Accessory muscles - No use of accessory muscles in breathing. Auscultation:   Breath sounds: - Normal.     Cardiovascular   Inspection: Jugular vein - Bilateral - Inspection Normal.   Palpation/Percussion:   Apical Impulse: - Normal.   Auscultation: Rhythm - Regular. Heart Sounds - S1 WNL and S2 WNL. No S3 or S4. Murmurs & Other Heart Sounds:  Auscultation of the heart reveals - No Murmurs. Peripheral Vascular   Upper Extremity: Inspection - Bilateral - No Cyanotic nailbeds or Digital clubbing. Lower Extremity:   Palpation: Edema - Bilateral - No edema. Abdomen: Soft, non-tender, bowel sounds are active. Neuro: A&O times 3, CN and motor grossly WNL    Cardiographics  EKG: SR, RBBB     Assessment:     Encounter Diagnoses   Name Primary?  Atherosclerosis of coronary artery bypass graft of native heart without angina pectoris Yes    Essential hypertension     Hyperlipidemia, mixed     SSS (sick sinus syndrome) ([address]     Pacemaker           Plan:       1. Atherosclerotic heart disease: History of CABG, recent PTCA stenting of the LAD continue Plavix uninterrupted for 1 year. Cont Imdur, BB, statin, ASA    2. Atrial fibrillation: Paroxysmal AF post CABG. No recurrence    3. Intermittent symptomatic junctional rhythm sick sinus syndrome: Post Medtronic pacemaker 11/1/19. Managed by Dr Deep Davis, 17% paced in March    4. Hyperlipidemia: On high intensity statin. Labs due, will order. F/u in 6 months or PRN.     Giorgi Antony MD

## 2020-06-16 ENCOUNTER — OFFICE VISIT (OUTPATIENT)
Dept: CARDIOLOGY CLINIC | Age: 75
End: 2020-06-16

## 2020-06-16 VITALS
SYSTOLIC BLOOD PRESSURE: 138 MMHG | OXYGEN SATURATION: 97 % | BODY MASS INDEX: 39.75 KG/M2 | RESPIRATION RATE: 18 BRPM | WEIGHT: 299.9 LBS | HEIGHT: 73 IN | HEART RATE: 83 BPM | DIASTOLIC BLOOD PRESSURE: 60 MMHG

## 2020-06-16 DIAGNOSIS — I10 ESSENTIAL HYPERTENSION: ICD-10-CM

## 2020-06-16 DIAGNOSIS — I25.810 ATHEROSCLEROSIS OF CORONARY ARTERY BYPASS GRAFT OF NATIVE HEART WITHOUT ANGINA PECTORIS: Primary | ICD-10-CM

## 2020-06-16 DIAGNOSIS — Z95.0 PACEMAKER: ICD-10-CM

## 2020-06-16 DIAGNOSIS — I49.5 SSS (SICK SINUS SYNDROME) (HCC): ICD-10-CM

## 2020-06-16 DIAGNOSIS — E78.2 HYPERLIPIDEMIA, MIXED: ICD-10-CM

## 2020-06-16 RX ORDER — DICLOFENAC SODIUM 10 MG/G
GEL TOPICAL 4 TIMES DAILY
COMMUNITY

## 2020-06-16 NOTE — PROGRESS NOTES
1. Have you been to the ER, urgent care clinic since your last visit? Hospitalized since your last visit? 2- ED [facility] ER, S/P fall. 2. Have you seen or consulted any other health care providers outside of the [address] [place] [address] since your last visit? Include any pap smears or colon screening. No    Chief Complaint   Patient presents with    Coronary Artery Disease     6 mo appt. Denied cardiac symptoms.

## 2020-06-19 ENCOUNTER — TELEPHONE (OUTPATIENT)
Dept: CARDIAC REHAB | Age: 75
End: 2020-06-19

## 2020-07-01 ENCOUNTER — HOSPITAL ENCOUNTER (OUTPATIENT)
Dept: CARDIAC REHAB | Age: 75
Discharge: HOME | End: 2020-07-01
Attending: INTERNAL MEDICINE
Payer: MEDICARE

## 2020-07-01 VITALS — BODY MASS INDEX: 39.16 KG/M2 | WEIGHT: 296.8 LBS

## 2020-07-01 PROCEDURE — 93798 PHYS/QHP OP CAR RHAB W/ECG: CPT

## 2020-07-06 ENCOUNTER — HOSPITAL ENCOUNTER (OUTPATIENT)
Dept: LAB | Age: 75
Discharge: HOME | End: 2020-07-06
Payer: MEDICARE

## 2020-07-06 PROCEDURE — 36415 COLL VENOUS BLD VENIPUNCTURE: CPT

## 2020-07-06 PROCEDURE — 80061 LIPID PANEL: CPT

## 2020-07-06 PROCEDURE — 82550 ASSAY OF CK (CPK): CPT

## 2020-07-06 PROCEDURE — 80053 COMPREHEN METABOLIC PANEL: CPT

## 2020-07-07 ENCOUNTER — TELEPHONE (OUTPATIENT)
Dept: CARDIOLOGY CLINIC | Age: 75
End: 2020-07-07

## 2020-07-07 LAB
ALBUMIN SERPL-MCNC: 4.8 G/DL (ref 3.7–4.7)
ALBUMIN/GLOB SERPL: 2.3 {RATIO} (ref 1.2–2.2)
ALP SERPL-CCNC: 55 IU/L (ref 39–117)
ALT SERPL-CCNC: 28 IU/L (ref 0–44)
AST SERPL-CCNC: 22 IU/L (ref 0–40)
BILIRUB SERPL-MCNC: 0.9 MG/DL (ref 0–1.2)
BUN SERPL-MCNC: 14 MG/DL (ref 8–27)
BUN/CREAT SERPL: 15 (ref 10–24)
CALCIUM SERPL-MCNC: 9.4 MG/DL (ref 8.6–10.2)
CHLORIDE SERPL-SCNC: 102 MMOL/L (ref 96–106)
CHOLEST SERPL-MCNC: 102 MG/DL (ref 100–199)
CK SERPL-CCNC: 222 U/L (ref 41–331)
CO2 SERPL-SCNC: 23 MMOL/L (ref 20–29)
CREAT SERPL-MCNC: 0.96 MG/DL (ref 0.76–1.27)
GLOBULIN SER CALC-MCNC: 2.1 G/DL (ref 1.5–4.5)
GLUCOSE SERPL-MCNC: 96 MG/DL (ref 65–99)
HDLC SERPL-MCNC: 35 MG/DL
INTERPRETATION, 910389: NORMAL
LDLC SERPL CALC-MCNC: 44 MG/DL (ref 0–99)
POTASSIUM SERPL-SCNC: 4.4 MMOL/L (ref 3.5–5.2)
PROT SERPL-MCNC: 6.9 G/DL (ref 6–8.5)
SODIUM SERPL-SCNC: 139 MMOL/L (ref 134–144)
TRIGL SERPL-MCNC: 114 MG/DL (ref 0–149)
VLDLC SERPL CALC-MCNC: 23 MG/DL (ref 5–40)

## 2020-07-07 NOTE — PROGRESS NOTES
Please let pt know that his kidneys, electrolytes, and liver are all normal. His cholesterol is at goal. LDL 44, HDL 35 which is a bit low but has been that way for over 5 years. Trig 114.  Cont on his statin

## 2020-07-07 NOTE — TELEPHONE ENCOUNTER
Spoke with patient. Verified patient with two patient identifiers. Advised all labs good. Continue all meds unchanged. Patient verbalized understanding.

## 2020-07-07 NOTE — TELEPHONE ENCOUNTER
----- Message from Chris Viera NP sent at 7/7/2020  9:19 AM EDT -----  Please let pt know that his kidneys, electrolytes, and liver are all normal. His cholesterol is at goal. LDL 44, HDL 35 which is a bit low but has been that way for over 5 years. Trig 114.  Cont on his statin

## 2020-07-08 ENCOUNTER — HOSPITAL ENCOUNTER (OUTPATIENT)
Dept: CARDIAC REHAB | Age: 75
Discharge: HOME | End: 2020-07-08
Attending: INTERNAL MEDICINE
Payer: MEDICARE

## 2020-07-08 VITALS — WEIGHT: 297 LBS | BODY MASS INDEX: 39.18 KG/M2

## 2020-07-08 PROCEDURE — 93798 PHYS/QHP OP CAR RHAB W/ECG: CPT

## 2020-07-10 ENCOUNTER — HOSPITAL ENCOUNTER (OUTPATIENT)
Dept: CARDIAC REHAB | Age: 75
Discharge: HOME | End: 2020-07-10
Attending: INTERNAL MEDICINE
Payer: MEDICARE

## 2020-07-10 VITALS — WEIGHT: 293 LBS | BODY MASS INDEX: 38.66 KG/M2

## 2020-07-10 PROCEDURE — 93798 PHYS/QHP OP CAR RHAB W/ECG: CPT

## 2020-07-13 ENCOUNTER — HOSPITAL ENCOUNTER (OUTPATIENT)
Dept: CARDIAC REHAB | Age: 75
Discharge: HOME | End: 2020-07-13
Attending: INTERNAL MEDICINE
Payer: MEDICARE

## 2020-07-13 VITALS — BODY MASS INDEX: 38.6 KG/M2 | WEIGHT: 292.6 LBS

## 2020-07-13 PROCEDURE — 93798 PHYS/QHP OP CAR RHAB W/ECG: CPT

## 2020-07-15 ENCOUNTER — HOSPITAL ENCOUNTER (OUTPATIENT)
Dept: CARDIAC REHAB | Age: 75
Discharge: HOME | End: 2020-07-15
Attending: INTERNAL MEDICINE
Payer: MEDICARE

## 2020-07-15 PROCEDURE — 93797 PHYS/QHP OP CAR RHAB WO ECG: CPT | Performed by: DIETITIAN, REGISTERED

## 2020-07-15 PROCEDURE — 99211 OFF/OP EST MAY X REQ PHY/QHP: CPT

## 2020-07-15 PROCEDURE — 93798 PHYS/QHP OP CAR RHAB W/ECG: CPT

## 2020-07-16 ENCOUNTER — TELEPHONE (OUTPATIENT)
Dept: CARDIOLOGY CLINIC | Age: 75
End: 2020-07-16

## 2020-07-16 NOTE — TELEPHONE ENCOUNTER
Received fax from [facility] with [facility].  # [id number], fax [phone]. Verified patient with two patient identifiers. States pt having ^ HR, P waves disappearing at times. Asymptomatic. Per ALINE Camp NP, all EKG strips and note reviewed. Okay to continue with cardiac rehab. Spoke with Briseyda who verbalized understanding.

## 2020-07-16 NOTE — PROGRESS NOTES
Cardiac Rehab Nutrition Assessment - 1:1 Evaluation       NAME: Juan Myles : 1945 AGE: 66 y.o. GENDER: male  CARDIAC REHAB ADMITTING DIAGNOSIS: stent    Relevant Comorbidites: CABG, stents, dyslipidemia    LABS:   No results found for: HBA1C, HGBE8, FLK9KUIB, RYV3LEZD  Lab Results   Component Value Date/Time    Cholesterol, total 102 2020 09:51 AM    HDL Cholesterol 35 (L) 2020 09:51 AM    LDL, calculated 44 2020 09:51 AM    VLDL, calculated 23 2020 09:51 AM    Triglyceride 114 2020 09:51 AM    CHOL/HDL Ratio 3.3 2013 03:33 AM       MEDICATIONS/SUPPLEMENTS:   [unfilled]  Prior to Admission medications    Medication Sig Start Date End Date Taking? Authorizing Provider   diclofenac (VOLTAREN) 1 % gel Apply  to affected area four (4) times daily. Provider, Historical   amLODIPine (NORVASC) 2.5 mg tablet TAKE 1 TABLET EVERY DAY 20   Twylla Boeck, MD   metoprolol succinate (TOPROL-XL) 25 mg XL tablet TAKE 1/2 TABLET EVERY NIGHT 20   Twylla Boeck, MD   losartan (COZAAR) 25 mg tablet TAKE 1 TABLET EVERY DAY 20   Twylla Boeck, MD   pantoprazole (PROTONIX) 40 mg tablet TAKE 1 TABLET EVERY DAY  (STOP  PRILOSEC) 20   Twylla Boeck, MD   melatonin 1 mg tablet Take 1 mg by mouth nightly. Provider, Historical   atorvastatin (LIPITOR) 40 mg tablet TAKE 1 TABLET EVERY DAY.  (STOP  SIMVASTATIN) 20   Twylla Boeck, MD   clopidogrel (PLAVIX) 75 mg tab Take 1 Tab by mouth daily. 19   Twylla Boeck, MD   isosorbide mononitrate ER (IMDUR) 60 mg CR tablet Take 1 Tab by mouth every morning. 19   Twylla Boeck, MD   vit C/E/Zn/coppr/lutein/zeaxan (PRESERVISION AREDS 2 PO) Take  by mouth two (2) times a day. Provider, Historical   oxyCODONE-acetaminophen (PERCOCET) 5-325 mg per tablet Take  by mouth every four (4) hours as needed for Pain.     Provider, Historical   nitroglycerin (NITROSTAT) 0.4 mg SL tablet 1 Tab by SubLINGual route every five (5) minutes as needed for Chest Pain (max 3 daily for chest pain). 3/6/18   Maura Grimes NP   Potassium Gluconate 595 mg (99 mg) tablet Take 595 mg by mouth two (2) times a day. Provider, Historical   hydrocortisone (HYTONE) 2.5 % topical cream  3/27/14   Provider, Historical   OXYGEN-AIR DELIVERY SYSTEMS (HORIZON NASAL CPAP SYSTEM) by Does Not Apply route. Provider, Historical   acetaminophen (TYLENOL ARTHRITIS PAIN) 650 mg CR tablet Take 650 mg by mouth four (4) times daily. Provider, Historical   ferrous sulfate (IRON) 325 mg (65 mg iron) tablet Take 325 mg by mouth two (2) times a day. Provider, Historical   citalopram (CELEXA) 10 mg tablet Take 10 mg by mouth daily. Provider, Historical   omega-3 fatty acids-vitamin e (FISH OIL) 1,000 mg Cap Take 1 Cap by mouth. Provider, Historical   multivitamin, stress formula (STRESS TAB) tablet Take 1 Tab by mouth daily. Provider, Historical   aspirin 81 mg tablet Take 162 mg by mouth daily. Neymar Faria MD   GLUC HCL/GLUC DESHPANDE/AC-D-GLUCOS (GLUCOSAMINE COMPLEX PO) Take 1 Tab by mouth daily.     Neymar Faria MD       ANTHROPOMETRICS:    Ht Readings from Last 1 Encounters:   06/16/20 6' 1\" (1.854 m)      Wt Readings from Last 1 Encounters:   07/13/20 132.7 kg (292 lb 9.6 oz)      IBW: 184 # +/- 10%  %IBW: 159 % +/- 10%    BMI: 38.6 kg/M2 Category: Obesity Class II  Waist: 54 inches    Reported Wt Hx:  Wt Readings from Last 10 Encounters:   07/13/20 132.7 kg (292 lb 9.6 oz)   07/10/20 132.9 kg (293 lb)   07/08/20 134.7 kg (297 lb)   07/06/20 121.3 kg (267 lb 6.4 oz)   07/01/20 134.6 kg (296 lb 12.8 oz)   06/16/20 136 kg (299 lb 14.4 oz)   03/05/20 132.2 kg (291 lb 6.4 oz)   02/14/20 132 kg (291 lb)   02/10/20 132.4 kg (291 lb 12.8 oz)   02/05/20 132 kg (291 lb)     Reported Diet Hx:    Rate Your Plate Score: 51  (Score 58-72: Making many healthy choices; 41-57: Some choices need improving 24-40: many choices need improving)     24 Hour Diet Recall  Breakfast 2 eggs, fruit   Lunch Protein bar   Dinner Chicken, veggies   Snacks Melon, baked cheetos   Beverages Sugar free sparkling water, crystal lite        Environmental/Social:  Retired; recently lost wife. Pt and girlfriend cooking meals      NUTRITION INTERVENTION:  Nutrition 60 minute one-on-one education & goal setting with Miranda Mckeon    Reviewed with Miranda Mckeon relevant labs compared to ideals. Reviewed weight history and patient's verbalized weight goal as well as any real or perceived barriers to obtaining the goal. Collaborated with patient to set a specific short and long term weight goal.     Reviewed Rate Your Plate and conducted a verbal diet recall. Assessed for environmental, financial, psychosocial, physical and comorbidities that may impact the food and eating patterns / behaviors of [address] [place] [address] with patient to set specific nutrient goals as well as specific food / behavior changes that will help patient meet the overall goal of following a heart healthy eating pattern (using guidelines as set forth by the American Heart Association and modeled after healthful eating patterns as recognized by the USDA Dietary Guidelines such as DASH, Mediterranean or plant-based). Briefly reviewed with Miranda Mckeon the nutrition information in the Cardiac Rehab patient education book and encouraged Miranda Mckeon to read thoroughly, ask questions as needed, and use for future reference for heart healthy nutrition information. Miranda Mckeon is not scheduled to participate in Cardiac Rehab group nutrition classes.     PATIENT GOALS:    Weight Goals:  Short Term Weight Goal:275 lbs  Long Term Weight Goal:225 lbs    Nutrition Goals:  Daily Recommendations:  Calories: [address] /day  (using Melba Ted with AF x1.2-1.3-500)    Saturated Fat: no more than 14 g/day  Trans Fat: 0 g/day  Sodium: no more than 4896-5357 mg/day  Fruit: 2-3 cups / day  Vegetables: 3-4 cups/day    Other:  1. Read food labels for saturated fat and sodium and limit to above recommendations      Keeping a food diary was recommended. Questions addressed. Follow-up plans discussed. Chevy Lockett verbalized understanding.             Skyler Melendez RD

## 2020-07-17 ENCOUNTER — HOSPITAL ENCOUNTER (OUTPATIENT)
Dept: CARDIAC REHAB | Age: 75
Discharge: HOME | End: 2020-07-17
Attending: INTERNAL MEDICINE
Payer: MEDICARE

## 2020-07-17 VITALS — BODY MASS INDEX: 38.89 KG/M2 | WEIGHT: 294.8 LBS

## 2020-07-17 PROCEDURE — 93798 PHYS/QHP OP CAR RHAB W/ECG: CPT

## 2020-07-20 ENCOUNTER — HOSPITAL ENCOUNTER (OUTPATIENT)
Dept: CARDIAC REHAB | Age: 75
Discharge: HOME | End: 2020-07-20
Attending: INTERNAL MEDICINE
Payer: MEDICARE

## 2020-07-20 VITALS — BODY MASS INDEX: 38.84 KG/M2 | WEIGHT: 294.4 LBS

## 2020-07-20 PROCEDURE — 93798 PHYS/QHP OP CAR RHAB W/ECG: CPT

## 2020-07-22 ENCOUNTER — HOSPITAL ENCOUNTER (OUTPATIENT)
Dept: CARDIAC REHAB | Age: 75
Discharge: HOME | End: 2020-07-22
Attending: INTERNAL MEDICINE
Payer: MEDICARE

## 2020-07-22 VITALS — BODY MASS INDEX: 38.02 KG/M2 | WEIGHT: 288.2 LBS

## 2020-07-22 PROCEDURE — 93798 PHYS/QHP OP CAR RHAB W/ECG: CPT

## 2020-07-24 ENCOUNTER — HOSPITAL ENCOUNTER (OUTPATIENT)
Dept: CARDIAC REHAB | Age: 75
Discharge: HOME | End: 2020-07-24
Attending: INTERNAL MEDICINE
Payer: MEDICARE

## 2020-07-24 VITALS — BODY MASS INDEX: 38.31 KG/M2 | WEIGHT: 290.4 LBS

## 2020-07-24 PROCEDURE — 93798 PHYS/QHP OP CAR RHAB W/ECG: CPT

## 2020-07-27 ENCOUNTER — HOSPITAL ENCOUNTER (OUTPATIENT)
Dept: CARDIAC REHAB | Age: 75
Discharge: HOME | End: 2020-07-27
Attending: INTERNAL MEDICINE
Payer: MEDICARE

## 2020-07-27 VITALS — WEIGHT: 293 LBS | BODY MASS INDEX: 38.66 KG/M2

## 2020-07-27 PROCEDURE — 93798 PHYS/QHP OP CAR RHAB W/ECG: CPT

## 2020-07-29 ENCOUNTER — HOSPITAL ENCOUNTER (OUTPATIENT)
Dept: CARDIAC REHAB | Age: 75
Discharge: HOME | End: 2020-07-29
Attending: INTERNAL MEDICINE
Payer: MEDICARE

## 2020-07-29 VITALS — WEIGHT: 292.8 LBS | BODY MASS INDEX: 38.63 KG/M2

## 2020-07-29 PROCEDURE — 93798 PHYS/QHP OP CAR RHAB W/ECG: CPT

## 2020-07-31 ENCOUNTER — HOSPITAL ENCOUNTER (OUTPATIENT)
Dept: CARDIAC REHAB | Age: 75
Discharge: HOME | End: 2020-07-31
Attending: INTERNAL MEDICINE
Payer: MEDICARE

## 2020-07-31 VITALS — WEIGHT: 293 LBS | BODY MASS INDEX: 38.66 KG/M2

## 2020-07-31 PROCEDURE — 93798 PHYS/QHP OP CAR RHAB W/ECG: CPT

## 2020-08-03 ENCOUNTER — HOSPITAL ENCOUNTER (OUTPATIENT)
Dept: CARDIAC REHAB | Age: 75
Discharge: HOME | End: 2020-08-03
Attending: INTERNAL MEDICINE
Payer: MEDICARE

## 2020-08-03 VITALS — BODY MASS INDEX: 38.39 KG/M2 | WEIGHT: 291 LBS

## 2020-08-03 PROCEDURE — 93798 PHYS/QHP OP CAR RHAB W/ECG: CPT

## 2020-08-05 ENCOUNTER — HOSPITAL ENCOUNTER (OUTPATIENT)
Dept: CARDIAC REHAB | Age: 75
Discharge: HOME | End: 2020-08-05
Attending: INTERNAL MEDICINE
Payer: MEDICARE

## 2020-08-05 VITALS — BODY MASS INDEX: 38.68 KG/M2 | WEIGHT: 293.2 LBS

## 2020-08-05 PROCEDURE — 93798 PHYS/QHP OP CAR RHAB W/ECG: CPT

## 2020-08-07 ENCOUNTER — HOSPITAL ENCOUNTER (OUTPATIENT)
Dept: CARDIAC REHAB | Age: 75
Discharge: HOME | End: 2020-08-07
Attending: INTERNAL MEDICINE
Payer: MEDICARE

## 2020-08-07 VITALS — WEIGHT: 292.2 LBS | BODY MASS INDEX: 38.55 KG/M2

## 2020-08-07 PROCEDURE — 93798 PHYS/QHP OP CAR RHAB W/ECG: CPT

## 2020-08-10 ENCOUNTER — HOSPITAL ENCOUNTER (OUTPATIENT)
Dept: CARDIAC REHAB | Age: 75
Discharge: HOME | End: 2020-08-10
Attending: INTERNAL MEDICINE
Payer: MEDICARE

## 2020-08-10 PROCEDURE — 93798 PHYS/QHP OP CAR RHAB W/ECG: CPT

## 2020-08-12 ENCOUNTER — APPOINTMENT (OUTPATIENT)
Dept: CARDIAC REHAB | Age: 75
End: 2020-08-12
Attending: INTERNAL MEDICINE
Payer: MEDICARE

## 2020-08-14 ENCOUNTER — APPOINTMENT (OUTPATIENT)
Dept: CARDIAC REHAB | Age: 75
End: 2020-08-14
Attending: INTERNAL MEDICINE
Payer: MEDICARE

## 2020-08-17 ENCOUNTER — HOSPITAL ENCOUNTER (OUTPATIENT)
Dept: CARDIAC REHAB | Age: 75
Discharge: HOME | End: 2020-08-17
Attending: INTERNAL MEDICINE
Payer: MEDICARE

## 2020-08-17 VITALS — WEIGHT: 292.2 LBS | BODY MASS INDEX: 38.55 KG/M2

## 2020-08-17 PROCEDURE — 93798 PHYS/QHP OP CAR RHAB W/ECG: CPT

## 2020-08-19 ENCOUNTER — HOSPITAL ENCOUNTER (OUTPATIENT)
Dept: CARDIAC REHAB | Age: 75
Discharge: HOME | End: 2020-08-19
Attending: INTERNAL MEDICINE
Payer: MEDICARE

## 2020-08-19 ENCOUNTER — APPOINTMENT (OUTPATIENT)
Dept: CARDIAC REHAB | Age: 75
End: 2020-08-19
Attending: INTERNAL MEDICINE
Payer: MEDICARE

## 2020-08-19 VITALS — BODY MASS INDEX: 38.02 KG/M2 | WEIGHT: 288.2 LBS

## 2020-08-21 ENCOUNTER — APPOINTMENT (OUTPATIENT)
Dept: CARDIAC REHAB | Age: 75
End: 2020-08-21
Attending: INTERNAL MEDICINE
Payer: MEDICARE

## 2020-08-31 ENCOUNTER — HOSPITAL ENCOUNTER (OUTPATIENT)
Dept: CARDIAC REHAB | Age: 75
Discharge: HOME | End: 2020-08-31
Attending: INTERNAL MEDICINE
Payer: MEDICARE

## 2020-08-31 VITALS — WEIGHT: 291 LBS | BODY MASS INDEX: 38.39 KG/M2

## 2020-08-31 PROCEDURE — 93798 PHYS/QHP OP CAR RHAB W/ECG: CPT

## 2020-09-02 ENCOUNTER — HOSPITAL ENCOUNTER (OUTPATIENT)
Dept: CARDIAC REHAB | Age: 75
Discharge: HOME | End: 2020-09-02
Attending: INTERNAL MEDICINE
Payer: MEDICARE

## 2020-09-02 VITALS — BODY MASS INDEX: 38.66 KG/M2 | WEIGHT: 293 LBS

## 2020-09-02 PROCEDURE — 93798 PHYS/QHP OP CAR RHAB W/ECG: CPT

## 2020-09-16 ENCOUNTER — HOSPITAL ENCOUNTER (OUTPATIENT)
Dept: CARDIAC REHAB | Age: 75
Discharge: HOME | End: 2020-09-16
Attending: INTERNAL MEDICINE
Payer: MEDICARE

## 2020-09-16 VITALS — WEIGHT: 294 LBS | BODY MASS INDEX: 38.79 KG/M2

## 2020-09-16 PROCEDURE — 93798 PHYS/QHP OP CAR RHAB W/ECG: CPT

## 2020-09-18 ENCOUNTER — HOSPITAL ENCOUNTER (OUTPATIENT)
Dept: CARDIAC REHAB | Age: 75
Discharge: HOME | End: 2020-09-18
Attending: INTERNAL MEDICINE
Payer: MEDICARE

## 2020-09-18 VITALS — WEIGHT: 294 LBS | BODY MASS INDEX: 38.79 KG/M2

## 2020-09-18 PROCEDURE — 93798 PHYS/QHP OP CAR RHAB W/ECG: CPT

## 2020-09-21 ENCOUNTER — APPOINTMENT (OUTPATIENT)
Dept: CARDIAC REHAB | Age: 75
End: 2020-09-21
Attending: INTERNAL MEDICINE
Payer: MEDICARE

## 2020-09-23 ENCOUNTER — APPOINTMENT (OUTPATIENT)
Dept: CARDIAC REHAB | Age: 75
End: 2020-09-23
Attending: INTERNAL MEDICINE
Payer: MEDICARE

## 2020-09-28 ENCOUNTER — HOSPITAL ENCOUNTER (OUTPATIENT)
Dept: CARDIAC REHAB | Age: 75
Discharge: HOME | End: 2020-09-28
Attending: INTERNAL MEDICINE
Payer: MEDICARE

## 2020-09-28 VITALS — WEIGHT: 294 LBS | BODY MASS INDEX: 38.79 KG/M2

## 2020-09-28 PROCEDURE — 93798 PHYS/QHP OP CAR RHAB W/ECG: CPT

## 2020-09-30 ENCOUNTER — HOSPITAL ENCOUNTER (OUTPATIENT)
Dept: CARDIAC REHAB | Age: 75
Discharge: HOME | End: 2020-09-30
Attending: INTERNAL MEDICINE
Payer: MEDICARE

## 2020-09-30 ENCOUNTER — TRANSCRIBE ORDER (OUTPATIENT)
Dept: SCHEDULING | Age: 75
End: 2020-09-30

## 2020-09-30 VITALS — WEIGHT: 299 LBS | BODY MASS INDEX: 39.45 KG/M2

## 2020-09-30 DIAGNOSIS — M47.817 LUMBOSACRAL SPONDYLOSIS WITHOUT MYELOPATHY: ICD-10-CM

## 2020-09-30 DIAGNOSIS — M54.50 LOW BACK PAIN, UNSPECIFIED BACK PAIN LATERALITY, UNSPECIFIED CHRONICITY, UNSPECIFIED WHETHER SCIATICA PRESENT: Primary | ICD-10-CM

## 2020-09-30 DIAGNOSIS — M43.10 ACQUIRED SPONDYLOLISTHESIS: ICD-10-CM

## 2020-09-30 DIAGNOSIS — M54.31 SCIATICA OF RIGHT SIDE: ICD-10-CM

## 2020-09-30 DIAGNOSIS — M41.50 DEGENERATIVE SCOLIOSIS IN ADULT PATIENT: ICD-10-CM

## 2020-09-30 PROCEDURE — 93798 PHYS/QHP OP CAR RHAB W/ECG: CPT

## 2020-10-06 ENCOUNTER — OFFICE VISIT (OUTPATIENT)
Dept: CARDIOLOGY CLINIC | Age: 75
End: 2020-10-06
Payer: MEDICARE

## 2020-10-06 VITALS
DIASTOLIC BLOOD PRESSURE: 82 MMHG | OXYGEN SATURATION: 96 % | RESPIRATION RATE: 18 BRPM | BODY MASS INDEX: 38.59 KG/M2 | WEIGHT: 291.2 LBS | HEIGHT: 73 IN | SYSTOLIC BLOOD PRESSURE: 140 MMHG | HEART RATE: 76 BPM

## 2020-10-06 DIAGNOSIS — I49.5 SSS (SICK SINUS SYNDROME) (HCC): Primary | ICD-10-CM

## 2020-10-06 DIAGNOSIS — I25.10 ASHD (ARTERIOSCLEROTIC HEART DISEASE): ICD-10-CM

## 2020-10-06 DIAGNOSIS — Z95.5 S/P CORONARY ARTERY STENT PLACEMENT: ICD-10-CM

## 2020-10-06 DIAGNOSIS — E78.2 HYPERLIPIDEMIA, MIXED: ICD-10-CM

## 2020-10-06 DIAGNOSIS — I10 ESSENTIAL HYPERTENSION: ICD-10-CM

## 2020-10-06 PROCEDURE — G8536 NO DOC ELDER MAL SCRN: HCPCS | Performed by: INTERNAL MEDICINE

## 2020-10-06 PROCEDURE — 1101F PT FALLS ASSESS-DOCD LE1/YR: CPT | Performed by: INTERNAL MEDICINE

## 2020-10-06 PROCEDURE — G8510 SCR DEP NEG, NO PLAN REQD: HCPCS | Performed by: INTERNAL MEDICINE

## 2020-10-06 PROCEDURE — 93000 ELECTROCARDIOGRAM COMPLETE: CPT | Performed by: INTERNAL MEDICINE

## 2020-10-06 PROCEDURE — G8754 DIAS BP LESS 90: HCPCS | Performed by: INTERNAL MEDICINE

## 2020-10-06 PROCEDURE — G8417 CALC BMI ABV UP PARAM F/U: HCPCS | Performed by: INTERNAL MEDICINE

## 2020-10-06 PROCEDURE — G8753 SYS BP > OR = 140: HCPCS | Performed by: INTERNAL MEDICINE

## 2020-10-06 PROCEDURE — G8427 DOCREV CUR MEDS BY ELIG CLIN: HCPCS | Performed by: INTERNAL MEDICINE

## 2020-10-06 PROCEDURE — 99213 OFFICE O/P EST LOW 20 MIN: CPT | Performed by: INTERNAL MEDICINE

## 2020-10-06 RX ORDER — METHYLPREDNISOLONE 4 MG/1
TABLET ORAL
COMMUNITY
Start: 2020-09-30 | End: 2020-12-04 | Stop reason: ALTCHOICE

## 2020-10-06 NOTE — PROGRESS NOTES
1. Have you been to the ER, urgent care clinic since your last visit? Hospitalized since your last visit? No.    2. Have you seen or consulted any other health care providers outside of the [address] [place] [address] since your last visit? Include any pap smears or colon screening.    No.        Chief Complaint   Patient presents with    Follow-up     ED dysfunction- wants to adjust medication-pt denies any cardiac symptoms

## 2020-10-06 NOTE — PROGRESS NOTES
Donald Portillo MD          NAME:  Jorge Nguyen   :   1945   MRN:   [id number]   PCP:  Emilia Torres MD           Subjective: The patient is a 66y.o. year old male  who returns for a routine follow-up. Since the last visit, patient reports no change in exercise tolerance, chest pain, edema, medication intolerance, palpitations, shortness of breath, PND/orthopnea wheezing, sputum, syncope, dizziness or light headedness. Doing well. Past Medical History:   Diagnosis Date    AF (atrial fibrillation) ([facility])     s/p DCCV after his CABG, no recurrence    Anxiety     Arrhythmia     a fib    Arthritis     CAD (coronary artery disease)     s/p CABG in     GERD (gastroesophageal reflux disease)     Goiter, nontoxic, multinodular     Hypertension     Low blood potassium     Myocardial infarct ([facility] ?     MARIA ISABEL on CPAP     Other and unspecified hyperlipidemia     Unspecified sleep apnea         ICD-10-CM ICD-9-CM    1. SSS (sick sinus syndrome) ([facility])  I49.5 427.81 AMB POC EKG ROUTINE W/ 12 LEADS, INTER & REP   2. Hyperlipidemia, mixed  E78.2 272.2 AMB POC EKG ROUTINE W/ 12 LEADS, INTER & REP   3. S/P coronary artery stent placement  Z95.5 V45.82 AMB POC EKG ROUTINE W/ 12 LEADS, INTER & REP   4. ASHD (arteriosclerotic heart disease)  I25.10 414.00    5. Essential hypertension  I10 401.9       Social History     Tobacco Use    Smoking status: Former Smoker     Packs/day: 4.50     Years: 20.00     Pack years: 90.00     Types: Cigarettes     Last attempt to quit: 1970     Years since quittin.5    Smokeless tobacco: Never Used   Substance Use Topics    Alcohol use:  Yes     Alcohol/week: 0.0 standard drinks     Comment: 2 oz at night every so often      Family History   Problem Relation Age of Onset    Cancer Father         in his shoulder    Heart Attack Father     Heart Disease Father     Heart Disease Mother     Cancer Mother         abd     Cancer Son         wilm's tumor    Cancer Sister         abd    Thyroid Disease Neg Hx         Review of Systems  Cardiovascular: Negative except as noted in HPI      Objective:       Vitals:    10/06/20 0937   BP: (!) 140/82   Pulse: 76   Resp: 18   SpO2: 96%   Weight: 291 lb 3.2 oz (132.1 kg)   Height: 6' 1\" (1.854 m)    Body mass index is 38.42 kg/m². General PE  Mental Status - Alert. General Appearance - Not in acute distress. Chest and Lung Exam   Inspection: Accessory muscles - No use of accessory muscles in breathing. Auscultation:   Breath sounds: - Normal.    Cardiovascular   Inspection: Jugular vein - Bilateral - Inspection Normal.  Palpation/Percussion:   Apical Impulse: - Normal.  Auscultation: Rhythm - Regular. Heart Sounds - S1 WNL and S2 WNL. No S3 or S4. Murmurs & Other Heart Sounds: Auscultation of the heart reveals - No Murmurs. Peripheral Vascular   Upper Extremity: Inspection - Bilateral - No Cyanotic nailbeds or Digital clubbing. Lower Extremity:   Palpation: Edema - Bilateral - No edema. Data Review:     EKG -  EKG: unchanged from previous tracings, normal sinus rhythm, RBBB. LABS- @brieflabs@      Allergies reviewed  Allergies   Allergen Reactions    Demerol [Meperidine] Other (comments)     hallucinations  hallucinations       Medications reviewed  Current Outpatient Medications   Medication Sig    methylPREDNISolone (MEDROL DOSEPACK) 4 mg tablet FPD    diclofenac (VOLTAREN) 1 % gel Apply  to affected area four (4) times daily.  amLODIPine (NORVASC) 2.5 mg tablet TAKE 1 TABLET EVERY DAY    metoprolol succinate (TOPROL-XL) 25 mg XL tablet TAKE 1/2 TABLET EVERY NIGHT    losartan (COZAAR) 25 mg tablet TAKE 1 TABLET EVERY DAY (Patient taking differently: Take 25 mg by mouth daily.)    pantoprazole (PROTONIX) 40 mg tablet TAKE 1 TABLET EVERY DAY  (STOP  PRILOSEC)    melatonin 1 mg tablet Take 1 mg by mouth nightly.     atorvastatin (LIPITOR) 40 mg tablet TAKE 1 TABLET EVERY DAY.  (STOP SIMVASTATIN)    clopidogrel (PLAVIX) 75 mg tab Take 1 Tab by mouth daily.  isosorbide mononitrate ER (IMDUR) 60 mg CR tablet Take 1 Tab by mouth every morning.  vit C/E/Zn/coppr/lutein/zeaxan (PRESERVISION AREDS 2 PO) Take  by mouth two (2) times a day.  oxyCODONE-acetaminophen (PERCOCET) 5-325 mg per tablet Take  by mouth every four (4) hours as needed for Pain.  nitroglycerin (NITROSTAT) 0.4 mg SL tablet 1 Tab by SubLINGual route every five (5) minutes as needed for Chest Pain (max 3 daily for chest pain).  Potassium Gluconate 595 mg (99 mg) tablet Take 595 mg by mouth two (2) times a day.  hydrocortisone (HYTONE) 2.5 % topical cream     OXYGEN-AIR DELIVERY SYSTEMS (HORIZON NASAL CPAP SYSTEM) by Does Not Apply route.  acetaminophen (TYLENOL ARTHRITIS PAIN) 650 mg CR tablet Take 650 mg by mouth four (4) times daily.  ferrous sulfate (IRON) 325 mg (65 mg iron) tablet Take 325 mg by mouth daily.  citalopram (CELEXA) 10 mg tablet Take 10 mg by mouth daily.  omega-3 fatty acids-vitamin e (FISH OIL) 1,000 mg Cap Take 1 Cap by mouth.  multivitamin, stress formula (STRESS TAB) tablet Take 1 Tab by mouth daily.  aspirin 81 mg tablet Take 162 mg by mouth daily.  GLUC HCL/GLUC DESHPANDE/AC-D-GLUCOS (GLUCOSAMINE COMPLEX PO) Take 1 Tab by mouth daily. No current facility-administered medications for this visit. Assessment:       ICD-10-CM ICD-9-CM    1. SSS (sick sinus syndrome) ([facility])  I49.5 427.81 AMB POC EKG ROUTINE W/ 12 LEADS, INTER & REP   2. Hyperlipidemia, mixed  E78.2 272.2 AMB POC EKG ROUTINE W/ 12 LEADS, INTER & REP   3. S/P coronary artery stent placement  Z95.5 V45.82 AMB POC EKG ROUTINE W/ 12 LEADS, INTER & REP   4. ASHD (arteriosclerotic heart disease)  I25.10 414.00    5.  Essential hypertension  I10 401.9         Orders Placed This Encounter    AMB POC EKG ROUTINE W/ 12 LEADS, INTER & REP     Order Specific Question:   Reason for Exam:     Answer:   routine    methylPREDNISolone (MEDROL DOSEPACK) 4 mg tablet     Sig: FPD       Plan:     Cardiac status fine. Asymptomatic with satisfactory BP and lipids. Needs a new PCP. Would like ED meds. I told him he is fine to take these, but I do not get involved in prescribing them as I don't keep up with them.   I have referred him to Dr Ramy Salas MD

## 2020-10-08 ENCOUNTER — DOCUMENTATION ONLY (OUTPATIENT)
Dept: SLEEP MEDICINE | Age: 75
End: 2020-10-08

## 2020-10-08 ENCOUNTER — VIRTUAL VISIT (OUTPATIENT)
Dept: SLEEP MEDICINE | Age: 75
End: 2020-10-08
Payer: MEDICARE

## 2020-10-08 DIAGNOSIS — I25.708 ATHEROSCLEROSIS OF CORONARY ARTERY BYPASS GRAFT OF NATIVE HEART WITH OTHER FORMS OF ANGINA PECTORIS (HCC): ICD-10-CM

## 2020-10-08 DIAGNOSIS — G47.33 OBSTRUCTIVE SLEEP APNEA (ADULT) (PEDIATRIC): Primary | ICD-10-CM

## 2020-10-08 DIAGNOSIS — G47.31 COMPLEX SLEEP APNEA SYNDROME: ICD-10-CM

## 2020-10-08 PROCEDURE — 99213 OFFICE O/P EST LOW 20 MIN: CPT | Performed by: INTERNAL MEDICINE

## 2020-10-08 PROCEDURE — G8756 NO BP MEASURE DOC: HCPCS | Performed by: INTERNAL MEDICINE

## 2020-10-08 PROCEDURE — G8427 DOCREV CUR MEDS BY ELIG CLIN: HCPCS | Performed by: INTERNAL MEDICINE

## 2020-10-08 PROCEDURE — 1101F PT FALLS ASSESS-DOCD LE1/YR: CPT | Performed by: INTERNAL MEDICINE

## 2020-10-08 PROCEDURE — G8432 DEP SCR NOT DOC, RNG: HCPCS | Performed by: INTERNAL MEDICINE

## 2020-10-08 NOTE — PATIENT INSTRUCTIONS
217 [facility] [place]. 1668 [facility], [address] Tel.  [phone] Fax. 100 [facility] [address] [place], [address] [facility] Tel.  [phone] Fax. [phone] 9250 [facility] Kerrie Albarran Tel.  [phone] Fax. [phone] PROPER SLEEP HYGIENE What to avoid · Do not have drinks with caffeine, such as coffee or black tea, for 8 hours before bed. · Do not smoke or use other types of tobacco near bedtime. Nicotine is a stimulant and can keep you awake. · Avoid drinking alcohol late in the evening, because it can cause you to wake in the middle of the night. · Do not eat a big meal close to bedtime. If you are hungry, eat a light snack. · Do not drink a lot of water close to bedtime, because the need to urinate may wake you up during the night. · Do not read or watch TV in bed. Use the bed only for sleeping and sexual activity. What to try · Go to bed at the same time every night, and wake up at the same time every morning. Do not take naps during the day. · Keep your bedroom quiet, dark, and cool. · Get regular exercise, but not within 3 to 4 hours of your bedtime. Collette Ruts · Sleep on a comfortable pillow and mattress. · If watching the clock makes you anxious, turn it facing away from you so you cannot see the time. · If you worry when you lie down, start a worry book. Well before bedtime, write down your worries, and then set the book and your concerns aside. · Try meditation or other relaxation techniques before you go to bed. · If you cannot fall asleep, get up and go to another room until you feel sleepy. Do something relaxing. Repeat your bedtime routine before you go to bed again. · Make your house quiet and calm about an hour before bedtime. Turn down the lights, turn off the TV, log off the computer, and turn down the volume on music. This can help you relax after a busy day. Drowsy Driving [employer] cites drowsiness as a causing factor in more than 722,480 police reported crashes annually, resulting in 76,000 injuries and 1,500 deaths. Other surveys suggest 55% of people polled have driven while drowsy in the past year, 23% had fallen asleep but not crashed, 3% crashed, and 2% had and accident due to drowsy driving. Who is at risk? Young Drivers: One study of drowsy driving accidents states that 55% of the drivers were under 25 years. Of those, 75% were male. Shift Workers and Travelers: People who work overnight or travel across time zones frequently are at higher risk of experiencing Circadian Rhythm Disorders. They are trying to work and function when their body is programed to sleep. Sleep Deprived: Lack of sleep has a serious impact on your ability to pay attention or focus on a task. Consistently getting less than the average of 8 hours your body needs creates partial or cumulative sleep deprivation. Untreated Sleep Disorders: Sleep Apnea, Narcolepsy, R.L.S., and other sleep disorders (untreated) prevent a person from getting enough restful sleep. This leads to excessive daytime sleepiness and increases the risk for drowsy driving accidents by up to 7 times. Medications / Alcohol: Even over the counter medications can cause drowsiness. Medications that impair a drivers attention should have a warning label. Alcohol naturally makes you sleepy and on its own can cause accidents. Combined with excessive drowsiness its effects are amplified. Signs of Drowsy Driving: * You don't remember driving the last few miles * You may drift out of your seymour * You are unable to focus and your thoughts wander * You may yawn more often than normal 
 * You have difficulty keeping your eyes open / nodding off * Missing traffic signs, speeding, or tailgating Prevention-  
Good sleep hygiene, lifestyle and behavioral choices have the most impact on drowsy driving. There is no substitute for sleep and the average person requires 8 hours nightly. If you find yourself driving drowsy, stop and sleep. Consider the sleep hygiene tips provided during your visit as well. Medication Refill Policy: Refills for all medications require 1 week advance notice. Please have your pharmacy fax a refill request. We are unable to fax, or call in \"controled substance\" medications and you will need to pick these prescriptions up from our office. MyChart Activation Thank you for requesting access to [employer] [facility]. Please follow the instructions below to securely access and download your online medical record. [employer] allows you to send messages to your doctor, view your test results, renew your prescriptions, schedule appointments, and more. How Do I Sign Up? 1. In your internet browser, go to [email or web address]. 2. Click on the First Time User? Click Here link in the Sign In box. You will see the New Member Sign Up page. 3. Enter your [facility] [employer] Access Code exactly as it appears below. You will not need to use this code after youve completed the sign-up process. If you do not sign up before the expiration date, you must request a new code. [facility] Access Code: [email or web address] Expires: 2020  4:47 PM (This is the date your [facility] access code will ) 4. Enter the last four digits of your Social Security Number (xxxx) and Date of Birth (mm/dd/yyyy) as indicated and click Submit. You will be taken to the next sign-up page. 5. Create a dax [employer] ID. This will be your [facility] Shu login ID and cannot be changed, so think of one that is secure and easy to remember. 6. Create a [facility]u password. You can change your password at any time. 7. Enter your Password Reset Question and Answer. This can be used at a later time if you forget your password. 8. Enter your e-mail address.  You will receive e-mail notification when new information is available in [employer]. 9. Click Sign Up. You can now view and download portions of your medical record. 10. Click the Download Summary menu link to download a portable copy of your medical information. Additional Information If you have questions, please call [phone]. Remember, [employer] is NOT to be used for urgent needs. For medical emergencies, dial 911.

## 2020-10-08 NOTE — PROGRESS NOTES
217 [facility]. [place], [address]  Tel.  [phone]  Fax. 100 [facility] 60  [place], [address] [facility]  Tel.  [phone]  Fax. [phone] 9250 [facility] Kerrie Albarran   Tel.  [phone]  Fax. [phone]       Telemedicine visit performed with verbal consent of the patient. Patient called and identity confirmed with 2 patient identifers    Patient was seen at home  Patricio Galloway is a 66 y.o. male who was seen by synchronous (real-time) audio-video technology on 10/8/2020. Consent:  He and/or his healthcare decision maker is aware that this patient-initiated Telehealth encounter is the equivalent to a face to face encounter in the sleep disorder center and has provided verbal consent to proceed: Yes    I was at home while conducting this encounter. S>Bassam Mcdowell Georgette Cazares is a 66 y.o. male seen at this telemedicine visit for a positive airway pressure follow-up. He reports some problems using the device. His machine is old and humidifier not working anymore. He is 96% compliant over the past 90 days. The following problems are identified:    Drowsiness no Problems exhaling no   Snoring no Forget to put on no   Mask Comfortable yes  Can't fall asleep no   Dry Mouth yes Mask falls off no   Air Leaking no Frequent awakenings no       Download reviewed. He washes his PAP equipment in the washing machine. He admits that his sleep has improved on PAP therapy using full mask and regular tubing.     Allergies   Allergen Reactions    Demerol [Meperidine] Other (comments)     hallucinations  hallucinations       He has a current medication list which includes the following prescription(s): methylprednisolone, diclofenac, amlodipine, metoprolol succinate, losartan, pantoprazole, melatonin, atorvastatin, clopidogrel, isosorbide mononitrate er, vit c/e/zn/coppr/lutein/zeaxan, oxycodone-acetaminophen, nitroglycerin, potassium gluconate, hydrocortisone, oxygen-air delivery systems, acetaminophen, ferrous sulfate, citalopram, omega-3 fatty acids-vitamin e, multivitamin, stress formula, aspirin, and glucosam/gluc story/zq-fvg-g-gluc. Rito Vo He  has a past medical history of AF (atrial fibrillation) ([facility] [address] [address] Anxiety, Arrhythmia, Arthritis, CAD (coronary artery disease), GERD (gastroesophageal reflux disease), Goiter, nontoxic, multinodular, Hypertension, Low blood potassium, Myocardial infarct ([facility] [address] (?1997), MARIA ISABEL on CPAP, Other and unspecified hyperlipidemia, and Unspecified sleep apnea. [place] Sleepiness Score: 11    O>      There were no vitals taken for this visit. Vital Signs: (As obtained by patient/caregiver at home)        Constitutional: [x] Appears well-developed and well-nourished [x] No apparent distress      [] Abnormal -     Mental status: [x] Alert and awake  [x] Oriented to person/place/time [x] Able to follow commands    [] Abnormal -     Eyes:   EOM    [x]  Normal    [] Abnormal -   Sclera  [x]  Normal    [] Abnormal -          Discharge [x]  None visible   [] Abnormal -     HENT: [x] Normocephalic, atraumatic  [] Abnormal -     External Ears [x] Normal  [] Abnormal -    Neck: [x] No visualized mass [] Abnormal -     Pulmonary/Chest: [x] Respiratory effort normal   [x] No visualized signs of difficulty breathing or respiratory distress        [] Abnormal -       Neurological:        [x] No Facial Asymmetry (Cranial nerve 7 motor function) (limited exam due to video visit)          [x] No gaze palsy        [] Abnormal -          Skin:        [x] No significant exanthematous lesions or discoloration noted on facial skin         [] Abnormal -            Psychiatric:       [x] Normal Affect [] Abnormal -        Other pertinent observable physical exam findings:-            A>    ICD-10-CM ICD-9-CM    1. Obstructive sleep apnea (adult) (pediatric)  G47.33 327.23 AMB SUPPLY ORDER   2. Complex sleep apnea syndrome  G47.31 327.21    3.  Atherosclerosis of coronary artery bypass graft of native heart with other forms of angina pectoris ([facility])  I25.708 414.05      413.9      AHI = 27 (2009). On Bi - Level :  EPAP 11, IPAP 15 cmH2O. Compliant:      yes    Therapeutic Response:  Positive    P>    he is compliant with PAP therapy and PAP continues to benefit patient and remains necessary for control of his sleep apnea. BIPAP setting - he will continue on his current pressure settings until he gets his new machine  Patient's PAP device has exceeded its  Lifespan. Replacement device ordered. * We have recommended a dedicated weight loss through appropriate diet and an exercise regimen as significant weight reduction has been shown to reduce severity of obstructive sleep apnea. *  I have reviewed medicare requirements regarding PAP usage      * He was asked to contact our office for any problems regarding PAP therapy. * Counseling was provided regarding the importance of regular PAP use and on proper sleep hygiene and safe driving. * Re-enforced proper and regular cleaning for the device. He was encouraged to stop using the washing machine to wash the mask and tube  2. Complex sleep apnea - appears to be doing well on BIPAP (ASV had been discontinued in 2017)     3. Coronary Artery Disease - he continues on his current regimen. I have reviewed the relationship between heart disease and sleep disordered breathing. All of his questions were addressed. Pursuant to the emergency declaration under the [facility], [facility] waiver authority and the [employer] and [employer] Act, this Virtual  Visit was conducted, with patient's consent, to reduce the patient's risk of exposure to COVID-19 and provide continuity of care for an established patient. Services were provided through a video synchronous discussion virtually to substitute for in-person clinic visit.     Shanthi TOUSSAINT Yudelka Gutierres MD    Electronically signed by    Lester Baron MD  Diplomate in Sleep Medicine  [facility]

## 2020-10-21 ENCOUNTER — CLINICAL SUPPORT (OUTPATIENT)
Dept: CARDIOLOGY CLINIC | Age: 75
End: 2020-10-21
Payer: MEDICARE

## 2020-10-21 DIAGNOSIS — Z95.0 CARDIAC PACEMAKER IN SITU: Primary | ICD-10-CM

## 2020-10-21 DIAGNOSIS — I49.5 SSS (SICK SINUS SYNDROME) (HCC): ICD-10-CM

## 2020-10-21 PROCEDURE — 93280 PM DEVICE PROGR EVAL DUAL: CPT | Performed by: INTERNAL MEDICINE

## 2020-11-05 RX ORDER — AMLODIPINE BESYLATE 2.5 MG/1
TABLET ORAL
Qty: 90 TAB | Refills: 1 | Status: SHIPPED | OUTPATIENT
Start: 2020-11-05 | End: 2021-04-05

## 2020-11-05 RX ORDER — CLOPIDOGREL BISULFATE 75 MG/1
TABLET ORAL
Qty: 90 TAB | Refills: 3 | Status: ON HOLD | OUTPATIENT
Start: 2020-11-05 | End: 2021-04-01

## 2020-11-05 RX ORDER — ISOSORBIDE MONONITRATE 60 MG/1
TABLET, EXTENDED RELEASE ORAL
Qty: 90 TAB | Refills: 3 | Status: SHIPPED | OUTPATIENT
Start: 2020-11-05 | End: 2021-11-15

## 2020-11-07 RX ORDER — LOSARTAN POTASSIUM 25 MG/1
TABLET ORAL
Qty: 90 TAB | Refills: 1 | Status: SHIPPED | OUTPATIENT
Start: 2020-11-07 | End: 2021-04-28

## 2020-11-07 RX ORDER — METOPROLOL SUCCINATE 25 MG/1
TABLET, EXTENDED RELEASE ORAL
Qty: 45 TAB | Refills: 1 | Status: SHIPPED | OUTPATIENT
Start: 2020-11-07 | End: 2021-04-05

## 2020-12-02 ENCOUNTER — TELEPHONE (OUTPATIENT)
Dept: CARDIAC REHAB | Age: 75
End: 2020-12-02

## 2020-12-02 ENCOUNTER — TELEPHONE (OUTPATIENT)
Dept: CARDIOLOGY CLINIC | Age: 75
End: 2020-12-02

## 2020-12-02 NOTE — TELEPHONE ENCOUNTER
Called pt in regard to Cardiac rehab attendance. Pt states falling in November with injury to ribs and shoulder. Interested in completing program. Will call back in Jan 2021.

## 2020-12-02 NOTE — TELEPHONE ENCOUNTER
Received fax from [employer] with Dr. Jocelin Hernandez, ph # [phone]. Fax [phone]. Verified patient with two patient identifiers. Is pt cleared for TRUSP with biopsy under local anesthesia, date TBD. And can pt hold ASA, Plavix how many days prior, resume when? Pls advise.

## 2020-12-03 NOTE — TELEPHONE ENCOUNTER
He is almost to 1 year post stent placement and had no symptoms at his visit in October. He can be cleared for his procedure. He can hold his ASA Plavix 7 days prior to surgery. Resume asap.

## 2020-12-04 ENCOUNTER — OFFICE VISIT (OUTPATIENT)
Dept: INTERNAL MEDICINE CLINIC | Age: 75
End: 2020-12-04
Payer: MEDICARE

## 2020-12-04 VITALS
DIASTOLIC BLOOD PRESSURE: 74 MMHG | SYSTOLIC BLOOD PRESSURE: 119 MMHG | RESPIRATION RATE: 12 BRPM | WEIGHT: 297 LBS | HEIGHT: 73 IN | HEART RATE: 78 BPM | OXYGEN SATURATION: 96 % | BODY MASS INDEX: 39.36 KG/M2 | TEMPERATURE: 97 F

## 2020-12-04 DIAGNOSIS — R53.1 WEAKNESS GENERALIZED: ICD-10-CM

## 2020-12-04 DIAGNOSIS — I25.10 CORONARY ARTERY DISEASE INVOLVING NATIVE CORONARY ARTERY OF NATIVE HEART WITHOUT ANGINA PECTORIS: ICD-10-CM

## 2020-12-04 DIAGNOSIS — N52.8 OTHER MALE ERECTILE DYSFUNCTION: ICD-10-CM

## 2020-12-04 DIAGNOSIS — E66.01 SEVERE OBESITY (HCC): ICD-10-CM

## 2020-12-04 DIAGNOSIS — I10 ESSENTIAL HYPERTENSION: ICD-10-CM

## 2020-12-04 DIAGNOSIS — R97.20 ELEVATED PSA: ICD-10-CM

## 2020-12-04 DIAGNOSIS — R73.9 HYPERGLYCEMIA: ICD-10-CM

## 2020-12-04 DIAGNOSIS — E66.9 OBESITY (BMI 30-39.9): ICD-10-CM

## 2020-12-04 DIAGNOSIS — Z99.89 OSA ON CPAP: ICD-10-CM

## 2020-12-04 DIAGNOSIS — Z00.00 INITIAL MEDICARE ANNUAL WELLNESS VISIT: Primary | ICD-10-CM

## 2020-12-04 DIAGNOSIS — G47.33 OSA ON CPAP: ICD-10-CM

## 2020-12-04 DIAGNOSIS — I49.5 SSS (SICK SINUS SYNDROME) (HCC): ICD-10-CM

## 2020-12-04 PROCEDURE — 3288F FALL RISK ASSESSMENT DOCD: CPT | Performed by: INTERNAL MEDICINE

## 2020-12-04 PROCEDURE — 1100F PTFALLS ASSESS-DOCD GE2>/YR: CPT | Performed by: INTERNAL MEDICINE

## 2020-12-04 PROCEDURE — G8510 SCR DEP NEG, NO PLAN REQD: HCPCS | Performed by: INTERNAL MEDICINE

## 2020-12-04 PROCEDURE — G8752 SYS BP LESS 140: HCPCS | Performed by: INTERNAL MEDICINE

## 2020-12-04 PROCEDURE — G8754 DIAS BP LESS 90: HCPCS | Performed by: INTERNAL MEDICINE

## 2020-12-04 PROCEDURE — G0463 HOSPITAL OUTPT CLINIC VISIT: HCPCS | Performed by: INTERNAL MEDICINE

## 2020-12-04 PROCEDURE — 99214 OFFICE O/P EST MOD 30 MIN: CPT | Performed by: INTERNAL MEDICINE

## 2020-12-04 PROCEDURE — G8417 CALC BMI ABV UP PARAM F/U: HCPCS | Performed by: INTERNAL MEDICINE

## 2020-12-04 PROCEDURE — G8427 DOCREV CUR MEDS BY ELIG CLIN: HCPCS | Performed by: INTERNAL MEDICINE

## 2020-12-04 PROCEDURE — G0438 PPPS, INITIAL VISIT: HCPCS | Performed by: INTERNAL MEDICINE

## 2020-12-04 PROCEDURE — G8536 NO DOC ELDER MAL SCRN: HCPCS | Performed by: INTERNAL MEDICINE

## 2020-12-04 NOTE — PATIENT INSTRUCTIONS
Medicare Wellness Visit, Male    The best way to live healthy is to have a lifestyle where you eat a well-balanced diet, exercise regularly, limit alcohol use, and quit all forms of tobacco/nicotine, if applicable. Regular preventive services are another way to keep healthy. Preventive services (vaccines, screening tests, monitoring & exams) can help personalize your care plan, which helps you manage your own care. Screening tests can find health problems at the earliest stages, when they are easiest to treat. [address] follows the current, evidence-based guidelines published by the [facility] Evelio Martinez ([facility]) when recommending preventive services for our patients. Because we follow these guidelines, sometimes recommendations change over time as research supports it. (For example, a prostate screening blood test is no longer routinely recommended for men with no symptoms.)  Of course, you and your doctor may decide to screen more often for some diseases, based on your risk and co-morbidities (chronic disease you are already diagnosed with). Preventive services for you include:  - Medicare offers their members a free annual wellness visit, which is time for you and your primary care provider to discuss and plan for your preventive service needs. Take advantage of this benefit every year!  -All adults over age 72 should receive the recommended pneumonia vaccines. Current USPSTF guidelines recommend a series of two vaccines for the best pneumonia protection.   -All adults should have a flu vaccine yearly and an ECG.  All adults age 48 and older should receive a shingles vaccine once in their lifetime.    -All adults age 38-68 who are overweight should have a diabetes screening test once every three years.   -Other screening tests & preventive services for persons with diabetes include: an eye exam to screen for diabetic retinopathy, a kidney function test, a foot exam, and stricter control over your cholesterol.   -Cardiovascular screening for adults with routine risk involves an electrocardiogram (ECG) at intervals determined by the provider.   -Colorectal cancer screening should be done for adults age 54-65 with no increased risk factors for colorectal cancer. There are a number of acceptable methods of screening for this type of cancer. Each test has its own benefits and drawbacks. Discuss with your provider what is most appropriate for you during your annual wellness visit. The different tests include: colonoscopy (considered the best screening method), a fecal occult blood test, a fecal DNA test, and sigmoidoscopy.  -All adults born between [facility] should be screened once for Hepatitis C.  -An Abdominal Aortic Aneurysm (AAA) Screening is recommended for men age 73-68 who has ever smoked in their lifetime. Here is a list of your current Health Maintenance items (your personalized list of preventive services) with a due date:  Health Maintenance Due   Topic Date Due    DTaP/Tdap/Td  (1 - Tdap) 11/26/1964    Shingles Vaccine (1 of 2) 11/26/1993    Glaucoma Screening   11/26/2008    Annual Well Visit  03/14/2018           Medicare Wellness Visit, Male    The best way to live healthy is to have a lifestyle where you eat a well-balanced diet, exercise regularly, limit alcohol use, and quit all forms of tobacco/nicotine, if applicable. Regular preventive services are another way to keep healthy. Preventive services (vaccines, screening tests, monitoring & exams) can help personalize your care plan, which helps you manage your own care. Screening tests can find health problems at the earliest stages, when they are easiest to treat. [facility] follows the current, evidence-based guidelines published by the [facility]vel[facility] ([facility]STF) when recommending preventive services for our patients.  Because we follow these guidelines, sometimes recommendations change over time as research supports it. (For example, a prostate screening blood test is no longer routinely recommended for men with no symptoms). Of course, you and your doctor may decide to screen more often for some diseases, based on your risk and co-morbidities (chronic disease you are already diagnosed with). Preventive services for you include:  - Medicare offers their members a free annual wellness visit, which is time for you and your primary care provider to discuss and plan for your preventive service needs. Take advantage of this benefit every year!  -All adults over age 72 should receive the recommended pneumonia vaccines. Current USPSTF guidelines recommend a series of two vaccines for the best pneumonia protection.   -All adults should have a flu vaccine yearly and tetanus vaccine every 10 years.  -All adults age 48 and older should receive the shingles vaccines (series of two vaccines). -All adults age 38-68 who are overweight should have a diabetes screening test once every three years.   -Other screening tests & preventive services for persons with diabetes include: an eye exam to screen for diabetic retinopathy, a kidney function test, a foot exam, and stricter control over your cholesterol.   -Cardiovascular screening for adults with routine risk involves an electrocardiogram (ECG) at intervals determined by the provider.   -Colorectal cancer screening should be done for adults age 54-65 with no increased risk factors for colorectal cancer. There are a number of acceptable methods of screening for this type of cancer. Each test has its own benefits and drawbacks. Discuss with your provider what is most appropriate for you during your annual wellness visit.  The different tests include: colonoscopy (considered the best screening method), a fecal occult blood test, a fecal DNA test, and sigmoidoscopy.  -All adults born between [facility] should be screened once for Hepatitis C.  -An Abdominal Aortic Aneurysm (AAA) Screening is recommended for men age 73-68 who has ever smoked in their lifetime.      Here is a list of your current Health Maintenance items (your personalized list of preventive services) with a due date:  Health Maintenance Due   Topic Date Due    DTaP/Tdap/Td  (1 - Tdap) 11/26/1964    Shingles Vaccine (1 of 2) 11/26/1993    Glaucoma Screening   11/26/2008    Annual Well Visit  03/14/2018

## 2020-12-04 NOTE — PROGRESS NOTES
Candice Quarles is a 66 y.o. male who presents for evaluation of npv, awv. Used to follow with dr Haylie Mcgrath, most recently saw pa there a few months ago. psa has been elevated, he has been seeing urology since. Hopes to have prostate bx done soon, but needs to stop plavix and asa first.  Has had few falls of late, hurt right shoulder and left knee. Has had xrays that were ok. His biggest concern is ED issues. His wife passed away, and he has a new GF, that he would like to have sex with. ROS:  Constitutional: negative for fevers, chills, anorexia and weight loss  Eyes:   negative for visual disturbance and irritation  ENT:   negative for tinnitus,sore throat,nasal congestion,ear pain,hoarseness  Respiratory:  negative for cough, hemoptysis, dyspnea,wheezing  CV:   negative for chest pain, palpitations, lower extremity edema  GI:   negative for nausea, vomiting, diarrhea, abdominal pain,melena  Genitourinary: negative for frequency, dysuria and hematuria  Musculoskel: negative for myalgias, arthralgias, back pain, muscle weakness, joint pain  Neurological:  negative for headaches, dizziness, focal weakness, numbness  Psychiatric:     Negative for depression or anxiety      Past Medical History:   Diagnosis Date    AF (atrial fibrillation) (HCC)     s/p DCCV after his CABG, no recurrence    Anxiety     Arrhythmia     a fib    Arthritis     CAD (coronary artery disease)     s/p CABG in 1997    GERD (gastroesophageal reflux disease)     Goiter, nontoxic, multinodular     Hypertension     Low blood potassium     Myocardial infarct ([facility] [address] ? 1997    MARIA ISABEL on CPAP     Other and unspecified hyperlipidemia     Unspecified sleep apnea        Past Surgical History:   Procedure Laterality Date    CARDIAC SURG PROCEDURE UNLIST  1997    bypass    HX CATARACT REMOVAL  2006    bilateral    HX CHOLECYSTECTOMY  1970's    HX ORTHOPAEDIC      Arthoscopic L. knee surgery     HX SEPTOPLASTY  1980's    HX SHOULDER ARTHROSCOPY  2012    left    ID INS NEW/RPLCMT PRM PM W/TRANSV ELTRD ATRIAL&VENT N/A 2019    INSERT PPM DUAL performed by [facility]ime [facility] at [facility]ARDIAC C[facility]    RIGHT HEART CATHETERIZATION      x3 states pt. (can't remember when)    RT/LT HEART CATHETERS  2013    PTCA    Heart Dr. Domingo Lane       Family History   Problem Relation Age of Onset   [facility] Cancer Father         in his shoulder    Heart Attack Father     Heart Disease Father     Heart Disease Mother     Cancer Mother         abd    [facility] Cancer Son         wilm's tumor    Cancer Sister         abd    Thyroid Disease Neg Hx        Social History     Socioeconomic History    Marital status:      Spouse name: Not on file    Number of children: Not on file    Years of education: Not on file    Highest education level: Not on file   Occupational History    Not on file   Social Needs    Financial resource strain: Not on file    Food insecurity     Worry: Not on file     Inability: Not on file    Transportation needs     Medical: Not on file     Non-medical: Not on file   Tobacco Use    Smoking status: Former Smoker     Packs/day: 4.50     Years: 20.00     Pack years: 90.00     Types: Cigarettes     Last attempt to quit: 1970     Years since quittin.6    Smokeless tobacco: Never Used   Substance and Sexual Activity    Alcohol use:  Yes     Alcohol/week: 0.0 standard drinks     Comment: 2 oz at night every so often    Drug use: No    Sexual activity: Not on file   Lifestyle    Physical activity     Days per week: Not on file     Minutes per session: Not on file    Stress: Not on file   Relationships    Social connections     Talks on phone: Not on file     Gets together: Not on file     Attends Jewish service: Not on file     Active member of club or organization: Not on file     Attends meetings of clubs or organizations: Not on file     Relationship status: Not on file    Intimate partner violence Fear of current or ex partner: Not on file     Emotionally abused: Not on file     Physically abused: Not on file     Forced sexual activity: Not on file   Other Topics Concern    Not on file   Social History Narrative    Lives in [address] [place] [address] with wife. Has 2 sons and 1 daughter. Works part time for [employer]. Worked at [employer] as an . Likes to travel and fish. Visit Vitals  /74 (BP 1 Location: Right arm, BP Patient Position: Sitting)   Pulse 78   Temp 97 °F (36.1 °C) (Temporal)   Resp 12   Ht 6' 1\" (1.854 m)   Wt 297 lb (134.7 kg)   SpO2 96%   BMI 39.18 kg/m²       Physical Examination:   General - Well appearing male. obese  HEENT - PERRL, TM no erythema/opacification, normal nasal turbinates, no oropharyngeal erythema or exudate, MMM  Neck - supple, no bruits, no thyroidomegaly, no lymphadenopathy  Pulm - clear to auscultation bilaterally  Cardio - RRR, normal S1 S2, no murmur  Abd - soft, nontender, no masses, no HSM  Extrem - no edema, +2 distal pulses  Neuro-  No focal deficits, CN intact     Assessment/Plan:    1.  htn--controlled with toprol xl, cozaar, imdur  2. Elevated psa--hopes to have bx soon  3. Cad with hx cabg--on plavix and asa, both of which will need to be stopped before doing bx  4. ED--will need to likely stop imdur, in order to try viagra. 5.  hyperlipids--on lipitor  6. MARIA EUGENIA--on celexa  7. Insomnia--uses melatonin  8.  gen weakness--referral to PT    rtc 6 months        Aleksandr Cough III, DO            This is an Initial Medicare Annual Wellness Exam (AWV) (Performed 12 months after IPPE or effective date of Medicare Part B enrollment, Once in a lifetime)    I have reviewed the patient's medical history in detail and updated the computerized patient record.      Depression Risk Factor Screening:     3 most recent PHQ Screens 12/4/2020   Little interest or pleasure in doing things Not at all   Feeling down, depressed, irritable, or hopeless Not at all   Total Score PHQ 2 0   Trouble falling or staying asleep, or sleeping too much -   Feeling tired or having little energy -   Poor appetite, weight loss, or overeating -   Feeling bad about yourself - or that you are a failure or have let yourself or your family down -   Trouble concentrating on things such as school, work, reading, or watching TV -   Moving or speaking so slowly that other people could have noticed; or the opposite being so fidgety that others notice -   Thoughts of being better off dead, or hurting yourself in some way -   PHQ 9 Score -   How difficult have these problems made it for you to do your work, take care of your home and get along with others -       Alcohol Risk Screen   Do you average more than 1 drink per night or more than 7 drinks a week: No    In the past three months have you have had more than 4 drinks containing alcohol on one occasion: No         Functional Ability and Level of Safety:   Hearing: Hearing is good. The patient wears hearing aids. Activities of Daily Living: The home contains: grab bars and shower chair, handicap toilet  Patient does total self care    Ambulation: with mild difficulty. Uses cane and walker now      Fall Risk:  Fall Risk Assessment, last 12 mths 12/4/2020   Able to walk? Yes   Fall in past 12 months? Yes   Fall with injury? Yes   Number of falls in past 12 months 3   Fall Risk Score 4     Abuse Screen:  Patient is not abused       Cognitive Screening   Has your family/caregiver stated any concerns about your memory: no     Cognitive Screening: Normal - MMSE (Mini Mental Status Exam)    Assessment/Plan   Education and counseling provided:  Are appropriate based on today's review and evaluation  End-of-Life planning (with patient's consent)  Pneumococcal Vaccine  Influenza Vaccine  Prostate cancer screening tests (PSA, covered annually)  Screening for glaucoma    Diagnoses and all orders for this visit:    1.  Initial Medicare annual wellness visit         Health Maintenance Due     Health Maintenance Due   Topic Date Due    DTaP/Tdap/Td series (1 - Tdap) 11/26/1964    Shingrix Vaccine Age 50> (1 of 2) 11/26/1993    GLAUCOMA SCREENING Q2Y  11/26/2008    Medicare Yearly Exam  03/14/2018       Patient Care Team   Patient Care Team:  Jamal Wheatley MD as PCP - General (Family Medicine)  Cinda Fuller MD as Physician (Cardiology)  Ean Maguire NP as Nurse Practitioner (Nurse Practitioner)  Dearagustina Alford MD (Cardiology)    History     Patient Active Problem List   Diagnosis Code    Hyperlipidemia, mixed E78.2    Atherosclerosis of coronary artery bypass graft I25.810    Atrial fibrillation ([address] I48.91    Goiter, nontoxic, multinodular E04.2    Unstable angina (HCC) I20.0    S/P coronary artery stent placement Z95.5    HUSAIN (dyspnea on exertion) R06.00    Benign essential tremor syndrome G25.0    Memory difficulties R41.3    B12 deficiency E53.8    Vitamin D deficiency E55.9    Hypothyroidism due to acquired atrophy of thyroid E03.4    Irregular heartbeat I49.9    Severe obesity ([address] E66.01    Angina of effort ([address] I20.8    SSS (sick sinus syndrome) ([address] I49.5     Past Medical History:   Diagnosis Date    AF (atrial fibrillation) ([address]     s/p DCCV after his CABG, no recurrence    Anxiety     Arrhythmia     a fib    Arthritis     CAD (coronary artery disease)     s/p CABG in 1997    GERD (gastroesophageal reflux disease)     Goiter, nontoxic, multinodular     Hypertension     Low blood potassium     Myocardial infarct ([address] ? 1997    MARIA ISABEL on CPAP     Other and unspecified hyperlipidemia     Unspecified sleep apnea       Past Surgical History:   Procedure Laterality Date    CARDIAC SURG PROCEDURE UNLIST  1997    bypass    HX CATARACT REMOVAL  2006    bilateral    HX CHOLECYSTECTOMY  1970's    HX ORTHOPAEDIC      Arthoscopic L. knee surgery     HX SEPTOPLASTY  1980's    HX SHOULDER ARTHROSCOPY  2012    left    MN INS NEW/RPLCMT PRM PM W/TRANSV ELTRD ATRIAL&VENT N/A 11/1/2019    INSERT PPM DUAL performed by Robert Foster [facility] CARDIAC CATH LAB    RIGHT HEART CATHETERIZATION      x3 states pt. (can't remember when)    RT/LT HEART CATHETERS  12/2013    PTCA    Heart Dr. Osvaldo Bell     Current Outpatient Medications   Medication Sig Dispense Refill    metoprolol succinate (TOPROL-XL) 25 mg XL tablet TAKE 1/2 TABLET EVERY NIGHT 45 Tab 1    losartan (COZAAR) 25 mg tablet TAKE 1 TABLET EVERY DAY 90 Tab 1    isosorbide mononitrate ER (IMDUR) 60 mg CR tablet TAKE 1 TABLET EVERY MORNING 90 Tab 3    clopidogreL (PLAVIX) 75 mg tab TAKE 1 TABLET EVERY DAY 90 Tab 3    amLODIPine (NORVASC) 2.5 mg tablet TAKE 1 TABLET EVERY DAY 90 Tab 1    diclofenac (VOLTAREN) 1 % gel Apply  to affected area four (4) times daily.  pantoprazole (PROTONIX) 40 mg tablet TAKE 1 TABLET EVERY DAY  (STOP  PRILOSEC) 90 Tab 3    melatonin 1 mg tablet Take 1 mg by mouth nightly.  atorvastatin (LIPITOR) 40 mg tablet TAKE 1 TABLET EVERY DAY.  (STOP  SIMVASTATIN) 90 Tab 3    vit C/E/Zn/coppr/lutein/zeaxan (PRESERVISION AREDS 2 PO) Take  by mouth two (2) times a day.  nitroglycerin (NITROSTAT) 0.4 mg SL tablet 1 Tab by SubLINGual route every five (5) minutes as needed for Chest Pain (max 3 daily for chest pain). 25 Tab 1    Potassium Gluconate 595 mg (99 mg) tablet Take 595 mg by mouth two (2) times a day.  hydrocortisone (HYTONE) 2.5 % topical cream       OXYGEN-AIR DELIVERY SYSTEMS (HORIZON NASAL CPAP SYSTEM) by Does Not Apply route.  acetaminophen (TYLENOL ARTHRITIS PAIN) 650 mg CR tablet Take 650 mg by mouth four (4) times daily.  ferrous sulfate (IRON) 325 mg (65 mg iron) tablet Take 325 mg by mouth daily.  citalopram (CELEXA) 10 mg tablet Take 10 mg by mouth daily.  omega-3 fatty acids-vitamin e (FISH OIL) 1,000 mg Cap Take 1 Cap by mouth.       multivitamin, stress formula (STRESS TAB) tablet Take 1 Tab by mouth daily.  aspirin 81 mg tablet Take 162 mg by mouth daily.  GLUC HCL/GLUC DESHPANDE/AC-D-GLUCOS (GLUCOSAMINE COMPLEX PO) Take 1 Tab by mouth daily. Allergies   Allergen Reactions    Demerol [Meperidine] Other (comments)     hallucinations  hallucinations       Family History   Problem Relation Age of Onset    Cancer Father         in his shoulder    Heart Attack Father     Heart Disease Father     Heart Disease Mother     Cancer Mother         abd     Cancer Son         wilm's tumor    Cancer Sister         abd    Thyroid Disease Neg Hx      Social History     Tobacco Use    Smoking status: Former Smoker     Packs/day: 4.50     Years: 20.00     Pack years: 90.00     Types: Cigarettes     Last attempt to quit: 1970     Years since quittin.6    Smokeless tobacco: Never Used   Substance Use Topics    Alcohol use:  Yes     Alcohol/week: 0.0 standard drinks     Comment: 2 oz at night every so often

## 2020-12-09 ENCOUNTER — HOSPITAL ENCOUNTER (OUTPATIENT)
Dept: LAB | Age: 75
Discharge: HOME | End: 2020-12-09
Payer: MEDICARE

## 2020-12-09 PROCEDURE — 36415 COLL VENOUS BLD VENIPUNCTURE: CPT

## 2020-12-09 PROCEDURE — 84443 ASSAY THYROID STIM HORMONE: CPT

## 2020-12-09 PROCEDURE — 83036 HEMOGLOBIN GLYCOSYLATED A1C: CPT

## 2020-12-10 LAB
EST. AVERAGE GLUCOSE BLD GHB EST-MCNC: 103 MG/DL
HBA1C MFR BLD: 5.2 % (ref 4.8–5.6)
TSH SERPL DL<=0.005 MIU/L-ACNC: 1.52 UIU/ML (ref 0.45–4.5)

## 2021-01-06 ENCOUNTER — TRANSCRIBE ORDER (OUTPATIENT)
Dept: SCHEDULING | Age: 76
End: 2021-01-06

## 2021-01-06 DIAGNOSIS — C61 PROSTATE CANCER (HCC): Primary | ICD-10-CM

## 2021-01-12 ENCOUNTER — HOSPITAL ENCOUNTER (OUTPATIENT)
Dept: CARDIAC REHAB | Age: 76
Discharge: HOME | End: 2021-01-12
Attending: INTERNAL MEDICINE
Payer: MEDICARE

## 2021-01-12 VITALS — BODY MASS INDEX: 38.79 KG/M2 | WEIGHT: 294 LBS

## 2021-01-12 PROCEDURE — 93798 PHYS/QHP OP CAR RHAB W/ECG: CPT

## 2021-01-14 ENCOUNTER — HOSPITAL ENCOUNTER (OUTPATIENT)
Dept: NUCLEAR MEDICINE | Age: 76
Discharge: HOME | End: 2021-01-14
Attending: UROLOGY
Payer: MEDICARE

## 2021-01-14 ENCOUNTER — HOSPITAL ENCOUNTER (OUTPATIENT)
Dept: CT IMAGING | Age: 76
Discharge: HOME | End: 2021-01-14
Attending: UROLOGY
Payer: MEDICARE

## 2021-01-14 DIAGNOSIS — C61 PROSTATE CANCER (HCC): ICD-10-CM

## 2021-01-14 LAB — CREAT BLD-MCNC: 0.9 MG/DL (ref 0.6–1.3)

## 2021-01-14 PROCEDURE — 78306 BONE IMAGING WHOLE BODY: CPT

## 2021-01-14 PROCEDURE — 82565 ASSAY OF CREATININE: CPT

## 2021-01-14 PROCEDURE — 74011000636 HC RX REV CODE- 636: Performed by: UROLOGY

## 2021-01-14 PROCEDURE — 74177 CT ABD & PELVIS W/CONTRAST: CPT

## 2021-01-14 RX ADMIN — IOPAMIDOL 100 ML: 755 INJECTION, SOLUTION INTRAVENOUS at 13:18

## 2021-01-14 RX ADMIN — IOHEXOL 50 ML: 240 INJECTION, SOLUTION INTRATHECAL; INTRAVASCULAR; INTRAVENOUS; ORAL at 13:19

## 2021-01-15 ENCOUNTER — HOSPITAL ENCOUNTER (OUTPATIENT)
Dept: CARDIAC REHAB | Age: 76
Discharge: HOME | End: 2021-01-15
Attending: INTERNAL MEDICINE
Payer: MEDICARE

## 2021-01-15 VITALS — WEIGHT: 297 LBS | BODY MASS INDEX: 39.18 KG/M2

## 2021-01-15 PROCEDURE — 93798 PHYS/QHP OP CAR RHAB W/ECG: CPT

## 2021-01-15 NOTE — PROGRESS NOTES
Yumiko Barker MD          NAME:  Colonel Fuentes   :   1945   MRN:   [id number]   PCP:  Alonzo Viera DO           Subjective: The patient is a 66y.o. year old male  who returns for a routine follow-up. He denies chest pain, edema, medication intolerance, palpitations, shortness of breath, PND/orthopnea wheezing, sputum, syncope, dizziness or light headedness. He was recently diagnosed with prostate cancer, goes this Friday to find out treatment. Past Medical History:   Diagnosis Date    AF (atrial fibrillation) ([facility])     s/p DCCV after his CABG, no recurrence    Anxiety     Arrhythmia     a fib    Arthritis     CAD (coronary artery disease)     s/p CABG in     GERD (gastroesophageal reflux disease)     Goiter, nontoxic, multinodular     Hypertension     Low blood potassium     Myocardial infarct ([facility] [address] ?     MARIA ISABEL on CPAP     Other and unspecified hyperlipidemia     Unspecified sleep apnea         ICD-10-CM ICD-9-CM    1. Atherosclerosis of coronary artery bypass graft of native heart without angina pectoris  I25.810 414.05 AMB POC EKG ROUTINE W/ 12 LEADS, INTER & REP   2. Essential hypertension  I10 401.9 AMB POC EKG ROUTINE W/ 12 LEADS, INTER & REP   3. Hyperlipidemia, mixed  E78.2 272.2 AMB POC EKG ROUTINE W/ 12 LEADS, INTER & REP   4. SSS (sick sinus syndrome) ([facility])  I49.5 427.81 AMB POC EKG ROUTINE W/ 12 LEADS, INTER & REP   5.  Cardiac pacemaker in situ  Z95.0 V45.01 AMB POC EKG ROUTINE W/ 12 LEADS, INTER & REP      Social History     Tobacco Use    Smoking status: Former Smoker     Packs/day: 4.50     Years: 20.00     Pack years: 90.00     Types: Cigarettes     Quit date: 1970     Years since quittin.7    Smokeless tobacco: Never Used   Substance Use Topics    Alcohol use: Yes     Frequency: 4 or more times a week     Comment: ounce of vodka daily      Family History   Problem Relation Age of Onset    Cancer Father         in his shoulder    Heart Attack Father     Heart Disease Father     Heart Disease Mother    Randy Macias Mother         abd    [facility] Cancer Son         wilm's tumor    Cancer Sister         abd    Thyroid Disease Neg Hx         Review of Systems  Cardiovascular: Negative except as noted in HPI      Objective:       Vitals:    01/18/21 1340   BP: 128/72   Pulse: 87   Resp: 18   SpO2: 96%   Weight: 301 lb 3.2 oz (136.6 kg)   Height: 6' 1\" (1.854 m)    Body mass index is 39.74 kg/m². General PE  Mental Status - Alert. General Appearance - Not in acute distress. Chest and Lung Exam   Inspection: Accessory muscles - No use of accessory muscles in breathing. Auscultation:   Breath sounds: - Normal.    Cardiovascular   Inspection: Jugular vein - Bilateral - Inspection Normal.  Palpation/Percussion:   Apical Impulse: - Normal.  Auscultation: Rhythm - Regular. Heart Sounds - S1 WNL and S2 WNL. No S3 or S4. Murmurs & Other Heart Sounds: Auscultation of the heart reveals - No Murmurs. Peripheral Vascular   Upper Extremity: Inspection - Bilateral - No Cyanotic nailbeds or Digital clubbing. Lower Extremity:   Palpation: Edema - Bilateral - No edema. Data Review:     EKG - normal sinus rhythm, RBBB. Allergies reviewed  Allergies   Allergen Reactions    Demerol [Meperidine] Other (comments)     hallucinations  hallucinations       Medications reviewed  Current Outpatient Medications   Medication Sig    tadalafiL (CIALIS) 10 mg tablet Take 10 mg by mouth daily as needed for Erectile Dysfunction.  metoprolol succinate (TOPROL-XL) 25 mg XL tablet TAKE 1/2 TABLET EVERY NIGHT    losartan (COZAAR) 25 mg tablet TAKE 1 TABLET EVERY DAY    isosorbide mononitrate ER (IMDUR) 60 mg CR tablet TAKE 1 TABLET EVERY MORNING    clopidogreL (PLAVIX) 75 mg tab TAKE 1 TABLET EVERY DAY    amLODIPine (NORVASC) 2.5 mg tablet TAKE 1 TABLET EVERY DAY    diclofenac (VOLTAREN) 1 % gel Apply  to affected area four (4) times daily.     pantoprazole (PROTONIX) 40 mg tablet TAKE 1 TABLET EVERY DAY  (STOP  PRILOSEC)    melatonin 1 mg tablet Take 1 mg by mouth nightly.  atorvastatin (LIPITOR) 40 mg tablet TAKE 1 TABLET EVERY DAY.  (STOP  SIMVASTATIN)    vit C/E/Zn/coppr/lutein/zeaxan (PRESERVISION AREDS 2 PO) Take  by mouth two (2) times a day.  Potassium Gluconate 595 mg (99 mg) tablet Take 595 mg by mouth two (2) times a day.  hydrocortisone (HYTONE) 2.5 % topical cream     OXYGEN-AIR DELIVERY SYSTEMS (HORIZON NASAL CPAP SYSTEM) by Does Not Apply route.  acetaminophen (TYLENOL ARTHRITIS PAIN) 650 mg CR tablet Take 650 mg by mouth four (4) times daily.  ferrous sulfate (IRON) 325 mg (65 mg iron) tablet Take 325 mg by mouth two (2) times a day.  citalopram (CELEXA) 10 mg tablet Take 10 mg by mouth daily.  omega-3 fatty acids-vitamin e (FISH OIL) 1,000 mg Cap Take 2 Caps by mouth daily.  multivitamin, stress formula (STRESS TAB) tablet Take 1 Tab by mouth daily.  aspirin 81 mg tablet Take 162 mg by mouth daily.  GLUC HCL/GLUC DESHPANDE/AC-D-GLUCOS (GLUCOSAMINE COMPLEX PO) Take 1 Tab by mouth daily. No current facility-administered medications for this visit. Assessment:       ICD-10-CM ICD-9-CM    1. Atherosclerosis of coronary artery bypass graft of native heart without angina pectoris  I25.810 414.05 AMB POC EKG ROUTINE W/ 12 LEADS, INTER & REP   2. Essential hypertension  I10 401.9 AMB POC EKG ROUTINE W/ 12 LEADS, INTER & REP   3. Hyperlipidemia, mixed  E78.2 272.2 AMB POC EKG ROUTINE W/ 12 LEADS, INTER & REP   4. SSS (sick sinus syndrome) ([facility]CC)  I49.5 427.81 AMB POC EKG ROUTINE W/ 12 LEADS, INTER & REP   5.  Cardiac pacemaker in situ  Z95.0 V45.01 AMB POC EKG ROUTINE W/ 12 LEADS, INTER & REP        Orders Placed This Encounter    AMB POC EKG ROUTINE W/ 12 LEADS, INTER & REP     Order Specific Question:   Reason for Exam:     Answer:   routine    tadalafiL (CIALIS) 10 mg tablet     Sig: Take 10 mg by mouth daily as needed for Erectile Dysfunction. Plan:     CAD: Hx of CABG. PCI/KARRIE mid LM 12/2019. Distal LAD lesion 100% stenosis/. Prox Cx 40%, RCA dist 90% with graft open. Denies angina or anginal equivalent symptoms. EKG SR. Continue BB, Imdur, Amlodipine, statin, ASA. Cleared for prostate surgery at medium risk, OK to interrupt asa    HTN: well controlled on current anti hypertensive regimen. Hyperlipidemia: LDL 44 7/6/2020. On statin. Repeat labs this July. SSS: pacemaker with appropriate function, no events in 10/2020. F/U in 6 months.      Willa Wright MD

## 2021-01-18 ENCOUNTER — OFFICE VISIT (OUTPATIENT)
Dept: CARDIOLOGY CLINIC | Age: 76
End: 2021-01-18
Payer: MEDICARE

## 2021-01-18 ENCOUNTER — HOSPITAL ENCOUNTER (OUTPATIENT)
Dept: CARDIAC REHAB | Age: 76
Discharge: HOME | End: 2021-01-18
Attending: INTERNAL MEDICINE
Payer: MEDICARE

## 2021-01-18 VITALS
WEIGHT: 301.2 LBS | SYSTOLIC BLOOD PRESSURE: 128 MMHG | RESPIRATION RATE: 18 BRPM | HEIGHT: 73 IN | OXYGEN SATURATION: 96 % | DIASTOLIC BLOOD PRESSURE: 72 MMHG | BODY MASS INDEX: 39.92 KG/M2 | HEART RATE: 87 BPM

## 2021-01-18 VITALS — BODY MASS INDEX: 39.71 KG/M2 | WEIGHT: 301 LBS

## 2021-01-18 DIAGNOSIS — E78.2 HYPERLIPIDEMIA, MIXED: ICD-10-CM

## 2021-01-18 DIAGNOSIS — Z95.0 CARDIAC PACEMAKER IN SITU: ICD-10-CM

## 2021-01-18 DIAGNOSIS — I49.5 SSS (SICK SINUS SYNDROME) (HCC): ICD-10-CM

## 2021-01-18 DIAGNOSIS — I25.810 ATHEROSCLEROSIS OF CORONARY ARTERY BYPASS GRAFT OF NATIVE HEART WITHOUT ANGINA PECTORIS: Primary | ICD-10-CM

## 2021-01-18 DIAGNOSIS — I10 ESSENTIAL HYPERTENSION: ICD-10-CM

## 2021-01-18 PROCEDURE — G0463 HOSPITAL OUTPT CLINIC VISIT: HCPCS | Performed by: INTERNAL MEDICINE

## 2021-01-18 PROCEDURE — 99214 OFFICE O/P EST MOD 30 MIN: CPT | Performed by: INTERNAL MEDICINE

## 2021-01-18 PROCEDURE — G8752 SYS BP LESS 140: HCPCS | Performed by: INTERNAL MEDICINE

## 2021-01-18 PROCEDURE — G8754 DIAS BP LESS 90: HCPCS | Performed by: INTERNAL MEDICINE

## 2021-01-18 PROCEDURE — G8427 DOCREV CUR MEDS BY ELIG CLIN: HCPCS | Performed by: INTERNAL MEDICINE

## 2021-01-18 PROCEDURE — 93005 ELECTROCARDIOGRAM TRACING: CPT | Performed by: INTERNAL MEDICINE

## 2021-01-18 PROCEDURE — 3288F FALL RISK ASSESSMENT DOCD: CPT | Performed by: INTERNAL MEDICINE

## 2021-01-18 PROCEDURE — 93798 PHYS/QHP OP CAR RHAB W/ECG: CPT

## 2021-01-18 PROCEDURE — 93010 ELECTROCARDIOGRAM REPORT: CPT | Performed by: INTERNAL MEDICINE

## 2021-01-18 PROCEDURE — G8417 CALC BMI ABV UP PARAM F/U: HCPCS | Performed by: INTERNAL MEDICINE

## 2021-01-18 PROCEDURE — 1100F PTFALLS ASSESS-DOCD GE2>/YR: CPT | Performed by: INTERNAL MEDICINE

## 2021-01-18 PROCEDURE — G8536 NO DOC ELDER MAL SCRN: HCPCS | Performed by: INTERNAL MEDICINE

## 2021-01-18 PROCEDURE — G8510 SCR DEP NEG, NO PLAN REQD: HCPCS | Performed by: INTERNAL MEDICINE

## 2021-01-18 RX ORDER — TADALAFIL 10 MG/1
10 TABLET ORAL
COMMUNITY
End: 2021-01-28 | Stop reason: ALTCHOICE

## 2021-01-18 NOTE — PROGRESS NOTES
1. Have you been to the ER, urgent care clinic since your last visit? Hospitalized since your last visit? No.    2. Have you seen or consulted any other health care providers outside of the [address] [place] [address] since your last visit? Include any pap smears or colon screening. Seen by [facility]  for prostate cancer.       Chief Complaint   Patient presents with    Follow-up     6 month- Dx with Prostate Cancer- pt denies any cardiac symptoms- wants to discuss medications that could be causing his ED issues

## 2021-01-18 NOTE — LETTER
1/18/2021 Patient: Lianna Vela YOB: 1945 Date of Visit: 1/18/2021 Professor Joseph Danielle DO 
[address] [address] [address] [employer] Dear Professor Joseph Danielle DO, Thank you for referring Mr. Conchita Marte to [address] [place] [address] for evaluation. My notes for this consultation are attached. If you have questions, please do not hesitate to call me. I look forward to following your patient along with you.  
 
 
Sincerely, 
 
Juan Robledo MD

## 2021-01-20 ENCOUNTER — HOSPITAL ENCOUNTER (OUTPATIENT)
Dept: CARDIAC REHAB | Age: 76
Discharge: HOME | End: 2021-01-20
Attending: INTERNAL MEDICINE
Payer: MEDICARE

## 2021-01-20 VITALS — WEIGHT: 296 LBS | BODY MASS INDEX: 39.05 KG/M2

## 2021-01-20 PROCEDURE — 93798 PHYS/QHP OP CAR RHAB W/ECG: CPT

## 2021-01-22 ENCOUNTER — HOSPITAL ENCOUNTER (OUTPATIENT)
Dept: CARDIAC REHAB | Age: 76
Discharge: HOME | End: 2021-01-22
Attending: INTERNAL MEDICINE
Payer: MEDICARE

## 2021-01-22 VITALS — WEIGHT: 297 LBS | BODY MASS INDEX: 39.18 KG/M2

## 2021-01-22 PROCEDURE — 93798 PHYS/QHP OP CAR RHAB W/ECG: CPT

## 2021-01-22 NOTE — CARDIO/PULMONARY
Cardiopulmonary Rehab Nursing Entry:    Delia Armstrong  Completed phase II cardiac rehab and attended 36 sessions from 39. Delia Armstrong is interested in maintaining optimal health and will work with Dr. Shaq Hoffman. Delia Armstrong has not improved his endurance and stamina through regular exercise. Blood pressure is 136/79 and is WNL. He has also improved his nutrition, [employer] and depression scores and these were reviewed with patient. Pt achieved his MET goal of 3.4 and increased his 6MWT distance form 230m to 324m without stops. Delia Armstrong plans to continue exercising walking at home with his girlfriends and has set revised goals that include weight loss.   Kobe Pelletier RN  2021

## 2021-01-27 ENCOUNTER — OFFICE VISIT (OUTPATIENT)
Dept: CARDIOLOGY CLINIC | Age: 76
End: 2021-01-27
Payer: MEDICARE

## 2021-01-27 ENCOUNTER — DOCUMENTATION ONLY (OUTPATIENT)
Dept: SLEEP MEDICINE | Age: 76
End: 2021-01-27

## 2021-01-27 ENCOUNTER — TELEPHONE (OUTPATIENT)
Dept: SLEEP MEDICINE | Age: 76
End: 2021-01-27

## 2021-01-27 DIAGNOSIS — Z95.0 CARDIAC PACEMAKER IN SITU: Primary | ICD-10-CM

## 2021-01-27 DIAGNOSIS — I49.5 SSS (SICK SINUS SYNDROME) (HCC): ICD-10-CM

## 2021-01-27 PROCEDURE — 93294 REM INTERROG EVL PM/LDLS PM: CPT | Performed by: INTERNAL MEDICINE

## 2021-01-27 PROCEDURE — 93296 REM INTERROG EVL PM/IDS: CPT | Performed by: INTERNAL MEDICINE

## 2021-01-27 NOTE — PROGRESS NOTES
Patient called into the office on 01/27/2021 in regards to a letter he received from [facility] stating that they would not pay for his pap supplies. I reached out to [facility] and spoke to Jorden who is actively working on the issue. Jorden will give the office a call after he speaks to [facility]. LVM on 01/27/2021 around 1:30pm  to notify patient.

## 2021-01-27 NOTE — TELEPHONE ENCOUNTER
Spoke to Jorden at [facility] on 01/27/2021 at 2:20pm he found out that when the patient was set up on his Bipap device he was not  eligible for a new device with Medicare until 2021. Per Jorden patient will not be penalized for this [facility] will write off the cost for device and should the patient need supplies all he would need to do is call DME. The letter patient received was in regards to that matter.

## 2021-01-28 ENCOUNTER — TELEPHONE (OUTPATIENT)
Dept: CARDIOLOGY CLINIC | Age: 76
End: 2021-01-28

## 2021-01-28 NOTE — TELEPHONE ENCOUNTER
Patient has ED and Dr Collette Fogo has already approved Cialis but its not working that well and his Urologist wants to prescribe him Viagra but one of his medications has nitrates in it and wants to know if ok to take along with this.         [phone]    Thanks  Daisha Morrissey

## 2021-01-28 NOTE — TELEPHONE ENCOUNTER
Spoke with patient. Verified patient with two patient identifiers. Wants specific okay to stop Imdur and take Viagra. ? Try it and see if he has CP off of it? Pt wants to take the Viagra. Pls advise.

## 2021-01-28 NOTE — TELEPHONE ENCOUNTER
He is correct, he should not be on both. He would have to come off Imdur in order to take either medication.

## 2021-01-29 NOTE — TELEPHONE ENCOUNTER
Spoke with patient. Verified patient with two patient identifiers. Discussed needs to be off Imdur while on ED meds. Stop ED meds if has CP or ^ BP. Check BP to make sure BP stays < 130/80. Asked us to send this note to Dr. Al Ortiz, fax # [phone]. Done. Patient verbalized understanding.

## 2021-01-29 NOTE — TELEPHONE ENCOUNTER
He can try to go off it and see if he has chest pain or if his blood pressure goes up. He needs to monitor his blood pressure. We can increase losartan if necessary. Goal 130/80s or lower.

## 2021-02-03 ENCOUNTER — HOSPITAL ENCOUNTER (OUTPATIENT)
Dept: RADIATION THERAPY | Age: 76
Discharge: HOME | End: 2021-02-03

## 2021-02-24 ENCOUNTER — HOSPITAL ENCOUNTER (OUTPATIENT)
Dept: RADIATION THERAPY | Age: 76
Discharge: HOME | End: 2021-02-24

## 2021-03-17 RX ORDER — ATORVASTATIN CALCIUM 40 MG/1
TABLET, FILM COATED ORAL
Qty: 90 TAB | Refills: 3 | Status: SHIPPED | OUTPATIENT
Start: 2021-03-17 | End: 2022-02-04

## 2021-03-29 ENCOUNTER — TRANSCRIBE ORDER (OUTPATIENT)
Dept: REGISTRATION | Age: 76
End: 2021-03-29

## 2021-03-29 ENCOUNTER — HOSPITAL ENCOUNTER (OUTPATIENT)
Dept: PREADMISSION TESTING | Age: 76
Discharge: HOME | End: 2021-03-29
Payer: MEDICARE

## 2021-03-29 DIAGNOSIS — Z01.812 PRE-PROCEDURE LAB EXAM: ICD-10-CM

## 2021-03-29 DIAGNOSIS — Z01.812 PRE-PROCEDURE LAB EXAM: Primary | ICD-10-CM

## 2021-03-29 PROCEDURE — U0005 INFEC AGEN DETEC AMPLI PROBE: HCPCS

## 2021-03-30 LAB — SARS-COV-2, COV2NT: NOT DETECTED

## 2021-04-01 ENCOUNTER — ANESTHESIA (OUTPATIENT)
Dept: ENDOSCOPY | Age: 76
End: 2021-04-01
Payer: MEDICARE

## 2021-04-01 ENCOUNTER — APPOINTMENT (OUTPATIENT)
Dept: ULTRASOUND IMAGING | Age: 76
End: 2021-04-01
Attending: INTERNAL MEDICINE
Payer: MEDICARE

## 2021-04-01 ENCOUNTER — HOSPITAL ENCOUNTER (OUTPATIENT)
Age: 76
Setting detail: OUTPATIENT SURGERY
Discharge: HOME | End: 2021-04-01
Attending: INTERNAL MEDICINE | Admitting: INTERNAL MEDICINE
Payer: MEDICARE

## 2021-04-01 ENCOUNTER — ANESTHESIA EVENT (OUTPATIENT)
Dept: ENDOSCOPY | Age: 76
End: 2021-04-01
Payer: MEDICARE

## 2021-04-01 VITALS
HEART RATE: 65 BPM | OXYGEN SATURATION: 94 % | TEMPERATURE: 97.7 F | SYSTOLIC BLOOD PRESSURE: 129 MMHG | DIASTOLIC BLOOD PRESSURE: 60 MMHG | WEIGHT: 288 LBS | RESPIRATION RATE: 24 BRPM | BODY MASS INDEX: 40.32 KG/M2 | HEIGHT: 71 IN

## 2021-04-01 PROCEDURE — 76040000007: Performed by: INTERNAL MEDICINE

## 2021-04-01 PROCEDURE — 74011000250 HC RX REV CODE- 250: Performed by: NURSE ANESTHETIST, CERTIFIED REGISTERED

## 2021-04-01 PROCEDURE — 77030021593 HC FCPS BIOP ENDOSC BSC -A: Performed by: INTERNAL MEDICINE

## 2021-04-01 PROCEDURE — 74011250636 HC RX REV CODE- 250/636: Performed by: NURSE ANESTHETIST, CERTIFIED REGISTERED

## 2021-04-01 PROCEDURE — 77030039825 HC MSK NSL PAP SUPERNO2VA VYRM -B: Performed by: ANESTHESIOLOGY

## 2021-04-01 PROCEDURE — A4648 IMPLANTABLE TISSUE MARKER: HCPCS | Performed by: INTERNAL MEDICINE

## 2021-04-01 PROCEDURE — 76060000032 HC ANESTHESIA 0.5 TO 1 HR: Performed by: INTERNAL MEDICINE

## 2021-04-01 PROCEDURE — 88305 TISSUE EXAM BY PATHOLOGIST: CPT

## 2021-04-01 RX ORDER — SODIUM CHLORIDE 0.9 % (FLUSH) 0.9 %
5-40 SYRINGE (ML) INJECTION EVERY 8 HOURS
Status: DISCONTINUED | OUTPATIENT
Start: 2021-04-01 | End: 2021-04-01 | Stop reason: HOSPADM

## 2021-04-01 RX ORDER — DIPHENHYDRAMINE HYDROCHLORIDE 50 MG/ML
50 INJECTION, SOLUTION INTRAMUSCULAR; INTRAVENOUS ONCE
Status: DISCONTINUED | OUTPATIENT
Start: 2021-04-01 | End: 2021-04-01 | Stop reason: HOSPADM

## 2021-04-01 RX ORDER — NALOXONE HYDROCHLORIDE 0.4 MG/ML
0.4 INJECTION, SOLUTION INTRAMUSCULAR; INTRAVENOUS; SUBCUTANEOUS
Status: DISCONTINUED | OUTPATIENT
Start: 2021-04-01 | End: 2021-04-01 | Stop reason: HOSPADM

## 2021-04-01 RX ORDER — SODIUM CHLORIDE 9 MG/ML
100 INJECTION, SOLUTION INTRAVENOUS CONTINUOUS
Status: DISCONTINUED | OUTPATIENT
Start: 2021-04-01 | End: 2021-04-01 | Stop reason: HOSPADM

## 2021-04-01 RX ORDER — LIDOCAINE HYDROCHLORIDE 20 MG/ML
INJECTION, SOLUTION EPIDURAL; INFILTRATION; INTRACAUDAL; PERINEURAL AS NEEDED
Status: DISCONTINUED | OUTPATIENT
Start: 2021-04-01 | End: 2021-04-01 | Stop reason: HOSPADM

## 2021-04-01 RX ORDER — FLUMAZENIL 0.1 MG/ML
0.2 INJECTION INTRAVENOUS
Status: DISCONTINUED | OUTPATIENT
Start: 2021-04-01 | End: 2021-04-01 | Stop reason: HOSPADM

## 2021-04-01 RX ORDER — DEXTROMETHORPHAN/PSEUDOEPHED 2.5-7.5/.8
1.2 DROPS ORAL
Status: DISCONTINUED | OUTPATIENT
Start: 2021-04-01 | End: 2021-04-01 | Stop reason: HOSPADM

## 2021-04-01 RX ORDER — SODIUM CHLORIDE 9 MG/ML
INJECTION, SOLUTION INTRAVENOUS
Status: DISCONTINUED | OUTPATIENT
Start: 2021-04-01 | End: 2021-04-01 | Stop reason: HOSPADM

## 2021-04-01 RX ORDER — EPINEPHRINE 0.1 MG/ML
1 INJECTION INTRACARDIAC; INTRAVENOUS
Status: DISCONTINUED | OUTPATIENT
Start: 2021-04-01 | End: 2021-04-01 | Stop reason: HOSPADM

## 2021-04-01 RX ORDER — LEVOFLOXACIN 5 MG/ML
INJECTION, SOLUTION INTRAVENOUS AS NEEDED
Status: DISCONTINUED | OUTPATIENT
Start: 2021-04-01 | End: 2021-04-01 | Stop reason: HOSPADM

## 2021-04-01 RX ORDER — LEVOFLOXACIN 500 MG/1
500 TABLET, FILM COATED ORAL DAILY
Qty: 3 TAB | Refills: 0 | Status: SHIPPED | OUTPATIENT
Start: 2021-04-01 | End: 2021-04-04

## 2021-04-01 RX ORDER — PROPOFOL 10 MG/ML
INJECTION, EMULSION INTRAVENOUS AS NEEDED
Status: DISCONTINUED | OUTPATIENT
Start: 2021-04-01 | End: 2021-04-01 | Stop reason: HOSPADM

## 2021-04-01 RX ORDER — SODIUM CHLORIDE 0.9 % (FLUSH) 0.9 %
5-40 SYRINGE (ML) INJECTION AS NEEDED
Status: DISCONTINUED | OUTPATIENT
Start: 2021-04-01 | End: 2021-04-01 | Stop reason: HOSPADM

## 2021-04-01 RX ORDER — ATROPINE SULFATE 0.1 MG/ML
0.5 INJECTION INTRAVENOUS
Status: DISCONTINUED | OUTPATIENT
Start: 2021-04-01 | End: 2021-04-01 | Stop reason: HOSPADM

## 2021-04-01 RX ORDER — FENTANYL CITRATE 50 UG/ML
100 INJECTION, SOLUTION INTRAMUSCULAR; INTRAVENOUS
Status: DISCONTINUED | OUTPATIENT
Start: 2021-04-01 | End: 2021-04-01 | Stop reason: HOSPADM

## 2021-04-01 RX ORDER — MIDAZOLAM HYDROCHLORIDE 1 MG/ML
.25-1 INJECTION, SOLUTION INTRAMUSCULAR; INTRAVENOUS
Status: DISCONTINUED | OUTPATIENT
Start: 2021-04-01 | End: 2021-04-01 | Stop reason: HOSPADM

## 2021-04-01 RX ORDER — PHENYLEPHRINE HCL IN 0.9% NACL 0.4MG/10ML
SYRINGE (ML) INTRAVENOUS AS NEEDED
Status: DISCONTINUED | OUTPATIENT
Start: 2021-04-01 | End: 2021-04-01 | Stop reason: HOSPADM

## 2021-04-01 RX ADMIN — PROPOFOL 40 MG: 10 INJECTION, EMULSION INTRAVENOUS at 11:16

## 2021-04-01 RX ADMIN — LEVOFLOXACIN 500 MG: 5 INJECTION, SOLUTION INTRAVENOUS at 11:21

## 2021-04-01 RX ADMIN — PROPOFOL 40 MG: 10 INJECTION, EMULSION INTRAVENOUS at 11:00

## 2021-04-01 RX ADMIN — LIDOCAINE HYDROCHLORIDE 40 MG: 20 INJECTION, SOLUTION EPIDURAL; INFILTRATION; INTRACAUDAL; PERINEURAL at 10:54

## 2021-04-01 RX ADMIN — Medication 80 MCG: at 10:57

## 2021-04-01 RX ADMIN — PROPOFOL 40 MG: 10 INJECTION, EMULSION INTRAVENOUS at 11:30

## 2021-04-01 RX ADMIN — PROPOFOL 30 MG: 10 INJECTION, EMULSION INTRAVENOUS at 11:06

## 2021-04-01 RX ADMIN — PROPOFOL 50 MG: 10 INJECTION, EMULSION INTRAVENOUS at 11:13

## 2021-04-01 RX ADMIN — PROPOFOL 30 MG: 10 INJECTION, EMULSION INTRAVENOUS at 10:56

## 2021-04-01 RX ADMIN — Medication 120 MCG: at 11:29

## 2021-04-01 RX ADMIN — PROPOFOL 40 MG: 10 INJECTION, EMULSION INTRAVENOUS at 11:18

## 2021-04-01 RX ADMIN — Medication 80 MCG: at 11:16

## 2021-04-01 RX ADMIN — PROPOFOL 40 MG: 10 INJECTION, EMULSION INTRAVENOUS at 11:21

## 2021-04-01 RX ADMIN — PROPOFOL 40 MG: 10 INJECTION, EMULSION INTRAVENOUS at 11:27

## 2021-04-01 RX ADMIN — Medication 120 MCG: at 11:03

## 2021-04-01 RX ADMIN — PROPOFOL 40 MG: 10 INJECTION, EMULSION INTRAVENOUS at 11:09

## 2021-04-01 RX ADMIN — PROPOFOL 50 MG: 10 INJECTION, EMULSION INTRAVENOUS at 10:54

## 2021-04-01 RX ADMIN — Medication 120 MCG: at 11:24

## 2021-04-01 RX ADMIN — PROPOFOL 30 MG: 10 INJECTION, EMULSION INTRAVENOUS at 11:11

## 2021-04-01 RX ADMIN — SODIUM CHLORIDE: 900 INJECTION, SOLUTION INTRAVENOUS at 10:40

## 2021-04-01 RX ADMIN — PROPOFOL 40 MG: 10 INJECTION, EMULSION INTRAVENOUS at 10:58

## 2021-04-01 RX ADMIN — PROPOFOL 40 MG: 10 INJECTION, EMULSION INTRAVENOUS at 11:24

## 2021-04-01 RX ADMIN — PROPOFOL 50 MG: 10 INJECTION, EMULSION INTRAVENOUS at 10:55

## 2021-04-01 RX ADMIN — PROPOFOL 40 MG: 10 INJECTION, EMULSION INTRAVENOUS at 11:33

## 2021-04-01 RX ADMIN — PROPOFOL 40 MG: 10 INJECTION, EMULSION INTRAVENOUS at 11:03

## 2021-04-01 RX ADMIN — Medication 80 MCG: at 11:33

## 2021-04-01 RX ADMIN — Medication 120 MCG: at 11:09

## 2021-04-01 RX ADMIN — Medication 80 MCG: at 11:34

## 2021-04-01 NOTE — ANESTHESIA POSTPROCEDURE EVALUATION
Post-Anesthesia Evaluation and Assessment    Patient: Marleni Hurst MRN: [id number]  SSN: [id number]    YOB: 1945  Age: 66 y.o. Sex: male      I have evaluated the patient and they are stable and ready for discharge from the PACU. Cardiovascular Function/Vital Signs  Visit Vitals  BP (!) 121/51   Pulse 61   Temp 37 °C (98.6 °F)   Resp 26   Ht 5' 11\" (1.803 m)   Wt 130.6 kg (288 lb)   SpO2 99%   BMI 40.17 kg/m²       Patient is status post MAC anesthesia for Procedure(s):  RECTAL EUS WITH FIDUCIAL MARKERS   :-  COLONOSCOPY  ENDOSCOPIC POLYPECTOMY. Nausea/Vomiting: None    Postoperative hydration reviewed and adequate. Pain:  Pain Scale 1: Numeric (0 - 10) (04/01/21 0929)  Pain Intensity 1: 0 (04/01/21 0929)   Managed    Neurological Status: At baseline    Mental Status, Level of Consciousness: Alert and  oriented to person, place, and time    Pulmonary Status:   O2 Device: Room air (04/01/21 1002)   Adequate oxygenation and airway patent    Complications related to anesthesia: None    Post-anesthesia assessment completed. No concerns    Signed By: Omar Arriaza MD     April 1, 2021              Procedure(s):  RECTAL EUS WITH FIDUCIAL MARKERS   :-  COLONOSCOPY  ENDOSCOPIC POLYPECTOMY. MAC    <[email or web address]>    INITIAL Post-op Vital signs:   Vitals Value Taken Time   /51 04/01/21 1150   Temp     Pulse 65 04/01/21 1153   Resp 24 04/01/21 1153   SpO2 96 % 04/01/21 1153   Vitals shown include unvalidated device data.

## 2021-04-01 NOTE — ANESTHESIA PREPROCEDURE EVALUATION
Relevant Problems   RESPIRATORY SYSTEM   (+) HUSAIN (dyspnea on exertion)      CARDIOVASCULAR   (+) Angina of effort (HCC)   (+) Atherosclerosis of coronary artery bypass graft   (+) Atrial fibrillation (HCC)   (+) SSS (sick sinus syndrome) (HCC)   (+) Unstable angina (HCC)      ENDOCRINE   (+) Hypothyroidism due to acquired atrophy of thyroid   (+) Severe obesity (HCC)       Anesthetic History   No history of anesthetic complications            Review of Systems / Medical History  Patient summary reviewed, nursing notes reviewed and pertinent labs reviewed    Pulmonary        Sleep apnea: CPAP           Neuro/Psych   Within defined limits           Cardiovascular    Hypertension: well controlled        Dysrhythmias   CAD    Exercise tolerance: >4 METS     GI/Hepatic/Renal     GERD           Endo/Other      Hypothyroidism  Morbid obesity and arthritis     Other Findings            Physical Exam    Airway  Mallampati: II  TM Distance: > 6 cm  Neck ROM: normal range of motion   Mouth opening: Normal     Cardiovascular    Rhythm: regular           Dental  No notable dental hx       Pulmonary  Breath sounds clear to auscultation               Abdominal         Other Findings            Anesthetic Plan    ASA: 3  Anesthesia type: MAC          Induction: Intravenous  Anesthetic plan and risks discussed with: Patient

## 2021-04-01 NOTE — ROUTINE PROCESS
Chevy Lockett  1945  [id number]    Situation:  Verbal report received from:Kirstie KELLY  Procedure: Procedure(s):  RECTAL EUS WITH FIDUCIAL MARKERS   :-  COLONOSCOPY  ENDOSCOPIC POLYPECTOMY    Background:    Preoperative diagnosis: PROSTATE CANCER  Postoperative diagnosis: Colon Polyps  4 Fiducial Markers for Prostate Ca    :  Dr. Geovanny Pinto  Assistant(s): Endoscopy Technician-1: Krys Olivarez  Endoscopy RN-1: Roger García RN    Specimens:   ID Type Source Tests Collected by Time Destination   1 : Ascending Colon Polyps Preservative Colon, Ascending  Regulo Lorenzo MD 4/1/2021 1105 Pathology   2 : Descending Colon Polyps Preservative Colon, Descending  Regulo Lorenzo MD 4/1/2021 1109 Pathology     H. Pylori  no    Assessment:  Intra-procedure medications   Anesthesia gave intra-procedure sedation and medications, see anesthesia flow sheet yes    Intravenous fluids: NS@ KVO     Vital signs stable     Abdominal assessment: round and soft     Recommendation:  Discharge patient per MD order.   Family or Friend Carolann Eddy  Permission to share finding with family or friend yes

## 2021-04-01 NOTE — PROCEDURES
[address]      Colonoscopy Operative Report    Israel Ireland  [id number]  1945      Procedure Type:   Colonoscopy --screening     Indications:    Screening colonoscopy         Pre-operative Diagnosis: see indication above    Post-operative Diagnosis:  See findings below    :  Meliza Childers MD      Referring Provider: EDUARDO Chung MD,  [facility] Mock, DO      Sedation:  MAC anesthesia Propofol      Procedure Details:  After informed consent was obtained with all risks and benefits of procedure explained and preoperative exam completed, the patient was taken to the endoscopy suite and placed in the left lateral decubitus position. Upon sequential sedation as per above, a digital rectal exam was performed demonstrating internal hemorrhoids. The Olympus pediatric videocolonoscope  was inserted in the rectum and carefully advanced to the cecum, which was identified by the ileocecal valve and appendiceal orifice. The cecum was identified by the ileocecal valve and appendiceal orifice. The quality of preparation was good. The colonoscope was slowly withdrawn with careful evaluation between folds. Retroflexion in the rectum was completed . Findings:   Rectum: normal  Sigmoid: normal  Descending Colon: two sessile 2-3 mm polyps - removed with cold biopsy forceps  Transverse Colon: normal  Ascending Colon: two sessile 2-3 mm polyps - removed with cold biopsy forceps  Cecum: normal  Terminal Ileum: not intubated      Specimen Removed: 1. Ascending colon polyps, 2. Descending colon polyps    Complications: None. EBL:  None. Impression:    As above    Recommendations:  1. Follow up surgical pathology  2. High fiber diet education  3. Repeat colonoscopy in 5 years  4. Proceed to rectal EUS with placement of fiducial markers into the prostate    Signed By: Meliza Childers MD     4/1/2021  11:47 AM

## 2021-04-01 NOTE — ADDENDUM NOTE
Addendum  created 04/01/21 1221 by Jonnie Gallagher CRNA    Flowsheet accepted, Intraprocedure Flowsheets edited

## 2021-04-01 NOTE — PROCEDURES
[address]  [address]     Rectal Endoscopic Ultrasound     NAME:  Omar Gutierres   :   1945   MRN:   [id number]       Procedure Name: Rectal endoscopic ultrasound with fiducial marker placement    Indications: prostate cancer    : Romero Malin. Jay Jay Lagos MD    Referring Provider: -EDUARDO Gandhi MD, -Edilberto Brady DO        Anethesia/Sedation:  MAC anesthesia Propofol      Procedure Details   After informed consent was obtained for the procedure, with all risks and benefits of procedure explained the patient was taken to the endoscopy suite and placed in the left lateral decubitus position. Initially, a flexible sigmoidoscopy was performed. Findings are described below. Next, passed rectal linear echoendoscope was passed to 25 cm. The iliac vessels were seen and were normal, and there was no adenopathy appreciated. The patient tolerated the procedure well. Findings:    The prostate gland, seminal vesicles, and bladder were identified. Overlying vasculature was ruled out with color flow doppler. Next, a 22 g [employer] needle was used to successfully place a total of four 0.46 mm by 5 mm platinum Lumicoil fiducial markers into the prostate gland. Levaquin 500 mg IV was administered for prophylaxis. Specimen Removed:  none    Complications: None. EBL:  None. Impression:    1. Successful placement of four Lumicoil fiducial markers into the prostate gland    Recommendations:     1. Levaquin 500 mg PO X 3 days (to begin 21) - script provided  2. Follow up with Dr. Mena Postal By: Romero Malin.  Jay Jay Lagos MD     2021  11:50 AM

## 2021-04-01 NOTE — DISCHARGE INSTRUCTIONS
[address], [address]    COLON DISCHARGE INSTRUCTIONS    Eliezer Montiel  [id number]  1945    Discomfort:  Redness at IV site- apply warm compress to area; if redness or soreness persist- contact your physician  There may be a slight amount of blood passed from the rectum  Gaseous discomfort- walking, belching will help relieve any discomfort  You may not operate a vehicle for 12 hours  You may not engage in an occupation involving machinery or appliances for rest of today  You may not drink alcoholic beverages for at least 12 hours  Avoid making any critical decisions for at least 24 hour  DIET:  You may resume your regular diet - however -  remember your colon is empty and a heavy meal will produce gas. Avoid these foods:  vegetables, fried / greasy foods, carbonated drinks     ACTIVITY:  You may  resume your normal daily activities it is recommended that you spend the remainder of the day resting -  avoid any strenuous activity. CALL M.D. ANY SIGN OF:   Increasing pain, nausea, vomiting  Abdominal distension (swelling)  New increased bleeding (oral or rectal)  Fever (chills)  Pain in chest area  Bloody discharge from nose or mouth  Shortness of breath      Follow-up Instructions:   Call Dr. Jia Cervantes for any questions or problems at [address]:   Your colonoscopy showed 4 polyps, which were removed. We will contact you about the pathology results and when your next colonoscopy will be due. Fiducial markers were placed into the prostate today. A prescription for Levaquin has been provided. Please start this tomorrow (4/2/21). Please follow up with Dr. Eduard Logan. Telephone # [phone]      Signed By: Robert Chamberlain MD     4/1/2021  12:05 PM       DISCHARGE SUMMARY from Nurse    The following personal items collected during your admission are returned to you:   Dental Appliance: Dental Appliances: None  Vision: Visual Aid: None  Hearing Aid:    Jewelry:    Clothing:    Other Valuables:    Valuables sent to safe:      Learning About Coronavirus (COVID-19)  Coronavirus (COVID-19): Overview  What is coronavirus (COVID-19)? The coronavirus disease (COVID-19) is caused by a virus. It is an illness that was first found in [place], [place], in December 2019. It has since spread worldwide. The virus can cause fever, cough, and trouble breathing. In severe cases, it can cause pneumonia and make it hard to breathe without help. It can cause death. Coronaviruses are a large group of viruses. They cause the common cold. They also cause more serious illnesses like Middle East respiratory syndrome (MERS) and severe acute respiratory syndrome (SARS). COVID-19 is caused by a novel coronavirus. That means it's a new type that has not been seen in people before. This virus spreads person-to-person through droplets from coughing and sneezing. It can also spread when you are close to someone who is infected. And it can spread when you touch something that has the virus on it, such as a doorknob or a tabletop. What can you do to protect yourself from coronavirus (COVID-19)? The best way to protect yourself from getting sick is to:  · Avoid areas where there is an outbreak. · Avoid contact with people who may be infected. · Wash your hands often with soap or alcohol-based hand sanitizers. · Avoid crowds and try to stay at least 6 feet away from other people. · Wash your hands often, especially after you cough or sneeze. Use soap and water, and scrub for at least 20 seconds. If soap and water aren't available, use an alcohol-based hand . · Avoid touching your mouth, nose, and eyes. What can you do to avoid spreading the virus to others? To help avoid spreading the virus to others:  · Cover your mouth with a tissue when you cough or sneeze. Then throw the tissue in the trash.   · Use a disinfectant to clean things that you touch often.  · Stay home if you are sick or have been exposed to the virus. Don't go to school, work, or public areas. And don't use public transportation. · If you are sick:  ? Leave your home only if you need to get medical care. But call the doctor's office first so they know you're coming. And wear a face mask, if you have one.  ? If you have a face mask, wear it whenever you're around other people. It can help stop the spread of the virus when you cough or sneeze. ? Clean and disinfect your home every day. Use household  and disinfectant wipes or sprays. Take special care to clean things that you grab with your hands. These include doorknobs, remote controls, phones, and handles on your refrigerator and microwave. And don't forget countertops, tabletops, bathrooms, and computer keyboards. When to call for help  Call 911 anytime you think you may need emergency care. For example, call if:  · You have severe trouble breathing. (You can't talk at all.)  · You have constant chest pain or pressure. · You are severely dizzy or lightheaded. · You are confused or can't think clearly. · Your face and lips have a blue color. · You pass out (lose consciousness) or are very hard to wake up. Call your doctor now if you develop symptoms such as:  · Shortness of breath. · Fever. · Cough. If you need to get care, call ahead to the doctor's office for instructions before you go. Make sure you wear a face mask, if you have one, to prevent exposing other people to the virus. Where can you get the latest information? The following health organizations are tracking and studying this virus. Their websites contain the most up-to-date information. Adriano Chaok also learn what to do if you think you may have been exposed to the virus. · U.S. Centers for Disease Control and Prevention (CDC): The CDC provides updated news about the disease and travel advice. The website also tells you how to prevent the spread of infection. www.cdc.gov  · World Health Organization [facility]): WHO offers information about the virus outbreaks. WHO also has travel advice. www.who.int  Current as of: April 1, 2020               Content Version: 12.4  © 6227-1304 [employer]. Care instructions adapted under license by your healthcare professional. If you have questions about a medical condition or this instruction, always ask your healthcare professional. [address] any warranty or liability for your use of this information.

## 2021-04-01 NOTE — ROUTINE PROCESS

## 2021-04-01 NOTE — H&P
[address]  [address] [facility], [facility] [address]      History and Physical       NAME:  Rocio Benítez   :   1945   MRN:   [id number]             History of Present Illness:  Patient is a 66 y.o. who is seen for colon cancer screening and placement of fiducial markers into the prostate. PMH:  Past Medical History:   Diagnosis Date    AF (atrial fibrillation) (HCC)     s/p DCCV after his CABG, no recurrence    Anxiety     Arrhythmia     a fib    Arthritis     CAD (coronary artery disease)     s/p CABG in     GERD (gastroesophageal reflux disease)     Goiter, nontoxic, multinodular     Hypertension     Low blood potassium     Myocardial infarct ([facility] [address] ?     MARIA ISABEL on CPAP     Other and unspecified hyperlipidemia     Unspecified sleep apnea        PSH:  Past Surgical History:   Procedure Laterality Date    HX CATARACT REMOVAL  2006    bilateral    HX CHOLECYSTECTOMY  's    HX ORTHOPAEDIC      Arthoscopic L. knee surgery     HX SEPTOPLASTY      HX SHOULDER ARTHROSCOPY  2012    left    NE CARDIAC SURG PROCEDURE UNLIST      bypass    NE INS NEW/RPLCMT PRM PM W/TRANSV ELTRD ATRIAL&VENT N/A 2019    INSERT PPM DUAL performed by Leary Holstein, [facility] at [facility] CARD[facility]    RIGHT HEART CATHETERIZATION      x3 states pt. (can't remember when)    RT/LT HEART CATHETERS  2013    [facility]    Heart Dr. Yany Feliciano       Allergies: Allergies   Allergen Reactions    Demerol [Meperidine] Other (comments)     hallucinations  hallucinations       Home Medications:  Prior to Admission Medications   Prescriptions Last Dose Informant Patient Reported? Taking? GLUC HCL/GLUC DESHPANDE/AC-D-GLUCOS (GLUCOSAMINE COMPLEX PO) 3/31/2021 at Unknown time  Yes Yes   Sig: Take 1 Tab by mouth daily. OXYGEN-AIR DELIVERY SYSTEMS (HORIZON NASAL CPAP SYSTEM)   Yes No   Sig: by Does Not Apply route.    Potassium Gluconate 595 mg (99 mg) tablet 3/31/2021 at Unknown time  Yes Yes Sig: Take 595 mg by mouth two (2) times a day. acetaminophen (TYLENOL ARTHRITIS PAIN) 650 mg CR tablet   Yes No   Sig: Take 650 mg by mouth four (4) times daily. amLODIPine (NORVASC) 2.5 mg tablet 3/31/2021 at Unknown time  No Yes   Sig: TAKE 1 TABLET EVERY DAY   aspirin 81 mg tablet 3/31/2021 at Unknown time  Yes Yes   Sig: Take 162 mg by mouth daily. atorvastatin (LIPITOR) 40 mg tablet 3/31/2021 at Unknown time  No Yes   Sig: TAKE 1 TABLET EVERY DAY.  (STOP  SIMVASTATIN)   citalopram (CELEXA) 10 mg tablet 3/31/2021 at Unknown time  Yes Yes   Sig: Take 10 mg by mouth daily. diclofenac (VOLTAREN) 1 % gel   Yes No   Sig: Apply  to affected area four (4) times daily. ferrous sulfate (IRON) 325 mg (65 mg iron) tablet 3/25/2021 at Unknown time  Yes Yes   Sig: Take 325 mg by mouth two (2) times a day. hydrocortisone (HYTONE) 2.5 % topical cream   Yes No   isosorbide mononitrate ER (IMDUR) 60 mg CR tablet 4/1/2021 at Unknown time  No Yes   Sig: TAKE 1 TABLET EVERY MORNING   losartan (COZAAR) 25 mg tablet 3/31/2021 at Unknown time  No Yes   Sig: TAKE 1 TABLET EVERY DAY   melatonin 1 mg tablet 3/25/2021 at Unknown time  Yes Yes   Sig: Take 1 mg by mouth nightly. metoprolol succinate (TOPROL-XL) 25 mg XL tablet 3/31/2021 at Unknown time  No Yes   Sig: TAKE 1/2 TABLET EVERY NIGHT   multivitamin, stress formula (STRESS TAB) tablet 3/31/2021 at Unknown time  Yes Yes   Sig: Take 1 Tab by mouth daily. omega-3 fatty acids-vitamin e (FISH OIL) 1,000 mg Cap 3/31/2021 at Unknown time  Yes Yes   Sig: Take 2 Caps by mouth daily. pantoprazole (PROTONIX) 40 mg tablet 3/31/2021 at Unknown time  No Yes   Sig: TAKE 1 TABLET EVERY DAY  (STOP  PRILOSEC)   vit C/E/Zn/coppr/lutein/zeaxan (PRESERVISION AREDS 2 PO) 3/31/2021 at Unknown time  Yes Yes   Sig: Take  by mouth two (2) times a day.       Facility-Administered Medications: None       Hospital Medications:  Current Facility-Administered Medications   Medication Dose Route Frequency    0.9% sodium chloride infusion  100 mL/hr IntraVENous CONTINUOUS    sodium chloride (NS) flush 5-40 mL  5-40 mL IntraVENous Q8H    sodium chloride (NS) flush 5-40 mL  5-40 mL IntraVENous PRN    midazolam (VERSED) injection 0.25-10 mg  0.25-10 mg IntraVENous Multiple    fentaNYL citrate (PF) injection 100 mcg  100 mcg IntraVENous Multiple    naloxone (NARCAN) injection 0.4 mg  0.4 mg IntraVENous Multiple    flumazeniL (ROMAZICON) 0.1 mg/mL injection 0.2 mg  0.2 mg IntraVENous Multiple    simethicone (MYLICON) 68OA/3.8UH oral drops 80 mg  1.2 mL Oral Multiple    diphenhydrAMINE (BENADRYL) injection 50 mg  50 mg IntraVENous ONCE    atropine injection 0.5 mg  0.5 mg IntraVENous ONCE PRN    EPINEPHrine (ADRENALIN) 0.1 mg/mL syringe 1 mg  1 mg Endoscopically ONCE PRN       Social History:  Social History     Tobacco Use    Smoking status: Former Smoker     Packs/day: 4.50     Years: 20.00     Pack years: 90.00     Types: Cigarettes     Quit date: 1970     Years since quittin.9    Smokeless tobacco: Never Used   Substance Use Topics    Alcohol use: Yes     Frequency: 4 or more times a week     Comment: ounce of vodka daily       Family History:  Family History   Problem Relation Age of Onset    Cancer Father         in his shoulder    Heart Attack Father     Heart Disease Father     Heart Disease Mother     Cancer Mother         abd    Duana Hark Cancer Son         wilm's tumor    Cancer Sister         abd    Thyroid Disease Neg Hx              Review of Systems:      Constitutional: negative fever, negative chills, negative weight loss  Eyes:   negative visual changes  ENT:   negative sore throat, tongue or lip swelling  Respiratory:  negative cough, negative dyspnea  Cards:  negative for chest pain, palpitations, lower extremity edema  GI:   See HPI  :  negative for frequency, dysuria  Integument:  negative for rash and pruritus  Heme:  negative for easy bruising and gum/nose bleeding  Musculoskel: negative for myalgias,  back pain and muscle weakness  Neuro: negative for headaches, dizziness, vertigo  Psych:  negative for feelings of anxiety, depression       Objective:     Patient Vitals for the past 8 hrs:   BP Temp Pulse Resp SpO2 Height Weight   04/01/21 1002 131/84 98.6 °F (37 °C) 70 20 95 % 5' 11\" (1.803 m) 130.6 kg (288 lb)     No intake/output data recorded. No intake/output data recorded. EXAM:     NEURO-a&o   HEENT-wnl   LUNGS-clear    COR-regular rate and rhythym     ABD-soft , no tenderness, no rebound, good bs     EXT-no edema     Data Review     No results for input(s): WBC, HGB, HCT, PLT, HGBEXT, HCTEXT, PLTEXT in the last 72 hours. No results for input(s): NA, K, CL, CO2, BUN, CREA, GLU, PHOS, CA in the last 72 hours. No results for input(s): AP, TBIL, TP, ALB, GLOB, GGT, AML, LPSE in the last 72 hours. No lab exists for component: SGOT, GPT, AMYP, HLPSE  No results for input(s): INR, PTP, APTT, INREXT in the last 72 hours. Assessment:     · Colon cancer screening  · Prostate cancer     Patient Active Problem List   Diagnosis Code    Hyperlipidemia, mixed E78.2    Atherosclerosis of coronary artery bypass graft I25.810    Atrial fibrillation (HCC) I48.91    Goiter, nontoxic, multinodular E04.2    Unstable angina (HCC) I20.0    S/P coronary artery stent placement Z95.5    HUSAIN (dyspnea on exertion) R06.00    Benign essential tremor syndrome G25.0    Memory difficulties R41.3    B12 deficiency E53.8    Vitamin D deficiency E55.9    Hypothyroidism due to acquired atrophy of thyroid E03.4    Irregular heartbeat I49.9    Severe obesity (HCC) E66.01    Angina of effort (HCC) I20.8    SSS (sick sinus syndrome) ([facility] I49.5     Plan:   · The patient was counseled at length about the risks of mirna Covid-19 in the carlos-operative and post-operative states including the recovery window of their procedure.   The patient was made aware that mirna Covid-19 after a surgical procedure may worsen their prognosis for recovering from the virus and lend to a higher morbidity and or mortality risk. The patient was given the options of postponing their procedure. All of the risks, benefits, and alternatives were discussed. The patient does wish to proceed with the procedure. · Endoscopic procedure with MAC     Signed By: Ajay Marquez.  Allan Estrada MD     4/1/2021  10:38 AM

## 2021-04-02 ENCOUNTER — HOSPITAL ENCOUNTER (OUTPATIENT)
Dept: RADIATION THERAPY | Age: 76
Discharge: HOME | End: 2021-04-02

## 2021-04-05 RX ORDER — PANTOPRAZOLE SODIUM 40 MG/1
TABLET, DELAYED RELEASE ORAL
Qty: 90 TAB | Refills: 3 | Status: SHIPPED | OUTPATIENT
Start: 2021-04-05 | End: 2022-02-04

## 2021-04-05 RX ORDER — AMLODIPINE BESYLATE 2.5 MG/1
TABLET ORAL
Qty: 90 TAB | Refills: 1 | Status: SHIPPED | OUTPATIENT
Start: 2021-04-05 | End: 2021-11-15

## 2021-04-05 RX ORDER — METOPROLOL SUCCINATE 25 MG/1
TABLET, EXTENDED RELEASE ORAL
Qty: 45 TAB | Refills: 1 | Status: SHIPPED | OUTPATIENT
Start: 2021-04-05 | End: 2021-11-15

## 2021-04-14 ENCOUNTER — HOSPITAL ENCOUNTER (OUTPATIENT)
Dept: RADIATION THERAPY | Age: 76
Discharge: HOME | End: 2021-04-14

## 2021-04-15 ENCOUNTER — HOSPITAL ENCOUNTER (OUTPATIENT)
Dept: RADIATION THERAPY | Age: 76
Discharge: HOME | End: 2021-04-15

## 2021-04-19 ENCOUNTER — HOSPITAL ENCOUNTER (OUTPATIENT)
Dept: RADIATION THERAPY | Age: 76
Discharge: HOME | End: 2021-04-19

## 2021-04-20 ENCOUNTER — HOSPITAL ENCOUNTER (OUTPATIENT)
Dept: RADIATION THERAPY | Age: 76
Discharge: HOME | End: 2021-04-20

## 2021-04-21 ENCOUNTER — HOSPITAL ENCOUNTER (OUTPATIENT)
Dept: RADIATION THERAPY | Age: 76
Discharge: HOME | End: 2021-04-21

## 2021-04-22 ENCOUNTER — HOSPITAL ENCOUNTER (OUTPATIENT)
Dept: RADIATION THERAPY | Age: 76
Discharge: HOME | End: 2021-04-22

## 2021-04-26 ENCOUNTER — HOSPITAL ENCOUNTER (OUTPATIENT)
Dept: RADIATION THERAPY | Age: 76
Discharge: HOME | End: 2021-04-26

## 2021-04-27 ENCOUNTER — HOSPITAL ENCOUNTER (OUTPATIENT)
Dept: RADIATION THERAPY | Age: 76
Discharge: HOME | End: 2021-04-27

## 2021-04-28 ENCOUNTER — HOSPITAL ENCOUNTER (OUTPATIENT)
Dept: RADIATION THERAPY | Age: 76
Discharge: HOME | End: 2021-04-28

## 2021-04-28 ENCOUNTER — OFFICE VISIT (OUTPATIENT)
Dept: CARDIOLOGY CLINIC | Age: 76
End: 2021-04-28
Payer: MEDICARE

## 2021-04-28 VITALS
BODY MASS INDEX: 42 KG/M2 | RESPIRATION RATE: 18 BRPM | HEART RATE: 85 BPM | OXYGEN SATURATION: 98 % | SYSTOLIC BLOOD PRESSURE: 132 MMHG | WEIGHT: 300 LBS | HEIGHT: 71 IN | DIASTOLIC BLOOD PRESSURE: 84 MMHG

## 2021-04-28 DIAGNOSIS — E66.01 SEVERE OBESITY (HCC): ICD-10-CM

## 2021-04-28 DIAGNOSIS — I49.5 SSS (SICK SINUS SYNDROME) (HCC): ICD-10-CM

## 2021-04-28 DIAGNOSIS — I10 ESSENTIAL HYPERTENSION: ICD-10-CM

## 2021-04-28 DIAGNOSIS — Z95.0 CARDIAC PACEMAKER IN SITU: Primary | ICD-10-CM

## 2021-04-28 DIAGNOSIS — I49.5 SSS (SICK SINUS SYNDROME) (HCC): Primary | ICD-10-CM

## 2021-04-28 DIAGNOSIS — I48.91 ATRIAL FIBRILLATION, UNSPECIFIED TYPE (HCC): ICD-10-CM

## 2021-04-28 DIAGNOSIS — Z95.0 CARDIAC PACEMAKER IN SITU: ICD-10-CM

## 2021-04-28 PROCEDURE — G0463 HOSPITAL OUTPT CLINIC VISIT: HCPCS | Performed by: NURSE PRACTITIONER

## 2021-04-28 PROCEDURE — G8536 NO DOC ELDER MAL SCRN: HCPCS | Performed by: NURSE PRACTITIONER

## 2021-04-28 PROCEDURE — 93280 PM DEVICE PROGR EVAL DUAL: CPT | Performed by: INTERNAL MEDICINE

## 2021-04-28 PROCEDURE — G8754 DIAS BP LESS 90: HCPCS | Performed by: NURSE PRACTITIONER

## 2021-04-28 PROCEDURE — G8427 DOCREV CUR MEDS BY ELIG CLIN: HCPCS | Performed by: NURSE PRACTITIONER

## 2021-04-28 PROCEDURE — G8432 DEP SCR NOT DOC, RNG: HCPCS | Performed by: NURSE PRACTITIONER

## 2021-04-28 PROCEDURE — 99214 OFFICE O/P EST MOD 30 MIN: CPT | Performed by: NURSE PRACTITIONER

## 2021-04-28 PROCEDURE — G8417 CALC BMI ABV UP PARAM F/U: HCPCS | Performed by: NURSE PRACTITIONER

## 2021-04-28 PROCEDURE — G8752 SYS BP LESS 140: HCPCS | Performed by: NURSE PRACTITIONER

## 2021-04-28 PROCEDURE — 3288F FALL RISK ASSESSMENT DOCD: CPT | Performed by: NURSE PRACTITIONER

## 2021-04-28 PROCEDURE — 1100F PTFALLS ASSESS-DOCD GE2>/YR: CPT | Performed by: NURSE PRACTITIONER

## 2021-04-28 RX ORDER — LOSARTAN POTASSIUM 25 MG/1
12.5 TABLET ORAL DAILY
Qty: 90 TAB | Refills: 1
Start: 2021-04-28 | End: 2021-06-21

## 2021-04-28 NOTE — PROGRESS NOTES
ELECTROPHYSIOLOGY        Subjective:      Zunilda Soliz is a 66 y.o. male is here for EP follow up. Recently diagnosed with prostate CA. Undergoing radiation as he was not a candidate for prostatectomy with his cardiac hx. Has not started flomax due to bps. Is fatigued. The patient denies chest pain/ shortness of breath, orthopnea, PND, LE edema, palpitations, syncope, presyncope. Met a woman at [employer] to whom he is currently engaged. He is happy to have met someone after the passing of his wife. Patient Active Problem List    Diagnosis Date Noted    SSS (sick sinus syndrome) ([address] 10/30/2019    Angina of effort ([address] 10/07/2019    Severe obesity ([address] 10/03/2019    Irregular heartbeat 10/11/2016    Benign essential tremor syndrome 10/10/2016    Memory difficulties 10/10/2016    B12 deficiency 10/10/2016    Vitamin D deficiency 10/10/2016    Hypothyroidism due to acquired atrophy of thyroid 10/10/2016    HUSAIN (dyspnea on exertion) 08/08/2014    Unstable angina ([address] 12/20/2013    S/P coronary artery stent placement 12/20/2013    Goiter, nontoxic, multinodular     Hyperlipidemia, mixed 07/11/2012    Atherosclerosis of coronary artery bypass graft 07/11/2012    Atrial fibrillation ([address] 07/11/2012      Ginger Osorio DO  Past Medical History:   Diagnosis Date    AF (atrial fibrillation) (HCC)     s/p DCCV after his CABG, no recurrence    Anxiety     Arrhythmia     a fib    Arthritis     CAD (coronary artery disease)     s/p CABG in 1997    Cancer ([address] 04/2021    prostate cancer    GERD (gastroesophageal reflux disease)     Goiter, nontoxic, multinodular     Hypertension     Low blood potassium     Myocardial infarct ([address] ? 1997    MARIA ISABEL on CPAP     Other and unspecified hyperlipidemia     Unspecified sleep apnea       Past Surgical History:   Procedure Laterality Date    COLONOSCOPY N/A 4/1/2021    [facility]COPY    HX CATARACT REMOVAL  2006    bilateral    HX CHOLECYSTECTOMY  's    HX ORTHOPAEDIC      Arthoscopic L. knee surgery     HX SEPTOPLASTY  's    HX SHOULDER ARTHROSCOPY  2012    left    AK CARDIAC SURG PROCEDURE UNLIST      bypass    AK INS NEW/RPLCMT PRM PM W/TRANSV ELTRD ATRIAL&VENT N/A 2019    INSERT PPM DUAL performed by Bryon Essex, [facility] at [facility] CARDIAC CATH LAB    RIGHT HEART CATHETERIZATION      x3 states pt. (can't remember when)    RT/LT HEART CATHETERS  2013    PTCA    Heart Dr. Medrano Plate [Meperidine] Other (comments)     hallucinations  hallucinations      Family History   Problem Relation Age of Onset    Cancer Father         in his shoulder    Heart Attack Father     Heart Disease Father     Heart Disease Mother     Cancer Mother         abd    [facility] Cancer Son         wilm's tumor   [facility] Cancer Sister         abd    Thyroid Disease Neg Hx     negative for cardiac disease  Social History     Socioeconomic History    Marital status:      Spouse name: Not on file    Number of children: Not on file    Years of education: Not on file    Highest education level: Not on file   Tobacco Use    Smoking status: Former Smoker     Packs/day: 4.50     Years: 20.00     Pack years: 90.00     Types: Cigarettes     Quit date: 1970     Years since quittin.0    Smokeless tobacco: Never Used   Substance and Sexual Activity    Alcohol use: Yes     Frequency: 4 or more times a week     Comment: 2 ounces of vodka daily    Drug use: No   Social History Narrative    Lives in [address] [place] [address] with wife. Has 2 sons and 1 daughter. Works part time for [employer]. Worked at [employer] as an . Likes to travel and fish.      Current Outpatient Medications   Medication Sig    OTHER Hormone tx got prostate cancer- Radiation for 9 weeks daily-    leuprolide acetate (LUPRON DEPOT, 3 MONTH, IM) by IntraMUSCular route. Every 3 months    losartan (COZAAR) 25 mg tablet Take 0.5 Tabs by mouth daily. Lowered 04/28/21 due to bp    amLODIPine (NORVASC) 2.5 mg tablet TAKE 1 TABLET EVERY DAY    pantoprazole (PROTONIX) 40 mg tablet TAKE 1 TABLET EVERY DAY (STOP PRILOSEC)    metoprolol succinate (TOPROL-XL) 25 mg XL tablet TAKE 1/2 TABLET EVERY NIGHT    atorvastatin (LIPITOR) 40 mg tablet TAKE 1 TABLET EVERY DAY.  (STOP  SIMVASTATIN)    isosorbide mononitrate ER (IMDUR) 60 mg CR tablet TAKE 1 TABLET EVERY MORNING    diclofenac (VOLTAREN) 1 % gel Apply  to affected area four (4) times daily.  melatonin 1 mg tablet Take 1 mg by mouth nightly.  vit C/E/Zn/coppr/lutein/zeaxan (PRESERVISION AREDS 2 PO) Take  by mouth two (2) times a day.  Potassium Gluconate 595 mg (99 mg) tablet Take 595 mg by mouth two (2) times a day.  hydrocortisone (HYTONE) 2.5 % topical cream     OXYGEN-AIR DELIVERY SYSTEMS (HORIZON NASAL CPAP SYSTEM) by Does Not Apply route.  acetaminophen (TYLENOL ARTHRITIS PAIN) 650 mg CR tablet Take 650 mg by mouth four (4) times daily.  ferrous sulfate (IRON) 325 mg (65 mg iron) tablet Take 325 mg by mouth two (2) times a day.  citalopram (CELEXA) 10 mg tablet Take 10 mg by mouth daily.  omega-3 fatty acids-vitamin e (FISH OIL) 1,000 mg Cap Take 2 Caps by mouth daily.  multivitamin, stress formula (STRESS TAB) tablet Take 1 Tab by mouth daily.  aspirin 81 mg tablet Take 162 mg by mouth daily.  GLUC HCL/GLUC DESHPANDE/AC-D-GLUCOS (GLUCOSAMINE COMPLEX PO) Take 1 Tab by mouth daily. No current facility-administered medications for this visit. Vitals:    04/28/21 1033 04/28/21 1034   BP: (!) 100/56 132/84   Pulse: 85 85   Resp: 18    SpO2: 98%    Weight: 300 lb (136.1 kg)    Height: 5' 10.5\" (1.791 m)        I have reviewed the nurses notes, vitals, problem list, allergy list, medical history, family, social history and medications.     Review of Symptoms:    General: Pt denies excessive weight gain or loss. Pt is able to conduct ADL's  HEENT: Denies blurred vision, headaches, epistaxis and difficulty swallowing. Respiratory: Denies shortness of breath, HUSAIN, wheezing or stridor. Cardiovascular: Denies precordial pain, palpitations, edema or PND  Gastrointestinal: Denies poor appetite, indigestion, abdominal pain or blood in stool  Urinary: Denies dysuria, pyuria  Musculoskeletal: Denies pain or swelling from muscles or joints  Neurologic: Denies tremor, paresthesias, or sensory motor disturbance  Skin: Denies rash, itching or texture change. Psych: Denies depression      Physical Exam:      General: Well developed, in no acute distress. HEENT: Eyes - PERRL  Heart:  Normal S1/S2 negative S3 or S4. Regular, no murmur  Respiratory: Clear bilaterally x 4, no wheezing or rales  Extremities:  No edema, no cyanosis. Musculoskeletal: No clubbing  Neuro: A&Ox3, speech clear  Skin: No visible rashes or lesions. Non diaphoretic.  No visible ulcers  Vascular: 2+ pulses symmetric in all extremities  Psych - judgement intact and orientation is wnl       Cardiographics       Results for orders placed or performed during the hospital encounter of 02/14/20   EKG, 12 LEAD, INITIAL   Result Value Ref Range    Ventricular Rate 77 BPM    Atrial Rate 77 BPM    P-R Interval 176 ms    QRS Duration 146 ms    Q-T Interval 446 ms    QTC Calculation (Bezet) 504 ms    Calculated P Axis 57 degrees    Calculated R Axis -40 degrees    Calculated T Axis 61 degrees    Diagnosis       Normal sinus rhythm  Left axis deviation  Right bundle branch block  Nonspecific ST and T wave abnormality  When compared with ECG of 20-DEC-2019 15:18,  ME interval has decreased    Confirmed by Moon Hernández MD, Tamara Parks ([id number]) on 2/15/2020 1:27:32 PM           Lab Results   Component Value Date/Time    WBC 6.0 02/14/2020 04:27 PM    Hemoglobin (POC) 15.0 02/10/2011 02:08 PM    HGB 14.1 02/14/2020 04:27 PM    HCT 41.6 02/14/2020 04:27 PM PLATELET 517 70/36/1757 04:27 PM    MCV 95.6 02/14/2020 04:27 PM      Lab Results   Component Value Date/Time    Sodium 139 07/06/2020 09:51 AM    Potassium 4.4 07/06/2020 09:51 AM    Chloride 102 07/06/2020 09:51 AM    CO2 23 07/06/2020 09:51 AM    Anion gap 6 02/14/2020 04:27 PM    Glucose 96 07/06/2020 09:51 AM    BUN 14 07/06/2020 09:51 AM    Creatinine 0.96 07/06/2020 09:51 AM    BUN/Creatinine ratio 15 07/06/2020 09:51 AM    GFR est AA 88 07/06/2020 09:51 AM    GFR est non-AA 76 07/06/2020 09:51 AM    Calcium 9.4 07/06/2020 09:51 AM    Bilirubin, total 0.9 07/06/2020 09:51 AM    Alk. phosphatase 55 07/06/2020 09:51 AM    Protein, total 6.9 07/06/2020 09:51 AM    Albumin 4.8 (H) 07/06/2020 09:51 AM    Globulin 3.1 02/14/2020 04:27 PM    A-G Ratio 2.3 (H) 07/06/2020 09:51 AM    ALT (SGPT) 28 07/06/2020 09:51 AM      Lab Results   Component Value Date/Time    TSH 1.520 12/09/2020 03:16 PM    TSH 2.080 09/06/2018 09:40 AM           Assessment:           ICD-10-CM ICD-9-CM    1. SSS (sick sinus syndrome) (HCC)  I49.5 427.81    2. Essential hypertension  I10 401.9    3. Atrial fibrillation, unspecified type ([address]  I48.91 427.31    4. Severe obesity ([address]  E66.01 278.01    5. Cardiac pacemaker in situ  Z95.0 V45.01      Orders Placed This Encounter    OTHER     Sig: Hormone tx got prostate cancer- Radiation for 9 weeks daily-    leuprolide acetate (LUPRON DEPOT, 3 MONTH, IM)     Sig: by IntraMUSCular route. Every 3 months    losartan (COZAAR) 25 mg tablet     Sig: Take 0.5 Tabs by mouth daily. Lowered 04/28/21 due to bp     Dispense:  90 Tab     Refill:  1        Plan:     Mr Irena Jacobson is here for annual follow up. He is A paced 7.2% for sick sinus and 1.0% RVP. Battery remaining is >13 years. High rates during radiation for prostate CA. I have lowered his ARB so he may start flowmax. He will monitor bps at home.    During this visit,  the patient and I had a comprehensive discussion of device management using principles of shared decision making. we reviewed device therapy, including the potential risks and benefits of device management. These risks include death, myocardial infarction, stroke, cardiac perforation, vascular injury, injury, pacing induced cardiomyopathy, inappropriate shocks (defibrillator) and other less severe complications. The patient demonstrated a clear understanding of these issues during out discussion. Our plans, determined together after thorough consideration, are outlined else where in this note. Enrolled in remote monitoring and I will see him back in 1 year. Addressed all patient questions and concerns at this visit. Continue medical management for CAD. Discussed side effects, efficacy and good medication compliance with pt re: bb, arb. Thank you for allowing me to participate in Bee Barrios 's care.       Gianni Thakur NP

## 2021-04-28 NOTE — PROGRESS NOTES
Chief Complaint   Patient presents with    Pacemaker Check     annual follow up -     Chest Pain     tightness once in a while     Dizziness     when getting up      1. Have you been to the ER, urgent care clinic since your last visit? Hospitalized since your last visit? Yes [facility] for fall     2. Have you seen or consulted any other health care providers outside of the [address] [place] [address] since your last visit? Include any pap smears or colon screening.   Yes Dr Rosario Hannah and Dr Flory Pedersen for prostate ca tx

## 2021-04-29 ENCOUNTER — HOSPITAL ENCOUNTER (OUTPATIENT)
Dept: RADIATION THERAPY | Age: 76
Discharge: HOME | End: 2021-04-29

## 2021-04-30 ENCOUNTER — TELEPHONE (OUTPATIENT)
Dept: SLEEP MEDICINE | Age: 76
End: 2021-04-30

## 2021-04-30 ENCOUNTER — HOSPITAL ENCOUNTER (OUTPATIENT)
Dept: RADIATION THERAPY | Age: 76
Discharge: HOME | End: 2021-04-30

## 2021-04-30 NOTE — TELEPHONE ENCOUNTER
Patient called in stating he feels he is not receiving enough pressure which is causing him not to sleep. He wants to know if we can up his pressure and would like a call once the change has been made. He can be reached at [phone].

## 2021-05-03 ENCOUNTER — HOSPITAL ENCOUNTER (OUTPATIENT)
Dept: RADIATION THERAPY | Age: 76
Discharge: HOME | End: 2021-05-03

## 2021-05-04 ENCOUNTER — HOSPITAL ENCOUNTER (OUTPATIENT)
Dept: RADIATION THERAPY | Age: 76
Discharge: HOME | End: 2021-05-04

## 2021-05-05 ENCOUNTER — HOSPITAL ENCOUNTER (OUTPATIENT)
Dept: RADIATION THERAPY | Age: 76
Discharge: HOME | End: 2021-05-05

## 2021-05-06 ENCOUNTER — HOSPITAL ENCOUNTER (OUTPATIENT)
Dept: RADIATION THERAPY | Age: 76
Discharge: HOME | End: 2021-05-06

## 2021-05-07 ENCOUNTER — HOSPITAL ENCOUNTER (OUTPATIENT)
Dept: RADIATION THERAPY | Age: 76
Discharge: HOME | End: 2021-05-07

## 2021-05-10 ENCOUNTER — HOSPITAL ENCOUNTER (OUTPATIENT)
Dept: RADIATION THERAPY | Age: 76
Discharge: HOME | End: 2021-05-10

## 2021-05-11 ENCOUNTER — HOSPITAL ENCOUNTER (OUTPATIENT)
Dept: RADIATION THERAPY | Age: 76
Discharge: HOME | End: 2021-05-11

## 2021-05-12 ENCOUNTER — HOSPITAL ENCOUNTER (OUTPATIENT)
Dept: RADIATION THERAPY | Age: 76
Discharge: HOME | End: 2021-05-12

## 2021-05-13 ENCOUNTER — HOSPITAL ENCOUNTER (OUTPATIENT)
Dept: RADIATION THERAPY | Age: 76
Discharge: HOME | End: 2021-05-13

## 2021-05-13 NOTE — TELEPHONE ENCOUNTER
The following device settings were changed [id number]    Device setting Category                     Previous Updated  Max IPAP Device settings             15.0    16.0  Min EPAP Device settings             10.0    11.0  Min pressure support Device settings   4.0      3.0      Patient was called  No Answer/Busy.

## 2021-05-14 ENCOUNTER — HOSPITAL ENCOUNTER (OUTPATIENT)
Dept: RADIATION THERAPY | Age: 76
Discharge: HOME | End: 2021-05-14

## 2021-05-17 ENCOUNTER — HOSPITAL ENCOUNTER (OUTPATIENT)
Dept: RADIATION THERAPY | Age: 76
Discharge: HOME | End: 2021-05-17

## 2021-05-18 ENCOUNTER — HOSPITAL ENCOUNTER (OUTPATIENT)
Dept: RADIATION THERAPY | Age: 76
Discharge: HOME | End: 2021-05-18

## 2021-05-19 ENCOUNTER — HOSPITAL ENCOUNTER (OUTPATIENT)
Dept: RADIATION THERAPY | Age: 76
Discharge: HOME | End: 2021-05-19

## 2021-05-20 ENCOUNTER — HOSPITAL ENCOUNTER (OUTPATIENT)
Dept: RADIATION THERAPY | Age: 76
Discharge: HOME | End: 2021-05-20

## 2021-05-21 ENCOUNTER — HOSPITAL ENCOUNTER (OUTPATIENT)
Dept: RADIATION THERAPY | Age: 76
Discharge: HOME | End: 2021-05-21

## 2021-05-24 ENCOUNTER — HOSPITAL ENCOUNTER (OUTPATIENT)
Dept: RADIATION THERAPY | Age: 76
Discharge: HOME | End: 2021-05-24

## 2021-05-25 ENCOUNTER — HOSPITAL ENCOUNTER (OUTPATIENT)
Dept: RADIATION THERAPY | Age: 76
Discharge: HOME | End: 2021-05-25

## 2021-05-26 ENCOUNTER — HOSPITAL ENCOUNTER (OUTPATIENT)
Dept: RADIATION THERAPY | Age: 76
Discharge: HOME | End: 2021-05-26

## 2021-05-27 ENCOUNTER — HOSPITAL ENCOUNTER (OUTPATIENT)
Dept: RADIATION THERAPY | Age: 76
Discharge: HOME | End: 2021-05-27

## 2021-05-28 ENCOUNTER — HOSPITAL ENCOUNTER (OUTPATIENT)
Dept: RADIATION THERAPY | Age: 76
Discharge: HOME | End: 2021-05-28

## 2021-06-01 ENCOUNTER — HOSPITAL ENCOUNTER (OUTPATIENT)
Dept: RADIATION THERAPY | Age: 76
Discharge: HOME | End: 2021-06-01

## 2021-06-02 ENCOUNTER — HOSPITAL ENCOUNTER (OUTPATIENT)
Dept: RADIATION THERAPY | Age: 76
Discharge: HOME | End: 2021-06-02

## 2021-06-03 ENCOUNTER — HOSPITAL ENCOUNTER (OUTPATIENT)
Dept: RADIATION THERAPY | Age: 76
Discharge: HOME | End: 2021-06-03

## 2021-06-04 ENCOUNTER — HOSPITAL ENCOUNTER (OUTPATIENT)
Dept: RADIATION THERAPY | Age: 76
Discharge: HOME | End: 2021-06-04

## 2021-06-07 ENCOUNTER — HOSPITAL ENCOUNTER (OUTPATIENT)
Dept: RADIATION THERAPY | Age: 76
Discharge: HOME | End: 2021-06-07

## 2021-06-08 ENCOUNTER — HOSPITAL ENCOUNTER (OUTPATIENT)
Dept: RADIATION THERAPY | Age: 76
Discharge: HOME | End: 2021-06-08

## 2021-06-09 ENCOUNTER — OFFICE VISIT (OUTPATIENT)
Dept: ORTHOPEDIC SURGERY | Age: 76
End: 2021-06-09
Payer: MEDICARE

## 2021-06-09 ENCOUNTER — HOSPITAL ENCOUNTER (OUTPATIENT)
Dept: RADIATION THERAPY | Age: 76
Discharge: HOME | End: 2021-06-09

## 2021-06-09 VITALS — HEIGHT: 71 IN | BODY MASS INDEX: 41.16 KG/M2 | WEIGHT: 294 LBS

## 2021-06-09 DIAGNOSIS — M25.561 PAIN IN BOTH KNEES, UNSPECIFIED CHRONICITY: Primary | ICD-10-CM

## 2021-06-09 DIAGNOSIS — M17.11 PRIMARY OSTEOARTHRITIS OF RIGHT KNEE: Primary | ICD-10-CM

## 2021-06-09 DIAGNOSIS — M25.562 PAIN IN BOTH KNEES, UNSPECIFIED CHRONICITY: Primary | ICD-10-CM

## 2021-06-09 DIAGNOSIS — M17.11 PRIMARY OSTEOARTHRITIS OF RIGHT KNEE: ICD-10-CM

## 2021-06-09 PROCEDURE — 99203 OFFICE O/P NEW LOW 30 MIN: CPT | Performed by: ORTHOPAEDIC SURGERY

## 2021-06-09 PROCEDURE — G8756 NO BP MEASURE DOC: HCPCS | Performed by: ORTHOPAEDIC SURGERY

## 2021-06-09 PROCEDURE — G8510 SCR DEP NEG, NO PLAN REQD: HCPCS | Performed by: ORTHOPAEDIC SURGERY

## 2021-06-09 PROCEDURE — 1100F PTFALLS ASSESS-DOCD GE2>/YR: CPT | Performed by: ORTHOPAEDIC SURGERY

## 2021-06-09 PROCEDURE — G8417 CALC BMI ABV UP PARAM F/U: HCPCS | Performed by: ORTHOPAEDIC SURGERY

## 2021-06-09 PROCEDURE — 3288F FALL RISK ASSESSMENT DOCD: CPT | Performed by: ORTHOPAEDIC SURGERY

## 2021-06-09 PROCEDURE — G8427 DOCREV CUR MEDS BY ELIG CLIN: HCPCS | Performed by: ORTHOPAEDIC SURGERY

## 2021-06-09 PROCEDURE — G8536 NO DOC ELDER MAL SCRN: HCPCS | Performed by: ORTHOPAEDIC SURGERY

## 2021-06-09 RX ORDER — BICALUTAMIDE 50 MG/1
TABLET, FILM COATED ORAL
COMMUNITY
Start: 2021-04-15

## 2021-06-09 RX ORDER — SODIUM, POTASSIUM,MAG SULFATES 17.5-3.13G
SOLUTION, RECONSTITUTED, ORAL ORAL
COMMUNITY
Start: 2021-03-22 | End: 2021-06-15 | Stop reason: ALTCHOICE

## 2021-06-09 RX ORDER — TAMSULOSIN HYDROCHLORIDE 0.4 MG/1
CAPSULE ORAL
COMMUNITY
Start: 2021-06-04 | End: 2021-06-15

## 2021-06-09 NOTE — PATIENT INSTRUCTIONS
Knee Arthritis: Care Instructions  Your Care Instructions     Knee arthritis is a breakdown of the cartilage that cushions your knee joint. When the cartilage wears down, your bones rub against each other. This causes pain and stiffness. Knee arthritis tends to get worse with time. Treatment for knee arthritis involves reducing pain, making the leg muscles stronger, and staying at a healthy body weight. The treatment usually does not improve the health of the cartilage, but it can reduce pain and improve how well your knee works. You can take simple measures to protect your knee joints, ease your pain, and help you stay active. Follow-up care is a key part of your treatment and safety. Be sure to make and go to all appointments, and call your doctor if you are having problems. It's also a good idea to know your test results and keep a list of the medicines you take. How can you care for yourself at home? · Know that knee arthritis will cause more pain on some days than on others. · Stay at a healthy weight. Lose weight if you are overweight. When you stand up, the pressure on your knees from every pound of body weight is multiplied four times. So if you lose 10 pounds, you will reduce the pressure on your knees by 40 pounds. · Talk to your doctor or physical therapist about exercises that will help ease joint pain. ? Stretch to help prevent stiffness and to prevent injury before you exercise. You may enjoy gentle forms of yoga to help keep your knee joints and muscles flexible. ? Walk instead of jog.  ? Ride a bike. This makes your thigh muscles stronger and takes pressure off your knee. ? Wear well-fitting and comfortable shoes. ? Exercise in chest-deep water. This can help you exercise longer with less pain. ? Avoid exercises that include squatting or kneeling. They can put a lot of strain on your knees.   ? Talk to your doctor to make sure that the exercise you do is not making the arthritis worse.  · Do not sit for long periods of time. Try to walk once in a while to keep your knee from getting stiff. · Ask your doctor or physical therapist whether shoe inserts may reduce your arthritis pain. · If you can afford it, get new athletic shoes at least every year. This can help reduce the strain on your knees. · Use a device to help you do everyday activities. ? A cane or walking stick can help you keep your balance when you walk. Hold the cane or walking stick in the hand opposite the painful knee. ? If you feel like you may fall when you walk, try using crutches or a front-wheeled walker. These can prevent falls that could cause more damage to your knee. ? A knee brace may help keep your knee stable and prevent pain. ? You also can use other things to make life easier, such as a higher toilet seat and handrails in the bathtub or shower. · Take pain medicines exactly as directed. ? Do not wait until you are in severe pain. You will get better results if you take it sooner. ? If you are not taking a prescription pain medicine, take an over-the-counter medicine such as acetaminophen (Tylenol), ibuprofen (Advil, Motrin), or naproxen (Aleve). Read and follow all instructions on the label. ? Do not take two or more pain medicines at the same time unless the doctor told you to. Many pain medicines have acetaminophen, which is Tylenol. Too much acetaminophen (Tylenol) can be harmful. ? Tell your doctor if you take a blood thinner, have diabetes, or have allergies to shellfish. · Ask your doctor if you might benefit from a shot of steroid medicine into your knee. This may provide pain relief for several months. · Many people take the supplements glucosamine and chondroitin for osteoarthritis. Some people feel they help, but the medical research does not show that they work. Talk to your doctor before you take these supplements. When should you call for help?    Call your doctor now or seek immediate medical care if:    · You have sudden swelling, warmth, or pain in your knee.     · You have knee pain and a fever or rash.     · You have such bad pain that you cannot use your knee. Watch closely for changes in your health, and be sure to contact your doctor if you have any problems. Where can you learn more? Go to http://www.gray.com/  Enter W187 in the search box to learn more about \"Knee Arthritis: Care Instructions. \"  Current as of: August 5, 2020               Content Version: 12.8  © 2006-2021 [employer]. Care instructions adapted under license by "[employer] (which disclaims liability or warranty for this information). If you have questions about a medical condition or this instruction, always ask your healthcare professional. [address] any warranty or liability for your use of this information.

## 2021-06-09 NOTE — PROGRESS NOTES
Name: Kylah Atkins    : 1945     Service Dept: [address] [place] [address] and Sports Medicine    Patient's Pharmacies:    [facility] [address], [address] [place] [address] AT [address]  [address]  [address] [address]  Phone: [phone] Fax: [phone]    [facility] - [address] [place]  [address] [address]  Phone: [phone] Fax: [phone]       Chief Complaint   Patient presents with    Knee Pain     bilateral        Visit Vitals  Ht 5' 11\" (1.803 m)   Wt 294 lb (133.4 kg)   BMI 41.00 kg/m²      Allergies   Allergen Reactions    Demerol [Meperidine] Other (comments)     hallucinations  hallucinations      Current Outpatient Medications   Medication Sig Dispense Refill    bicalutamide (CASODEX) 50 mg tablet       Suprep Bowel Prep Kit 17.5-3.13-1.6 gram solr oral solution TAKE 1 KIT CONTAINER BY MOUTH AS DIRECTED      tamsulosin (FLOMAX) 0.4 mg capsule       OTHER Hormone tx got prostate cancer- Radiation for 9 weeks daily-      leuprolide acetate (LUPRON DEPOT, 3 MONTH, IM) by IntraMUSCular route. Every 3 months      losartan (COZAAR) 25 mg tablet Take 0.5 Tabs by mouth daily. Lowered 21 due to bp 90 Tab 1    amLODIPine (NORVASC) 2.5 mg tablet TAKE 1 TABLET EVERY DAY 90 Tab 1    pantoprazole (PROTONIX) 40 mg tablet TAKE 1 TABLET EVERY DAY (STOP PRILOSEC) 90 Tab 3    metoprolol succinate (TOPROL-XL) 25 mg XL tablet TAKE 1/2 TABLET EVERY NIGHT 45 Tab 1    atorvastatin (LIPITOR) 40 mg tablet TAKE 1 TABLET EVERY DAY.  (STOP  SIMVASTATIN) 90 Tab 3    isosorbide mononitrate ER (IMDUR) 60 mg CR tablet TAKE 1 TABLET EVERY MORNING 90 Tab 3    diclofenac (VOLTAREN) 1 % gel Apply  to affected area four (4) times daily.  melatonin 1 mg tablet Take 1 mg by mouth nightly.       vit C/E/Zn/coppr/lutein/zeaxan (PRESERVISION AREDS 2 PO) Take  by mouth two (2) times a day.      Potassium Gluconate 595 mg (99 mg) tablet Take 595 mg by mouth two (2) times a day.  hydrocortisone (HYTONE) 2.5 % topical cream       OXYGEN-AIR DELIVERY SYSTEMS (HORIZON NASAL CPAP SYSTEM) by Does Not Apply route.  acetaminophen (TYLENOL ARTHRITIS PAIN) 650 mg CR tablet Take 650 mg by mouth four (4) times daily.  ferrous sulfate (IRON) 325 mg (65 mg iron) tablet Take 325 mg by mouth two (2) times a day.  citalopram (CELEXA) 10 mg tablet Take 10 mg by mouth daily.  omega-3 fatty acids-vitamin e (FISH OIL) 1,000 mg Cap Take 2 Caps by mouth daily.  multivitamin, stress formula (STRESS TAB) tablet Take 1 Tab by mouth daily.  aspirin 81 mg tablet Take 162 mg by mouth daily.  GLUC HCL/GLUC DESHPANDE/AC-D-GLUCOS (GLUCOSAMINE COMPLEX PO) Take 1 Tab by mouth daily.         Patient Active Problem List   Diagnosis Code    Hyperlipidemia, mixed E78.2    Atherosclerosis of coronary artery bypass graft I25.810    Atrial fibrillation (HCC) I48.91    Goiter, nontoxic, multinodular E04.2    Unstable angina ([facility]) I20.0    S/P coronary artery stent placement Z95.5    HUSAIN (dyspnea on exertion) R06.00    Benign essential tremor syndrome G25.0    Memory difficulties R41.3    B12 deficiency E53.8    Vitamin D deficiency E55.9    Hypothyroidism due to acquired atrophy of thyroid E03.4    Irregular heartbeat I49.9    Severe obesity ([facility]) E66.01    Angina of effort ([facility]) I20.8    SSS (sick sinus syndrome) ([facility]) I49.5      Family History   Problem Relation Age of Onset    Cancer Father         in his shoulder    Heart Attack Father     Heart Disease Father     Heart Disease Mother     Cancer Mother         abd    [facility] Cancer Son         wilm's tumor    Cancer Sister         abd    Thyroid Disease Neg Hx       Social History     Socioeconomic History    Marital status:      Spouse name: Not on file    Number of children: Not on file    Years of education: Not on file  Highest education level: Not on file   Tobacco Use    Smoking status: Former Smoker     Packs/day: 4.50     Years: 20.00     Pack years: 90.00     Types: Cigarettes     Quit date: 1970     Years since quittin.1    Smokeless tobacco: Never Used   Vaping Use    Vaping Use: Never used   Substance and Sexual Activity    Alcohol use: Yes     Comment: 2 ounces of vodka daily    Drug use: No   Social History Narrative    Lives in [address] [place] [address] with wife. Has 2 sons and 1 daughter. Works part time for [employer]. Worked at [employer] as an . Likes to travel and fish. Social Determinants of Health     Financial Resource Strain:     Difficulty of Paying Living Expenses:    Food Insecurity:     Worried About Running Out of Food in the Last Year:     [address] in the Last Year:    Transportation Needs:     Lack of Transportation (Medical):      Lack of Transportation (Non-Medical):    Physical Activity:     Days of Exercise per Week:     Minutes of Exercise per Session:    Stress:     Feeling of Stress :    Social Connections:     Frequency of Communication with Friends and Family:     Frequency of Social Gatherings with Friends and Family:     Attends Zoroastrian Services:     Active Member of Clubs or Organizations:     Attends Club or Organization Meetings:     Marital Status:       Past Surgical History:   Procedure Laterality Date    COLONOSCOPY N/A 2021    COLONOSCOPY performed by Mary Valera MD at [address] [place] [address] ENDOSCOPY    HX CATARACT REMOVAL  2006    bilateral    HX CHOLECYSTECTOMY  's    HX ORTHOPAEDIC      Arthoscopic L. knee surgery     HX SEPTOPLASTY      HX SHOULDER ARTHROSCOPY  2012    left    [place] CARDI[place] SUR[place] PROCEDURE [place]      bypass    [place] INS NEW/RPLCMT PRM PM W/TRANSV ELTRD ATRIAL&VENT N/A 2019    INSERT PPM DUAL performed by K[facility]ly Castillo MD at [facility] CARDIAC CATH LAB   [address] [facility] [place] [place]      x3 states pt. (can't remember when)    RT/LT HEART CATHETERS  12/2013    PTCA    Heart Dr. Kiana Friend      Past Medical History:   Diagnosis Date    AF (atrial fibrillation) [facility])     s/p DCCV after his CABG, no recurrence    Anxiety     Arrhythmia     a fib    Arthritis     CAD (coronary artery disease)     s/p CABG in 1997    Cancer ([facility] 04/2021    prostate cancer    GERD (gastroesophageal reflux disease)     Goiter, nontoxic, multinodular     Hypertension     Low blood potassium     Myocardial infarct ([facility] ? 1997    MARIA ISABEL on CPAP     Other and unspecified hyperlipidemia     Unspecified sleep apnea         I have reviewed and agree with PFSH and ROS and intake form in chart and the record furthermore I have reviewed prior medical record(s) regarding this patients care during this appointment. Review of Systems:   Patient is a pleasant appearing individual, appropriately dressed, well hydrated, well nourished, who is alert, appropriately oriented for age, and in no acute distress with a cane based gait and normal affect who does not appear to be in any significant pain. Physical Exam:  Left Knee -Decrease range of motion with flexion, Knee arc of greater than 50 degrees, Some crepitation, Grossly neurovascularly intact, Good cap refill, No skin lesion, Moderate swelling, No gross instability, Some quadriceps weakness Kellgren and Colby at least grade 3    Right Knee -Decrease range of motion with flexion, Some crepitation, Grossly neurovascularly intact, Good cap refill, No skin lesion, Moderate swelling, No gross instability, Some quadriceps weaknessKellgren and Colby at least grade 3     Encounter Diagnoses     ICD-10-CM ICD-9-CM   1. Primary osteoarthritis of right knee  M17.11 715.16       HPI:  The patient is here with a chief complaint of right knee pain, throbbing burning pain, progressively getting worse. Nothing has helped.     ROS:  10-point review of systems is positive for instability and locking. X-rays of the right knee are positive for severe OA with valgus deformity. Assessment/Plan:  1. Right knee severe OA. Plan would be for right total knee replacement, general medical clearance, and cardiac clearance. He may be a right total knee with possible semi-constraint because of valgus deformity. Outpatient surgery at [facility] and go from there. As part of continued conservative pain management options the patient was advised to utilize Tylenol or OTC NSAIDS as long as it is not medically contraindicated. Return to Office: Follow-up and Dispositions    · Return for [facility] FOR SUR[facility]ERY. Scribed by Berenice Caba as dictated by Bambi Gardiner. Angela Camacoh MD.  Documentation True and Accepted Dylan Camacho MD

## 2021-06-10 ENCOUNTER — HOSPITAL ENCOUNTER (OUTPATIENT)
Dept: RADIATION THERAPY | Age: 76
Discharge: HOME | End: 2021-06-10

## 2021-06-11 ENCOUNTER — HOSPITAL ENCOUNTER (OUTPATIENT)
Dept: RADIATION THERAPY | Age: 76
Discharge: HOME | End: 2021-06-11

## 2021-06-14 ENCOUNTER — HOSPITAL ENCOUNTER (OUTPATIENT)
Dept: RADIATION THERAPY | Age: 76
Discharge: HOME | End: 2021-06-14

## 2021-06-15 ENCOUNTER — OFFICE VISIT (OUTPATIENT)
Dept: INTERNAL MEDICINE CLINIC | Age: 76
End: 2021-06-15
Payer: MEDICARE

## 2021-06-15 ENCOUNTER — HOSPITAL ENCOUNTER (OUTPATIENT)
Dept: RADIATION THERAPY | Age: 76
Discharge: HOME | End: 2021-06-15

## 2021-06-15 VITALS
HEIGHT: 71 IN | WEIGHT: 305.4 LBS | TEMPERATURE: 96.7 F | HEART RATE: 82 BPM | DIASTOLIC BLOOD PRESSURE: 65 MMHG | BODY MASS INDEX: 42.76 KG/M2 | RESPIRATION RATE: 18 BRPM | OXYGEN SATURATION: 95 % | SYSTOLIC BLOOD PRESSURE: 132 MMHG

## 2021-06-15 DIAGNOSIS — G47.33 OSA ON CPAP: ICD-10-CM

## 2021-06-15 DIAGNOSIS — I10 ESSENTIAL HYPERTENSION: Primary | ICD-10-CM

## 2021-06-15 DIAGNOSIS — Z99.89 OSA ON CPAP: ICD-10-CM

## 2021-06-15 DIAGNOSIS — C61 PROSTATE CANCER (HCC): ICD-10-CM

## 2021-06-15 DIAGNOSIS — E66.01 SEVERE OBESITY (HCC): ICD-10-CM

## 2021-06-15 DIAGNOSIS — F41.1 GAD (GENERALIZED ANXIETY DISORDER): ICD-10-CM

## 2021-06-15 DIAGNOSIS — I25.10 CORONARY ARTERY DISEASE INVOLVING NATIVE CORONARY ARTERY OF NATIVE HEART WITHOUT ANGINA PECTORIS: ICD-10-CM

## 2021-06-15 PROBLEM — I20.8 ANGINA OF EFFORT (HCC): Status: RESOLVED | Noted: 2019-10-07 | Resolved: 2021-06-15

## 2021-06-15 PROCEDURE — G8417 CALC BMI ABV UP PARAM F/U: HCPCS | Performed by: INTERNAL MEDICINE

## 2021-06-15 PROCEDURE — G8752 SYS BP LESS 140: HCPCS | Performed by: INTERNAL MEDICINE

## 2021-06-15 PROCEDURE — G8427 DOCREV CUR MEDS BY ELIG CLIN: HCPCS | Performed by: INTERNAL MEDICINE

## 2021-06-15 PROCEDURE — G0463 HOSPITAL OUTPT CLINIC VISIT: HCPCS | Performed by: INTERNAL MEDICINE

## 2021-06-15 PROCEDURE — 99214 OFFICE O/P EST MOD 30 MIN: CPT | Performed by: INTERNAL MEDICINE

## 2021-06-15 PROCEDURE — 1100F PTFALLS ASSESS-DOCD GE2>/YR: CPT | Performed by: INTERNAL MEDICINE

## 2021-06-15 PROCEDURE — 3288F FALL RISK ASSESSMENT DOCD: CPT | Performed by: INTERNAL MEDICINE

## 2021-06-15 PROCEDURE — G8432 DEP SCR NOT DOC, RNG: HCPCS | Performed by: INTERNAL MEDICINE

## 2021-06-15 PROCEDURE — G8754 DIAS BP LESS 90: HCPCS | Performed by: INTERNAL MEDICINE

## 2021-06-15 PROCEDURE — G8536 NO DOC ELDER MAL SCRN: HCPCS | Performed by: INTERNAL MEDICINE

## 2021-06-15 RX ORDER — TAMSULOSIN HYDROCHLORIDE 0.4 MG/1
0.8 CAPSULE ORAL DAILY
Qty: 180 CAPSULE | Refills: 3
Start: 2021-06-15

## 2021-06-15 NOTE — PATIENT INSTRUCTIONS
Learning About Low-Carbohydrate Diets  What is a low-carbohydrate diet? A low-carbohydrate (or \"low-carb\") diet limits foods and drinks that have carbohydrates. This includes grains, fruits, milk and yogurt, and starchy vegetables like potatoes, beans, and corn. It also avoids foods and drinks that have added sugar. Instead, low-carb diets include foods that are high in protein and fat. Why might you follow a low-carb diet? Low-carb diets may be used for a variety of reasons, such as for weight loss. People who have diabetes may use a low-carb diet to help manage their blood sugar levels. What should you do before you start the diet? Talk to your doctor before you try any diet. This is even more important if you have health problems like kidney disease, heart disease, or diabetes. Your doctor may suggest that you meet with a registered dietitian. A dietitian can help you make an eating plan that works for you. What foods do you eat on a low-carb diet? On a low-carb diet, you choose foods that are high in protein and fat. Examples of these are:  · Meat, poultry, and fish. · Eggs. · Nuts, such as walnuts, pecans, almonds, and peanuts. · Peanut butter and other nut butters. · Tofu. · Avocado. · Freada [place]. · Non-starchy vegetables like broccoli, cauliflower, green beans, mushrooms, peppers, lettuce, and spinach. · Unsweetened non-dairy milks like almond milk and coconut milk. · Cheese, cottage cheese, and cream cheese. Current as of: December 17, 2020               Content Version: 12.8  © 2006-2021 [employer]. Care instructions adapted under license by [employer] (which disclaims liability or warranty for this information). If you have questions about a medical condition or this instruction, always ask your healthcare professional. [address] any warranty or liability for your use of this information.          Learning About Low-Carbohydrate Diets  What is a low-carbohydrate diet? A low-carbohydrate (or \"low-carb\") diet limits foods and drinks that have carbohydrates. This includes grains, fruits, milk and yogurt, and starchy vegetables like potatoes, beans, and corn. It also avoids foods and drinks that have added sugar. Instead, low-carb diets include foods that are high in protein and fat. Why might you follow a low-carb diet? Low-carb diets may be used for a variety of reasons, such as for weight loss. People who have diabetes may use a low-carb diet to help manage their blood sugar levels. What should you do before you start the diet? Talk to your doctor before you try any diet. This is even more important if you have health problems like kidney disease, heart disease, or diabetes. Your doctor may suggest that you meet with a registered dietitian. A dietitian can help you make an eating plan that works for you. What foods do you eat on a low-carb diet? On a low-carb diet, you choose foods that are high in protein and fat. Examples of these are:  · Meat, poultry, and fish. · Eggs. · Nuts, such as walnuts, pecans, almonds, and peanuts. · Peanut butter and other nut butters. · Tofu. · Avocado. · [place]. · Non-starchy vegetables like broccoli, cauliflower, green beans, mushrooms, peppers, lettuce, and spinach. · Unsweetened non-dairy milks like almond milk and coconut milk. · Cheese, cottage cheese, and cream cheese. Current as of: December 17, 2020               Content Version: 12.8  © 2006-2021 [employer]. Care instructions adapted under license by [employer] (which disclaims liability or warranty for this information). If you have questions about a medical condition or this instruction, always ask your healthcare professional. [address]  any warranty or liability for your use of this information.     Review the list of weight loss meds, and let me know your thoughts about starting one.

## 2021-06-15 NOTE — PROGRESS NOTES
Cody Haddad is a 66 y.o. male who presents for evaluation of routine follow up. Last seen by me dec 4, 2020 in npv, also awv. Since then was dx with prostate cancer, finishes radiation treatment later this week, and seems to be doing well. Also has plans to have TKA done in a few months, 'jiffy knee' procedure by dr Rigo White. Is engaged now, plans to spend month of July in [facility] with his son who lives there. ROS:  Constitutional: negative for fevers, chills, anorexia and weight loss  Eyes:   negative for visual disturbance and irritation  ENT:   negative for tinnitus,sore throat,nasal congestion,ear pain,hoarseness  Respiratory:  negative for cough, hemoptysis, dyspnea,wheezing  CV:   negative for chest pain, palpitations, lower extremity edema  GI:   negative for nausea, vomiting, diarrhea, abdominal pain,melena  Genitourinary: negative for frequency, dysuria and hematuria  Musculoskel: negative for myalgias, arthralgias, back pain, muscle weakness, joint pain  Neurological:  negative for headaches, dizziness, focal weakness, numbness  Psychiatric:     Negative for depression or anxiety      Past Medical History:   Diagnosis Date    AF (atrial fibrillation) (HCC)     s/p DCCV after his CABG, no recurrence    Anxiety     Arrhythmia     a fib    Arthritis     CAD (coronary artery disease)     s/p CABG in 1997    Cancer ([facility] [address] 04/2021    prostate cancer    GERD (gastroesophageal reflux disease)     Goiter, nontoxic, multinodular     Hypertension     Low blood potassium     Myocardial infarct ([facility] [address] ? 1997    MARIA ISABEL on CPAP     Other and unspecified hyperlipidemia     Unspecified sleep apnea        Past Surgical History:   Procedure Laterality Date    COLONOSCOPY N/A 4/1/2021    COLON[facility] at [facility] ENDOSCOPY    HX CATARACT REMOVAL  2006    bilateral    HX CHOLECYSTECTOMY  1970's    HX ORTHOPAEDIC      Arthoscopic L. knee surgery     HX SEPTOPLASTY  1980's    HX SHOULDER ARTHROSCOPY  2012    left    [place] CARDI[place] PROCEDURE UNLIST  1997    bypass    [place] INS NEW/RPLCMT PRM PM W/TRANSV ELTRD ATRIAL&VENT N/A 2019    INSERT PPM DUAL performed by Kurt Tobias MD at \[facility]\"" CARDIAC CATH LAB    RIGHT HEART CATHETERIZATION      x3 states pt. (can't remember when)    RT/LT HEART CATHETERS  2013    PTCA    Heart Dr. Chaim Bedoya       Family History   Problem Relation Age of Onset   Amada Seaelijah Cancer Father         in his shoulder    Heart Attack Father     Heart Disease Father     Heart Disease Mother    Luis Armando Xiao Mother         abd    Voncile Searing Cancer Son         wilm's tumor    Cancer Sister         abd    Thyroid Disease Neg Hx        Social History     Socioeconomic History    Marital status:      Spouse name: Not on file    Number of children: Not on file    Years of education: Not on file    Highest education level: Not on file   Occupational History    Not on file   Tobacco Use    Smoking status: Former Smoker     Packs/day: 4.50     Years: 20.00     Pack years: 90.00     Types: Cigarettes     Quit date: 1970     Years since quittin.1    Smokeless tobacco: Never Used   Vaping Use    Vaping Use: Never used   Substance and Sexual Activity    Alcohol use: Yes     Comment: 2 ounces of vodka daily    Drug use: No    Sexual activity: Not on file   Other Topics Concern    Not on file   Social History Narrative    Lives in [address] [place] [address],[address] with wife. Has 2 sons and 1 daughter. Works part time for [employer]. Worked at [employer] as an . Likes to travel and fish. Social Determinants of Health     Financial Resource Strain:     Difficulty of Paying Living Expenses:    Food Insecurity:     Worried About Running Out of Food in the Last Year:     [address] in the Last Year:    Transportation Needs:     Lack of Transportation (Medical):      Lack of Transportation (Non-Medical):    Physical Activity:     Days of Exercise per Week:     Minutes of Exercise per Session:    Stress:     Feeling of Stress :    Social Connections:     Frequency of Communication with Friends and Family:     Frequency of Social Gatherings with Friends and Family:     Attends Mormonism Services:     Active Member of Clubs or Organizations:     Attends Club or Organization Meetings:     Marital Status:    Intimate Partner Violence:     Fear of Current or Ex-Partner:     Emotionally Abused:     Physically Abused:     Sexually Abused:             Visit Vitals  /65 (BP 1 Location: Left upper arm, BP Patient Position: Sitting)   Pulse 82   Temp (!) 96.7 °F (35.9 °C) (Temporal)   Resp 18   Ht 5' 11\" (1.803 m)   Wt 305 lb 6.4 oz (138.5 kg)   SpO2 95%   BMI 42.59 kg/m²       Physical Examination:   General - Well appearing male. Obese. HEENT - PERRL, TM no erythema/opacification, normal nasal turbinates, no oropharyngeal erythema or exudate, MMM  Neck - supple, no bruits, no thyroidomegaly, no lymphadenopathy  Pulm - clear to auscultation bilaterally  Cardio - RRR, normal S1 S2, no murmur  Abd - soft, nontender, no masses, no HSM  Extrem - no edema, +2 distal pulses  Neuro-  No focal deficits, CN intact     Assessment/Plan:    1. Obesity, bmi 42.59--info given on weight loss meds. He will review and let me know his thoughts. Info also given on low carb diets  2. Cad with hx cabg--on toprol xl, asa  3. MARIA EUGENIA--doing well with celexa  4. Prostate cancer--finishing radiation treatment later this week. Also on casodex and lupron  5. bph--continue flomax  6.  htn--controlled with cozaar, norvasc, toprol xl, imdur  7.  hyperlipids--on lipitor  8. Bilateral knee osteoarthritis--has plans to have 'jiffy knee' replacement later this year  9.   Hx SSS, atrial fib--now with PPM.  Remains nsr, on toprol xl, norvasc    rtc few months for preop and labs        Zahraa Lim III, DO

## 2021-06-16 ENCOUNTER — HOSPITAL ENCOUNTER (OUTPATIENT)
Dept: RADIATION THERAPY | Age: 76
Discharge: HOME | End: 2021-06-16

## 2021-06-17 ENCOUNTER — HOSPITAL ENCOUNTER (OUTPATIENT)
Dept: RADIATION THERAPY | Age: 76
Discharge: HOME | End: 2021-06-17

## 2021-06-21 RX ORDER — LOSARTAN POTASSIUM 25 MG/1
TABLET ORAL
Qty: 90 TABLET | Refills: 1 | Status: SHIPPED | OUTPATIENT
Start: 2021-06-21 | End: 2021-11-15

## 2021-07-21 ENCOUNTER — TELEPHONE (OUTPATIENT)
Dept: CARDIOLOGY CLINIC | Age: 76
End: 2021-07-21

## 2021-07-21 DIAGNOSIS — E78.2 HYPERLIPIDEMIA, MIXED: Primary | ICD-10-CM

## 2021-07-21 NOTE — TELEPHONE ENCOUNTER
Spoke with patient. Verified patient with two patient identifiers. Advised I did not call him for anything. Patient verbalized understanding.

## 2021-08-02 ENCOUNTER — OFFICE VISIT (OUTPATIENT)
Dept: CARDIOLOGY CLINIC | Age: 76
End: 2021-08-02
Payer: MEDICARE

## 2021-08-02 ENCOUNTER — HOSPITAL ENCOUNTER (OUTPATIENT)
Dept: NON INVASIVE DIAGNOSTICS | Age: 76
Discharge: HOME | End: 2021-08-02
Payer: MEDICARE

## 2021-08-02 ENCOUNTER — HOSPITAL ENCOUNTER (OUTPATIENT)
Dept: GENERAL RADIOLOGY | Age: 76
Discharge: HOME | End: 2021-08-02
Payer: MEDICARE

## 2021-08-02 VITALS
HEIGHT: 71 IN | OXYGEN SATURATION: 98 % | SYSTOLIC BLOOD PRESSURE: 120 MMHG | RESPIRATION RATE: 16 BRPM | HEART RATE: 80 BPM | DIASTOLIC BLOOD PRESSURE: 60 MMHG | BODY MASS INDEX: 42.84 KG/M2 | WEIGHT: 306 LBS

## 2021-08-02 DIAGNOSIS — Z95.0 CARDIAC PACEMAKER IN SITU: Primary | ICD-10-CM

## 2021-08-02 DIAGNOSIS — I49.5 SSS (SICK SINUS SYNDROME) (HCC): ICD-10-CM

## 2021-08-02 DIAGNOSIS — Z95.0 CARDIAC PACEMAKER IN SITU: ICD-10-CM

## 2021-08-02 DIAGNOSIS — I48.91 ATRIAL FIBRILLATION, UNSPECIFIED TYPE (HCC): ICD-10-CM

## 2021-08-02 DIAGNOSIS — I10 ESSENTIAL HYPERTENSION: ICD-10-CM

## 2021-08-02 DIAGNOSIS — M17.11 PRIMARY OSTEOARTHRITIS OF RIGHT KNEE: ICD-10-CM

## 2021-08-02 DIAGNOSIS — E78.2 HYPERLIPIDEMIA, MIXED: ICD-10-CM

## 2021-08-02 DIAGNOSIS — I25.708 ATHEROSCLEROSIS OF CORONARY ARTERY BYPASS GRAFT OF NATIVE HEART WITH OTHER FORMS OF ANGINA PECTORIS (HCC): Primary | ICD-10-CM

## 2021-08-02 LAB
ATRIAL RATE: 72 BPM
CALCULATED P AXIS, ECG09: 70 DEGREES
CALCULATED R AXIS, ECG10: -37 DEGREES
CALCULATED T AXIS, ECG11: 32 DEGREES
DIAGNOSIS, 93000: NORMAL
P-R INTERVAL, ECG05: 380 MS
Q-T INTERVAL, ECG07: 450 MS
QRS DURATION, ECG06: 146 MS
QTC CALCULATION (BEZET), ECG08: 492 MS
VENTRICULAR RATE, ECG03: 72 BPM

## 2021-08-02 PROCEDURE — G8752 SYS BP LESS 140: HCPCS | Performed by: INTERNAL MEDICINE

## 2021-08-02 PROCEDURE — 93005 ELECTROCARDIOGRAM TRACING: CPT

## 2021-08-02 PROCEDURE — G0463 HOSPITAL OUTPT CLINIC VISIT: HCPCS | Performed by: INTERNAL MEDICINE

## 2021-08-02 PROCEDURE — G8510 SCR DEP NEG, NO PLAN REQD: HCPCS | Performed by: INTERNAL MEDICINE

## 2021-08-02 PROCEDURE — G8417 CALC BMI ABV UP PARAM F/U: HCPCS | Performed by: INTERNAL MEDICINE

## 2021-08-02 PROCEDURE — 3288F FALL RISK ASSESSMENT DOCD: CPT | Performed by: INTERNAL MEDICINE

## 2021-08-02 PROCEDURE — 1100F PTFALLS ASSESS-DOCD GE2>/YR: CPT | Performed by: INTERNAL MEDICINE

## 2021-08-02 PROCEDURE — 93294 REM INTERROG EVL PM/LDLS PM: CPT | Performed by: INTERNAL MEDICINE

## 2021-08-02 PROCEDURE — 93296 REM INTERROG EVL PM/IDS: CPT | Performed by: INTERNAL MEDICINE

## 2021-08-02 PROCEDURE — G8754 DIAS BP LESS 90: HCPCS | Performed by: INTERNAL MEDICINE

## 2021-08-02 PROCEDURE — 99214 OFFICE O/P EST MOD 30 MIN: CPT | Performed by: INTERNAL MEDICINE

## 2021-08-02 PROCEDURE — 71046 X-RAY EXAM CHEST 2 VIEWS: CPT

## 2021-08-02 PROCEDURE — G8536 NO DOC ELDER MAL SCRN: HCPCS | Performed by: INTERNAL MEDICINE

## 2021-08-02 PROCEDURE — G8427 DOCREV CUR MEDS BY ELIG CLIN: HCPCS | Performed by: INTERNAL MEDICINE

## 2021-08-02 NOTE — PROGRESS NOTES
Lawrence Mohamud NP          NAME:  Breonna Gomez   :   1945   MRN:   [id number]   PCP:  Renea Mooney DO           Subjective: The patient is a 66y.o. year old male  who returns for a follow-up appt and cardiac clearance to have Right TKR, the jiffy knee procedure with Dr Heber Cannon. Karla Griffith in [place]. He denies chest pain,SOB/HUSAIN, PND, orthopnea, or edema. Denies syncope, palpitations, dizziness or light headedness. Past Medical History:   Diagnosis Date    AF (atrial fibrillation) ([facility])     s/p DCCV after his CABG, no recurrence    Anxiety     Arrhythmia     a fib    Arthritis     CAD (coronary artery disease)     s/p CABG in     Cancer ([facility] 2021    prostate cancer    GERD (gastroesophageal reflux disease)     Goiter, nontoxic, multinodular     Hypertension     Long term current use of anticoagulant therapy     Low blood potassium     Myocardial infarct ([facility] ?     MARIA ISABEL on CPAP     Other and unspecified hyperlipidemia     Pacemaker     Unspecified sleep apnea         ICD-10-CM ICD-9-CM    1. Atherosclerosis of coronary artery bypass graft of native heart with other forms of angina pectoris ([facility])  Z31.279 689.17 METABOLIC PANEL, COMPREHENSIVE     413.9 LIPID PANEL      CANCELED: AMB POC EKG ROUTINE W/ 12 LEADS, INTER & REP   2. Atrial fibrillation, unspecified type ([facility]  X02.01 704.12 METABOLIC PANEL, COMPREHENSIVE      LIPID PANEL      CANCELED: AMB POC EKG ROUTINE W/ 12 LEADS, INTER & REP   3. Essential hypertension  F87 617.2 METABOLIC PANEL, COMPREHENSIVE      LIPID PANEL   4. Hyperlipidemia, mixed  T82.5 605.3 METABOLIC PANEL, COMPREHENSIVE      LIPID PANEL      CANCELED: AMB POC EKG ROUTINE W/ 12 LEADS, INTER & REP   5. SSS (sick sinus syndrome) ([facility])  I42.7 066.98 METABOLIC PANEL, COMPREHENSIVE      LIPID PANEL   6.  Cardiac pacemaker in situ  B80.6 A08.54 METABOLIC PANEL, COMPREHENSIVE      LIPID PANEL      Social History     Tobacco Use    Smoking status: Former Smoker     Packs/day: 4.50     Years: 20.00     Pack years: 90.00     Types: Cigarettes     Quit date: 1970     Years since quittin.3    Smokeless tobacco: Never Used   Substance Use Topics    Alcohol use: Yes     Comment: 2 ounces of vodka daily      Family History   Problem Relation Age of Onset    Cancer Father         in his shoulder    Heart Attack Father     Heart Disease Father     Heart Disease Mother     Cancer Mother         abd     Cancer Son         wilm's tumor    Cancer Sister         abd    Thyroid Disease Neg Hx         Review of Systems  Cardiovascular: Negative except as noted in HPI      Objective:       Vitals:    21 1429   BP: 120/60   Pulse: 80   Resp: 16   SpO2: 98%   Weight: 306 lb (138.8 kg)   Height: 5' 11\" (1.803 m)    Body mass index is 42.68 kg/m². General PE  Mental Status - Alert. General Appearance - Not in acute distress. Chest and Lung Exam   Inspection: Accessory muscles - No use of accessory muscles in breathing. Auscultation:   Breath sounds: - Normal.    Cardiovascular   Inspection: Jugular vein - Bilateral - Inspection Normal.  Palpation/Percussion:   Apical Impulse: - Normal.  Auscultation: Rhythm - Regular. Heart Sounds - S1 WNL and S2 WNL. No S3 or S4. Murmurs & Other Heart Sounds: Auscultation of the heart reveals - No Murmurs. Peripheral Vascular   Upper Extremity: Inspection - Bilateral - No Cyanotic nailbeds or Digital clubbing. Lower Extremity:   Palpation: Edema - Bilateral - No edema.         Data Review:     ECG: Sinus rhythm with 1st degree AV block   Left axis deviation   Right bundle branch block   When compared with ECG of 2020 15:38,   KS interval has increased   Inverted T waves have replaced nonspecific T wave abnormality in Inferior   leads     Allergies reviewed  Allergies   Allergen Reactions    Demerol [Meperidine] Other (comments)     hallucinations  hallucinations       Medications reviewed  Current Outpatient Medications   Medication Sig    losartan (COZAAR) 25 mg tablet TAKE 1 TABLET EVERY DAY (Patient taking differently: 12.5 mg.)    tamsulosin (FLOMAX) 0.4 mg capsule Take 2 Capsules by mouth daily.  leuprolide acetate (LUPRON DEPOT, 3 MONTH, IM) by IntraMUSCular route. Every 3 months    amLODIPine (NORVASC) 2.5 mg tablet TAKE 1 TABLET EVERY DAY    pantoprazole (PROTONIX) 40 mg tablet TAKE 1 TABLET EVERY DAY (STOP PRILOSEC)    metoprolol succinate (TOPROL-XL) 25 mg XL tablet TAKE 1/2 TABLET EVERY NIGHT    atorvastatin (LIPITOR) 40 mg tablet TAKE 1 TABLET EVERY DAY.  (STOP  SIMVASTATIN)    isosorbide mononitrate ER (IMDUR) 60 mg CR tablet TAKE 1 TABLET EVERY MORNING    diclofenac (VOLTAREN) 1 % gel Apply  to affected area four (4) times daily.  melatonin 1 mg tablet Take 5 mg by mouth nightly.  vit C/E/Zn/coppr/lutein/zeaxan (PRESERVISION AREDS 2 PO) Take  by mouth two (2) times a day.  Potassium Gluconate 595 mg (99 mg) tablet Take 595 mg by mouth two (2) times a day.  hydrocortisone (HYTONE) 2.5 % topical cream     OXYGEN-AIR DELIVERY SYSTEMS (HORIZON NASAL CPAP SYSTEM) by Does Not Apply route.  acetaminophen (TYLENOL ARTHRITIS PAIN) 650 mg CR tablet Take 650 mg by mouth four (4) times daily.  ferrous sulfate (IRON) 325 mg (65 mg iron) tablet Take 325 mg by mouth two (2) times a day.  citalopram (CELEXA) 10 mg tablet Take 10 mg by mouth daily.  omega-3 fatty acids-vitamin e (FISH OIL) 1,000 mg Cap Take 2 Caps by mouth daily.  multivitamin, stress formula (STRESS TAB) tablet Take 1 Tab by mouth daily.  aspirin 81 mg tablet Take 162 mg by mouth daily.  GLUC HCL/GLUC DESHPANDE/AC-D-GLUCOS (GLUCOSAMINE COMPLEX PO) Take 1 Tab by mouth daily.     bicalutamide (CASODEX) 50 mg tablet  (Patient not taking: Reported on 8/2/2021)    OTHER Hormone tx got prostate cancer- Radiation for 9 weeks daily- (Patient not taking: Reported on 8/2/2021)     No current facility-administered medications for this visit. Assessment:       ICD-10-CM ICD-9-CM    1. Atherosclerosis of coronary artery bypass graft of native heart with other forms of angina pectoris ([facility])  E56.059 452.78 METABOLIC PANEL, COMPREHENSIVE     413.9 LIPID PANEL      CANCELED: AMB POC EKG ROUTINE W/ 12 LEADS, INTER & REP   2. Atrial fibrillation, unspecified type ([facility]  F58.41 447.35 METABOLIC PANEL, COMPREHENSIVE      LIPID PANEL      CANCELED: AMB POC EKG ROUTINE W/ 12 LEADS, INTER & REP   3. Essential hypertension  K87 656.7 METABOLIC PANEL, COMPREHENSIVE      LIPID PANEL   4. Hyperlipidemia, mixed  R80.2 946.1 METABOLIC PANEL, COMPREHENSIVE      LIPID PANEL      CANCELED: AMB POC EKG ROUTINE W/ 12 LEADS, INTER & REP   5. SSS (sick sinus syndrome) ([facility])  Q81.7 069.22 METABOLIC PANEL, COMPREHENSIVE      LIPID PANEL   6. Cardiac pacemaker in situ  T35.9 B38.43 METABOLIC PANEL, COMPREHENSIVE      LIPID PANEL        Orders Placed This Encounter    METABOLIC PANEL, COMPREHENSIVE    LIPID PANEL       Plan:     CAD: Hx of CABG. 12/2019 cath with PCI/KARRIE mid LM. Distal LAD lesion 100% stenosis/. Prox Cx 40%, RCA dist 90% with graft open. Denies angina or anginal equivalent symptoms today. EKG without significant changes c/w EKG 1/18/21. Continue BB, Imdur, Amlodipine, statin, ASA. Cleared for right TKR at medium risk, OK to interrupt asa    HTN: well controlled on current anti hypertensive regimen- Losartan 25mg, Amlodipine 2.5mg, Toprol 25mg, Imdur 60mg    Hyperlipidemia: LDL 44 7/6/2020. On Lipitor 40mg. Repeat labs now     SSS: pacemaker with appropriate function, no events in 4/28/21. Cont device interrogation per EP's instructions. Patient seen and examined by me with nurse practitioner. Vikram Ayers personally performed all components of the history, physical, and medical decision making and agree with the assessment and plan with minor modifications as noted. Asymptomatic. Cleared at medium risk.     Omie Lamb, MD

## 2021-08-02 NOTE — LETTER
8/2/2021 3:12 PM    Patient:  Simona Oneal   YOB: 1945  Date of Visit: 8/2/2021      Dear Domingo Kaur MD  [address] [place] [address] [address] [address]  Via In Basket: Thank you for referring Mr. Martina Martinez to me for evaluation/treatment. Below are the relevant portions of my assessment and plan of care. If you have questions, please do not hesitate to call me. I look forward to following Mr. Viktor Mccallum along with you.         Sincerely,      Kelsi Irizarry MD

## 2021-08-02 NOTE — LETTER
8/2/2021    Patient: Joya Solares   YOB: 1945   Date of Visit: 8/2/2021     Chana Win III, DO  [address] [facility] [address] [address]  [address] [address]  Via In [facility] [address]    Dear Allison Mathias DO,      Thank you for referring Mr. Yaquelin Conroy to [address] [place] [address] for evaluation. My notes for this consultation are attached. If you have questions, please do not hesitate to call me. I look forward to following your patient along with you.       Sincerely,    Pavel Cannon MD

## 2021-08-02 NOTE — PROGRESS NOTES
Chief Complaint   Patient presents with    Follow-up    Surgical Clearance     Right Knee Replacement    Coronary Artery Disease    Irregular Heart Beat    Cholesterol Problem     1. Have you been to the ER, urgent care clinic since your last visit? Yes [facility] 3/20 Hospitalized since your last visit? NO    2. Have you seen or consulted any other health care providers outside of the [address] [place] [address] since your last visit? Ortho  Include any pap smears or colon screening. No    Knee surgery scheduled for 9/9/21 with Dr Naif Hernandez  with [address] fax  # [phone]. Has questions about weight reduction .

## 2021-08-03 ENCOUNTER — TELEPHONE (OUTPATIENT)
Dept: CARDIOLOGY CLINIC | Age: 76
End: 2021-08-03

## 2021-08-03 LAB
ALBUMIN SERPL-MCNC: 4.4 G/DL (ref 3.7–4.7)
ALBUMIN/GLOB SERPL: 2 {RATIO} (ref 1.2–2.2)
ALP SERPL-CCNC: 49 IU/L (ref 48–121)
ALT SERPL-CCNC: 22 IU/L (ref 0–44)
AST SERPL-CCNC: 21 IU/L (ref 0–40)
BILIRUB SERPL-MCNC: 0.4 MG/DL (ref 0–1.2)
BUN SERPL-MCNC: 19 MG/DL (ref 8–27)
BUN/CREAT SERPL: 22 (ref 10–24)
CALCIUM SERPL-MCNC: 9.6 MG/DL (ref 8.6–10.2)
CHLORIDE SERPL-SCNC: 103 MMOL/L (ref 96–106)
CHOLEST SERPL-MCNC: 111 MG/DL (ref 100–199)
CK SERPL-CCNC: 177 U/L (ref 41–331)
CO2 SERPL-SCNC: 22 MMOL/L (ref 20–29)
CREAT SERPL-MCNC: 0.87 MG/DL (ref 0.76–1.27)
GLOBULIN SER CALC-MCNC: 2.2 G/DL (ref 1.5–4.5)
GLUCOSE SERPL-MCNC: 88 MG/DL (ref 65–99)
HDLC SERPL-MCNC: 35 MG/DL
IMP & REVIEW OF LAB RESULTS: NORMAL
LDLC SERPL CALC-MCNC: 53 MG/DL (ref 0–99)
POTASSIUM SERPL-SCNC: 4.5 MMOL/L (ref 3.5–5.2)
PROT SERPL-MCNC: 6.6 G/DL (ref 6–8.5)
SODIUM SERPL-SCNC: 139 MMOL/L (ref 134–144)
TRIGL SERPL-MCNC: 128 MG/DL (ref 0–149)
VLDLC SERPL CALC-MCNC: 23 MG/DL (ref 5–40)

## 2021-08-03 NOTE — PROGRESS NOTES
Kidneys, liver, and electrolytes normal. Cholesterol at goal LDL at 53, goal below 70. HDL is low at 35, but this has been a chronic issue.  Cont to take medications, work on diet/exercise which help with HDL

## 2021-08-03 NOTE — TELEPHONE ENCOUNTER
----- Message from Claudine Guerra NP sent at 8/3/2021  8:59 AM EDT -----  Kidneys, liver, and electrolytes normal. Cholesterol at goal LDL at 53, goal below 70. HDL is low at 35, but this has been a chronic issue.  Cont to take medications, work on diet/exercise which help with HDL

## 2021-08-10 ENCOUNTER — OFFICE VISIT (OUTPATIENT)
Dept: INTERNAL MEDICINE CLINIC | Age: 76
End: 2021-08-10
Payer: MEDICARE

## 2021-08-10 VITALS
BODY MASS INDEX: 43.03 KG/M2 | WEIGHT: 307.4 LBS | TEMPERATURE: 98.5 F | HEIGHT: 71 IN | HEART RATE: 80 BPM | SYSTOLIC BLOOD PRESSURE: 143 MMHG | DIASTOLIC BLOOD PRESSURE: 77 MMHG | OXYGEN SATURATION: 97 % | RESPIRATION RATE: 18 BRPM

## 2021-08-10 DIAGNOSIS — M17.11 PRIMARY OSTEOARTHRITIS OF RIGHT KNEE: ICD-10-CM

## 2021-08-10 DIAGNOSIS — G47.33 OSA ON CPAP: ICD-10-CM

## 2021-08-10 DIAGNOSIS — I10 ESSENTIAL HYPERTENSION: ICD-10-CM

## 2021-08-10 DIAGNOSIS — I25.10 CORONARY ARTERY DISEASE INVOLVING NATIVE CORONARY ARTERY OF NATIVE HEART WITHOUT ANGINA PECTORIS: ICD-10-CM

## 2021-08-10 DIAGNOSIS — Z01.818 PREOP EXAMINATION: Primary | ICD-10-CM

## 2021-08-10 DIAGNOSIS — F41.1 GAD (GENERALIZED ANXIETY DISORDER): ICD-10-CM

## 2021-08-10 DIAGNOSIS — E66.01 SEVERE OBESITY (HCC): ICD-10-CM

## 2021-08-10 DIAGNOSIS — Z99.89 OSA ON CPAP: ICD-10-CM

## 2021-08-10 PROCEDURE — G8536 NO DOC ELDER MAL SCRN: HCPCS | Performed by: INTERNAL MEDICINE

## 2021-08-10 PROCEDURE — 3288F FALL RISK ASSESSMENT DOCD: CPT | Performed by: INTERNAL MEDICINE

## 2021-08-10 PROCEDURE — 99214 OFFICE O/P EST MOD 30 MIN: CPT | Performed by: INTERNAL MEDICINE

## 2021-08-10 PROCEDURE — 1100F PTFALLS ASSESS-DOCD GE2>/YR: CPT | Performed by: INTERNAL MEDICINE

## 2021-08-10 PROCEDURE — G8432 DEP SCR NOT DOC, RNG: HCPCS | Performed by: INTERNAL MEDICINE

## 2021-08-10 PROCEDURE — G8754 DIAS BP LESS 90: HCPCS | Performed by: INTERNAL MEDICINE

## 2021-08-10 PROCEDURE — G8417 CALC BMI ABV UP PARAM F/U: HCPCS | Performed by: INTERNAL MEDICINE

## 2021-08-10 PROCEDURE — G8753 SYS BP > OR = 140: HCPCS | Performed by: INTERNAL MEDICINE

## 2021-08-10 PROCEDURE — G0463 HOSPITAL OUTPT CLINIC VISIT: HCPCS | Performed by: INTERNAL MEDICINE

## 2021-08-10 PROCEDURE — G8427 DOCREV CUR MEDS BY ELIG CLIN: HCPCS | Performed by: INTERNAL MEDICINE

## 2021-08-10 RX ORDER — PHENTERMINE HYDROCHLORIDE 37.5 MG/1
37.5 TABLET ORAL
Qty: 30 TABLET | Refills: 2 | Status: SHIPPED | OUTPATIENT
Start: 2021-08-10 | End: 2021-11-11

## 2021-08-10 NOTE — PATIENT INSTRUCTIONS
When You Are Overweight: Care Instructions  Your Care Instructions     If you're overweight, your doctor may recommend that you make changes in your eating and exercise habits. Being overweight can lead to serious health problems, such as high blood pressure, heart disease, type 2 diabetes, and arthritis, or it can make these problems worse. Eating a healthy diet and being more active can help you reach and stay at a healthy weight. You don't have to make huge changes all at once. Start by making small changes in your eating and exercise habits. To lose weight, you need to burn more calories than you take in. You can do this by eating healthy foods in reasonable amounts and becoming more active every day. Follow-up care is a key part of your treatment and safety. Be sure to make and go to all appointments, and call your doctor if you are having problems. It's also a good idea to know your test results and keep a list of the medicines you take. How can you care for yourself at home? · Improve your eating habits. You'll be more successful if you work on changing one eating habit at a time. All foods, if eaten in moderation, can be part of healthy eating. Remember to:  ? Eat a variety of foods from each food group. Include grains, vegetables, fruits, dairy, and protein foods. ? Limit foods high in fat, sugar, and calories. ? Eat slowly. And don't do anything else, such as watch TV, while you are eating. ? Pay attention to portion sizes. Put your food on a smaller plate. ? Plan your meals ahead of time. You'll be less likely to grab something that's not as healthy. · Get active. Regular activity can help you feel better, have more energy, and burn more calories. If you haven't been active, start slowly. Start with at least 30 minutes of moderate activity on most days of the week. Then gradually increase the amount of activity. Try for 60 or 90 minutes a day, at least 5 days a week.  There are a lot of ways to fit activity into your life. You can:  ? Walk or bike to the store. Or walk with a friend, or walk the dog.  ? Mow the lawn, rake leaves, shovel snow, or do some gardening. ? Use the stairs instead of the elevator, at least for a few floors. · Change your thinking. Your thoughts have a lot to do with how you feel and what you do. When you're trying to reach a healthy weight, changing how you think about certain things may help. Here are some ideas:  ? Don't compare yourself to others. Healthy bodies come in all shapes and sizes. ? Pay attention to how hungry or full you feel. When you eat, be aware of why you're eating and how much you're eating. ? Focus on improving your health instead of dieting. Dieting almost never works over the long term. · Ask your doctor about other health professionals who can help you reach a healthy weight. ? A dietitian can help you make healthy changes in your diet. ? An exercise specialist or  can help you develop a safe and effective exercise program.  ? A counselor or psychiatrist can help you cope with issues such as depression, anxiety, or family problems that can make it hard to focus on reaching a healthy weight. · Get support from your family, your doctor, your friends, a support group--and support yourself. Where can you learn more? Go to http://www.gray.com/  Enter S270 in the search box to learn more about \"When You Are Overweight: Care Instructions. \"  Current as of: September 23, 2020               Content Version: 12.8  © 3302-8722 [employer]. Care instructions adapted under license by [employer] (which disclaims liability or warranty for this information). If you have questions about a medical condition or this instruction, always ask your healthcare professional. [address] any warranty or liability for your use of this information.

## 2021-08-10 NOTE — PROGRESS NOTES
Kingsley Mclean is a 66 y.o. male who presents for evaluation of preop exam for upcoming right knee surgery, 'jiffy knee', by dr Thiago Minaya. Last seen by me Elizabeth 15, 2021. He just got back from [facility], was helping his son working on his house. He is doing well, has no complaints other than his knee pain. Was seen by dr Alina Gong with cardiology recently, and also cleared for surgery. Pt would like to also lose some weight. ROS:  Constitutional: negative for fevers, chills, anorexia and weight loss  Eyes:   negative for visual disturbance and irritation  ENT:   negative for tinnitus,sore throat,nasal congestion,ear pain,hoarseness  Respiratory:  negative for cough, hemoptysis, dyspnea,wheezing  CV:   negative for chest pain, palpitations, lower extremity edema  GI:   negative for nausea, vomiting, diarrhea, abdominal pain,melena  Genitourinary: negative for frequency, dysuria and hematuria  Musculoskel: negative for myalgias, arthralgias, back pain, muscle weakness. ++bilateral knee joint pain  Neurological:  negative for headaches, dizziness, focal weakness, numbness  Psychiatric:     Negative for depression or anxiety      Past Medical History:   Diagnosis Date    AF (atrial fibrillation) ([facility])     s/p DCCV after his CABG, no recurrence    Anxiety     Arrhythmia     a fib    Arthritis     CAD (coronary artery disease)     s/p CABG in 1997    Cancer ([facility] 04/2021    prostate cancer    GERD (gastroesophageal reflux disease)     Goiter, nontoxic, multinodular     Hypertension     Long term current use of anticoagulant therapy     Low blood potassium     Myocardial infarct ([facility] ? 1997    MARIA ISABEL on CPAP     Other and unspecified hyperlipidemia     Pacemaker     Unspecified sleep apnea        Past Surgical History:   Procedure Laterality Date    COLONOSCOPY N/A 4/1/2021    COLONOSCOPY performed by Marcia Mar MD at [facility] ENDOSCOPY    HX CATARACT REMOVAL  2006    bilateral    HX CHOLECYSTECTOMY      HX CORONARY ARTERY BYPASS GRAFT      side entry     HX CORONARY STENT PLACEMENT      HX ORTHOPAEDIC      Arthoscopic L. knee surgery     HX SEPTOPLASTY      HX SHOULDER ARTHROSCOPY  2012    left    HI CARDIAC SURG PROCEDURE UNLIST      bypass    HI INS NEW/RPLCMT PRM PM W/TRANSV ELTRD ATRIAL&VENT N/A 2019    INSERT PPM DUAL performed [facility] at [facility] CARDIAC CATH LAB    RIGHT HEART CATHETERIZATION      x3 states pt. (can't remember when)    RT/LT HEART CATHETERS  2013    PTCA    Heart Dr. Sharyle Baxter       Family History   Problem Relation Age of Onset   [facility] Cancer Father         in his shoulder    Heart Attack Father     Heart Disease Father     Heart Disease Mother     Cancer Mother         abd    [facility] Cancer Son         wilm's tumor   [facility] Cancer Sister         abd    Thyroid Disease Neg Hx        Social History     Socioeconomic History    Marital status:      Spouse name: Not on file    Number of children: Not on file    Years of education: Not on file    Highest education level: Not on file   Occupational History    Not on file   Tobacco Use    Smoking status: Former Smoker     Packs/day: 4.50     Years: 20.00     Pack years: 90.00     Types: Cigarettes     Quit date: 1970     Years since quittin.3    Smokeless tobacco: Never Used   Vaping Use    Vaping Use: Never used   Substance and Sexual Activity    Alcohol use: Yes     Comment: 2 ounces of vodka daily    Drug use: No    Sexual activity: Yes     Partners: Female   Other Topics Concern    Not on file   Social History Narrative    Lives in [address] [place] [address] with wife. Has 2 sons and 1 daughter. Works part time for [employer]. Worked at [employer] as an . Likes to travel and fish.      Social Determinants of Health     Financial Resource Strain:     Difficulty of Paying Living Expenses:    Food Insecurity:     Worried About Running Out of Food in the Last Year:    [address] in the Last Year:    Transportation Needs:     Lack of Transportation (Medical):  Lack of Transportation (Non-Medical):    Physical Activity:     Days of Exercise per Week:     Minutes of Exercise per Session:    Stress:     Feeling of Stress :    Social Connections:     Frequency of Communication with Friends and Family:     Frequency of Social Gatherings with Friends and Family:     Attends Rastafari Services:     Active Member of Clubs or Organizations:     Attends Club or Organization Meetings:     Marital Status:    Intimate Partner Violence:     Fear of Current or Ex-Partner:     Emotionally Abused:     Physically Abused:     Sexually Abused:             Visit Vitals  BP (!) 143/77 (BP 1 Location: Left upper arm, BP Patient Position: Sitting)   Pulse 80   Temp 98.5 °F (36.9 °C) (Temporal)   Resp 18   Ht 5' 11\" (1.803 m)   Wt 307 lb 6.4 oz (139.4 kg)   SpO2 97%   BMI 42.87 kg/m²       Physical Examination:   General - Well appearing male. obese  HEENT - PERRL, TM no erythema/opacification, normal nasal turbinates, no oropharyngeal erythema or exudate, MMM  Neck - supple, no bruits, no thyroidomegaly, no lymphadenopathy  Pulm - clear to auscultation bilaterally  Cardio - RRR, normal S1 S2, no murmur  Abd - soft, nontender, no masses, no HSM  Extrem - no edema, +2 distal pulses  Neuro-  No focal deficits, CN intact     Assessment/Plan:    1.  preop exam for right tka--has plans to do 'jiffy knee' procedure. May proceed as planned. No further workup needed. 2.  Obesity, bmi 42.87--rx to start phentermine. Hopefully can lose 10 lbs before sx, which will help with post sx therapy  3.  moira--continue cpap  4. Cad with hx cabg--on asa, toprol xl, imdur  5.  htn--bit elevated today, continue cozaar, norvasc, toprol xl  6. Hx prostate cancer--on lupron, casodex, flomax  7. MARIA EUGENIA--doing well with celexa  8.  hyperlipids--on lipitor  9.   Hx sss, pafib--nsr now, sp ppm    rtc 3 months        Linda Sings III, DO

## 2021-08-23 DIAGNOSIS — M17.11 PRIMARY OSTEOARTHRITIS OF RIGHT KNEE: Primary | ICD-10-CM

## 2021-08-26 ENCOUNTER — HOSPITAL ENCOUNTER (OUTPATIENT)
Dept: LAB | Age: 76
Discharge: HOME | End: 2021-08-26

## 2021-08-26 ENCOUNTER — OFFICE VISIT (OUTPATIENT)
Dept: ORTHOPEDIC SURGERY | Age: 76
End: 2021-08-26
Payer: MEDICARE

## 2021-08-26 ENCOUNTER — HOSPITAL ENCOUNTER (OUTPATIENT)
Dept: PREADMISSION TESTING | Age: 76
Discharge: HOME | End: 2021-08-26

## 2021-08-26 DIAGNOSIS — M17.11 OSTEOARTHRITIS OF RIGHT KNEE, UNSPECIFIED OSTEOARTHRITIS TYPE: Primary | ICD-10-CM

## 2021-08-26 DIAGNOSIS — M25.561 RIGHT KNEE PAIN, UNSPECIFIED CHRONICITY: ICD-10-CM

## 2021-08-26 PROCEDURE — 1100F PTFALLS ASSESS-DOCD GE2>/YR: CPT | Performed by: ORTHOPAEDIC SURGERY

## 2021-08-26 PROCEDURE — G8536 NO DOC ELDER MAL SCRN: HCPCS | Performed by: ORTHOPAEDIC SURGERY

## 2021-08-26 PROCEDURE — 99214 OFFICE O/P EST MOD 30 MIN: CPT | Performed by: ORTHOPAEDIC SURGERY

## 2021-08-26 PROCEDURE — G8427 DOCREV CUR MEDS BY ELIG CLIN: HCPCS | Performed by: ORTHOPAEDIC SURGERY

## 2021-08-26 PROCEDURE — G8756 NO BP MEASURE DOC: HCPCS | Performed by: ORTHOPAEDIC SURGERY

## 2021-08-26 PROCEDURE — 3288F FALL RISK ASSESSMENT DOCD: CPT | Performed by: ORTHOPAEDIC SURGERY

## 2021-08-26 PROCEDURE — G8432 DEP SCR NOT DOC, RNG: HCPCS | Performed by: ORTHOPAEDIC SURGERY

## 2021-08-26 PROCEDURE — G8417 CALC BMI ABV UP PARAM F/U: HCPCS | Performed by: ORTHOPAEDIC SURGERY

## 2021-08-26 RX ORDER — CEPHALEXIN 500 MG/1
500 CAPSULE ORAL EVERY 8 HOURS
Qty: 3 CAPSULE | Refills: 0 | Status: SHIPPED | OUTPATIENT
Start: 2021-08-26 | End: 2021-08-27

## 2021-08-26 RX ORDER — ONDANSETRON 4 MG/1
4 TABLET, ORALLY DISINTEGRATING ORAL
Qty: 10 TABLET | Refills: 0 | Status: SHIPPED | OUTPATIENT
Start: 2021-08-26 | End: 2021-11-11

## 2021-08-26 RX ORDER — OXYCODONE AND ACETAMINOPHEN 5; 325 MG/1; MG/1
1 TABLET ORAL
Qty: 30 TABLET | Refills: 0 | Status: SHIPPED | OUTPATIENT
Start: 2021-08-26 | End: 2021-09-09

## 2021-08-26 NOTE — PATIENT INSTRUCTIONS
Knee Pain or Injury: Care Instructions  Your Care Instructions     Injuries are a common cause of knee problems. Sudden (acute) injuries may be caused by a direct blow to the knee. They can also be caused by abnormal twisting, bending, or falling on the knee. Pain, bruising, or swelling may be severe, and may start within minutes of the injury. Overuse is another cause of knee pain. Other causes are climbing stairs, kneeling, and other activities that use the knee. Everyday wear and tear, especially as you get older, also can cause knee pain. Rest, along with home treatment, often relieves pain and allows your knee to heal. If you have a serious knee injury, you may need tests and treatment. Follow-up care is a key part of your treatment and safety. Be sure to make and go to all appointments, and call your doctor if you are having problems. It's also a good idea to know your test results and keep a list of the medicines you take. How can you care for yourself at home? · Be safe with medicines. Read and follow all instructions on the label. ? If the doctor gave you a prescription medicine for pain, take it as prescribed. ? If you are not taking a prescription pain medicine, ask your doctor if you can take an over-the-counter medicine. · Rest and protect your knee. Take a break from any activity that may cause pain. · Put ice or a cold pack on your knee for 10 to 20 minutes at a time. Put a thin cloth between the ice and your skin. · Prop up a sore knee on a pillow when you ice it or anytime you sit or lie down for the next 3 days. Try to keep it above the level of your heart. This will help reduce swelling. · If your knee is not swollen, you can put moist heat, a heating pad, or a warm cloth on your knee. · If your doctor recommends an elastic bandage, sleeve, or other type of support for your knee, wear it as directed.   · Follow your doctor's instructions about how much weight you can put on your leg. Use a cane, crutches, or a walker as instructed. · Follow your doctor's instructions about activity during your healing process. If you can do mild exercise, slowly increase your activity. · Reach and stay at a healthy weight. Extra weight can strain the joints, especially the knees and hips, and make the pain worse. Losing even a few pounds may help. When should you call for help? Call 911 anytime you think you may need emergency care. For example, call if:    · You have symptoms of a blood clot in your lung (called a pulmonary embolism). These may include:  ? Sudden chest pain. ? Trouble breathing. ? Coughing up blood. Call your doctor now or seek immediate medical care if:    · You have severe or increasing pain.     · Your leg or foot turns cold or changes color.     · You cannot stand or put weight on your knee.     · Your knee looks twisted or bent out of shape.     · You cannot move your knee.     · You have signs of infection, such as:  ? Increased pain, swelling, warmth, or redness. ? Red streaks leading from the knee. ? Pus draining from a place on your knee. ? A fever.     · You have signs of a blood clot in your leg (called a deep vein thrombosis), such as:  ? Pain in your calf, back of the knee, thigh, or groin. ? Redness and swelling in your leg or groin. Watch closely for changes in your health, and be sure to contact your doctor if:    · You have tingling, weakness, or numbness in your knee.     · You have any new symptoms, such as swelling.     · You have bruises from a knee injury that last longer than 2 weeks.     · You do not get better as expected. Where can you learn more? Go to http://www.gray.com/  Enter K195 in the search box to learn more about \"Knee Pain or Injury: Care Instructions. \"  Current as of: February 26, 2020               Content Version: 12.8  © 4460-2284 [employer].    Care instructions adapted under license by [employer] (which disclaims liability or warranty for this information). If you have questions about a medical condition or this instruction, always ask your healthcare professional. [address] any warranty or liability for your use of this information.

## 2021-08-26 NOTE — PROGRESS NOTES
Name: Kristen Madrigal    : 1945     Service Dept: [facility]    Patient's Pharmacies:    [facility] [address], [facility] AT [address]  [address] [address]  Phone: [phone] Fax: [phone]    [address] [facility], [facility] [place] [address] [address]  Phone: [phone] Fax: [phone]    [facility]  MOB#4  [address] [address]  Phone: [phone] Fax: [phone]       Chief Complaint   Patient presents with    Knee Pain    Pre-op Exam        There were no vitals taken for this visit. Allergies   Allergen Reactions    Demerol [Meperidine] Other (comments)     hallucinations  hallucinations      Current Outpatient Medications   Medication Sig Dispense Refill    phentermine (ADIPEX-P) 37.5 mg tablet Take 1 Tablet by mouth every morning. Max Daily Amount: 37.5 mg. 30 Tablet 2    losartan (COZAAR) 25 mg tablet TAKE 1 TABLET EVERY DAY (Patient taking differently: 12.5 mg.) 90 Tablet 1    tamsulosin (FLOMAX) 0.4 mg capsule Take 2 Capsules by mouth daily. 180 Capsule 3    bicalutamide (CASODEX) 50 mg tablet       OTHER Hormone tx got prostate cancer- Radiation for 9 weeks daily-       leuprolide acetate (LUPRON DEPOT, 3 MONTH, IM) by IntraMUSCular route.  Every 3 months      amLODIPine (NORVASC) 2.5 mg tablet TAKE 1 TABLET EVERY DAY 90 Tab 1    pantoprazole (PROTONIX) 40 mg tablet TAKE 1 TABLET EVERY DAY (STOP PRILOSEC) 90 Tab 3    metoprolol succinate (TOPROL-XL) 25 mg XL tablet TAKE 1/2 TABLET EVERY NIGHT 45 Tab 1    atorvastatin (LIPITOR) 40 mg tablet TAKE 1 TABLET EVERY DAY.  (STOP  SIMVASTATIN) 90 Tab 3    isosorbide mononitrate ER (IMDUR) 60 mg CR tablet TAKE 1 TABLET EVERY MORNING 90 Tab 3    diclofenac (VOLTAREN) 1 % gel Apply  to affected area four (4) times daily.  melatonin 1 mg tablet Take 5 mg by mouth nightly.  vit C/E/Zn/coppr/lutein/zeaxan (PRESERVISION AREDS 2 PO) Take  by mouth two (2) times a day.  Potassium Gluconate 595 mg (99 mg) tablet Take 595 mg by mouth two (2) times a day.  hydrocortisone (HYTONE) 2.5 % topical cream       OXYGEN-AIR DELIVERY SYSTEMS (HORIZON NASAL CPAP SYSTEM) by Does Not Apply route.  acetaminophen (TYLENOL ARTHRITIS PAIN) 650 mg CR tablet Take 650 mg by mouth four (4) times daily.  ferrous sulfate (IRON) 325 mg (65 mg iron) tablet Take 325 mg by mouth two (2) times a day.  citalopram (CELEXA) 10 mg tablet Take 10 mg by mouth daily.  omega-3 fatty acids-vitamin e (FISH OIL) 1,000 mg Cap Take 2 Caps by mouth daily.  multivitamin, stress formula (STRESS TAB) tablet Take 1 Tab by mouth daily.  aspirin 81 mg tablet Take 162 mg by mouth daily.  GLUC HCL/GLUC DESHPANDE/AC-D-GLUCOS (GLUCOSAMINE COMPLEX PO) Take 1 Tab by mouth daily.         Patient Active Problem List   Diagnosis Code    Hyperlipidemia, mixed E78.2    Atherosclerosis of coronary artery bypass graft I25.810    Atrial fibrillation (HCC) I48.91    Goiter, nontoxic, multinodular E04.2    Unstable angina ([facility]) I20.0    S/P coronary artery stent placement Z95.5    HUSAIN (dyspnea on exertion) R06.00    Benign essential tremor syndrome G25.0    Memory difficulties R41.3    B12 deficiency E53.8    Vitamin D deficiency E55.9    Hypothyroidism due to acquired atrophy of thyroid E03.4    Irregular heartbeat I49.9    Severe obesity (HCC) E66.01    SSS (sick sinus syndrome) ([facility]) I49.5      Family History   Problem Relation Age of Onset    Cancer Father         in his shoulder    Heart Attack Father     Heart Disease Father     Heart Disease Mother     Cancer Mother         abd    Tivis Mccreedy Cancer Son         wilm's tumor    Cancer Sister         abd    Thyroid Disease Neg Hx       Social History     Socioeconomic History    Marital status:      Spouse name: Not on file    Number of children: Not on file    Years of education: Not on file    Highest education level: Not on file   Tobacco Use    Smoking status: Former Smoker     Packs/day: 4.50     Years: 20.00     Pack years: 90.00     Types: Cigarettes     Quit date: 1970     Years since quittin.3    Smokeless tobacco: Never Used   Vaping Use    Vaping Use: Never used   Substance and Sexual Activity    Alcohol use: Yes     Comment: 2 ounces of vodka daily    Drug use: No    Sexual activity: Yes     Partners: Female   Social History Narrative    Lives in [address] [place] [address] with wife. Has 2 sons and 1 daughter. Works part time for [employer]. Worked at [employer] as an . Likes to travel and fish. Social Determinants of Health     Financial Resource Strain:     Difficulty of Paying Living Expenses:    Food Insecurity:     Worried About Running Out of Food in the Last Year:     [address] in the Last Year:    Transportation Needs:     Lack of Transportation (Medical):      Lack of Transportation (Non-Medical):    Physical Activity:     Days of Exercise per Week:     Minutes of Exercise per Session:    Stress:     Feeling of Stress :    Social Connections:     Frequency of Communication with Friends and Family:     Frequency of Social Gatherings with Friends and Family:     Attends Religion Services:     Active Member of Clubs or Organizations:     Attends Club or Organization Meetings:     Marital Status:       Past Surgical History:   Procedure Laterality Date    COLONOSCOPY N/A 2021    COLONOSCOPY performed by Dante Justin MD at [address] CATARACT REMOVAL      bilateral    HX CHOLECYSTECTOMY      HX CORONARY ARTERY BYPASS GRAFT      side entry [address] HX ORTHOPAEDIC      Arthoscopic L. knee surgery     HX SEPTOPLASTY  1980's    HX SHOULDER ARTHROSCOPY  2012    left    [place] CARDIAC SURG PROCEDURE UNLIST  1997    bypass    [place] INS NEW/RPLCMT PRM PM W/TRANSV ELTRD ATRIAL&VENT N/A 11/1/2019    INSERT PPM DUAL performed by Loren [facility]illo [facility] at [facility] CARDIAC [facility]    RIGHT HEART CATHETERIZATION      x3 states pt. (can't remember when)    RT/LT HEART CATHETERS  12/2013    PTCA    Heart Dr. Jaylin Baig      Past Medical History:   Diagnosis Date    AF (atrial fibrillation) [facility])     s/p DCCV after his CABG, no recurrence    Anxiety     Arrhythmia     a fib    Arthritis     CAD (coronary artery disease)     s/p CABG in 1997    Cancer ([facility] [address] 04/2021    prostate cancer    GERD (gastroesophageal reflux disease)     Goiter, nontoxic, multinodular     Hypertension     Long term current use of anticoagulant therapy     Low blood potassium     Myocardial infarct ([facility] [address] ? 1997    MARIA ISABEL on CPAP     Other and unspecified hyperlipidemia     Pacemaker     Unspecified sleep apnea         I have reviewed and agree with PFSH and ROS and intake form in chart and the record furthermore I have reviewed prior medical record(s) regarding this patients care during this appointment. Review of Systems:   Patient is a pleasant appearing individual, appropriately dressed, well hydrated, well nourished, who is alert, appropriately oriented for age, and in no acute distress with a normal gait and normal affect who does not appear to be in any significant pain.    Physical Exam:  Right Knee -Decrease range of motion with flexion, Knee arc of greater than 50 degrees, Some crepitation, Grossly neurovascularly intact, Good cap refill, No skin lesion, Moderate swelling, No gross instability, Some quadriceps weakness, Kellgren and Colby at least grade 3    Left Knee - Full Range of Motion, No crepitation, Grossly neurovascularly intact, Good cap refill, No skin lesion, No swelling, No gross instability, No quadriceps weakness    Inpatient status: The patient has admitted to severe pain in the affected knee and due to such pain they are unable to complete activities of daily living at home and/or work on a regular basis where conservative treatments have failed. After extensive discussion with the patient, they have chosen to receive a total knee replacement with the expectation of inpatient procedure. Their dependent functional status (i.e. lack of capable support and safety at home, pain management, comorbities, or difficulty ambulating with assistive walking devices) would deem them a candidate for an inpatient stay. The patient acknowledges and understand the plan. The risks of surgery were explained to the patient which include but not limited to infection, nerve injury, artery injury, tendon injury, poor result, poor wound healing, unforeseen incidence, bleeding, infection, nerve damage, failure to improve, worsening of symptoms, morbidity, and mortality risks were explained. All questions were answered. Patient was told of no guarantees. Patient accepts all risks and benefits. A consent for surgery will be documented and signed by the patient or a legal guardian. All questions were answered. The procedure was explained in detail. The patient was counseled about the risks of mirna Covid-19 during their perioperative period and any recovery window from their procedure. The patient was made aware that mirna Covid-19 may worsen their prognosis for recovering from their procedure and lend to a higher morbidity and/or mortality risk. All material risks, benefits, and reasonable alternatives including postponing the procedure were discussed. The patient DOES wish to proceed with their procedure at this time. Encounter Diagnoses     ICD-10-CM ICD-9-CM   1. Osteoarthritis of right knee, unspecified osteoarthritis type  M17.11 715.96   2.  Right knee pain, unspecified chronicity  M25.561 719.46       HPI:  The patient is here with a chief complaint of right knee pain, diagnosed with severe OA of the right knee, worse pain, progressively getting worse. Nothing has helped. ROS:  10-point review of systems is positive for locking and instability. X-rays are positive for severe OA. Assessment/Plan:  Plan would be for right total knee replacement, possible semi-constrained, Percocet, Keflex, and Zofran. We will go from there. As part of continued conservative pain management options the patient was advised to utilize Tylenol or OTC NSAIDS as long as it is not medically contraindicated. Return to Office: Follow-up and Dispositions    · Return for already scheduled for surgery. Scribed by Mitra Ta LPN as dictated by [facility] [place] ABDOULAYE Pitts MD.  Documentation True and Accepted Dylan ABDOULAYE Pitts MD

## 2021-08-26 NOTE — H&P (VIEW-ONLY)
Name: Emma Pacheco    : 1945     Service Dept: [address] [facility]    Patient's Pharmacies:    [facility] [address], 124 [facility] AT [address]  [address] [facility]  [address] [address]  Phone: [phone] Fax: [phone]    [address] [facility], [facility]  Critical access hospital [address]  Phone: [phone] Fax: [phone]    [facility], [address]  MOB#4  [address] [address]  Phone: [phone] Fax: [phone]       Chief Complaint   Patient presents with    Knee Pain    Pre-op Exam        There were no vitals taken for this visit. Allergies   Allergen Reactions    Demerol [Meperidine] Other (comments)     hallucinations  hallucinations      Current Outpatient Medications   Medication Sig Dispense Refill    phentermine (ADIPEX-P) 37.5 mg tablet Take 1 Tablet by mouth every morning. Max Daily Amount: 37.5 mg. 30 Tablet 2    losartan (COZAAR) 25 mg tablet TAKE 1 TABLET EVERY DAY (Patient taking differently: 12.5 mg.) 90 Tablet 1    tamsulosin (FLOMAX) 0.4 mg capsule Take 2 Capsules by mouth daily. 180 Capsule 3    bicalutamide (CASODEX) 50 mg tablet       OTHER Hormone tx got prostate cancer- Radiation for 9 weeks daily-       leuprolide acetate (LUPRON DEPOT, 3 MONTH, IM) by IntraMUSCular route.  Every 3 months      amLODIPine (NORVASC) 2.5 mg tablet TAKE 1 TABLET EVERY DAY 90 Tab 1    pantoprazole (PROTONIX) 40 mg tablet TAKE 1 TABLET EVERY DAY (STOP PRILOSEC) 90 Tab 3    metoprolol succinate (TOPROL-XL) 25 mg XL tablet TAKE 1/2 TABLET EVERY NIGHT 45 Tab 1    atorvastatin (LIPITOR) 40 mg tablet TAKE 1 TABLET EVERY DAY.  (STOP  SIMVASTATIN) 90 Tab 3    isosorbide mononitrate ER (IMDUR) 60 mg CR tablet TAKE 1 TABLET EVERY MORNING 90 Tab 3    diclofenac (VOLTAREN) 1 % gel Apply  to affected area four (4) times daily.  melatonin 1 mg tablet Take 5 mg by mouth nightly.  vit C/E/Zn/coppr/lutein/zeaxan (PRESERVISION AREDS 2 PO) Take  by mouth two (2) times a day.  Potassium Gluconate 595 mg (99 mg) tablet Take 595 mg by mouth two (2) times a day.  hydrocortisone (HYTONE) 2.5 % topical cream       OXYGEN-AIR DELIVERY SYSTEMS (HORIZON NASAL CPAP SYSTEM) by Does Not Apply route.  acetaminophen (TYLENOL ARTHRITIS PAIN) 650 mg CR tablet Take 650 mg by mouth four (4) times daily.  ferrous sulfate (IRON) 325 mg (65 mg iron) tablet Take 325 mg by mouth two (2) times a day.  citalopram (CELEXA) 10 mg tablet Take 10 mg by mouth daily.  omega-3 fatty acids-vitamin e (FISH OIL) 1,000 mg Cap Take 2 Caps by mouth daily.  multivitamin, stress formula (STRESS TAB) tablet Take 1 Tab by mouth daily.  aspirin 81 mg tablet Take 162 mg by mouth daily.  GLUC HCL/GLUC DESHPANDE/AC-D-GLUCOS (GLUCOSAMINE COMPLEX PO) Take 1 Tab by mouth daily.         Patient Active Problem List   Diagnosis Code    Hyperlipidemia, mixed E78.2    Atherosclerosis of coronary artery bypass graft I25.810    Atrial fibrillation (HCC) I48.91    Goiter, nontoxic, multinodular E04.2    Unstable angina ([facility]) I20.0    S/P coronary artery stent placement Z95.5    HUSAIN (dyspnea on exertion) R06.00    Benign essential tremor syndrome G25.0    Memory difficulties R41.3    B12 deficiency E53.8    Vitamin D deficiency E55.9    Hypothyroidism due to acquired atrophy of thyroid E03.4    Irregular heartbeat I49.9    Severe obesity (HCC) E66.01    SSS (sick sinus syndrome) ([facility]) I49.5      Family History   Problem Relation Age of Onset    Cancer Father         in his shoulder    Heart Attack Father     Heart Disease Father     Heart Disease Mother     Cancer Mother         abd    [facility] Cancer Son         wilm's tumor    Cancer Sister         abd    Thyroid Disease Neg Hx       Social History     Socioeconomic History    Marital status:      Spouse name: Not on file    Number of children: Not on file    Years of education: Not on file    Highest education level: Not on file   Tobacco Use    Smoking status: Former Smoker     Packs/day: 4.50     Years: 20.00     Pack years: 90.00     Types: Cigarettes     Quit date: 1970     Years since quittin.3    Smokeless tobacco: Never Used   Vaping Use    Vaping Use: Never used   Substance and Sexual Activity    Alcohol use: Yes     Comment: 2 ounces of vodka daily    Drug use: No    Sexual activity: Yes     Partners: Female   Social History Narrative    Lives in [address] [place] [place] [address] with wife. Has 2 sons and 1 daughter. Works part time for [employer]. Worked at [employer] as an . Likes to travel and fish. Social Determinants of Health     Financial Resource Strain:     Difficulty of Paying Living Expenses:    Food Insecurity:     Worried About Running Out of Food in the Last Year:     [address] in the Last Year:    Transportation Needs:     Lack of Transportation (Medical):      Lack of Transportation (Non-Medical):    Physical Activity:     Days of Exercise per Week:     Minutes of Exercise per Session:    Stress:     Feeling of Stress :    Social Connections:     Frequency of Communication with Friends and Family:     Frequency of Social Gatherings with Friends and Family:     Attends Adventism Services:     Active Member of Clubs or Organizations:     Attends Club or Organization Meetings:     Marital Status:       Past Surgical History:   Procedure Laterality Date    COLONOSCOPY N/A 2021    COLONOSCOPY performed by Angel Cooper MD at [address] CATARACT REMOVAL      bilateral    HX CHOLECYSTECTOMY      HX CORONARY ARTERY BYPASS GRAFT      side entry [address] HX ORTHOPAEDIC      Arthoscopic L. knee surgery     HX SEPTOPLASTY  1980's    HX SHOULDER ARTHROSCOPY  2012    left    ME CARDIAC SURG PROCEDURE UNLIST  1997    bypass    ME INS NEW/RPLCMT PRM PM W/TRANSV ELTRD ATRIAL&VENT N/A 11/1/2019    INSERT PPM DUAL performed by [facility]ana [facility]ara MD at [facility] CARDIAC CATH LAB    RIGHT HEART CATHETERIZATION      x3 states pt. (can't remember when)    RT/LT HEART CATHETERS  12/2013    PTCA    Heart Dr. Alex Bacon      Past Medical History:   Diagnosis Date    AF (atrial fibrillation) [facility])     s/p DCCV after his CABG, no recurrence    Anxiety     Arrhythmia     a fib    Arthritis     CAD (coronary artery disease)     s/p CABG in 1997    Cancer ([facility] [address] 04/2021    prostate cancer    GERD (gastroesophageal reflux disease)     Goiter, nontoxic, multinodular     Hypertension     Long term current use of anticoagulant therapy     Low blood potassium     Myocardial infarct ([facility] [address] ? 1997    MARIA ISABEL on CPAP     Other and unspecified hyperlipidemia     Pacemaker     Unspecified sleep apnea         I have reviewed and agree with PFSH and ROS and intake form in chart and the record furthermore I have reviewed prior medical record(s) regarding this patients care during this appointment. Review of Systems:   Patient is a pleasant appearing individual, appropriately dressed, well hydrated, well nourished, who is alert, appropriately oriented for age, and in no acute distress with a normal gait and normal affect who does not appear to be in any significant pain.    Physical Exam:  Right Knee -Decrease range of motion with flexion, Knee arc of greater than 50 degrees, Some crepitation, Grossly neurovascularly intact, Good cap refill, No skin lesion, Moderate swelling, No gross instability, Some quadriceps weakness, Kellgren and Colby at least grade 3    Left Knee - Full Range of Motion, No crepitation, Grossly neurovascularly intact, Good cap refill, No skin lesion, No swelling, No gross instability, No quadriceps weakness    Inpatient status: The patient has admitted to severe pain in the affected knee and due to such pain they are unable to complete activities of daily living at home and/or work on a regular basis where conservative treatments have failed. After extensive discussion with the patient, they have chosen to receive a total knee replacement with the expectation of inpatient procedure. Their dependent functional status (i.e. lack of capable support and safety at home, pain management, comorbities, or difficulty ambulating with assistive walking devices) would deem them a candidate for an inpatient stay. The patient acknowledges and understand the plan. The risks of surgery were explained to the patient which include but not limited to infection, nerve injury, artery injury, tendon injury, poor result, poor wound healing, unforeseen incidence, bleeding, infection, nerve damage, failure to improve, worsening of symptoms, morbidity, and mortality risks were explained. All questions were answered. Patient was told of no guarantees. Patient accepts all risks and benefits. A consent for surgery will be documented and signed by the patient or a legal guardian. All questions were answered. The procedure was explained in detail. The patient was counseled about the risks of mirna Covid-19 during their perioperative period and any recovery window from their procedure. The patient was made aware that mirna Covid-19 may worsen their prognosis for recovering from their procedure and lend to a higher morbidity and/or mortality risk. All material risks, benefits, and reasonable alternatives including postponing the procedure were discussed. The patient DOES wish to proceed with their procedure at this time. Encounter Diagnoses     ICD-10-CM ICD-9-CM   1. Osteoarthritis of right knee, unspecified osteoarthritis type  M17.11 715.96   2.  Right knee pain, unspecified chronicity  M25.561 719.46       HPI:  The patient is here with a chief complaint of right knee pain, diagnosed with severe OA of the right knee, worse pain, progressively getting worse. Nothing has helped. ROS:  10-point review of systems is positive for locking and instability. X-rays are positive for severe OA. Assessment/Plan:  Plan would be for right total knee replacement, possible semi-constrained, Percocet, Keflex, and Zofran. We will go from there. As part of continued conservative pain management options the patient was advised to utilize Tylenol or OTC NSAIDS as long as it is not medically contraindicated. Return to Office: Follow-up and Dispositions    · Return for already scheduled for surgery. Scribed by Jacquelin Kelly LPN as dictated by [facility] [place] ABDOULAYE Murry MD.  Documentation True and Accepted Dylan ABDOULAYE Murry MD

## 2021-08-26 NOTE — LETTER
8/27/2021    Patient: Giles Ferguson   YOB: 1945   Date of Visit: 8/26/2021     Kadie Bryan III, DO  [address] [facility] [address]  [address] [address]  [address] [facility] [address]    Dear [employer],      Thank you for referring Mr. Albert Hodge to [address] [place] [address] for evaluation. My notes for this consultation are attached. If you have questions, please do not hesitate to call me. I look forward to following your patient along with you.       Sincerely,    Jayne Marti MD

## 2021-08-27 LAB
BACTERIA SPEC CULT: ABNORMAL
BACTERIA SPEC CULT: ABNORMAL
SPECIAL REQUESTS,SREQ: ABNORMAL

## 2021-09-02 ENCOUNTER — ANESTHESIA EVENT (OUTPATIENT)
Dept: SURGERY | Age: 76
End: 2021-09-02
Payer: MEDICARE

## 2021-09-02 RX ORDER — DEXTROSE 50 % IN WATER (D50W) INTRAVENOUS SYRINGE
25-50 AS NEEDED
Status: CANCELLED | OUTPATIENT
Start: 2021-09-02

## 2021-09-02 RX ORDER — MAGNESIUM SULFATE 100 %
4 CRYSTALS MISCELLANEOUS AS NEEDED
Status: CANCELLED | OUTPATIENT
Start: 2021-09-02

## 2021-09-09 ENCOUNTER — ANESTHESIA (OUTPATIENT)
Dept: SURGERY | Age: 76
End: 2021-09-09
Payer: MEDICARE

## 2021-09-09 ENCOUNTER — HOSPITAL ENCOUNTER (OUTPATIENT)
Age: 76
Discharge: HOME | End: 2021-09-09
Attending: ORTHOPAEDIC SURGERY | Admitting: NURSE PRACTITIONER
Payer: MEDICARE

## 2021-09-09 ENCOUNTER — APPOINTMENT (OUTPATIENT)
Dept: GENERAL RADIOLOGY | Age: 76
End: 2021-09-09
Attending: NURSE PRACTITIONER
Payer: MEDICARE

## 2021-09-09 VITALS
BODY MASS INDEX: 40.88 KG/M2 | RESPIRATION RATE: 20 BRPM | HEIGHT: 71 IN | SYSTOLIC BLOOD PRESSURE: 104 MMHG | DIASTOLIC BLOOD PRESSURE: 63 MMHG | OXYGEN SATURATION: 97 % | WEIGHT: 292 LBS | TEMPERATURE: 97.6 F | HEART RATE: 75 BPM

## 2021-09-09 PROBLEM — M17.9 KNEE OSTEOARTHRITIS: Status: ACTIVE | Noted: 2021-09-09

## 2021-09-09 LAB
ABO + RH BLD: NORMAL
BLOOD GROUP ANTIBODIES SERPL: NEGATIVE
SPECIMEN EXP DATE BLD: NORMAL

## 2021-09-09 PROCEDURE — 74011250636 HC RX REV CODE- 250/636: Performed by: NURSE ANESTHETIST, CERTIFIED REGISTERED

## 2021-09-09 PROCEDURE — 86901 BLOOD TYPING SEROLOGIC RH(D): CPT

## 2021-09-09 PROCEDURE — 74011250636 HC RX REV CODE- 250/636: Performed by: ORTHOPAEDIC SURGERY

## 2021-09-09 PROCEDURE — 64450 NJX AA&/STRD OTHER PN/BRANCH: CPT | Performed by: NURSE ANESTHETIST, CERTIFIED REGISTERED

## 2021-09-09 PROCEDURE — 77030029372 HC ADH SKN CLSR PRINEO J&J -C: Performed by: ORTHOPAEDIC SURGERY

## 2021-09-09 PROCEDURE — 77030006835 HC BLD SAW SAG STRY -B: Performed by: ORTHOPAEDIC SURGERY

## 2021-09-09 PROCEDURE — 76060000035 HC ANESTHESIA 2 TO 2.5 HR: Performed by: ORTHOPAEDIC SURGERY

## 2021-09-09 PROCEDURE — 77030002982 HC SUT POLYSRB J&J -A: Performed by: ORTHOPAEDIC SURGERY

## 2021-09-09 PROCEDURE — 74011250636 HC RX REV CODE- 250/636: Performed by: NURSE PRACTITIONER

## 2021-09-09 PROCEDURE — 36415 COLL VENOUS BLD VENIPUNCTURE: CPT

## 2021-09-09 PROCEDURE — 74011250637 HC RX REV CODE- 250/637: Performed by: NURSE PRACTITIONER

## 2021-09-09 PROCEDURE — 97161 PT EVAL LOW COMPLEX 20 MIN: CPT

## 2021-09-09 PROCEDURE — 76010000131 HC OR TIME 2 TO 2.5 HR: Performed by: ORTHOPAEDIC SURGERY

## 2021-09-09 PROCEDURE — 77030013708 HC HNDPC SUC IRR PULS STRY –B: Performed by: ORTHOPAEDIC SURGERY

## 2021-09-09 PROCEDURE — C1776 JOINT DEVICE (IMPLANTABLE): HCPCS | Performed by: ORTHOPAEDIC SURGERY

## 2021-09-09 PROCEDURE — 77030002933 HC SUT MCRYL J&J -A: Performed by: ORTHOPAEDIC SURGERY

## 2021-09-09 PROCEDURE — 76942 ECHO GUIDE FOR BIOPSY: CPT | Performed by: NURSE ANESTHETIST, CERTIFIED REGISTERED

## 2021-09-09 PROCEDURE — 77030000032 HC CUF TRNQT ZIMM -B: Performed by: ORTHOPAEDIC SURGERY

## 2021-09-09 PROCEDURE — 77030018673: Performed by: ORTHOPAEDIC SURGERY

## 2021-09-09 PROCEDURE — 74011000272 HC RX REV CODE- 272: Performed by: ORTHOPAEDIC SURGERY

## 2021-09-09 PROCEDURE — 74011250637 HC RX REV CODE- 250/637: Performed by: ORTHOPAEDIC SURGERY

## 2021-09-09 PROCEDURE — C1713 ANCHOR/SCREW BN/BN,TIS/BN: HCPCS | Performed by: ORTHOPAEDIC SURGERY

## 2021-09-09 PROCEDURE — 77030003601 HC NDL NRV BLK BBMI -A: Performed by: NURSE ANESTHETIST, CERTIFIED REGISTERED

## 2021-09-09 PROCEDURE — 77030018850 HC STOCK ANTIEMB THG COVD -A: Performed by: ORTHOPAEDIC SURGERY

## 2021-09-09 PROCEDURE — 77030013079 HC BLNKT BAIR HGGR 3M -A: Performed by: NURSE ANESTHETIST, CERTIFIED REGISTERED

## 2021-09-09 PROCEDURE — 77030041690 HC SYS PINNING KN JNJ -D: Performed by: ORTHOPAEDIC SURGERY

## 2021-09-09 PROCEDURE — 77030011266 HC ELECTRD BLD INSL COVD -A: Performed by: ORTHOPAEDIC SURGERY

## 2021-09-09 PROCEDURE — 77030031139 HC SUT VCRL2 J&J -A: Performed by: ORTHOPAEDIC SURGERY

## 2021-09-09 PROCEDURE — 77030040393 HC DRSG OPTIFOAM GENT MDII -B: Performed by: ORTHOPAEDIC SURGERY

## 2021-09-09 PROCEDURE — 74011000250 HC RX REV CODE- 250: Performed by: NURSE ANESTHETIST, CERTIFIED REGISTERED

## 2021-09-09 PROCEDURE — 97116 GAIT TRAINING THERAPY: CPT

## 2021-09-09 PROCEDURE — 77030040361 HC SLV COMPR DVT MDII -B: Performed by: ORTHOPAEDIC SURGERY

## 2021-09-09 PROCEDURE — 73560 X-RAY EXAM OF KNEE 1 OR 2: CPT

## 2021-09-09 PROCEDURE — 77030010783 HC BOWL MX BN CEM J&J -B: Performed by: ORTHOPAEDIC SURGERY

## 2021-09-09 PROCEDURE — 74011000250 HC RX REV CODE- 250: Performed by: ORTHOPAEDIC SURGERY

## 2021-09-09 PROCEDURE — 76210000006 HC OR PH I REC 0.5 TO 1 HR: Performed by: ORTHOPAEDIC SURGERY

## 2021-09-09 PROCEDURE — 77030040375: Performed by: ORTHOPAEDIC SURGERY

## 2021-09-09 PROCEDURE — 77030006812 HC BLD SAW RECIP STRY -B: Performed by: ORTHOPAEDIC SURGERY

## 2021-09-09 PROCEDURE — 76210000025 HC REC RM PH II 3 TO 3.5 HR: Performed by: ORTHOPAEDIC SURGERY

## 2021-09-09 PROCEDURE — 77030007866 HC KT SPN ANES BBMI -B: Performed by: NURSE ANESTHETIST, CERTIFIED REGISTERED

## 2021-09-09 PROCEDURE — 2709999900 HC NON-CHARGEABLE SUPPLY: Performed by: ORTHOPAEDIC SURGERY

## 2021-09-09 DEVICE — COMPONENT PAT DIA38MM POLYETH DOME CEM MEDIALIZED ATTUNE: Type: IMPLANTABLE DEVICE | Site: KNEE | Status: FUNCTIONAL

## 2021-09-09 DEVICE — KNEE K1 TOT HEMI STD CEM IMPL CAPPED SYNTHES: Type: IMPLANTABLE DEVICE | Site: KNEE | Status: FUNCTIONAL

## 2021-09-09 DEVICE — ATTUNE RP TIB BASE SZ 9 CEM: Type: IMPLANTABLE DEVICE | Site: KNEE | Status: FUNCTIONAL

## 2021-09-09 DEVICE — INSERT TIB SZ 8 THK6MM KNEE POST STBL ROT PLATFRM ATTUNE: Type: IMPLANTABLE DEVICE | Site: KNEE | Status: FUNCTIONAL

## 2021-09-09 DEVICE — COMPONENT FEM SZ 8 R KNEE POST STBL CEM ATTUNE: Type: IMPLANTABLE DEVICE | Site: KNEE | Status: FUNCTIONAL

## 2021-09-09 DEVICE — CEMENT BNE 40GM FULL DOSE PMMA W/ GENT HI VISC RADPQ LNG: Type: IMPLANTABLE DEVICE | Site: KNEE | Status: FUNCTIONAL

## 2021-09-09 RX ORDER — MIDAZOLAM HYDROCHLORIDE 1 MG/ML
INJECTION, SOLUTION INTRAMUSCULAR; INTRAVENOUS
Status: SHIPPED | OUTPATIENT
Start: 2021-09-09 | End: 2021-09-09

## 2021-09-09 RX ORDER — DIPHENHYDRAMINE HYDROCHLORIDE 50 MG/ML
12.5 INJECTION, SOLUTION INTRAMUSCULAR; INTRAVENOUS
Status: DISCONTINUED | OUTPATIENT
Start: 2021-09-09 | End: 2021-09-09 | Stop reason: HOSPADM

## 2021-09-09 RX ORDER — BUPIVACAINE HYDROCHLORIDE 5 MG/ML
INJECTION, SOLUTION EPIDURAL; INTRACAUDAL
Status: COMPLETED | OUTPATIENT
Start: 2021-09-09 | End: 2021-09-09

## 2021-09-09 RX ORDER — FLUMAZENIL 0.1 MG/ML
0.2 INJECTION INTRAVENOUS
Status: DISCONTINUED | OUTPATIENT
Start: 2021-09-09 | End: 2021-09-09 | Stop reason: HOSPADM

## 2021-09-09 RX ORDER — SODIUM CHLORIDE 0.9 % (FLUSH) 0.9 %
5-40 SYRINGE (ML) INJECTION AS NEEDED
Status: CANCELLED | OUTPATIENT
Start: 2021-09-09

## 2021-09-09 RX ORDER — NALOXONE HYDROCHLORIDE 0.4 MG/ML
0.4 INJECTION, SOLUTION INTRAMUSCULAR; INTRAVENOUS; SUBCUTANEOUS AS NEEDED
Status: DISCONTINUED | OUTPATIENT
Start: 2021-09-09 | End: 2021-09-09 | Stop reason: HOSPADM

## 2021-09-09 RX ORDER — DEXTROSE 50 % IN WATER (D50W) INTRAVENOUS SYRINGE
25-50 AS NEEDED
Status: CANCELLED | OUTPATIENT
Start: 2021-09-09

## 2021-09-09 RX ORDER — DEXAMETHASONE SODIUM PHOSPHATE 4 MG/ML
INJECTION, SOLUTION INTRA-ARTICULAR; INTRALESIONAL; INTRAMUSCULAR; INTRAVENOUS; SOFT TISSUE
Status: SHIPPED | OUTPATIENT
Start: 2021-09-09 | End: 2021-09-09

## 2021-09-09 RX ORDER — SENNOSIDES 8.6 MG/1
1 TABLET ORAL 2 TIMES DAILY
Status: CANCELLED | OUTPATIENT
Start: 2021-09-09

## 2021-09-09 RX ORDER — ACETAMINOPHEN 500 MG
1000 TABLET ORAL ONCE
Status: COMPLETED | OUTPATIENT
Start: 2021-09-09 | End: 2021-09-09

## 2021-09-09 RX ORDER — SODIUM CHLORIDE 0.9 % (FLUSH) 0.9 %
5-40 SYRINGE (ML) INJECTION EVERY 8 HOURS
Status: CANCELLED | OUTPATIENT
Start: 2021-09-09

## 2021-09-09 RX ORDER — BUPIVACAINE HYDROCHLORIDE 2.5 MG/ML
INJECTION, SOLUTION EPIDURAL; INFILTRATION; INTRACAUDAL AS NEEDED
Status: DISCONTINUED | OUTPATIENT
Start: 2021-09-09 | End: 2021-09-09 | Stop reason: HOSPADM

## 2021-09-09 RX ORDER — SODIUM CHLORIDE 0.9 % (FLUSH) 0.9 %
5-40 SYRINGE (ML) INJECTION EVERY 8 HOURS
Status: DISCONTINUED | OUTPATIENT
Start: 2021-09-09 | End: 2021-09-09 | Stop reason: HOSPADM

## 2021-09-09 RX ORDER — CEFAZOLIN SODIUM IN 0.9 % NACL 2 G/100 ML
2 PLASTIC BAG, INJECTION (ML) INTRAVENOUS ONCE
Status: COMPLETED | OUTPATIENT
Start: 2021-09-09 | End: 2021-09-09

## 2021-09-09 RX ORDER — NALOXONE HYDROCHLORIDE 0.4 MG/ML
0.4 INJECTION, SOLUTION INTRAMUSCULAR; INTRAVENOUS; SUBCUTANEOUS AS NEEDED
Status: CANCELLED | OUTPATIENT
Start: 2021-09-09

## 2021-09-09 RX ORDER — CELECOXIB 200 MG/1
400 CAPSULE ORAL
Status: COMPLETED | OUTPATIENT
Start: 2021-09-09 | End: 2021-09-09

## 2021-09-09 RX ORDER — GABAPENTIN 300 MG/1
300 CAPSULE ORAL ONCE
Status: COMPLETED | OUTPATIENT
Start: 2021-09-09 | End: 2021-09-09

## 2021-09-09 RX ORDER — BUPIVACAINE HYDROCHLORIDE 5 MG/ML
INJECTION, SOLUTION EPIDURAL; INTRACAUDAL
Status: SHIPPED | OUTPATIENT
Start: 2021-09-09 | End: 2021-09-09

## 2021-09-09 RX ORDER — SODIUM CHLORIDE 0.9 % (FLUSH) 0.9 %
5-40 SYRINGE (ML) INJECTION AS NEEDED
Status: DISCONTINUED | OUTPATIENT
Start: 2021-09-09 | End: 2021-09-09 | Stop reason: HOSPADM

## 2021-09-09 RX ORDER — ONDANSETRON 2 MG/ML
4 INJECTION INTRAMUSCULAR; INTRAVENOUS ONCE
Status: DISCONTINUED | OUTPATIENT
Start: 2021-09-09 | End: 2021-09-09 | Stop reason: HOSPADM

## 2021-09-09 RX ORDER — PROPOFOL 10 MG/ML
VIAL (ML) INTRAVENOUS
Status: DISCONTINUED | OUTPATIENT
Start: 2021-09-09 | End: 2021-09-09 | Stop reason: HOSPADM

## 2021-09-09 RX ORDER — ONDANSETRON 2 MG/ML
4 INJECTION INTRAMUSCULAR; INTRAVENOUS
Status: CANCELLED | OUTPATIENT
Start: 2021-09-09

## 2021-09-09 RX ORDER — FENTANYL CITRATE 50 UG/ML
50 INJECTION, SOLUTION INTRAMUSCULAR; INTRAVENOUS
Status: DISCONTINUED | OUTPATIENT
Start: 2021-09-09 | End: 2021-09-09 | Stop reason: HOSPADM

## 2021-09-09 RX ORDER — CEFAZOLIN SODIUM IN 0.9 % NACL 2 G/100 ML
2 PLASTIC BAG, INJECTION (ML) INTRAVENOUS EVERY 8 HOURS
Status: CANCELLED | OUTPATIENT
Start: 2021-09-09 | End: 2021-09-09

## 2021-09-09 RX ORDER — FACIAL-BODY WIPES
10 EACH TOPICAL DAILY PRN
Status: CANCELLED | OUTPATIENT
Start: 2021-09-09

## 2021-09-09 RX ORDER — ALBUTEROL SULFATE 0.83 MG/ML
2.5 SOLUTION RESPIRATORY (INHALATION)
Status: DISCONTINUED | OUTPATIENT
Start: 2021-09-09 | End: 2021-09-09 | Stop reason: HOSPADM

## 2021-09-09 RX ORDER — KETOROLAC TROMETHAMINE 30 MG/ML
15 INJECTION, SOLUTION INTRAMUSCULAR; INTRAVENOUS
Status: CANCELLED | OUTPATIENT
Start: 2021-09-09 | End: 2021-09-10

## 2021-09-09 RX ORDER — OXYCODONE AND ACETAMINOPHEN 5; 325 MG/1; MG/1
2 TABLET ORAL
Status: DISCONTINUED | OUTPATIENT
Start: 2021-09-09 | End: 2021-09-09 | Stop reason: HOSPADM

## 2021-09-09 RX ORDER — KETAMINE HCL IN 0.9 % NACL 50 MG/5 ML
SYRINGE (ML) INTRAVENOUS AS NEEDED
Status: DISCONTINUED | OUTPATIENT
Start: 2021-09-09 | End: 2021-09-09 | Stop reason: HOSPADM

## 2021-09-09 RX ORDER — OXYCODONE AND ACETAMINOPHEN 10; 325 MG/1; MG/1
1 TABLET ORAL
Status: DISCONTINUED | OUTPATIENT
Start: 2021-09-09 | End: 2021-09-09 | Stop reason: HOSPADM

## 2021-09-09 RX ORDER — DIPHENHYDRAMINE HYDROCHLORIDE 50 MG/ML
12.5 INJECTION, SOLUTION INTRAMUSCULAR; INTRAVENOUS
Status: CANCELLED | OUTPATIENT
Start: 2021-09-09

## 2021-09-09 RX ORDER — SODIUM CHLORIDE, SODIUM LACTATE, POTASSIUM CHLORIDE, CALCIUM CHLORIDE 600; 310; 30; 20 MG/100ML; MG/100ML; MG/100ML; MG/100ML
25 INJECTION, SOLUTION INTRAVENOUS CONTINUOUS
Status: DISCONTINUED | OUTPATIENT
Start: 2021-09-09 | End: 2021-09-09 | Stop reason: HOSPADM

## 2021-09-09 RX ORDER — MAGNESIUM SULFATE 100 %
4 CRYSTALS MISCELLANEOUS AS NEEDED
Status: CANCELLED | OUTPATIENT
Start: 2021-09-09

## 2021-09-09 RX ORDER — ASPIRIN 325 MG
325 TABLET, DELAYED RELEASE (ENTERIC COATED) ORAL 2 TIMES DAILY
Status: CANCELLED | OUTPATIENT
Start: 2021-09-10

## 2021-09-09 RX ORDER — FENTANYL CITRATE 50 UG/ML
50 INJECTION, SOLUTION INTRAMUSCULAR; INTRAVENOUS AS NEEDED
Status: DISCONTINUED | OUTPATIENT
Start: 2021-09-09 | End: 2021-09-09 | Stop reason: HOSPADM

## 2021-09-09 RX ORDER — ACETAMINOPHEN 325 MG/1
650 TABLET ORAL
Status: CANCELLED | OUTPATIENT
Start: 2021-09-09

## 2021-09-09 RX ADMIN — GABAPENTIN 300 MG: 300 CAPSULE ORAL at 07:10

## 2021-09-09 RX ADMIN — ACETAMINOPHEN 1000 MG: 500 TABLET ORAL at 07:09

## 2021-09-09 RX ADMIN — CELECOXIB 400 MG: 200 CAPSULE ORAL at 07:09

## 2021-09-09 RX ADMIN — Medication 25 MG: at 07:58

## 2021-09-09 RX ADMIN — SODIUM CHLORIDE, POTASSIUM CHLORIDE, SODIUM LACTATE AND CALCIUM CHLORIDE 25 ML/HR: 600; 310; 30; 20 INJECTION, SOLUTION INTRAVENOUS at 07:10

## 2021-09-09 RX ADMIN — MIDAZOLAM 2 MG: 1 INJECTION INTRAMUSCULAR; INTRAVENOUS at 07:58

## 2021-09-09 RX ADMIN — PROPOFOL 50 MCG/KG/MIN: 10 INJECTION, EMULSION INTRAVENOUS at 08:10

## 2021-09-09 RX ADMIN — CEFAZOLIN 2 G: 10 INJECTION, POWDER, FOR SOLUTION INTRAVENOUS; PARENTERAL at 07:58

## 2021-09-09 RX ADMIN — Medication 25 MG: at 09:37

## 2021-09-09 RX ADMIN — BUPIVACAINE HYDROCHLORIDE 25 ML: 5 INJECTION, SOLUTION EPIDURAL; INTRACAUDAL; PERINEURAL at 07:37

## 2021-09-09 RX ADMIN — BUPIVACAINE HYDROCHLORIDE 12 MG: 5 INJECTION, SOLUTION EPIDURAL; INTRACAUDAL at 08:10

## 2021-09-09 RX ADMIN — OXYCODONE HYDROCHLORIDE AND ACETAMINOPHEN 2 TABLET: 5; 325 TABLET ORAL at 13:51

## 2021-09-09 RX ADMIN — MIDAZOLAM 2 MG: 1 INJECTION INTRAMUSCULAR; INTRAVENOUS at 07:37

## 2021-09-09 RX ADMIN — DEXAMETHASONE SODIUM PHOSPHATE 4 MG: 4 INJECTION, SOLUTION INTRAMUSCULAR; INTRAVENOUS at 07:37

## 2021-09-09 NOTE — OP NOTES
Operative Note    Patient: Danis Smith MRN: [id number]  Surgery Date: 9/9/2021  [unfilled]          Procedure  Primary Surgeon    RIGHT TKA (POSS SEMI CONSTRAINED)  Allie Beard MD    * Panel 2 does not exist *  * Panel 2 does not exist *    * Panel 3 does not exist *  * Panel 3 does not exist *     Surgeon(s) and Role:     * Allie Berad MD - Primary    Other OR Staff/Assistants:  Circ-1: Varun Rico  Scrub Tech-1: Zahraa Jack Felton: Jeannine Betancur  Surg Asst-1: Tone Zepeda    1st Assistant Tasks:  Closing    Pre-operative Diagnosis:  Primary osteoarthritis of right knee [M17.11]    Post-operative Diagnosis: same as preop diagnosis    Anesthesia Type: Spinal     Findings: djd    Complications: No    EBL: 50 cc    Specimens: None    Implants     Pacemaker    Joey Presley -- Deanne García Comp - [id number] - Raffaele Hernandez item: [address] --  Model/Cat number: [id number]    Serial number: [id number] : [place] MGMT    As of 11/1/2019     Status: Implanted                  Lead    Lead Pcmkr Capsur Fix Novus 58 -- - [id number] - Implanted      Inventory item: LEAD PCMKR CAPSUR FIX NOVUS 58 --  Model/Cat number: [id number]    Serial number: [id number] : [employer]TRONIC CARDIAC RHYTHM MGMT    Implant Date: 11/1/2019      As of 11/1/2019     Status: Implanted                  Lead Pcmkr Capsur Fix Novus 52 -- - [id number] - Implanted      Inventory item: LEAD PCMKR CAPSUR FIX NOVUS 52 --  Model/Cat number: [id number]    Serial number: [id number] : MEDTRONIC CARDIAC RHYTHM MGMT    Implant Date: 11/1/2019      As of 11/1/2019     Status: Implanted                  Cement    Cement Bne 40gm Full Dose Pmma W/ Gent Hi Visc Radpq Lng - [id number] - Implanted   (Right) Knee    Inventory item: CEMENT BNE 40GM FULL DOSE PMMA W/ GENT HI VISC RADPQ LNG Model/Cat number: [id number]    Serial number: [id number] : Juju Robertmaria guadalupe [employer]_Electric Imp    Lot number: [id number]      As of 9/9/2021     Status: Implanted                  Implant    Attune Rp Tib Base Sz 9 Leighton - [id number] - Implanted   (Right) Knee    Inventory item: ATTUNE RP TIB BASE SZ 9 LEIGHTON Model/Cat number: [id number]    Serial number: [id number] : [employer]_Electric Imp    Lot number: [id number] Size: 9    As of 9/9/2021     Status: Implanted                  Insert Tib Sz 8 Thk6mm Knee Post Stbl Rot Platfrm Attune - [id number] - Implanted   (Right) Knee    Inventory item: INSERT TIB SZ 8 THK6MM KNEE POST STBL ROT PLATFRM ATTUNE Model/Cat number: [id number]    Serial number: [id number] : [employer]_Electric Imp    Lot number: [id number] Size: 8 6mm    As of 9/9/2021     Status: Implanted                  Component Fem Sz 8 R Knee Post Stbl Leighton Attune - [id number] - Implanted   (Right) Knee    Inventory item: COMPONENT FEM SZ 8 R KNEE POST STBL LEIGHTON ATTUNE Model/Cat number: [id number]    Serial number: [id number] : [employer]_Electric Imp    Lot number: [id number] Size: 8    As of 9/9/2021     Status: Implanted                  Component Pat Llt92bz Polyeth Dome Leighton Medialized Attune - [id number] - Implanted   (Right) Knee    Inventory item: COMPONENT PAT FYC08AM POLYETH DOME LEIGHTON MEDIALIZED ATTUNE Model/Cat number: [id number]    Serial number: [id number] : [employer]_Electric Imp    Lot number: [id number] Size: 38mm    As of 9/9/2021     Status: Implanted                  Stent    Stent Sys Cor 3x23mm -- [facility] - [id number] - Implanted      Inventory item: STENT COR 23MM 3MM RX EVEROLIMUS ELUT XIENCE Model/Cat number: [id number]    : [employer] Lot number: [id number]    Device identifier: [id number] Device identifier type: GS1    GUDIJACOB Information     Request status Successful      Brand name: Sapna La Version/Model: 2493608-11    Company name: [address] MRI safety info as of 10/8/19: MR Conditional    Contains dry or latex rubber: No      GMDN P.T. name: Drug-eluting coronary artery stent, non-bioabsorbable-polymer-coated            As of 10/8/2019     Status: Implanted                  Stent Sys Cor 4x15mm -- Arlyn Stern - [id number] - Implanted      Inventory item: STENT SYS COR 4X15MM -- Mortimer Gutter Model/Cat number: [id number]    : [employer] Lot number: [id number]    Device identifier: [id number] Device identifier type: GS1    GUDID Information     Request status Successful      Brand name: Mortimer Gutter Version/Model: 6068761-52    Company name: "[employer]. MRI safety info as of 12/20/19: MR Conditional    Contains dry or latex rubber: No      GMDN P.T. name: Drug-eluting coronary artery stent, non-bioabsorbable-polymer-coated            As of 12/20/2019     Status: Implanted                         OPERATIVE PROCEDURE:  Please note the first assistant role was to help in patient positioning and draping of the extremity in a sterile fashion. Also during the surgery the assistant's responsibilities included but not limited to extremity positioning during critical portions of the surgery. Assisting in using and placement of retractors during surgery. Lower extremity was prepped and draped in a sterile fashion. After adequate anesthesia was given, the patient was placed in a well-padded supine position. Subvastus arthrotomy from the tibial tubercle to the superior pole of the patella was made. Knee was hyperflexed. Intramedullary reaming of distal femur and proximal tibia was performed. 10 mm of distal femur was cut. Anterior-posterior sizing guide was used. Anterior, posterior, chamfer cuts, and box cuts were made next. Proximal tibial cut and preparation performed. Posterior osteophyte meniscal remnants were removed, and also patella was everted. Free-hand cut of the patella was made. Trial components were placed. The patient was found to have excellent range of motion and stability with all trial components. All the trial components removed. Copious irrigation performed. Distal femur, proximal tibia, and patella were impacted in place. Excessive cement was removed. After the cement was hard, Subvastus arthrotomy closed with Vicryl and Prineo stitch. Compressive dressing was applied. The patient was taken to PACU in stable condition. Please note due to the patient's BMI of greater than 30 significant surgical effort was required compared to the standard patient with a BMI lower than 30. Surgical time increased approximately 30% from the normal surgical time due to the patient's high BMI. Because of the high BMI patient's knee would be considered a complex total knee replacement rather than a standard total knee replacement.     Bill Calabrese MD

## 2021-09-09 NOTE — DISCHARGE INSTRUCTIONS
TOTAL KNEE REPLACEMENT DISCHARGE INFORMATION    You have undergone a Total Knee Replacement. The following list is to provide you with some expectations over the next week upon your discharge from the hospital.     1. Please begin Aspirin 325mg every 12 hours (twice daily) starting tomorrow as directed until Dr. Marine Ledesma instructs you to discontinue it. If you are not sure which blood thinner to take please contact Dr. Alina Benedict office next business day for clarification. 2. Please be sure to continue your thigh-high compression stockings on both sides until instructed to discontinue them. 3. Over the course of the next week, you should continue thigh high stockings on the operative leg, DO NOT GET THE INCISION WET until instructed to do so. Please make sure the stockings on the operative leg are pulled up all the way to the thigh to prevent any creases which may result in abrasions or creases in the skin. If the stockings are creating creases resulting in abrasions or blistering on the operative leg please remove the stockings. You may take the stockings off on the nonoperative leg once you arrive home. 4. You may notice some bruising on your thigh and it may extend all the way to the ankles. That is perfectly normal early on.  5. You may experience a clicking noise in your knee and that is normal because of the artificial knee. 6. It is important to remember if you have any surgical procedure including dental procedures which may result in bleeding that an antibiotic 1 hour before the procedure will be required. Please let the provider performing the procedure know that you have artificial joint. If an antibiotic is not given by them please call our office and give us at least 5 business days to get you the appropriate antibiotics if needed. This rule applies indefinitely. 7. If an Ace wrap is placed on your knee you may remove the Ace wrap only 48 hours after your surgery.   We will leave the stockings on.  8. During the course of your  over the next week, should you experience fevers of 101.5 F, a white drainage from the incision, extreme redness around the incision, or the incision begins to have a pungent smell; Please call our office or page Dr. Sindy Feliz whose numbers are provided in your discharge paperwork. To Page Dr. Sindy Feliz please call [phone] and dial 0. Have the  page whomever is on call for Orthopedics. These are signs of infection and it should be addressed immediately. 9. Please do not drive until instructed to do so. 10. If you need a refill on pain medication please allow at least 2 business days notice for any refills. Immediate refill request may not be possible. Medication refill requests will not be addressed during non-business hours. Please do not page the on-call provider for pain medication refills after hours. 11. It is very important for you to begin your Outpatient Physical Therapy within a couple days of the day of your discharge and your appointment should have been set up. If your physical therapy has not been set up please call our office the next business day for assistance. Details provided in a separate sheet. 12. Remove ace wrap in 48 hrs after surgery but keep stockings on. 15. Finish all antibiotics, start the antibiotics as soon as you go home if you have prescribed antibiotics. 14.  You should perform your daily home exercises at least 4 times a day 30 minutes each time. Perform foot pumps on both feet at least 10 times every 15 minutes while awake. This helps prevent swelling in the leg and can help prevent blood clots in the leg. On the operative leg if you have significant swelling you can also lay down flat and put 3 pillows under the heel so the heel is above the heart level and then perform foot pumps 4 times a day for 10 minutes to help bring the swelling down. 15.  Do not place anything under your knee while sleeping at night.   Elevate your heel so your  is straight while sleeping at night. 16.  Perform deep breathing exercises 10 times every hour while awake. 17.  If you had a nerve block and you are not having pain the day of the surgery, at nighttime it is okay to take 1 pain medication before going to sleep to help prevent excruciating pain when the nerve block wears off. 18.  You may be given an ice pack machine use that to help prevent swelling. Do not apply heat to the incision area. 19.  While you are awake at least 10 times every 30 minutes move your foot up and down as if you are pumping gas from both feet to help prevent swelling and to promote blood circulation in the calf. 20.  If you develop sudden onset of shortness of breath or severe calf pain please go to closest emergency room. 21. Your pain medicine is a Narcotic and may cause constipation. You may take an over the counter stool softener while taking pain medicine. ICE THERAPY WRAP:    · Keep ice therapy wrap on when resting. · DO not wear when moving or walking. · Ice packs are reusable. · Ice Therapy wrap holds two ice packs at a time. Things to watch for:             Increased swelling of the surgical site             Spreading of redness around the incision site             Drainage of pus from the incision site             Developing a fever of 101.5 °F or higher             If any of these symptoms occur you have any questions please contact our office at [phone]. If you need to talk to Dr. Issa Diaz on an urgent basis please call the hospital at [phone]. 0 for the . Please let the  know you are a surgical patient of Dr. Issa Diaz and you wish to get in contact with him. If Dr. Issa Diaz or his staff do not call you back within 30 minutes. Please tell the  to try again. Phone: [phone]  [email or web address]

## 2021-09-09 NOTE — ANESTHESIA POSTPROCEDURE EVALUATION
Procedure(s):  RIGHT TKA. regional, spinal, general - backup    Anesthesia Post Evaluation      Multimodal analgesia: multimodal analgesia used between 6 hours prior to anesthesia start to PACU discharge  Patient location during evaluation: bedside  Patient participation: complete - patient cannot participate  Level of consciousness: awake and alert  Pain management: adequate  Airway patency: patent  Anesthetic complications: no  Cardiovascular status: stable  Respiratory status: acceptable  Hydration status: acceptable  Comments: DC when criteria met.   Post anesthesia nausea and vomiting:  none  Final Post Anesthesia Temperature Assessment:  Normothermia (36.0-37.5 degrees C)      INITIAL Post-op Vital signs:   Vitals Value Taken Time   /58 09/09/21 1043   Temp 36 °C (96.8 °F) 09/09/21 1043   Pulse 64 09/09/21 1043   Resp 16 09/09/21 1043   SpO2 94 % 09/09/21 1043

## 2021-09-09 NOTE — PERIOP NOTES
To the bathroom and voided a large amount into commode. Up in hallway walking with a walker. Tolerating well.

## 2021-09-09 NOTE — INTERVAL H&P NOTE
Update History & Physical    The Patient's History and Physical was reviewed with the patient. There was no change. The surgical site was confirmed by the patient and me. Patient understands and wants to proceed with the procedure. If applicable, I have discussed with the patient / power of  the rationale for blood component transfusion; its benefits in treating or preventing fatigue, organ damage, or death; and its risk which includes mild transfusion reactions, rare risk of blood borne infection, or more serious but rare reactions. I have discussed the alternatives to transfusion, including the risk and consequences of not receiving transfusion. The patient / Tiana Page of  had an opportunity to ask questions and had agreed to proceed with transfusion of blood components. Plan:  The risk, benefits, expected outcome, and alternative to the recommended procedure have been discussed with the patient.       Electronically signed by DANIEL Mcnair on 9/9/2021 at 7:21 AM

## 2021-09-09 NOTE — PROGRESS NOTES
Problem: Mobility Impaired (Adult and Pediatric)  Goal: *Acute Goals and Plan of Care (Insert Text)  Description: Pt is MOD (I)  with gait and navigates stairs with CGA . PLOF: Community ambulator who used a cane and who was (I) with ADLs. Outcome: Resolved/Met     Problem: Patient Education: Go to Patient Education Activity  Goal: Patient/Family Education  Outcome: Resolved/Met   PHYSICAL THERAPY EVALUATION AND DISCHARGE    Patient: Kingsley Mclean (45 y.o. male)  Date: 9/9/2021  Primary Diagnosis: Primary osteoarthritis of right knee [M17.11]  Knee osteoarthritis [M17.10]  Procedure(s) (LRB):  RIGHT TKA (Right) Day of Surgery   Precautions:  WBAT    ASSESSMENT :  Based on the objective data described below, the patient presents s/p R TKA and he is WBAT. He is able to ambulate with a RW with MOD (I) and he is able to navigate stairs with CGA. He is educated on a HEP and tolerates well. He can return home with outpatient P.T. Patient does not require further skilled intervention at this level of care. PLAN :  Recommendations and Planned Interventions:   No formal PT needs identified at this time. Discharge Recommendations: Outpatient  Further Equipment Recommendations for Discharge: N/A     SUBJECTIVE:   Patient states my knee is sore.     OBJECTIVE DATA SUMMARY:     Past Medical History:   Diagnosis Date    AF (atrial fibrillation) (HCC)     s/p DCCV after his CABG, no recurrence    Anxiety     Arrhythmia     a fib    Arthritis     CAD (coronary artery disease)     s/p CABG in 1997    [facility] 04/2021    prostate cancer    GERD (gastroesophageal reflux disease)     Goiter, nontoxic, multinodular     Hypertension     Long term current use of anticoagulant therapy     Low blood potassium     Myocardial infarct ([facility] ? 1997    MARIA ISABEL on CPAP     Other and unspecified hyperlipidemia     Pacemaker     Unspecified sleep apnea      Past Surgical History:   Procedure Laterality Date    COLONOSCOPY N/A 4/1/2021    COLONOSCOPY performed by Kirsten Forbes MD at [facility] ENDOSCOPY    HX CATARACT REMOVAL  2006    bilateral    HX CHOLECYSTECTOMY  1970's    HX CORONARY ARTERY BYPASS GRAFT      side entry [facility]      HX ORTHOPAEDIC      Arthoscopic L. knee surgery     HX SEPTOPLASTY  1980's    HX SHOULDER ARTHROSCOPY  2012    left    [facility] PROCEDURE UNLIST  1997    bypass    [facility] INS NEW/RPLCMT PRM PM W/TRANSV ELTRD ATRIAL&VENT N/A 11/1/2019    INSERT PPM DUAL performed by Margoth Vogt MD at [facility] CARDIAC CATH LAB    RIGHT HEART CATHETERIZATION      x3 states pt. (can't remember when)    RT/LT HEART CATHETERS  12/2013    PTCA    Heart Dr. Cornelia Whipple     Barriers to Learning/Limitations: None  Compensate with: N/A  Home Situation:   Home Situation  Home Environment: Private residence  # Steps to Enter: 3  Rails to Enter: Yes  Hand Rails : Bilateral  One/Two Story Residence: One story  # of Interior Steps: 11  Interior Rails: Both  Living Alone: Yes  Support Systems: Spouse/Significant Other, Child(nohemi)  Patient Expects to be Discharged to[de-identified] [employer]  Current DME Used/Available at Home: Cane, straight  Critical Behavior:  Neurologic State: Alert; Appropriate for age  Orientation Level: Oriented X4  Skin Integrity: Incision (comment) (ace wrap to rt knee c/d/i)  Skin Integumentary  Skin Integrity: Incision (comment) (ace wrap to rt knee c/d/i)  Strength:    Strength: Generally decreased, functional (R knee 4/5, SLR 1130)  Tone & Sensation:   Sensation: Intact  Range Of Motion:   AROM: Generally decreased, functional (R knee )  PROM: Generally decreased, functional (R knee )  Posture:  Posture (WDL): Within defined limits  Functional Mobility:  Bed Mobility:  Rolling: Modified independent  Supine to Sit: Modified independent  Sit to Supine: Modified independent  Scooting: Modified independent  Transfers:  Sit to Stand: Modified independent  Stand to Sit: Modified independent  Balance:   Sitting: Intact  Standing: Intact  Ambulation/Gait Training:  Distance (ft): 270 Feet (ft) (and another 125')  Assistive Device: Walker, rolling  Ambulation - Level of Assistance: Modified independent  Gait Description (WDL): Exceptions to WDL  Gait Abnormalities: Other; Antalgic (Flexed posture)  Right Side Weight Bearing: As tolerated  Stairs:  Number of Stairs Trained: 3 (Unable to reciprocate)  Stairs - Level of Assistance: Contact guard assistance  Rail Use: Both  Today's TX:   Pt is able to ambulate with MOD (I)  with a RW. He is able to navigate stairs with CGA. He is educated on a HEP and states understanding. Pain:  Pain level pre-treatment: 0/10   Pain level post-treatment: 1/10  Pain Location: R knee  Pain Intervention(s): Medication (see MAR); Rest, Ice, Repositioning   Response to intervention: Nurse notified, See doc flow    Activity Tolerance:   Fair, limited by breathing  Please refer to the flowsheet for vital signs taken during this treatment. After treatment:   []         Patient left in no apparent distress sitting up in chair  [x]         Patient left in no apparent distress in bed  []         Call bell left within reach  [x]         Nursing notified  []         Caregiver present  []         Bed alarm activated  []         SCDs applied    COMMUNICATION/EDUCATION:   [x]         Role of Physical Therapy in the acute care setting. []         Fall prevention education was provided and the patient/caregiver indicated understanding. [x]         Patient/family have participated as able in goal setting and plan of care. [x]         Patient/family agree to work toward stated goals and plan of care. []         Patient understands intent and goals of therapy, but is neutral about his/her participation. []         Patient is unable to participate in goal setting/plan of care: ongoing with therapy staff.  []         Other:     Thank you for this referral.  Radha Seals, PT, DPT   Time Calculation: 46 mins

## 2021-09-09 NOTE — ANESTHESIA PROCEDURE NOTES
Peripheral Block    Start time: 9/9/2021 7:25 AM  End time: 9/9/2021 7:38 AM  Performed by: Hemanth Villarreal CRNA  Authorized by: Hemanth Villarreal CRNA       Pre-procedure: Indications: at surgeon's request and post-op pain management    Preanesthetic Checklist: patient identified, risks and benefits discussed, site marked, timeout performed, anesthesia consent given and patient being monitored    Timeout Time: 07:20 EDT          Block Type:   Block Type:   Adductor canal block  Laterality:  Right  Monitoring:  Standard ASA monitoring, responsive to questions, oxygen, continuous pulse ox, frequent vital sign checks and heart rate  Injection Technique:  Single shot  Procedures: ultrasound guided    Patient Position: supine  Prep: chlorhexidine    Location:  Mid thigh  Needle Type:  Ultraplex  Needle Gauge:  20 G  Needle Localization:  Ultrasound guidance  Medication Injected:  Midazolam (VERSED) injection, 2 mg  bupivacaine (PF) (MARCAINE) 0.5% injection, 25 mL  dexamethasone (DECADRON) 4 mg/mL injection, 4 mg  Med Admin Time: 9/9/2021 7:37 AM    Assessment:  Number of attempts:  1  Injection Assessment:  Incremental injection every 5 mL, local visualized surrounding nerve on ultrasound, negative aspiration for blood, no paresthesia, no intravascular symptoms and ultrasound image on chart  Patient tolerance:  Patient tolerated the procedure well with no immediate complications

## 2021-09-09 NOTE — ANESTHESIA PREPROCEDURE EVALUATION
Relevant Problems   RESPIRATORY SYSTEM   (+) HUSAIN (dyspnea on exertion)      CARDIOVASCULAR   (+) Atherosclerosis of coronary artery bypass graft   (+) Atrial fibrillation (HCC)   (+) SSS (sick sinus syndrome) (HCC)   (+) Unstable angina (HCC)      ENDOCRINE   (+) Hypothyroidism due to acquired atrophy of thyroid   (+) Severe obesity ([facility]       Anesthetic History               Review of Systems / Medical History      Pulmonary        Sleep apnea           Neuro/Psych              Cardiovascular    Hypertension        Dysrhythmias   Pacemaker and CAD         GI/Hepatic/Renal     GERD           Endo/Other      Hypothyroidism  Morbid obesity and arthritis     Other Findings              Physical Exam    Airway  Mallampati: III  TM Distance: 4 - 6 cm  Neck ROM: normal range of motion   Mouth opening: Normal     Cardiovascular  Regular rate and rhythm,  S1 and S2 normal,  no murmur, click, rub, or gallop  Rhythm: regular  Rate: normal         Dental  No notable dental hx       Pulmonary  Breath sounds clear to auscultation               Abdominal  GI exam deferred       Other Findings            Anesthetic Plan    ASA: 3  Anesthesia type: regional, spinal and general - backup - saphenous block      Post-op pain plan if not by surgeon: peripheral nerve block single    Induction: Intravenous  Anesthetic plan and risks discussed with: Patient

## 2021-09-09 NOTE — ANESTHESIA PROCEDURE NOTES
Spinal Block    Start time: 9/9/2021 7:55 AM  End time: 9/9/2021 8:10 AM  Performed by: Curt Quinones CRNA  Authorized by: Curt Quinones CRNA     Pre-procedure:   Indications: primary anesthetic  Preanesthetic Checklist: patient identified, risks and benefits discussed, anesthesia consent, site marked, patient being monitored and timeout performed    Timeout Time: 07:55 EDT          Spinal Block:   Patient Position:  Seated  Prep Region:  Lumbar  Prep: Betadine      Location:  L3-4  Technique:  Single shot    Local Dose (mL):  2.2    Needle:   Needle Type:  Pencan  Needle Gauge:  22 G  Attempts:  3      Events: CSF confirmed, no blood with aspiration and no paresthesia        Assessment:  Insertion:  Uncomplicated  Patient tolerance:  Patient tolerated the procedure well with no immediate complications

## 2021-09-15 ENCOUNTER — TELEPHONE (OUTPATIENT)
Dept: ORTHOPEDIC SURGERY | Age: 76
End: 2021-09-15

## 2021-09-15 DIAGNOSIS — Z96.651 STATUS POST RIGHT KNEE REPLACEMENT: Primary | ICD-10-CM

## 2021-09-15 RX ORDER — OXYCODONE AND ACETAMINOPHEN 5; 325 MG/1; MG/1
1 TABLET ORAL
Qty: 30 TABLET | Refills: 0 | Status: SHIPPED | OUTPATIENT
Start: 2021-09-15 | End: 2021-09-23 | Stop reason: SDUPTHER

## 2021-09-15 NOTE — TELEPHONE ENCOUNTER
PT is requesting refill on pain oxycodone.      RT TKA 9/9/2021      [id number] [place], [place] - [address] [place] [address] TRAIL AT [facility] OF [address]   [address] [facility],  [place] [id number] Topical Sulfur Applications Pregnancy And Lactation Text: This medication is Pregnancy Category C and has an unknown safety profile during pregnancy. It is unknown if this topical medication is excreted in breast milk. Minocycline Pregnancy And Lactation Text: This medication is Pregnancy Category D and not consider safe during pregnancy. It is also excreted in breast milk. Benzoyl Peroxide Counseling: Patient counseled that medicine may cause skin irritation and bleach clothing.  In the event of skin irritation, the patient was advised to reduce the amount of the drug applied or use it less frequently.   The patient verbalized understanding of the proper use and possible adverse effects of benzoyl peroxide.  All of the patient's questions and concerns were addressed. Bactrim Pregnancy And Lactation Text: This medication is Pregnancy Category D and is known to cause fetal risk.  It is also excreted in breast milk. Doxycycline Counseling:  Patient counseled regarding possible photosensitivity and increased risk for sunburn.  Patient instructed to avoid sunlight, if possible.  When exposed to sunlight, patients should wear protective clothing, sunglasses, and sunscreen.  The patient was instructed to call the office immediately if the following severe adverse effects occur:  hearing changes, easy bruising/bleeding, severe headache, or vision changes.  The patient verbalized understanding of the proper use and possible adverse effects of doxycycline.  All of the patient's questions and concerns were addressed. Isotretinoin Pregnancy And Lactation Text: This medication is Pregnancy Category X and is considered extremely dangerous during pregnancy. It is unknown if it is excreted in breast milk. Tazorac Pregnancy And Lactation Text: This medication is not safe during pregnancy. It is unknown if this medication is excreted in breast milk. Spironolactone Counseling: Patient advised regarding risks of diarrhea, abdominal pain, hyperkalemia, birth defects (for female patients), liver toxicity and renal toxicity. The patient may need blood work to monitor liver and kidney function and potassium levels while on therapy. The patient verbalized understanding of the proper use and possible adverse effects of spironolactone.  All of the patient's questions and concerns were addressed. Benzoyl Peroxide Pregnancy And Lactation Text: This medication is Pregnancy Category C. It is unknown if benzoyl peroxide is excreted in breast milk. Birth Control Pills Counseling: Birth Control Pill Counseling: I discussed with the patient the potential side effects of OCPs including but not limited to increased risk of stroke, heart attack, thrombophlebitis, deep venous thrombosis, hepatic adenomas, breast changes, GI upset, headaches, and depression.  The patient verbalized understanding of the proper use and possible adverse effects of OCPs. All of the patient's questions and concerns were addressed. Doxycycline Pregnancy And Lactation Text: This medication is Pregnancy Category D and not consider safe during pregnancy. It is also excreted in breast milk but is considered safe for shorter treatment courses. High Dose Vitamin A Counseling: Side effects reviewed, pt to contact office should one occur. Topical Clindamycin Counseling: Patient counseled that this medication may cause skin irritation or allergic reactions.  In the event of skin irritation, the patient was advised to reduce the amount of the drug applied or use it less frequently.   The patient verbalized understanding of the proper use and possible adverse effects of clindamycin.  All of the patient's questions and concerns were addressed. Use Enhanced Medication Counseling?: No Spironolactone Pregnancy And Lactation Text: This medication can cause feminization of the male fetus and should be avoided during pregnancy. The active metabolite is also found in breast milk. Azithromycin Counseling:  I discussed with the patient the risks of azithromycin including but not limited to GI upset, allergic reaction, drug rash, diarrhea, and yeast infections. Birth Control Pills Pregnancy And Lactation Text: This medication should be avoided if pregnant and for the first 30 days post-partum. Erythromycin Counseling:  I discussed with the patient the risks of erythromycin including but not limited to GI upset, allergic reaction, drug rash, diarrhea, increase in liver enzymes, and yeast infections. Topical Retinoid counseling:  Patient advised to apply a pea-sized amount only at bedtime and wait 30 minutes after washing their face before applying.  If too drying, patient may add a non-comedogenic moisturizer. The patient verbalized understanding of the proper use and possible adverse effects of retinoids.  All of the patient's questions and concerns were addressed. Topical Clindamycin Pregnancy And Lactation Text: This medication is Pregnancy Category B and is considered safe during pregnancy. It is unknown if it is excreted in breast milk. High Dose Vitamin A Pregnancy And Lactation Text: High dose vitamin A therapy is contraindicated during pregnancy and breast feeding. Tetracycline Counseling: Patient counseled regarding possible photosensitivity and increased risk for sunburn.  Patient instructed to avoid sunlight, if possible.  When exposed to sunlight, patients should wear protective clothing, sunglasses, and sunscreen.  The patient was instructed to call the office immediately if the following severe adverse effects occur:  hearing changes, easy bruising/bleeding, severe headache, or vision changes.  The patient verbalized understanding of the proper use and possible adverse effects of tetracycline.  All of the patient's questions and concerns were addressed. Patient understands to avoid pregnancy while on therapy due to potential birth defects. Azithromycin Pregnancy And Lactation Text: This medication is considered safe during pregnancy and is also secreted in breast milk. Dapsone Counseling: I discussed with the patient the risks of dapsone including but not limited to hemolytic anemia, agranulocytosis, rashes, methemoglobinemia, kidney failure, peripheral neuropathy, headaches, GI upset, and liver toxicity.  Patients who start dapsone require monitoring including baseline LFTs and weekly CBCs for the first month, then every month thereafter.  The patient verbalized understanding of the proper use and possible adverse effects of dapsone.  All of the patient's questions and concerns were addressed. Topical Retinoid Pregnancy And Lactation Text: This medication is Pregnancy Category C. It is unknown if this medication is excreted in breast milk. Erythromycin Pregnancy And Lactation Text: This medication is Pregnancy Category B and is considered safe during pregnancy. It is also excreted in breast milk. Topical Sulfur Applications Counseling: Topical Sulfur Counseling: Patient counseled that this medication may cause skin irritation or allergic reactions.  In the event of skin irritation, the patient was advised to reduce the amount of the drug applied or use it less frequently.   The patient verbalized understanding of the proper use and possible adverse effects of topical sulfur application.  All of the patient's questions and concerns were addressed. Minocycline Counseling: Patient advised regarding possible photosensitivity and discoloration of the teeth, skin, lips, tongue and gums.  Patient instructed to avoid sunlight, if possible.  When exposed to sunlight, patients should wear protective clothing, sunglasses, and sunscreen.  The patient was instructed to call the office immediately if the following severe adverse effects occur:  hearing changes, easy bruising/bleeding, severe headache, or vision changes.  The patient verbalized understanding of the proper use and possible adverse effects of minocycline.  All of the patient's questions and concerns were addressed. Detail Level: Zone Bactrim Counseling:  I discussed with the patient the risks of sulfa antibiotics including but not limited to GI upset, allergic reaction, drug rash, diarrhea, dizziness, photosensitivity, and yeast infections.  Rarely, more serious reactions can occur including but not limited to aplastic anemia, agranulocytosis, methemoglobinemia, blood dyscrasias, liver or kidney failure, lung infiltrates or desquamative/blistering drug rashes. Dapsone Pregnancy And Lactation Text: This medication is Pregnancy Category C and is not considered safe during pregnancy or breast feeding. Tazorac Counseling:  Patient advised that medication is irritating and drying.  Patient may need to apply sparingly and wash off after an hour before eventually leaving it on overnight.  The patient verbalized understanding of the proper use and possible adverse effects of tazorac.  All of the patient's questions and concerns were addressed. Isotretinoin Counseling: Patient should get monthly blood tests, not donate blood, not drive at night if vision affected, not share medication, and not undergo elective surgery for 6 months after tx completed. Side effects reviewed, pt to contact office should one occur.

## 2021-09-16 ENCOUNTER — VIRTUAL VISIT (OUTPATIENT)
Dept: ORTHOPEDIC SURGERY | Age: 76
End: 2021-09-16

## 2021-09-16 DIAGNOSIS — Z96.651 STATUS POST RIGHT KNEE REPLACEMENT: Primary | ICD-10-CM

## 2021-09-16 RX ORDER — CLOPIDOGREL BISULFATE 75 MG/1
TABLET ORAL
COMMUNITY
Start: 2021-08-27 | End: 2021-11-15

## 2021-09-16 NOTE — PROGRESS NOTES
Name: Devin Brennan    : 1945   Weight Loss Metrics 2021 2021 8/10/2021 2021 6/15/2021 2021 2021   Today's Wt - 292 lb 307 lb 6.4 oz 306 lb 305 lb 6.4 oz 294 lb 300 lb   BMI 40.73 kg/m2 - 42.87 kg/m2 42.68 kg/m2 42.59 kg/m2 41 kg/m2 42.44 kg/m2       There is no height or weight on file to calculate BMI. Service Dept: [facility]    Patient's Pharmacies:    [facility] [address], [facility] AT [address]  [address] [place] [address]  Phone: [phone] Fax: [phone]    653 [facility], [address]  [address]  Phone: [phone] Fax: 2202 [address]  [facility] [address]  Phone: [phone] Fax: [phone]       Chief Complaint   Patient presents with    Surgical Follow-up    Knee Pain        HPI:  Patient follows up today 1 week status post a right TKA. His surgery was on 2021. He has been doing well and continues with physical therapy. There were no vitals taken for this visit. Allergies   Allergen Reactions    Demerol [Meperidine] Other (comments)     hallucinations  hallucinations        Current Outpatient Medications   Medication Sig Dispense Refill    clopidogreL (PLAVIX) 75 mg tab       oxyCODONE-acetaminophen (Percocet) 5-325 mg per tablet Take 1 Tablet by mouth every six (6) hours as needed for Pain for up to 14 days. Max Daily Amount: 4 Tablets. 30 Tablet 0    ondansetron (ZOFRAN ODT) 4 mg disintegrating tablet Take 1 Tablet by mouth every eight (8) hours as needed for Nausea or Nausea or Vomiting. (Patient not taking: Reported on 2021) 10 Tablet 0    phentermine (ADIPEX-P) 37.5 mg tablet Take 1 Tablet by mouth every morning.  Max Daily Amount: 37.5 mg. 30 Tablet 2    losartan (COZAAR) 25 mg tablet TAKE 1 TABLET EVERY DAY (Patient taking differently: 12.5 mg.) 90 Tablet 1    tamsulosin (FLOMAX) 0.4 mg capsule Take 2 Capsules by mouth daily. 180 Capsule 3    bicalutamide (CASODEX) 50 mg tablet       OTHER Hormone tx got prostate cancer- Radiation for 9 weeks daily-       leuprolide acetate (LUPRON DEPOT, 3 MONTH, IM) by IntraMUSCular route. Every 3 months      amLODIPine (NORVASC) 2.5 mg tablet TAKE 1 TABLET EVERY DAY 90 Tab 1    pantoprazole (PROTONIX) 40 mg tablet TAKE 1 TABLET EVERY DAY (STOP PRILOSEC) 90 Tab 3    metoprolol succinate (TOPROL-XL) 25 mg XL tablet TAKE 1/2 TABLET EVERY NIGHT 45 Tab 1    atorvastatin (LIPITOR) 40 mg tablet TAKE 1 TABLET EVERY DAY.  (STOP  SIMVASTATIN) 90 Tab 3    isosorbide mononitrate ER (IMDUR) 60 mg CR tablet TAKE 1 TABLET EVERY MORNING 90 Tab 3    diclofenac (VOLTAREN) 1 % gel Apply  to affected area four (4) times daily.  melatonin 1 mg tablet Take 5 mg by mouth nightly.  vit C/E/Zn/coppr/lutein/zeaxan (PRESERVISION AREDS 2 PO) Take  by mouth two (2) times a day.  Potassium Gluconate 595 mg (99 mg) tablet Take 595 mg by mouth two (2) times a day.  hydrocortisone (HYTONE) 2.5 % topical cream       OXYGEN-AIR DELIVERY SYSTEMS (HORIZON NASAL CPAP SYSTEM) by Does Not Apply route.  acetaminophen (TYLENOL ARTHRITIS PAIN) 650 mg CR tablet Take 650 mg by mouth four (4) times daily.  ferrous sulfate (IRON) 325 mg (65 mg iron) tablet Take 325 mg by mouth two (2) times a day.  citalopram (CELEXA) 10 mg tablet Take 10 mg by mouth daily.  omega-3 fatty acids-vitamin e (FISH OIL) 1,000 mg Cap Take 2 Caps by mouth daily.  multivitamin, stress formula (STRESS TAB) tablet Take 1 Tab by mouth daily.  aspirin 81 mg tablet Take 162 mg by mouth daily.  GLUC HCL/GLUC DESHPANDE/AC-D-GLUCOS (GLUCOSAMINE COMPLEX PO) Take 1 Tab by mouth daily.           Patient Active Problem List   Diagnosis Code  Hyperlipidemia, mixed E78.2    Atherosclerosis of coronary artery bypass graft I25.810    Atrial fibrillation (HCC) I48.91    Goiter, nontoxic, multinodular E04.2    Unstable angina (HCC) I20.0    S/P coronary artery stent placement Z95.5    HUSAIN (dyspnea on exertion) R06.00    Benign essential tremor syndrome G25.0    Memory difficulties R41.3    B12 deficiency E53.8    Vitamin D deficiency E55.9    Hypothyroidism due to acquired atrophy of thyroid E03.4    Irregular heartbeat I49.9    Severe obesity (HCC) E66.01    SSS (sick sinus syndrome) ([facility]) I49.5    Knee osteoarthritis M17.10        Family History   Problem Relation Age of Onset    Cancer Father         in his shoulder    Heart Attack Father     Heart Disease Father     Heart Disease Mother     Cancer Mother         abd    [employer] Cancer Son         wilm's tumor   [employer] Cancer Sister         abd    Thyroid Disease Neg Hx         Social History     Socioeconomic History    Marital status:      Spouse name: Not on file    Number of children: Not on file    Years of education: Not on file    Highest education level: Not on file   Tobacco Use    Smoking status: Former Smoker     Packs/day: 4.50     Years: 20.00     Pack years: 90.00     Types: Cigarettes     Quit date: 1970     Years since quittin.4    Smokeless tobacco: Never Used   Vaping Use    Vaping Use: Never used   Substance and Sexual Activity    Alcohol use: Yes     Comment: 2 ounces of vodka daily    Drug use: No    Sexual activity: Yes     Partners: Female   Social History Narrative    Lives in [address] [place] [address],[address] with wife. Has 2 sons and 1 daughter. Works part time for [email or web address]. Worked at [employer] as an . Likes to travel and fish.      Social Determinants of Health     Financial Resource Strain:     Difficulty of Paying Living Expenses:    Food Insecurity:     Worried About Running Out of Food in the Last Year:     [address] in the Last Year:    Transportation Needs:     Lack of Transportation (Medical):  Lack of Transportation (Non-Medical):    Physical Activity:     Days of Exercise per Week:     Minutes of Exercise per Session:    Stress:     Feeling of Stress :    Social Connections:     Frequency of Communication with Friends and Family:     Frequency of Social Gatherings with Friends and Family:     Attends Bahai Services:     Active Member of Clubs or Organizations:     Attends Club or Organization Meetings:     Marital Status:         Past Surgical History:   Procedure Laterality Date    COLONOSCOPY N/A 4/1/2021    COLONOSCOPY [facility] Dante Justin [facility] at [facility]COPY    HX CATARACT REMOVAL  2006    bilateral    HX CHOLECYSTECTOMY  1970's    HX CORONARY ARTERY BYPASS GRAFT      side entry [address] HX ORTHOPAEDIC      Arthoscopic L. knee surgery     HX SEPTOPLASTY  1980's    HX SHOULDER ARTHROSCOPY  2012    left    [place] CARDIAC SUR[place] PROCEDURE [place]LIST  [place]    bypass    [place] NEW/RPLCMT PRM PM W/TRANSV ELTRD ATRIAL&VENT N/A 11/1/2019    INSERT PPM DUAL performed by Daysi Patel MD at [facility] CARDIAC CATH LAB   [address]      x3 states pt. (can't remember when)    RT/LT HEART CATHETERS  12/2013    PTCA    Heart Dr. Eugenio Mederos        Past Medical History:   Diagnosis Date    AF (atrial fibrillation) ([address]     s/p DCCV after his CABG, no recurrence    Anxiety     Arrhythmia     a fib    Arthritis     CAD (coronary artery disease)     s/p CABG in 1997    Cancer ([address] 04/2021    prostate cancer    GERD (gastroesophageal reflux disease)     Goiter, nontoxic, multinodular     Hypertension     Long term current use of anticoagulant therapy     Low blood potassium     Myocardial infarct ([address] ? 1997    MARIA ISABEL on CPAP     Other and unspecified hyperlipidemia     Pacemaker     Unspecified sleep apnea         I have reviewed and agree with [facility] and ROS and intake form in chart and the record. Review of Systems:   Patient is pleasant appearing individual, appropriately dressed, well hydrated, well nourished, who is alert, appropriately oriented for age, and in no acute distress with a normal gait andnotmal affect who does not appear to be in any significant pain. Physical Exam:  Physical exam today the postoperative bandage was removed and the patient's incision is dry and intact, healing well. There is swelling and ecchymosis throughout the right lower extremity but no erythema or signs of infection. His calf is soft and nontender. Encounter Diagnoses     ICD-10-CM ICD-9-CM   1. Status post right knee replacement  Z96.651 V43.65       Sujey Dodge, who was evaluated through a synchronous (real-time) audio-video encounter, and/or his healthcare decision maker, is aware that it is a billable service, with coverage as determined by his insurance carrier. He provided verbal consent to proceed: Yes, and patient identification was verified. It was conducted pursuant to the emergency declaration under the [address] [place] [address], [address] [place] [address] and the [employer] General Act. A caregiver was present when appropriate. Ability to conduct physical exam was limited. I was in the office. The patient was at home. Return to Office:   Today I reviewed continued wound care with the patient including showering. He will continue progressing with physical therapy. He will continue using twice daily aspirin and his compression stocking on the right leg for another 3 weeks and at that time we will discontinue both. As long as he is doing well no further routine follow-up will be needed but he will see Dr. Nicole Machado back at 1 year for karrie-ray of the right knee. Were happy to see him at anytime in between for any new issues or concerns that arise.

## 2021-09-20 ENCOUNTER — HOSPITAL ENCOUNTER (OUTPATIENT)
Dept: RADIATION THERAPY | Age: 76
Discharge: HOME | End: 2021-09-20

## 2021-09-22 ENCOUNTER — TELEPHONE (OUTPATIENT)
Dept: ORTHOPEDIC SURGERY | Age: 76
End: 2021-09-22

## 2021-09-22 DIAGNOSIS — Z96.651 STATUS POST RIGHT KNEE REPLACEMENT: ICD-10-CM

## 2021-09-22 NOTE — TELEPHONE ENCOUNTER
PT IS REQUESTING REFILL ON PERCOCET. PT ALSO WANTS TO KNOW IF HE CAN BATHE THAT LEG. THEY TOOK PARTIAL BANDAGE OFF.        Via Lizy Bahena

## 2021-09-23 RX ORDER — OXYCODONE AND ACETAMINOPHEN 5; 325 MG/1; MG/1
1 TABLET ORAL
Qty: 30 TABLET | Refills: 0 | Status: SHIPPED | OUTPATIENT
Start: 2021-09-23 | End: 2021-10-01 | Stop reason: SDUPTHER

## 2021-10-01 ENCOUNTER — TELEPHONE (OUTPATIENT)
Dept: ORTHOPEDIC SURGERY | Age: 76
End: 2021-10-01

## 2021-10-01 DIAGNOSIS — Z96.651 STATUS POST RIGHT KNEE REPLACEMENT: ICD-10-CM

## 2021-10-01 RX ORDER — OXYCODONE AND ACETAMINOPHEN 5; 325 MG/1; MG/1
1 TABLET ORAL
Qty: 30 TABLET | Refills: 0 | Status: SHIPPED | OUTPATIENT
Start: 2021-10-01 | End: 2021-10-08 | Stop reason: SDUPTHER

## 2021-10-01 NOTE — TELEPHONE ENCOUNTER
Patient had TKA on 9/9 and would like a refill on pain medication at [facility] in [place], [place] (same one as before). Last refill was a week ago.

## 2021-10-06 ENCOUNTER — TELEPHONE (OUTPATIENT)
Dept: CARDIOLOGY CLINIC | Age: 76
End: 2021-10-06

## 2021-10-06 NOTE — TELEPHONE ENCOUNTER
Called patient back regarding remote transmission patient sent. I told him his device is working well. There are no episodes or alerts shown with his device. I educated the patient that when he has a symptom to send us a transmission and give us a call.

## 2021-10-06 NOTE — TELEPHONE ENCOUNTER
Please call patient about his monitor. He wants to know if pacemake and machine is functioning properly. He was not feeling well earlier.     [phone]    Thanks,  Samantha Alatorre

## 2021-10-08 ENCOUNTER — TELEPHONE (OUTPATIENT)
Dept: ORTHOPEDIC SURGERY | Age: 76
End: 2021-10-08

## 2021-10-08 DIAGNOSIS — Z96.651 STATUS POST RIGHT KNEE REPLACEMENT: ICD-10-CM

## 2021-10-08 RX ORDER — OXYCODONE AND ACETAMINOPHEN 5; 325 MG/1; MG/1
1 TABLET ORAL
Qty: 30 TABLET | Refills: 0 | Status: SHIPPED | OUTPATIENT
Start: 2021-10-08 | End: 2021-10-22

## 2021-10-08 NOTE — TELEPHONE ENCOUNTER
Patient had TKA 4 weeks ago. Would like a refill on pain medication at [facility] in [place]. Last refill was last week.

## 2021-11-01 ENCOUNTER — OFFICE VISIT (OUTPATIENT)
Dept: CARDIOLOGY CLINIC | Age: 76
End: 2021-11-01
Payer: MEDICARE

## 2021-11-01 DIAGNOSIS — Z95.0 CARDIAC PACEMAKER IN SITU: Primary | ICD-10-CM

## 2021-11-01 DIAGNOSIS — I49.5 SSS (SICK SINUS SYNDROME) (HCC): ICD-10-CM

## 2021-11-01 PROCEDURE — 93296 REM INTERROG EVL PM/IDS: CPT | Performed by: INTERNAL MEDICINE

## 2021-11-01 PROCEDURE — 93294 REM INTERROG EVL PM/LDLS PM: CPT | Performed by: INTERNAL MEDICINE

## 2021-11-11 ENCOUNTER — OFFICE VISIT (OUTPATIENT)
Dept: INTERNAL MEDICINE CLINIC | Age: 76
End: 2021-11-11
Payer: MEDICARE

## 2021-11-11 VITALS
OXYGEN SATURATION: 97 % | RESPIRATION RATE: 20 BRPM | SYSTOLIC BLOOD PRESSURE: 107 MMHG | WEIGHT: 292 LBS | HEART RATE: 95 BPM | HEIGHT: 71 IN | DIASTOLIC BLOOD PRESSURE: 65 MMHG | BODY MASS INDEX: 40.88 KG/M2 | TEMPERATURE: 97.9 F

## 2021-11-11 DIAGNOSIS — E66.01 SEVERE OBESITY (HCC): ICD-10-CM

## 2021-11-11 DIAGNOSIS — Z23 NEEDS FLU SHOT: ICD-10-CM

## 2021-11-11 DIAGNOSIS — I25.10 CORONARY ARTERY DISEASE INVOLVING NATIVE CORONARY ARTERY OF NATIVE HEART WITHOUT ANGINA PECTORIS: ICD-10-CM

## 2021-11-11 DIAGNOSIS — I10 ESSENTIAL HYPERTENSION: ICD-10-CM

## 2021-11-11 DIAGNOSIS — M17.11 PRIMARY OSTEOARTHRITIS OF RIGHT KNEE: ICD-10-CM

## 2021-11-11 DIAGNOSIS — R30.0 DYSURIA: Primary | ICD-10-CM

## 2021-11-11 DIAGNOSIS — C61 PROSTATE CANCER (HCC): ICD-10-CM

## 2021-11-11 DIAGNOSIS — L40.9 PSORIASIS: ICD-10-CM

## 2021-11-11 DIAGNOSIS — Z99.89 OSA ON CPAP: ICD-10-CM

## 2021-11-11 DIAGNOSIS — G47.33 OSA ON CPAP: ICD-10-CM

## 2021-11-11 LAB
BILIRUB UR QL STRIP: NEGATIVE
GLUCOSE UR-MCNC: NEGATIVE MG/DL
KETONES P FAST UR STRIP-MCNC: NEGATIVE MG/DL
PH UR STRIP: 5.5 [PH] (ref 4.6–8)
PROT UR QL STRIP: NEGATIVE
SP GR UR STRIP: 1.03 (ref 1–1.03)
UA UROBILINOGEN AMB POC: NORMAL (ref 0.2–1)
URINALYSIS CLARITY POC: NORMAL
URINALYSIS COLOR POC: YELLOW
URINE BLOOD POC: NEGATIVE
URINE LEUKOCYTES POC: NEGATIVE
URINE NITRITES POC: NEGATIVE

## 2021-11-11 PROCEDURE — 90694 VACC AIIV4 NO PRSRV 0.5ML IM: CPT | Performed by: INTERNAL MEDICINE

## 2021-11-11 PROCEDURE — 99214 OFFICE O/P EST MOD 30 MIN: CPT | Performed by: INTERNAL MEDICINE

## 2021-11-11 PROCEDURE — G8754 DIAS BP LESS 90: HCPCS | Performed by: INTERNAL MEDICINE

## 2021-11-11 PROCEDURE — G8417 CALC BMI ABV UP PARAM F/U: HCPCS | Performed by: INTERNAL MEDICINE

## 2021-11-11 PROCEDURE — G0463 HOSPITAL OUTPT CLINIC VISIT: HCPCS | Performed by: INTERNAL MEDICINE

## 2021-11-11 PROCEDURE — 1100F PTFALLS ASSESS-DOCD GE2>/YR: CPT | Performed by: INTERNAL MEDICINE

## 2021-11-11 PROCEDURE — 3288F FALL RISK ASSESSMENT DOCD: CPT | Performed by: INTERNAL MEDICINE

## 2021-11-11 PROCEDURE — G8536 NO DOC ELDER MAL SCRN: HCPCS | Performed by: INTERNAL MEDICINE

## 2021-11-11 PROCEDURE — G8432 DEP SCR NOT DOC, RNG: HCPCS | Performed by: INTERNAL MEDICINE

## 2021-11-11 PROCEDURE — G8427 DOCREV CUR MEDS BY ELIG CLIN: HCPCS | Performed by: INTERNAL MEDICINE

## 2021-11-11 PROCEDURE — G8752 SYS BP LESS 140: HCPCS | Performed by: INTERNAL MEDICINE

## 2021-11-11 PROCEDURE — 81003 URINALYSIS AUTO W/O SCOPE: CPT | Performed by: INTERNAL MEDICINE

## 2021-11-11 RX ORDER — BETAMETHASONE VALERATE 1.2 MG/G
CREAM TOPICAL 2 TIMES DAILY
Qty: 15 G | Refills: 1 | Status: SHIPPED | OUTPATIENT
Start: 2021-11-11 | End: 2022-02-22 | Stop reason: SDUPTHER

## 2021-11-11 NOTE — PROGRESS NOTES
Kelsey Guthrie is a 66 y.o. male who presents for evaluation of routine follow up. Last seen by me aug 10, 2021 in preop exam for right TKA, which went well. Had 'jiffy knee' done. Will finish PT next week. Complains today of some dysuria and malodor. Stopped phentermine, as he was taking too many meds. Weight still down 15 lbs. Also has patch near both medial malleoli that have psoriasis. [employer]s for [employer]. ROS:  Constitutional: negative for fevers, chills, anorexia and weight loss  Eyes:   negative for visual disturbance and irritation  ENT:   negative for tinnitus,sore throat,nasal congestion,ear pain,hoarseness  Respiratory:  negative for cough, hemoptysis, dyspnea,wheezing  CV:   negative for chest pain, palpitations, lower extremity edema  GI:   negative for nausea, vomiting, diarrhea, abdominal pain,melena  Genitourinary: negative for frequency, dysuria and hematuria  Musculoskel: negative for myalgias, arthralgias, back pain, muscle weakness, joint pain  Neurological:  negative for headaches, dizziness, focal weakness, numbness  Psychiatric:     Negative for depression or anxiety      Past Medical History:   Diagnosis Date    AF (atrial fibrillation) (HCC)     s/p DCCV after his CABG, no recurrence    Anxiety     Arrhythmia     a fib    Arthritis     CAD (coronary artery disease)     s/p CABG in 1997    Cancer ([facility] 04/2021    prostate cancer    GERD (gastroesophageal reflux disease)     Goiter, nontoxic, multinodular     Hypertension     Long term current use of anticoagulant therapy     Low blood potassium     Myocardial infarct ([facility] ? 1997    MARIA ISABEL on CPAP     Other and unspecified hyperlipidemia     Pacemaker     Unspecified sleep apnea        Past Surgical History:   Procedure Laterality Date    COLONOSCOPY N/A 4/1/2021    COL[facility]erman [facility]COPY    HX CATARACT REMOVAL  2006    bilateral    HX CHOLECYSTECTOMY  1970's    HX CORONARY ARTERY BYPASS GRAFT      side entry [facility]      HX KNEE REPLACEMENT      r tkr    HX ORTHOPAEDIC      Arthoscopic L. knee surgery     HX SEPTOPLASTY      HX SHOULDER ARTHROSCOPY  2012    left    [place] CARDIAC SURG PROCEDURE UNLIST      bypass    [place] INS NEW/RPLCMT PRM PM W/TRANSV ELTRD ATRIAL&VENT N/A 2019    INSERT PPM DUAL performed by Nora Meyers MD at [facility] CARDIAC CATH LAB    RIGHT HEART CATHETERIZATION      x3 states pt. (can't remember when)    RT/LT HEART CATHETERS  2013    PTCA    Heart Dr. Roc Zacarias       Family History   Problem Relation Age of Onset   Suni Ping Cancer Father         in his shoulder    Heart Attack Father     Heart Disease Father     Heart Disease Mother     Cancer Mother         abd    Suni Ping Cancer Son         wilm's tumor   Suni Ping Cancer Sister         abd    Thyroid Disease Neg Hx        Social History     Socioeconomic History    Marital status:      Spouse name: Not on file    Number of children: Not on file    Years of education: Not on file    Highest education level: Not on file   Occupational History    Not on file   Tobacco Use    Smoking status: Former Smoker     Packs/day: 4.50     Years: 20.00     Pack years: 90.00     Types: Cigarettes     Quit date: 1970     Years since quittin.6    Smokeless tobacco: Never Used   Vaping Use    Vaping Use: Never used   Substance and Sexual Activity    Alcohol use: Yes     Comment: 2 ounces of vodka daily    Drug use: No    Sexual activity: Yes     Partners: Female   Other Topics Concern    Not on file   Social History Narrative    Lives in [address] [place] [address],[address] with wife. Has 2 sons and 1 daughter. Works part time for [employer] Worked at [employer] as an . Likes to travel and fish.      Social Determinants of Health     Financial Resource Strain:     Difficulty of Paying Living Expenses: Not on file   Food Insecurity:     Worried About Running Out of Food in the Last Year: Not on file    Ran Out of Food in the Last Year: Not on file   Transportation Needs:     Lack of Transportation (Medical): Not on file    Lack of Transportation (Non-Medical): Not on file   Physical Activity:     Days of Exercise per Week: Not on file    Minutes of Exercise per Session: Not on file   Stress:     Feeling of Stress : Not on file   Social Connections:     Frequency of Communication with Friends and Family: Not on file    Frequency of Social Gatherings with Friends and Family: Not on file    Attends Restoration Services: Not on file    Active Member of [address] [place] [address] Diligent Board Member Services or Organizations: Not on file    Attends Club or Organization Meetings: Not on file    Marital Status: Not on file   Intimate Partner Violence:     Fear of Current or Ex-Partner: Not on file    Emotionally Abused: Not on file    Physically Abused: Not on file    Sexually Abused: Not on file   Housing Stability:     Unable to Pay for Housing in the Last Year: Not on file    Number of [place] in the Last Year: Not on file    Unstable Housing in the Last Year: Not on file            Visit Vitals  /65 (BP 1 Location: Left upper arm, BP Patient Position: Sitting)   Pulse 95   Temp 97.9 °F (36.6 °C) (Temporal)   Resp 20   Ht 5' 11\" (1.803 m)   Wt 292 lb (132.5 kg)   SpO2 97%   BMI 40.73 kg/m²       Physical Examination:   General - Well appearing male. obese  HEENT - PERRL, TM no erythema/opacification, normal nasal turbinates, no oropharyngeal erythema or exudate, MMM  Neck - supple, no bruits, no thyroidomegaly, no lymphadenopathy  Pulm - clear to auscultation bilaterally  Cardio - RRR, normal S1 S2, no murmur  Abd - soft, nontender, no masses, no HSM  Extrem - no edema, +2 distal pulses  Neuro-  No focal deficits, CN intact     Assessment/Plan:    1. Dysuria with malodorous urine--check ua--normal.  No signs of any infection. 2.  Psoriasis--rx sent in for betamethasone cream  3. Right knee oa--sp TKA, 'jiffy knee'. Doing well  4.  moira--on cpap  5. Cad with hx cabg--on plavix, asa, toprol xl, imdur  6.  hyperlipids--on lipitor  7. Prostate cancer--on casodex, lupron. Also on flomax  8.  htn--controlled with norvasc, toprol, imdur, cozaar  9. MARIA EUGENIA--doing well with celexa    High dose flu shot given today.   rtc 3 months for jemima Pozo III, DO

## 2021-11-11 NOTE — PATIENT INSTRUCTIONS
When You Are Overweight: Care Instructions  Your Care Instructions     If you're overweight, your doctor may recommend that you make changes in your eating and exercise habits. Being overweight can lead to serious health problems, such as high blood pressure, heart disease, type 2 diabetes, and arthritis, or it can make these problems worse. Eating a healthy diet and being more active can help you reach and stay at a healthy weight. You don't have to make huge changes all at once. Start by making small changes in your eating and exercise habits. To lose weight, you need to burn more calories than you take in. You can do this by eating healthy foods in reasonable amounts and becoming more active every day. Follow-up care is a key part of your treatment and safety. Be sure to make and go to all appointments, and call your doctor if you are having problems. It's also a good idea to know your test results and keep a list of the medicines you take. How can you care for yourself at home? · Improve your eating habits. You'll be more successful if you work on changing one eating habit at a time. All foods, if eaten in moderation, can be part of healthy eating. Remember to:  ? Eat a variety of foods from each food group. Include grains, vegetables, fruits, dairy, and protein foods. ? Limit foods high in fat, sugar, and calories. ? Eat slowly. And don't do anything else, such as watch TV, while you are eating. ? Pay attention to portion sizes. Put your food on a smaller plate. ? Plan your meals ahead of time. You'll be less likely to grab something that's not as healthy. · Get active. Regular activity can help you feel better, have more energy, and burn more calories. If you haven't been active, start slowly. Start with at least 30 minutes of moderate activity on most days of the week. Then gradually increase the amount of activity. Try for 60 or 90 minutes a day, at least 5 days a week.  There are a lot of ways to fit activity into your life. You can:  ? Walk or bike to the store. Or walk with a friend, or walk the dog.  ? Mow the lawn, rake leaves, shovel snow, or do some gardening. ? Use the stairs instead of the elevator, at least for a few floors. · Change your thinking. Your thoughts have a lot to do with how you feel and what you do. When you're trying to reach a healthy weight, changing how you think about certain things may help. Here are some ideas:  ? Don't compare yourself to others. Healthy bodies come in all shapes and sizes. ? Pay attention to how hungry or full you feel. When you eat, be aware of why you're eating and how much you're eating. ? Focus on improving your health instead of dieting. Dieting almost never works over the long term. · Ask your doctor about other health professionals who can help you reach a healthy weight. ? A dietitian can help you make healthy changes in your diet. ? An exercise specialist or  can help you develop a safe and effective exercise program.  ? A counselor or psychiatrist can help you cope with issues such as depression, anxiety, or family problems that can make it hard to focus on reaching a healthy weight. · Get support from your family, your doctor, your friends, a support group--and support yourself. Where can you learn more? Go to http://www.gray.com/  Enter H686 in the search box to learn more about \"When You Are Overweight: Care Instructions. \"  Current as of: March 17, 2021               Content Version: 13.0  © 2006-2021 Healthwise, Incorporated. Care instructions adapted under license by [employer] (which disclaims liability or warranty for this information). If you have questions about a medical condition or this instruction, always ask your healthcare professional. [address] any warranty or liability for your use of this information.     Keep working on getting your weight down.  Making good progress.

## 2021-11-15 RX ORDER — ISOSORBIDE MONONITRATE 60 MG/1
TABLET, EXTENDED RELEASE ORAL
Qty: 90 TABLET | Refills: 3 | Status: SHIPPED | OUTPATIENT
Start: 2021-11-15 | End: 2022-06-20

## 2021-11-15 RX ORDER — METOPROLOL SUCCINATE 25 MG/1
TABLET, EXTENDED RELEASE ORAL
Qty: 45 TABLET | Refills: 1 | Status: SHIPPED | OUTPATIENT
Start: 2021-11-15

## 2021-11-15 RX ORDER — AMLODIPINE BESYLATE 2.5 MG/1
TABLET ORAL
Qty: 90 TABLET | Refills: 1 | Status: SHIPPED | OUTPATIENT
Start: 2021-11-15

## 2021-11-15 RX ORDER — CLOPIDOGREL BISULFATE 75 MG/1
TABLET ORAL
Qty: 90 TABLET | Refills: 2 | Status: SHIPPED | OUTPATIENT
Start: 2021-11-15

## 2021-11-15 RX ORDER — LOSARTAN POTASSIUM 25 MG/1
12.5 TABLET ORAL DAILY
Qty: 90 TABLET | Refills: 3 | Status: SHIPPED | OUTPATIENT
Start: 2021-11-15

## 2021-12-06 ENCOUNTER — TELEPHONE (OUTPATIENT)
Dept: ORTHOPEDIC SURGERY | Age: 76
End: 2021-12-06

## 2021-12-06 RX ORDER — CEPHALEXIN 500 MG/1
500 CAPSULE ORAL
Qty: 4 CAPSULE | Refills: 0 | Status: SHIPPED | OUTPATIENT
Start: 2021-12-06 | End: 2021-12-06

## 2022-02-04 RX ORDER — PANTOPRAZOLE SODIUM 40 MG/1
TABLET, DELAYED RELEASE ORAL
Qty: 90 TABLET | Refills: 3 | Status: SHIPPED | OUTPATIENT
Start: 2022-02-04

## 2022-02-04 RX ORDER — ATORVASTATIN CALCIUM 40 MG/1
TABLET, FILM COATED ORAL
Qty: 90 TABLET | Refills: 3 | Status: SHIPPED | OUTPATIENT
Start: 2022-02-04

## 2022-02-07 ENCOUNTER — OFFICE VISIT (OUTPATIENT)
Dept: CARDIOLOGY CLINIC | Age: 77
End: 2022-02-07
Payer: MEDICARE

## 2022-02-07 DIAGNOSIS — I49.5 SSS (SICK SINUS SYNDROME) (HCC): ICD-10-CM

## 2022-02-07 DIAGNOSIS — Z95.0 CARDIAC PACEMAKER IN SITU: Primary | ICD-10-CM

## 2022-02-07 PROCEDURE — 93294 REM INTERROG EVL PM/LDLS PM: CPT | Performed by: INTERNAL MEDICINE

## 2022-02-07 PROCEDURE — 93296 REM INTERROG EVL PM/IDS: CPT | Performed by: INTERNAL MEDICINE

## 2022-02-20 NOTE — Clinical Note
"  History     Chief Complaint   Patient presents with     Chest Pain     right sided chest pain, pt reports no pain at rest worse with movement. Pt reports pain radiates into back and down right arm, pt reports hx of blood clots. Pt did have covid back in novemember      HPI  Marlene Millan is a 77 year old female with history of hypertension and pulmonary embolism in 2012 after total knee replacement who presents emergency department for evaluation of \"a couple of weeks\" of right anterior chest pain and bilateral posterior upper thoracic back pain primarily on the right side with sharp pain which is severe with movement and change in position, worsened with breathing and deep inspiration, and today associated with right arm discomfort.  No shortness of breath, cough, hemoptysis or leg pain or leg swelling.  She feels like her right arm somewhat weak and tingly today, but the weakness is ? due to increased right anterior lateral chest pain and difficulty using the arm because it causes pain to do so no arm swelling.  She reports she believes she has \"deep tissue\" muscular pain related to frequently pulling herself up on the floor from playing with her grandchildren, and reports that she has had right arm discomfort \"all of January\"/recent which she felt was secondary to a strain from slipping and falling on ice in December.   Prior history is also remarkable for COVID-19 illness and pneumonia in November of this year with CT of the chest PE protocol 11/30/2021 a few scattered groundglass opacities in the lungs consistent with COVID-19 pneumonia, but no evidence of PE.    Previous Records Reviewed:    Pulmonary embolism, bilateral 2/19/2012 - Post-surgical at [facility] following total knee replacement.    2/2/2012 Transesophageal Echocardiogram - [facility]:  Summary:  Mild concentric LVH  LV function normal  RV mildly dilated  RV systolic function mildly reduced  Right atrium mildly dilated  No " Lesion: Located in the Mid LAD. Pressure wire measurements taken. FFR value = 0.75. left atrial mass or thrombus  A patent foramen ovale is present    Allergies:  Allergies   Allergen Reactions     Sulfa Drugs      Affected eyesight       Problem List:    Patient Active Problem List    Diagnosis Date Noted     Pulmonary embolism (H) 02/20/2022     Priority: Medium     Senile nuclear cataract 12/19/2017     Priority: Medium     ASD (atrial septal defect) 12/14/2017     Priority: Medium     Health Care Home 12/12/2012     Priority: Medium     Jazmin Morgan RN-PHN  [facility] / [facility]   [phone]    DX V65.8 REPLACED WITH 07020 [facility] C[facility] (04/08/2013)       Advanced directives, counseling/discussion 06/11/2012     Priority: Medium     Received 2007         Patent foramen ovale 02/27/2012     Priority: Medium     Incidental finding on transesophageal echocardiogram       GERD (gastroesophageal reflux disease) 02/25/2011     Priority: Medium     Controlled by diet       HYPERLIPIDEMIA LDL GOAL <130 10/31/2010     Priority: Medium     Essential hypertension 06/07/2010     Priority: Medium     Macular degeneration 07/14/2008     Priority: Medium     Left eye       Spinal Stenosis of Lumbar Region 07/14/2008     Priority: Medium        Past Medical History:    Past Medical History:   Diagnosis Date     Basal cell carcinoma      Hypertension 6/7/2010     Macular degeneration      PONV (postoperative nausea and vomiting)      Pulmonary embolism (H)      Pulmonary embolism, bilateral (H) 2/19/2012     Shingles outbreak December 2016       Past Surgical History:    Past Surgical History:   Procedure Laterality Date     basal cell cancer of nose  Oct 2012     BREAST SURGERY      breast reduction     CARPAL TUNNEL RELEASE RT/LT      right     COMBINED REPAIR PTOSIS WITH BLEPHAROPLASTY BILATERAL Bilateral 6/30/2015    Procedure: COMBINED REPAIR PTOSIS WITH BLEPHAROPLASTY BILATERAL;  Surgeon: Ilir Marvin MD;  Location: [facility] REMOVAL GALLBLADDER       LIGATE VEIN  "VARICOSE, PHLEBECTOMY MULTIPLE STAB, COMBINED Bilateral 2015    Procedure: COMBINED LIGATE VEIN VARICOSE, PHLEBECTOMY MULTIPLE STAB;  Surgeon: Alphonse Pro MD;  Location: [facility] OR     ORTHOPEDIC SURGERY      bilateral knee     PHACOEMULSIFICATION WITH STANDARD INTRAOCULAR LENS IMPLANT Right 2017    Procedure: PHACOEMULSIFICATION WITH STANDARD INTRAOCULAR LENS IMPLANT;  Right Cataract Removal with Implant;  Surgeon: Clif Michel MD;  Location: [facility] OR     SURGICAL HISTORY OF -       breast reduction mammoplasty      SURGICAL HISTORY OF -       bilateral lower extremity vein stripping     SURGICAL HISTORY OF -   2010    left total knee arthroplasty       Family History:    Family History   Problem Relation Age of Onset     Diabetes Father         last both legs to the disease     Heart Disease Father          age 91       Social History:  Marital Status:   [5]  Social History     Tobacco Use     Smoking status: Former Smoker     Years: 20.00     Types: Cigarettes     Quit date: 1982     Years since quittin.0     Smokeless tobacco: Never Used   Vaping Use     Vaping Use: Never used   Substance Use Topics     Alcohol use: Yes     Comment: occ wine     Drug use: No        Medications:    amLODIPine (NORVASC) 2.5 MG tablet  ASPIRIN 81 PO  atorvastatin (LIPITOR) 40 MG tablet  Cholecalciferol (VITAMIN D3) 25 MCG (1000 UT) CAPS  FIBER ADULT GUMMIES PO  ibuprofen (ADVIL/MOTRIN) 800 MG tablet  MULTIPLE VITAMINS PO  Multiple Vitamins-Minerals (HAIR SKIN AND NAILS FORMULA PO)  Multiple Vitamins-Minerals (MACULAR VITAMIN BENEFIT PO)  omeprazole (PRILOSEC) 20 MG DR capsule      Review of Systems  As mentioned above in the history present illness.  All other systems were reviewed and are negative.    Physical Exam   BP: (!) 165/83  Pulse: 70  Temp: 98.1  F (36.7  C)  Resp: 18  Height: 162.6 cm (5' 4\")  Weight: 77.1 kg (170 lb)  SpO2: 96 %      Physical Exam  Vitals and nursing " note reviewed.   Constitutional:       General: She is not in acute distress.     Appearance: Normal appearance. She is well-developed. She is not ill-appearing or diaphoretic.   HENT:      Head: Normocephalic and atraumatic.      Right Ear: External ear normal.      Left Ear: External ear normal.      Nose: Nose normal.      Mouth/Throat:      Mouth: Mucous membranes are moist.   Eyes:      General: No scleral icterus.     Extraocular Movements: Extraocular movements intact.      Conjunctiva/sclera: Conjunctivae normal.   Neck:      Trachea: No tracheal deviation.   Cardiovascular:      Rate and Rhythm: Normal rate and regular rhythm.      Heart sounds: Normal heart sounds. No murmur heard.    No friction rub. No gallop.   Pulmonary:      Effort: Pulmonary effort is normal. No respiratory distress.      Breath sounds: Normal breath sounds. No decreased breath sounds, wheezing, rhonchi or rales.   Abdominal:      General: There is no distension.      Palpations: Abdomen is soft.      Tenderness: There is no abdominal tenderness.   Musculoskeletal:         General: No tenderness. Normal range of motion.      Cervical back: Normal range of motion and neck supple.      Right lower leg: No tenderness. No edema.      Left lower leg: No tenderness. No edema.   Skin:     General: Skin is warm and dry.      Coloration: Skin is not cyanotic or pale.      Findings: No erythema or rash.   Neurological:      General: No focal deficit present.      Mental Status: She is alert and oriented to person, place, and time.      Sensory: Sensation is intact.      Motor: Motor function is intact. No weakness.   Psychiatric:         Mood and Affect: Mood normal.         Behavior: Behavior normal.           ED Course             Procedures    Results for orders placed during the hospital encounter of 02/20/22    POC US ECHO LIMITED    [facility] Procedure Note    Limited Bedside ED Cardiac Ultrasound:    PROCEDURE:  PERFORMED BY: Dr. Ventura Huntley MD  INDICATIONS/SYMPTOM:  Chest Pain and Elevated Troponin  PROBE: Cardiac phased array probe  BODY LOCATION: Chest  FINDINGS:  The ultrasound was performed utilizing the subcostal, parasternal long axis, parasternal short axis and apical 4 chamber views.  Cardiac contractility:  Present  Gross estimation of cardiac kinesis: normal  Pericardial Effusion:  None  RV:LV ratio: LV > RV  IVC:  Diameter: IVC diameter expiration (IVCe) ~ 1 cm  IVC diameter inspiration (IVCi) > 50%  Collapsibility:  IVC collapses > 50% with inspiration  INTERPRETATION: No evidence of right-sided heart strain, no septal bowing, chamber size and motion were grossly normal with LV > RV, normal cardiac kinesis.  No pericardial effusion was found.  IVC visualized and findings indicate normovolemia.  IMAGE DOCUMENTATION: Images were archived to PACs system.            EKG Interpretation:      Interpreted by Ventura Huntley MD  Time reviewed: Upon complete  Symptoms at time of EKG: Chest pain  Rhythm: normal sinus   Rate: normal  Axis: normal  Ectopy: none  Conduction: Possible left atrial enlargement, otherwise normal  ST Segments/ T Waves: No ST-T wave changes or ischemic changes  Q Waves: none  Comparison to prior: Unchanged from 4/25/19  Clinical Impression: No acute EKG changes, normal EKG.         Results for orders placed or performed during the hospital encounter of 02/20/22 (from the past 24 hour(s))   Basic metabolic panel   Result Value Ref Range    Sodium 139 133 - 144 mmol/L    Potassium 4.1 3.4 - 5.3 mmol/L    Chloride 105 94 - 109 mmol/L    Carbon Dioxide (CO2) 29 20 - 32 mmol/L    Anion Gap 5 3 - 14 mmol/L    Urea Nitrogen 23 7 - 30 mg/dL    Creatinine 0.86 0.52 - 1.04 mg/dL    Calcium 9.1 8.5 - 10.1 mg/dL    Glucose 114 (H) 70 - 99 mg/dL    GFR Estimate 68 >60 mL/min/1.73m2   CBC with platelets   Result Value Ref Range    WBC Count 8.8 4.0 - 11.0 10e3/uL    RBC Count 3.76 (L) 3.80 - 5.20 10e6/uL     Hemoglobin 12.7 11.7 - 15.7 g/dL    Hematocrit 35.8 35.0 - 47.0 %    MCV 95 78 - 100 fL    MCH 33.8 (H) 26.5 - 33.0 pg    MCHC 35.5 31.5 - 36.5 g/dL    RDW 15.3 (H) 10.0 - 15.0 %    Platelet Count 293 150 - 450 10e3/uL   Troponin I   Result Value Ref Range    Troponin I High Sensitivity 103 (H) <54 ng/L   [facility] Draw    Narrative    The following orders were created for panel order [facility] Draw.  Procedure                               Abnormality         Status                     ---------                               -----------         ------                     Extra Blue Top Tube[[id number]]                              Final result               Extra Red Top Tube[[id number]]                               Final result                 Please view results for these tests on the individual orders.   Extra Blue Top Tube   Result Value Ref Range    Hold Specimen JIC    Extra Red Top Tube   Result Value Ref Range    Hold Specimen JIC    D dimer quantitative   Result Value Ref Range    D-Dimer Quantitative 2.70 (H) 0.00 - 0.50 ug/mL FEU    Narrative    This D-dimer assay is intended for use in conjunction with a clinical pretest probability assessment model to exclude pulmonary embolism (PE) and deep venous thrombosis (DVT) in outpatients suspected of PE or DVT. The cut-off value is 0.50 ug/mL FEU.   INR   Result Value Ref Range    INR 0.94 0.85 - 1.15   Troponin I   Result Value Ref Range    Troponin I High Sensitivity 94 (H) <54 ng/L   CT Chest Pulmonary Embolism w Contrast    Narrative    EXAM: CT CHEST PULMONARY EMBOLISM W CONTRAST  LOCATION: [facility]  DATE/TIME: 2/20/2022 12:34 PM    INDICATION: Chest pain, PE suspected, low/intermediate probability, positive D-dimer.  COMPARISON: Chest CTA on 11/30/2021.  TECHNIQUE: CT chest pulmonary angiogram during arterial phase injection of IV contrast. Multiplanar reformats and MIP reconstructions were performed. Dose reduction techniques  were used.   CONTRAST: 75 mL Isovue 370.    FINDINGS:  ANGIOGRAM CHEST: Multiple filling defects within the segmental and subsegmental pulmonary arteries, consistent with pulmonary emboli. No evidence of right heart strain.    LUNGS AND PLEURA: Bibasilar pulmonary opacities, likely atelectasis.    MEDIASTINUM/AXILLAE: No cardiomegaly or significant pericardial effusion. No significant mediastinal, hilar or axillary lymphadenopathy.    CORONARY ARTERY CALCIFICATION: Moderate.    UPPER ABDOMEN: Limited evaluation of the upper abdomen due to lack of coverage and timing of contrast. Partially visualized at least 4.5 cm renal cyst at the upper pole left kidney. Multiple calcified splenic granulomas.    MUSCULOSKELETAL: Normal.      Impression    IMPRESSION:  1.  The study is positive for multiple bilateral pulmonary emboli. No evidence of right heart strain.  2.  Moderate atherosclerotic vascular calcification of the coronary arteries.   Asymptomatic COVID-19 Virus (Coronavirus) by PCR Nasopharyngeal    Specimen: Nasopharyngeal; Swab   Result Value Ref Range    SARS CoV2 PCR Negative Negative    Narrative    Testing was performed using the carola  SARS-CoV-2 & Influenza A/B Assay on the carola  Annelise  System.  This test should be ordered for the detection of SARS-COV-2 in individuals who meet SARS-CoV-2 clinical and/or epidemiological criteria. Test performance is unknown in asymptomatic patients.  This test is for in vitro diagnostic use under the FDA EUA for laboratories certified under CLIA to perform moderate and/or high complexity testing. This test has not been FDA cleared or approved.  A negative test does not rule out the presence of PCR inhibitors in the specimen or target RNA in concentration below the limit of detection for the assay. The possibility of a false negative should be considered if the patient's recent exposure or clinical presentation suggests COVID-19.  [facility] are certified  under the Clinical Laboratory Improvement Amendments of 1988 (CLIA-88) as qualified to perform moderate and/or high complexity laboratory testing.   POC US ECHO LIMITED    [facility] Procedure Note     Limited Bedside ED Cardiac Ultrasound:    PROCEDURE: PERFORMED BY: Dr. Ventura Huntley MD  INDICATIONS/SYMPTOM:  Chest Pain and Elevated Troponin  PROBE: Cardiac phased array probe  BODY LOCATION: Chest  FINDINGS:   The ultrasound was performed utilizing the subcostal, parasternal long axis, parasternal short axis and apical 4 chamber views.  Cardiac contractility:  Present  Gross estimation of cardiac kinesis: normal  Pericardial Effusion:  None  RV:LV ratio: LV > RV  IVC:    Diameter: IVC diameter expiration (IVCe) ~ 1 cm                                             IVC diameter inspiration (IVCi) > 50%                                                   Collapsibility:  IVC collapses > 50% with inspiration  INTERPRETATION: No evidence of right-sided heart strain, no septal bowing, chamber size and motion were grossly normal with LV > RV, normal cardiac kinesis.  No pericardial effusion was found.  IVC visualized and findings indicate normovolemia.  IMAGE DOCUMENTATION: Images were archived to PACs system.           Medications   enoxaparin ANTICOAGULANT (LOVENOX) injection 60 mg (60 mg Subcutaneous Given 2/20/22 1412)   HYDROmorphone (PF) (DILAUDID) injection 0.5 mg (0.5 mg Intravenous Not Given 2/20/22 1413)   iopamidol (ISOVUE-370) solution 75 mL (75 mLs Intravenous Given 2/20/22 1235)   sodium chloride 0.9 % bag 500mL for CT scan flush use (100 mLs Intravenous Given 2/20/22 1235)     Elderly patient with hypertension significant pulmonary emboli, multiple bilateral segmental and subsegmental pulmonary emboli, and significant pain.  Will admit for observation.    1:59 PM - I reviewed the case consulted with the hospitalist on-call, Dr. Jaun. He accepted care of the patient upon  "admission.      Assessments & Plan (with Medical Decision Making)   77-year-old female with \"a couple weeks\" of right-sided chest pain and thoracic back pain which appears secondary to bilateral segmental and subsegmental pulmonary emboli without evidence of CT or bedside cardiac echo evidence right-sided heart strain.  She has no hypertension, respiratory distress or hypoxia.  Mildly elevated troponin but no acute EKG changes and this is felt secondary to PE but not ACS/AMI.  She has a prior history of PE after right total knee arthroplasty in 2012.  Recent history also markable for COVID-19 pneumonia in November of this past year, approximately 2.5 months ago, with a CT of the chest PE protocol 11/30/2021 showing no evidence of PE.  She was given Lovenox 1 mg/kg SQ For Anticoagulation Therapy her care was accepted upon admission for observation to the hospitalist service for observation.    I have reviewed the nursing notes.    I have reviewed the findings, diagnosis, plan and need for follow up with the patient.    New Prescriptions    No medications on file       Final diagnoses:   Acute pulmonary embolism without acute cor pulmonale, unspecified pulmonary embolism type (H)       2/20/2022   [facility] EMERGENCY DEPT     Ventura Huntley MD  02/20/22 1510    "

## 2022-02-22 ENCOUNTER — OFFICE VISIT (OUTPATIENT)
Dept: INTERNAL MEDICINE CLINIC | Age: 77
End: 2022-02-22
Payer: MEDICARE

## 2022-02-22 VITALS
DIASTOLIC BLOOD PRESSURE: 78 MMHG | OXYGEN SATURATION: 96 % | SYSTOLIC BLOOD PRESSURE: 136 MMHG | WEIGHT: 315 LBS | BODY MASS INDEX: 44.1 KG/M2 | RESPIRATION RATE: 20 BRPM | TEMPERATURE: 97.5 F | HEART RATE: 69 BPM | HEIGHT: 71 IN

## 2022-02-22 DIAGNOSIS — R73.03 PREDIABETES: ICD-10-CM

## 2022-02-22 DIAGNOSIS — I10 ESSENTIAL HYPERTENSION: ICD-10-CM

## 2022-02-22 DIAGNOSIS — C61 PROSTATE CANCER (HCC): ICD-10-CM

## 2022-02-22 DIAGNOSIS — E66.01 OBESITY, CLASS III, BMI 40-49.9 (MORBID OBESITY) (HCC): ICD-10-CM

## 2022-02-22 DIAGNOSIS — G47.33 OSA ON CPAP: ICD-10-CM

## 2022-02-22 DIAGNOSIS — L40.9 PSORIASIS: ICD-10-CM

## 2022-02-22 DIAGNOSIS — Z00.00 MEDICARE ANNUAL WELLNESS VISIT, SUBSEQUENT: Primary | ICD-10-CM

## 2022-02-22 DIAGNOSIS — Z99.89 OSA ON CPAP: ICD-10-CM

## 2022-02-22 DIAGNOSIS — I25.10 CORONARY ARTERY DISEASE INVOLVING NATIVE CORONARY ARTERY OF NATIVE HEART WITHOUT ANGINA PECTORIS: ICD-10-CM

## 2022-02-22 DIAGNOSIS — I49.5 SSS (SICK SINUS SYNDROME) (HCC): ICD-10-CM

## 2022-02-22 PROCEDURE — G8752 SYS BP LESS 140: HCPCS | Performed by: INTERNAL MEDICINE

## 2022-02-22 PROCEDURE — G8754 DIAS BP LESS 90: HCPCS | Performed by: INTERNAL MEDICINE

## 2022-02-22 PROCEDURE — G0463 HOSPITAL OUTPT CLINIC VISIT: HCPCS | Performed by: INTERNAL MEDICINE

## 2022-02-22 PROCEDURE — G0439 PPPS, SUBSEQ VISIT: HCPCS | Performed by: INTERNAL MEDICINE

## 2022-02-22 PROCEDURE — G8510 SCR DEP NEG, NO PLAN REQD: HCPCS | Performed by: INTERNAL MEDICINE

## 2022-02-22 PROCEDURE — G8417 CALC BMI ABV UP PARAM F/U: HCPCS | Performed by: INTERNAL MEDICINE

## 2022-02-22 PROCEDURE — 99214 OFFICE O/P EST MOD 30 MIN: CPT | Performed by: INTERNAL MEDICINE

## 2022-02-22 PROCEDURE — 1101F PT FALLS ASSESS-DOCD LE1/YR: CPT | Performed by: INTERNAL MEDICINE

## 2022-02-22 PROCEDURE — G8427 DOCREV CUR MEDS BY ELIG CLIN: HCPCS | Performed by: INTERNAL MEDICINE

## 2022-02-22 PROCEDURE — G8536 NO DOC ELDER MAL SCRN: HCPCS | Performed by: INTERNAL MEDICINE

## 2022-02-22 RX ORDER — ZOSTER VACCINE RECOMBINANT, ADJUVANTED 50 MCG/0.5
0.5 KIT INTRAMUSCULAR ONCE
Qty: 0.5 ML | Refills: 1 | Status: SHIPPED | OUTPATIENT
Start: 2022-02-22 | End: 2022-02-22

## 2022-02-22 RX ORDER — PHENTERMINE HYDROCHLORIDE 37.5 MG/1
37.5 TABLET ORAL
Qty: 90 TABLET | Refills: 1 | Status: SHIPPED | OUTPATIENT
Start: 2022-02-22 | End: 2022-06-20

## 2022-02-22 RX ORDER — BETAMETHASONE VALERATE 1.2 MG/G
CREAM TOPICAL 2 TIMES DAILY
Qty: 15 G | Refills: 0 | Status: SHIPPED | OUTPATIENT
Start: 2022-02-22 | End: 2022-02-22 | Stop reason: SDUPTHER

## 2022-02-22 RX ORDER — TETANUS TOXOID, REDUCED DIPHTHERIA TOXOID AND ACELLULAR PERTUSSIS VACCINE, ADSORBED 5; 2.5; 8; 8; 2.5 [IU]/.5ML; [IU]/.5ML; UG/.5ML; UG/.5ML; UG/.5ML
0.5 SUSPENSION INTRAMUSCULAR ONCE
Qty: 0.5 ML | Refills: 0 | Status: SHIPPED | OUTPATIENT
Start: 2022-02-22 | End: 2022-02-22

## 2022-02-22 RX ORDER — BETAMETHASONE VALERATE 1.2 MG/G
CREAM TOPICAL 2 TIMES DAILY
Qty: 45 G | Refills: 3 | Status: SHIPPED | OUTPATIENT
Start: 2022-02-22

## 2022-02-22 RX ORDER — PHENTERMINE HYDROCHLORIDE 37.5 MG/1
37.5 TABLET ORAL
Qty: 30 TABLET | Refills: 0 | Status: SHIPPED | OUTPATIENT
Start: 2022-02-22 | End: 2022-02-22 | Stop reason: SDUPTHER

## 2022-02-22 NOTE — PROGRESS NOTES
Shantal Do is a 64 y.o. male who presents for evaluation of AWV. Last seen by me nov 11, 2021 in hospital follow up s/p right knee 'jiffy knee' surgery for severe oa. He is disappointed with the results, still has significant pain. Got  end of dec. He is much happier now. Weight is up about 20 lbs. He would like to resume phentermine. Also asks for steroid cream for his eczema. ROS:  Constitutional: negative for fevers, chills, anorexia and weight loss  Eyes:   negative for visual disturbance and irritation  ENT:   negative for tinnitus,sore throat,nasal congestion,ear pain,hoarseness  Respiratory:  negative for cough, hemoptysis, dyspnea,wheezing  CV:   negative for chest pain, palpitations, lower extremity edema  GI:   negative for nausea, vomiting, diarrhea, abdominal pain,melena  Genitourinary: negative for frequency, dysuria and hematuria  Musculoskel: negative for myalgias, arthralgias, back pain, muscle weakness, joint pain  Neurological:  negative for headaches, dizziness, focal weakness, numbness  Psychiatric:     Negative for depression or anxiety      Past Medical History:   Diagnosis Date    AF (atrial fibrillation) (HCC)     s/p DCCV after his CABG, no recurrence    Anxiety     Arrhythmia     a fib    Arthritis     CAD (coronary artery disease)     s/p CABG in 1997    Cancer ([facility] [address] 04/2021    prostate cancer    GERD (gastroesophageal reflux disease)     Goiter, nontoxic, multinodular     Hypertension     Long term current use of anticoagulant therapy     Low blood potassium     Myocardial infarct ([facility] [address] ? 1997    MARIA ISABEL on CPAP     Other and unspecified hyperlipidemia     Pacemaker     Unspecified sleep apnea        Past Surgical History:   Procedure Laterality Date    COLONOSCOPY N/A 4/1/2021    COL[facility]cal [facility] ENDOSCOPY    HX CATARACT REMOVAL  2006    bilateral    HX CHOLECYSTECTOMY  1970's    HX CORONARY ARTERY BYPASS GRAFT      side entry [facility]      HX KNEE REPLACEMENT      r tkr    HX ORTHOPAEDIC      Arthoscopic L. knee surgery     HX SEPTOPLASTY      HX SHOULDER ARTHROSCOPY  2012    left    [place] CARDIAC SURG PROCEDURE UNLIST      bypass    [place] INS NEW/RPLCMT PRM PM W/TRANSV ELTRD ATRIAL&VENT N/A 2019    INSERT PPM DUAL performed by Kj Lino MD at hospitals CARDIAC CATH LAB    RIGHT HEART CATHETERIZATION      x3 states pt. (can't remember when)    RT/LT HEART CATHETERS  2013    PTCA    Heart Dr. Arvind Dietz       Family History   Problem Relation Age of Onset   Rebbecca Hinders Cancer Father         in his shoulder    Heart Attack Father     Heart Disease Father     Heart Disease Mother     Cancer Mother         abd    Rebbecca Hinders Cancer Son         wilm's tumor   Rebbecca Hinders Cancer Sister         abd    Thyroid Disease Neg Hx        Social History     Socioeconomic History    Marital status:      Spouse name: Not on file    Number of children: Not on file    Years of education: Not on file    Highest education level: Not on file   Occupational History    Not on file   Tobacco Use    Smoking status: Former Smoker     Packs/day: 4.50     Years: 20.00     Pack years: 90.00     Types: Cigarettes     Quit date: 1970     Years since quittin.8    Smokeless tobacco: Never Used   Vaping Use    Vaping Use: Never used   Substance and Sexual Activity    Alcohol use: Yes     Comment: 2 ounces of vodka daily    Drug use: No    Sexual activity: Yes     Partners: Female   Other Topics Concern    Not on file   Social History Narrative    Lives in [address] [place] [address],[address] with wife. Has 2 sons and 1 daughter. Works part time for [employer]. Worked at [employer] as an . Likes to travel and fish.      Social Determinants of Health     Financial Resource Strain:     Difficulty of Paying Living Expenses: Not on file   Food Insecurity:     Worried About [facility] in the Last Year: Not on file    Ran Out of Food in the Last Year: Not on file   Transportation Needs:     Lack of Transportation (Medical): Not on file    Lack of Transportation (Non-Medical): Not on file   Physical Activity:     Days of Exercise per Week: Not on file    Minutes of Exercise per Session: Not on file   Stress:     Feeling of Stress : Not on file   Social Connections:     Frequency of Communication with Friends and Family: Not on file    Frequency of Social Gatherings with Friends and Family: Not on file    Attends Confucianist Services: Not on file    Active Member of [address] [place] [address] [email or web address] or Organizations: Not on file    Attends Club or Organization Meetings: Not on file    Marital Status: Not on file   Intimate Partner Violence:     Fear of Current or Ex-Partner: Not on file    Emotionally Abused: Not on file    Physically Abused: Not on file    Sexually Abused: Not on file   Housing Stability:     Unable to Pay for Housing in the Last Year: Not on file    Number of [place] in the Last Year: Not on file    Unstable Housing in the Last Year: Not on file            Visit Vitals  /78 (BP 1 Location: Left upper arm, BP Patient Position: Sitting)   Pulse 69   Temp 97.5 °F (36.4 °C) (Temporal)   Resp 20   Ht 5' 11\" (1.803 m)   Wt 316 lb (143.3 kg)   SpO2 96%   BMI 44.07 kg/m²       Physical Examination:   General - Well appearing male. overweight  HEENT - PERRL, TM no erythema/opacification, normal nasal turbinates, no oropharyngeal erythema or exudate, MMM  Neck - supple, no bruits, no thyroidomegaly, no lymphadenopathy  Pulm - clear to auscultation bilaterally  Cardio - RRR, normal S1 S2, no murmur  Abd - soft, nontender, no masses, no HSM  Extrem - trace edema, +2 distal pulses  Neuro-  No focal deficits, CN intact     Assessment/Plan:    1. Obesity, bmi 44.07--rx sent in to resume adipex  2. Eczema--rx sent in to resume betamethasone cream  3.  moira--contnue cpap  4.   Cad with hx cabg--on asa, plavix, toprol xl, imdur  5.  hyperlipids--on lipitor, check flp, cmp  6. Hx prostate cancer--sp radiation. On casodex and flomax  7. MARIA EUGENIA--continue celexa  8. Right knee osteoarthritis--sp 'jiffy knee'. Not very pleased with results, still with pain. rx given for tdap and shingrix. rtc 4 months, follow weight issues        Angela Espinosa III, DO            This is the Subsequent Medicare Annual Wellness Exam, performed 12 months or more after the Initial AWV or the last Subsequent AWV    I have reviewed the patient's medical history in detail and updated the computerized patient record. Assessment/Plan   Education and counseling provided:  Are appropriate based on today's review and evaluation  End-of-Life planning (with patient's consent)  Pneumococcal Vaccine  Influenza Vaccine  Prostate cancer screening tests (PSA, covered annually)    1. Medicare annual wellness visit, subsequent  2.  Obesity, Class III, BMI 40-49.9 (morbid obesity) (HCC)       Depression Risk Factor Screening     3 most recent PHQ Screens 2/22/2022   Little interest or pleasure in doing things Not at all   Feeling down, depressed, irritable, or hopeless Not at all   Total Score PHQ 2 0   Trouble falling or staying asleep, or sleeping too much -   Feeling tired or having little energy -   Poor appetite, weight loss, or overeating -   Feeling bad about yourself - or that you are a failure or have let yourself or your family down -   Trouble concentrating on things such as school, work, reading, or watching TV -   Moving or speaking so slowly that other people could have noticed; or the opposite being so fidgety that others notice -   Thoughts of being better off dead, or hurting yourself in some way -   PHQ 9 Score -   How difficult have these problems made it for you to do your work, take care of your home and get along with others -       Alcohol & Drug Abuse Risk Screen    Do you average more than 1 drink per night or more than 7 drinks a week: No    In the past three months have you have had more than 4 drinks containing alcohol on one occasion: No          Functional Ability and Level of Safety    Hearing: The patient wears hearing aids. Activities of Daily Living: The home contains: handrails and grab bars  Patient does total self care      Ambulation: with mild difficulty. Uses cane now. Fall Risk:  Fall Risk Assessment, last 12 mths 2/22/2022   Able to walk? Yes   Fall in past 12 months? 0   Do you feel unsteady? 0   Are you worried about falling 0   Is TUG test greater than 12 seconds? -   Is the gait abnormal? -   Number of falls in past 12 months -   Fall with injury? -      Abuse Screen:  Patient is not abused.   Just got re- dec 2021       Cognitive Screening    Has your family/caregiver stated any concerns about your memory: no     Cognitive Screening: Normal - MMSE (Mini Mental Status Exam)    Health Maintenance Due     Health Maintenance Due   Topic Date Due    Hepatitis C Screening  Never done    DTaP/Tdap/Td series (1 - Tdap) Never done    Shingrix Vaccine Age 50> (1 of 2) Never done    COVID-19 Vaccine (3 - Booster for Moderna series) 09/16/2021       Patient Care Team   Patient Care Team:  Adelso Hearn DO as PCP - General (Internal Medicine)  Adelso Hearn DO as PCP - [facility]  Eric Kraus MD as Physician (Cardiology)  Wiliam Perez NP as Nurse Practitioner (Nurse Practitioner)  Hossein Guthrie MD (Cardiology)  Sandrine Joseph MD (Orthopedic Surgery)  Brittany Yao MD (Urology)    History     Patient Active Problem List   Diagnosis Code    Hyperlipidemia, mixed E78.2    Atherosclerosis of coronary artery bypass graft I25.810    Atrial fibrillation ([facility] [address] I48.91    Goiter, nontoxic, multinodular E04.2    Unstable angina (HCC) I20.0    S/P coronary artery stent placement Z95.5    HUSAIN (dyspnea on exertion) R06.00    Benign essential tremor syndrome G25.0    Memory difficulties R41.3    B12 deficiency E53.8    Vitamin D deficiency E55.9    Hypothyroidism due to acquired atrophy of thyroid E03.4    Irregular heartbeat I49.9    Severe obesity (HCC) E66.01    SSS (sick sinus syndrome) ([facility]) I49.5    Knee osteoarthritis M17.10     Past Medical History:   Diagnosis Date    AF (atrial fibrillation) ([facility])     s/p DCCV after his CABG, no recurrence    Anxiety     Arrhythmia     a fib    Arthritis     CAD (coronary artery disease)     s/p CABG in 1997    Cancer ([address] 04/2021    prostate cancer    GERD (gastroesophageal reflux disease)     Goiter, nontoxic, multinodular     Hypertension     Long term current use of anticoagulant therapy     Low blood potassium     Myocardial infarct (HCC) ? 1997    MARIA ISABEL on CPAP     Other and unspecified hyperlipidemia     Pacemaker     Unspecified sleep apnea       Past Surgical History:   Procedure Laterality Date    COLONOSCOPY N/A 4/1/2021    COLON[facility] at [facility] ENDOSCOPY    HX CATARACT REMOVAL  2006    bilateral    HX CHOLECYSTECTOMY  1970's    HX CORONARY ARTERY BYPASS GRAFT      side entry 1990    HX CORONARY STENT PLACEMENT      HX KNEE REPLACEMENT  2021    r tkr    HX ORTHOPAEDIC      Arthoscopic L. knee surgery     HX SEPTOPLASTY  1980's    HX SHOULDER ARTHROSCOPY  2012    left    [place] CARDIAC SURG PROCEDURE UNLIST  1997    bypass    [place] INS NEW/RPLCMT PRM PM W/TRANSV ELTRD ATRIAL&VENT N/A 11/1/2019    INSERT PPM DUAL performed [facility]    RIGHT HEART CATHETERIZATION      x3 states pt. (can't remember when)    RT/LT HEART CATHETERS  12/2013    PTCA    Heart Dr. Sonja Baker     Current Outpatient Medications   Medication Sig Dispense Refill    pantoprazole (PROTONIX) 40 mg tablet TAKE 1 TABLET EVERY DAY (STOP PRILOSEC) 90 Tablet 3    atorvastatin (LIPITOR) 40 mg tablet TAKE 1 TABLET EVERY DAY. (STOP  SIMVASTATIN) 90 Tablet 3    metoprolol succinate (TOPROL-XL) 25 mg XL tablet TAKE 1/2 TABLET EVERY NIGHT 45 Tablet 1    amLODIPine (NORVASC) 2.5 mg tablet TAKE 1 TABLET EVERY DAY 90 Tablet 1    losartan (COZAAR) 25 mg tablet Take 0.5 Tablets by mouth daily. 90 Tablet 3    isosorbide mononitrate ER (IMDUR) 60 mg CR tablet TAKE 1 TABLET EVERY MORNING 90 Tablet 3    clopidogreL (PLAVIX) 75 mg tab TAKE 1 TABLET EVERY DAY 90 Tablet 2    betamethasone valerate (VALISONE) 0.1 % topical cream Apply  to affected area two (2) times a day. 15 g 1    tamsulosin (FLOMAX) 0.4 mg capsule Take 2 Capsules by mouth daily. 180 Capsule 3    bicalutamide (CASODEX) 50 mg tablet       OTHER Hormone tx got prostate cancer- Radiation for 9 weeks daily-       leuprolide acetate (LUPRON DEPOT, 3 MONTH, IM) by IntraMUSCular route. Every 3 months      diclofenac (VOLTAREN) 1 % gel Apply  to affected area four (4) times daily.  melatonin 1 mg tablet Take 5 mg by mouth nightly.  vit C/E/Zn/coppr/lutein/zeaxan (PRESERVISION AREDS 2 PO) Take  by mouth two (2) times a day.  Potassium Gluconate 595 mg (99 mg) tablet Take 595 mg by mouth two (2) times a day.  OXYGEN-AIR DELIVERY SYSTEMS (HORIZON NASAL CPAP SYSTEM) by Does Not Apply route.  acetaminophen (TYLENOL ARTHRITIS PAIN) 650 mg CR tablet Take 650 mg by mouth four (4) times daily.  ferrous sulfate (IRON) 325 mg (65 mg iron) tablet Take 325 mg by mouth two (2) times a day.  citalopram (CELEXA) 10 mg tablet Take 10 mg by mouth daily.  omega-3 fatty acids-vitamin e (FISH OIL) 1,000 mg Cap Take 2 Caps by mouth daily.  multivitamin, stress formula (STRESS TAB) tablet Take 1 Tab by mouth daily.  aspirin 81 mg tablet Take 162 mg by mouth daily.  GLUC HCL/GLUC DESHPANDE/AC-D-GLUCOS (GLUCOSAMINE COMPLEX PO) Take 1 Tab by mouth daily.        Allergies   Allergen Reactions    Demerol [Meperidine] Other (comments) hallucinations  hallucinations       Family History   Problem Relation Age of Onset   [facility] Cancer Father         in his shoulder    Heart Attack Father     Heart Disease Father     Heart Disease Mother     Cancer Mother         abd    [facility] Cancer Son         wilm's tumor    Cancer Sister         abd    Thyroid Disease Neg Hx      Social History     Tobacco Use    Smoking status: Former Smoker     Packs/day: 4.50     Years: 20.00     Pack years: 90.00     Types: Cigarettes     Quit date: 1970     Years since quittin.8    Smokeless tobacco: Never Used   Substance Use Topics    Alcohol use: Yes     Comment: 2 ounces of vodka daily         Marie Other III, DO

## 2022-02-22 NOTE — PATIENT INSTRUCTIONS
Medicare Wellness Visit, Male    The best way to live healthy is to have a lifestyle where you eat a well-balanced diet, exercise regularly, limit alcohol use, and quit all forms of tobacco/nicotine, if applicable. Regular preventive services are another way to keep healthy. Preventive services (vaccines, screening tests, monitoring & exams) can help personalize your care plan, which helps you manage your own care. Screening tests can find health problems at the earliest stages, when they are easiest to treat. [facility] follows the current, evidence-based guidelines published by the [facility] Evelio Michelle ([facility]) when recommending preventive services for our patients. Because we follow these guidelines, sometimes recommendations change over time as research supports it. (For example, a prostate screening blood test is no longer routinely recommended for men with no symptoms). Of course, you and your doctor may decide to screen more often for some diseases, based on your risk and co-morbidities (chronic disease you are already diagnosed with). Preventive services for you include:  - Medicare offers their members a free annual wellness visit, which is time for you and your primary care provider to discuss and plan for your preventive service needs. Take advantage of this benefit every year!  -All adults over age 72 should receive the recommended pneumonia vaccines. Current USPSTF guidelines recommend a series of two vaccines for the best pneumonia protection.   -All adults should have a flu vaccine yearly and tetanus vaccine every 10 years.  -All adults age 48 and older should receive the shingles vaccines (series of two vaccines).        -All adults age 38-68 who are overweight should have a diabetes screening test once every three years.   -Other screening tests & preventive services for persons with diabetes include: an eye exam to screen for diabetic retinopathy, a kidney function test, a foot exam, and stricter control over your cholesterol.   -Cardiovascular screening for adults with routine risk involves an electrocardiogram (ECG) at intervals determined by the provider.   -Colorectal cancer screening should be done for adults age 54-65 with no increased risk factors for colorectal cancer. There are a number of acceptable methods of screening for this type of cancer. Each test has its own benefits and drawbacks. Discuss with your provider what is most appropriate for you during your annual wellness visit. The different tests include: colonoscopy (considered the best screening method), a fecal occult blood test, a fecal DNA test, and sigmoidoscopy.  -All adults born between [facility] should be screened once for Hepatitis C.  -An Abdominal Aortic Aneurysm (AAA) Screening is recommended for men age 73-68 who has ever smoked in their lifetime. Here is a list of your current Health Maintenance items (your personalized list of preventive services) with a due date:  Health Maintenance Due   Topic Date Due    Hepatitis C Test  Never done    DTaP/Tdap/Td  (1 - Tdap) Never done    Shingles Vaccine (1 of 2) Never done    COVID-19 Vaccine (3 - Booster for [place]bbrah Neighbor series) 09/16/2021       Come back for fasting labs. Lab opens 8 am.  Nothing to eat after MN, but may drink water.

## 2022-02-28 ENCOUNTER — LAB ONLY (OUTPATIENT)
Dept: INTERNAL MEDICINE CLINIC | Age: 77
End: 2022-02-28

## 2022-02-28 DIAGNOSIS — Z00.00 MEDICARE ANNUAL WELLNESS VISIT, SUBSEQUENT: ICD-10-CM

## 2022-02-28 DIAGNOSIS — C61 PROSTATE CANCER (HCC): ICD-10-CM

## 2022-02-28 DIAGNOSIS — I25.10 CORONARY ARTERY DISEASE INVOLVING NATIVE CORONARY ARTERY OF NATIVE HEART WITHOUT ANGINA PECTORIS: ICD-10-CM

## 2022-02-28 DIAGNOSIS — I10 ESSENTIAL HYPERTENSION: ICD-10-CM

## 2022-02-28 DIAGNOSIS — R73.03 PREDIABETES: ICD-10-CM

## 2022-02-28 LAB
ALBUMIN SERPL-MCNC: 4.1 G/DL (ref 3.5–5)
ALBUMIN/GLOB SERPL: 1.3 {RATIO} (ref 1.1–2.2)
ALP SERPL-CCNC: 60 U/L (ref 45–117)
ALT SERPL-CCNC: 28 U/L (ref 12–78)
ANION GAP SERPL CALC-SCNC: 4 MMOL/L (ref 5–15)
AST SERPL-CCNC: 16 U/L (ref 15–37)
BASOPHILS # BLD: 0 K/UL (ref 0–0.1)
BASOPHILS NFR BLD: 1 % (ref 0–1)
BILIRUB SERPL-MCNC: 0.7 MG/DL (ref 0.2–1)
BUN SERPL-MCNC: 19 MG/DL (ref 6–20)
BUN/CREAT SERPL: 20 (ref 12–20)
CALCIUM SERPL-MCNC: 9.9 MG/DL (ref 8.5–10.1)
CHLORIDE SERPL-SCNC: 106 MMOL/L (ref 97–108)
CHOLEST SERPL-MCNC: 125 MG/DL
CO2 SERPL-SCNC: 27 MMOL/L (ref 21–32)
CREAT SERPL-MCNC: 0.96 MG/DL (ref 0.7–1.3)
DIFFERENTIAL METHOD BLD: ABNORMAL
EOSINOPHIL # BLD: 0.1 K/UL (ref 0–0.4)
EOSINOPHIL NFR BLD: 2 % (ref 0–7)
ERYTHROCYTE [DISTWIDTH] IN BLOOD BY AUTOMATED COUNT: 13.4 % (ref 11.5–14.5)
EST. AVERAGE GLUCOSE BLD GHB EST-MCNC: 105 MG/DL
GLOBULIN SER CALC-MCNC: 3.1 G/DL (ref 2–4)
GLUCOSE SERPL-MCNC: 93 MG/DL (ref 65–100)
HBA1C MFR BLD: 5.3 % (ref 4–5.6)
HCT VFR BLD AUTO: 39.6 % (ref 36.6–50.3)
HCV AB SERPL QL IA: NONREACTIVE
HDLC SERPL-MCNC: 52 MG/DL
HDLC SERPL: 2.4 {RATIO} (ref 0–5)
HGB BLD-MCNC: 12.8 G/DL (ref 12.1–17)
IMM GRANULOCYTES # BLD AUTO: 0 K/UL (ref 0–0.04)
IMM GRANULOCYTES NFR BLD AUTO: 0 % (ref 0–0.5)
LDLC SERPL CALC-MCNC: 56.4 MG/DL (ref 0–100)
LYMPHOCYTES # BLD: 1.2 K/UL (ref 0.8–3.5)
LYMPHOCYTES NFR BLD: 30 % (ref 12–49)
MCH RBC QN AUTO: 32 PG (ref 26–34)
MCHC RBC AUTO-ENTMCNC: 32.3 G/DL (ref 30–36.5)
MCV RBC AUTO: 99 FL (ref 80–99)
MONOCYTES # BLD: 0.4 K/UL (ref 0–1)
MONOCYTES NFR BLD: 9 % (ref 5–13)
NEUTS SEG # BLD: 2.3 K/UL (ref 1.8–8)
NEUTS SEG NFR BLD: 58 % (ref 32–75)
NRBC # BLD: 0 K/UL (ref 0–0.01)
NRBC BLD-RTO: 0 PER 100 WBC
PLATELET # BLD AUTO: 213 K/UL (ref 150–400)
PMV BLD AUTO: 9.7 FL (ref 8.9–12.9)
POTASSIUM SERPL-SCNC: 4.3 MMOL/L (ref 3.5–5.1)
PROT SERPL-MCNC: 7.2 G/DL (ref 6.4–8.2)
PSA SERPL-MCNC: 0 NG/ML (ref 0.01–4)
RBC # BLD AUTO: 4 M/UL (ref 4.1–5.7)
SODIUM SERPL-SCNC: 137 MMOL/L (ref 136–145)
TRIGL SERPL-MCNC: 83 MG/DL (ref ?–150)
TSH SERPL DL<=0.05 MIU/L-ACNC: 3.26 UIU/ML (ref 0.36–3.74)
VLDLC SERPL CALC-MCNC: 16.6 MG/DL
WBC # BLD AUTO: 3.9 K/UL (ref 4.1–11.1)

## 2022-03-18 PROBLEM — M17.9 KNEE OSTEOARTHRITIS: Status: ACTIVE | Noted: 2021-09-09

## 2022-03-19 PROBLEM — I49.5 SSS (SICK SINUS SYNDROME) (HCC): Status: ACTIVE | Noted: 2019-10-30

## 2022-03-19 PROBLEM — E66.01 SEVERE OBESITY (HCC): Status: ACTIVE | Noted: 2019-10-03

## 2022-06-07 ENCOUNTER — TELEPHONE (OUTPATIENT)
Dept: ORTHOPEDIC SURGERY | Age: 77
End: 2022-06-07

## 2022-06-07 RX ORDER — CEPHALEXIN 500 MG/1
500 CAPSULE ORAL
Qty: 4 CAPSULE | Refills: 0 | Status: SHIPPED | OUTPATIENT
Start: 2022-06-07 | End: 2022-06-07

## 2022-06-20 ENCOUNTER — OFFICE VISIT (OUTPATIENT)
Dept: INTERNAL MEDICINE CLINIC | Age: 77
End: 2022-06-20
Payer: MEDICARE

## 2022-06-20 VITALS
HEIGHT: 71 IN | RESPIRATION RATE: 18 BRPM | HEART RATE: 64 BPM | BODY MASS INDEX: 44.1 KG/M2 | TEMPERATURE: 98.2 F | OXYGEN SATURATION: 96 % | SYSTOLIC BLOOD PRESSURE: 120 MMHG | DIASTOLIC BLOOD PRESSURE: 64 MMHG | WEIGHT: 315 LBS

## 2022-06-20 DIAGNOSIS — I25.10 CORONARY ARTERY DISEASE INVOLVING NATIVE CORONARY ARTERY OF NATIVE HEART WITHOUT ANGINA PECTORIS: ICD-10-CM

## 2022-06-20 DIAGNOSIS — N52.8 OTHER MALE ERECTILE DYSFUNCTION: ICD-10-CM

## 2022-06-20 DIAGNOSIS — I10 ESSENTIAL HYPERTENSION: ICD-10-CM

## 2022-06-20 DIAGNOSIS — Z99.89 OSA ON CPAP: ICD-10-CM

## 2022-06-20 DIAGNOSIS — C61 PROSTATE CANCER (HCC): ICD-10-CM

## 2022-06-20 DIAGNOSIS — G47.33 OSA ON CPAP: ICD-10-CM

## 2022-06-20 DIAGNOSIS — R42 VERTIGO: Primary | ICD-10-CM

## 2022-06-20 PROCEDURE — G8752 SYS BP LESS 140: HCPCS | Performed by: INTERNAL MEDICINE

## 2022-06-20 PROCEDURE — G0463 HOSPITAL OUTPT CLINIC VISIT: HCPCS | Performed by: INTERNAL MEDICINE

## 2022-06-20 PROCEDURE — 1101F PT FALLS ASSESS-DOCD LE1/YR: CPT | Performed by: INTERNAL MEDICINE

## 2022-06-20 PROCEDURE — 99214 OFFICE O/P EST MOD 30 MIN: CPT | Performed by: INTERNAL MEDICINE

## 2022-06-20 PROCEDURE — G8754 DIAS BP LESS 90: HCPCS | Performed by: INTERNAL MEDICINE

## 2022-06-20 PROCEDURE — G8536 NO DOC ELDER MAL SCRN: HCPCS | Performed by: INTERNAL MEDICINE

## 2022-06-20 PROCEDURE — G8432 DEP SCR NOT DOC, RNG: HCPCS | Performed by: INTERNAL MEDICINE

## 2022-06-20 PROCEDURE — G8417 CALC BMI ABV UP PARAM F/U: HCPCS | Performed by: INTERNAL MEDICINE

## 2022-06-20 PROCEDURE — G8427 DOCREV CUR MEDS BY ELIG CLIN: HCPCS | Performed by: INTERNAL MEDICINE

## 2022-06-20 RX ORDER — GABAPENTIN 100 MG/1
CAPSULE ORAL
COMMUNITY
Start: 2022-02-23 | End: 2022-06-20

## 2022-06-20 RX ORDER — PREGABALIN 50 MG/1
1 CAPSULE ORAL DAILY
COMMUNITY
Start: 2022-05-20

## 2022-06-20 RX ORDER — SILDENAFIL 50 MG/1
50 TABLET, FILM COATED ORAL
Qty: 30 TABLET | Refills: 1 | Status: SHIPPED | OUTPATIENT
Start: 2022-06-20

## 2022-06-20 NOTE — PROGRESS NOTES
Juan Edmond is a 64 y.o. male who presents for evaluation of routine follow up for obesity. Last seen by me feb 22, 2022 in [facility]. Resumed adipex then to help with obesity. He took it briefly then stopped it. He does not recall why he stopped it. Weight up another 3 lbs. Has struggled more with vertigo symptoms lately and would like to see ENT. Also requests viagra, as his new wife is 10 years younger than him, and \"ready to go\". ROS:  Constitutional: negative for fevers, chills, anorexia and weight loss  Eyes:   negative for visual disturbance and irritation  ENT:   negative for tinnitus,sore throat,nasal congestion,ear pain,hoarseness  Respiratory:  negative for cough, hemoptysis, dyspnea,wheezing  CV:   negative for chest pain, palpitations, lower extremity edema  GI:   negative for nausea, vomiting, diarrhea, abdominal pain,melena  Genitourinary: negative for frequency, dysuria and hematuria  Musculoskel: negative for myalgias, arthralgias, back pain, muscle weakness, joint pain  Neurological:  negative for headaches, dizziness, focal weakness, numbness  Psychiatric:     Negative for depression or anxiety      Past Medical History:   Diagnosis Date    AF (atrial fibrillation) (HCC)     s/p DCCV after his CABG, no recurrence    Anxiety     Arrhythmia     a fib    Arthritis     CAD (coronary artery disease)     s/p CABG in 1997    Cancer ([facility] 04/2021    prostate cancer    GERD (gastroesophageal reflux disease)     Goiter, nontoxic, multinodular     Hypertension     Long term current use of anticoagulant therapy     Low blood potassium     Myocardial infarct ([facility] [address] ? 1997    MARIA ISABEL on CPAP     Other and unspecified hyperlipidemia     Pacemaker     Unspecified sleep apnea        Past Surgical History:   Procedure Laterality Date    COLONOSCOPY N/A 4/1/2021    COLONOS[facility] by [facility]eesha Snell [facility] at [facility] ENDOSCOPY    HX CATARACT REMOVAL  2006    bilateral    HX CHOLECYSTECTOMY      HX CORONARY ARTERY BYPASS GRAFT      side entry [facility]      HX KNEE REPLACEMENT      r tkr    HX ORTHOPAEDIC      Arthoscopic L. knee surgery     HX SEPTOPLASTY      HX SHOULDER ARTHROSCOPY  2012    left    UT CARDIAC SURG PROCEDURE UNLIST      bypass    UT INS NEW/RPLCMT PRM PM W/TRANSV ELTRD ATRIAL&VENT N/A 2019    INSERT PPM DUAL [facility]ARD[facility]    RIGHT HEART CATHETERIZATION      x3 states pt. (can't remember when)    RT/LT HEART CATHETERS  2013    PTCA    Heart Dr. Hiram Locke       Family History   Problem Relation Age of Onset   Nunez Cancer Father         in his shoulder    Heart Attack Father     Heart Disease Father     Heart Disease Mother     Cancer Mother         abd    Nunez Cancer Son         wilm's tumor   Nunez Cancer Sister         abd    Thyroid Disease Neg Hx        Social History     Socioeconomic History    Marital status:      Spouse name: Not on file    Number of children: Not on file    Years of education: Not on file    Highest education level: Not on file   Occupational History    Not on file   Tobacco Use    Smoking status: Former Smoker     Packs/day: 4.50     Years: 20.00     Pack years: 90.00     Types: Cigarettes     Quit date: 1970     Years since quittin.2    Smokeless tobacco: Never Used   Vaping Use    Vaping Use: Never used   Substance and Sexual Activity    Alcohol use: Yes     Comment: 2 ounces of vodka daily    Drug use: No    Sexual activity: Yes     Partners: Female   Other Topics Concern    Not on file   Social History Narrative    Lives in [address] [place] [address],[address] with wife. Has 2 sons and 1 daughter. Works part time for [employer]. Worked at [employer] as an . Likes to travel and fish.      Social Determinants of Health     Financial Resource Strain:     Difficulty of Paying Living Expenses: Not on file   Food Insecurity:     Worried About Running Out of Food in the Last Year: Not on file    Haven of Food in the Last Year: Not on file   Transportation Needs:     Lack of Transportation (Medical): Not on file    Lack of Transportation (Non-Medical): Not on file   Physical Activity:     Days of Exercise per Week: Not on file    Minutes of Exercise per Session: Not on file   Stress:     Feeling of Stress : Not on file   Social Connections:     Frequency of Communication with Friends and Family: Not on file    Frequency of Social Gatherings with Friends and Family: Not on file    Attends Oriental orthodox Services: Not on file    Active Member of [address] [place] [address] [employer] or Organizations: Not on file    Attends Club or Organization Meetings: Not on file    Marital Status: Not on file   Intimate Partner Violence:     Fear of Current or Ex-Partner: Not on file    Emotionally Abused: Not on file    Physically Abused: Not on file    Sexually Abused: Not on file   Housing Stability:     Unable to Pay for Housing in the Last Year: Not on file    Number of [place] in the Last Year: Not on file    Unstable Housing in the Last Year: Not on file            Visit Vitals  /64 (BP 1 Location: Left upper arm, BP Patient Position: Sitting)   Pulse 64   Temp 98.2 °F (36.8 °C) (Temporal)   Resp 18   Ht 5' 11\" (1.803 m)   Wt 319 lb 3.2 oz (144.8 kg)   SpO2 96%   BMI 44.52 kg/m²       Physical Examination:   General - Well appearing male. overweight  HEENT - PERRL, TM no erythema/opacification, normal nasal turbinates, no oropharyngeal erythema or exudate, MMM  Neck - supple, no bruits, no thyroidomegaly, no lymphadenopathy  Pulm - clear to auscultation bilaterally  Cardio - RRR, normal S1 S2, no murmur  Abd - soft, nontender, no masses, no HSM  Extrem - no edema, +2 distal pulses  Neuro-  No focal deficits, CN intact     Assessment/Plan:    1. Vertigo--exercises given. Referral to ent, dr Margarito Klinefelter  2. ED--rx given for viagra  3. Chronic low back pain--doing PT now, sees dr Ivon Singh  4.  htn--controlled with norvasc, toprol xl, cozaar  5. Cad with hx cabg--on asa, plavix, coaar, toprol xl  6. Hx prostate cancer--sp radiation  7. Macular degeneration--gets shots regularly  8.  moira--continue cpap  9. Right knee oa--sp sx, jiffy knee  10.  hyperlipids--on lipitor  11.   MARIA EUGENIA--continue celexa    rtc 6 months        Madhavi Rendon III, DO

## 2022-06-20 NOTE — PATIENT INSTRUCTIONS
Epley Maneuver at Home for Vertigo: Exercises  Introduction  Vertigo is a spinning or whirling sensation when you move your head. Your doctor may have moved you in different positions to help your vertigo get better faster. This is called the Epley maneuver. Your doctor also may have asked you to do these exercises at home. Do the exercises as often as your doctor recommends. If your vertigo is getting worse, your doctor may have you change the exercise or stop it. Step 1  Step 1    1. Sit on the edge of a bed or sofa. Step 2    1. Turn your head 45 degrees in the direction your doctor told you to. This should be toward the ear that causes the most vertigo for you. In this picture, the woman is turning toward her left ear. Step 3    1. Tilt yourself backward until you are lying on your back. Your head should still be at a 45-degree turn. Your head should be about midway between looking straight ahead and looking out to your side. Hold for 30 seconds. If you have vertigo, stay in this position until it stops. Step 4    1. Turn your head 90 degrees toward the ear that has the least vertigo. In this picture, the woman is turning to the right because she has vertigo on her left side. The point of your chin should be raised and over your shoulder. Hold for 30 seconds. Step 5    1. Roll onto the side with the least vertigo. You should now be looking at the floor. Hold for 30 seconds. Follow-up care is a key part of your treatment and safety. Be sure to make and go to all appointments, and call your doctor if you are having problems. It's also a good idea to know your test results and keep a list of the medicines you take. Where can you learn more? Go to http://www.gray.com/  Enter P834 in the search box to learn more about \"Epley Maneuver at Home for Vertigo: Exercises. \"  Current as of: December 13, 2021               Content Version: 13.2  © 8415-4428 Healthwise, [employer] Care instructions adapted under license by "[employer] (which disclaims liability or warranty for this information). If you have questions about a medical condition or this instruction, always ask your healthcare professional. [address] any warranty or liability for your use of this information.

## 2022-06-20 NOTE — PROGRESS NOTES
1. \"Have you been to the ER, urgent care clinic since your last visit? Hospitalized since your last visit?\"     2. \"Have you seen or consulted any other health care providers outside of the [address] [place] [address] since your last visit? \"

## 2022-08-15 ENCOUNTER — TRANSCRIBE ORDER (OUTPATIENT)
Dept: SCHEDULING | Age: 77
End: 2022-08-15

## 2022-08-15 DIAGNOSIS — M47.817 LUMBOSACRAL SPONDYLOSIS WITHOUT MYELOPATHY: Primary | ICD-10-CM

## 2022-08-18 ENCOUNTER — TELEPHONE (OUTPATIENT)
Dept: INTERNAL MEDICINE CLINIC | Age: 77
End: 2022-08-18

## 2022-08-18 RX ORDER — NIRMATRELVIR AND RITONAVIR 300-100 MG
KIT ORAL
Qty: 1 BOX | Refills: 0 | Status: SHIPPED | OUTPATIENT
Start: 2022-08-18

## 2022-08-18 NOTE — TELEPHONE ENCOUNTER
Returned pts call, reports started yesterday with body aches and nasal congestion, worse today with fever, had positive covid test today, advised pt rest, increase fluids, treat symptoms, Tylenol for fever, will forward to Dr. Lisa Blake for additional and possible Paxlovid

## 2022-09-08 ENCOUNTER — HOSPITAL ENCOUNTER (OUTPATIENT)
Dept: MRI IMAGING | Age: 77
Discharge: HOME | End: 2022-09-08
Attending: ORTHOPAEDIC SURGERY
Payer: MEDICARE

## 2022-09-08 DIAGNOSIS — M47.817 LUMBOSACRAL SPONDYLOSIS WITHOUT MYELOPATHY: ICD-10-CM

## 2022-09-08 PROCEDURE — 72148 MRI LUMBAR SPINE W/O DYE: CPT

## 2022-09-21 ENCOUNTER — HOSPITAL ENCOUNTER (OUTPATIENT)
Dept: RADIATION THERAPY | Age: 77
Discharge: HOME | End: 2022-09-21

## 2022-10-25 ENCOUNTER — CLINICAL SUPPORT (OUTPATIENT)
Dept: INTERNAL MEDICINE CLINIC | Age: 77
End: 2022-10-25

## 2022-10-25 ENCOUNTER — TELEPHONE (OUTPATIENT)
Dept: INTERNAL MEDICINE CLINIC | Age: 77
End: 2022-10-25

## 2022-10-25 VITALS
RESPIRATION RATE: 18 BRPM | SYSTOLIC BLOOD PRESSURE: 102 MMHG | OXYGEN SATURATION: 98 % | TEMPERATURE: 97 F | HEART RATE: 70 BPM | DIASTOLIC BLOOD PRESSURE: 61 MMHG

## 2022-10-25 DIAGNOSIS — I10 ESSENTIAL HYPERTENSION: Primary | ICD-10-CM

## 2022-10-25 NOTE — TELEPHONE ENCOUNTER
Triage Call    Vertigo. Dealing with it a \"good while\". Stated when he stands up or turns in the bed. Sometimes when he gets out of the car he gets dizzy. Patient has been taking his blood pressure medication but is not able to check his blood pressure at home. Offered to come in for a blood pressure check nurse visit today or tomorrow and Patient stated he can come in now. Advised that was fine and we can tell Dr Watters [place] his symptoms and what his blood pressure is. Follow up as needed.

## 2022-12-02 ENCOUNTER — ANESTHESIA EVENT (OUTPATIENT)
Dept: CARDIAC CATH/INVASIVE PROCEDURES | Age: 77
End: 2022-12-02
Payer: MEDICARE

## 2022-12-02 ENCOUNTER — ANESTHESIA (OUTPATIENT)
Dept: CARDIAC CATH/INVASIVE PROCEDURES | Age: 77
End: 2022-12-02
Payer: MEDICARE

## 2022-12-02 ENCOUNTER — HOSPITAL ENCOUNTER (OUTPATIENT)
Age: 77
Discharge: HOME | End: 2022-12-02
Attending: INTERNAL MEDICINE | Admitting: INTERNAL MEDICINE
Payer: MEDICARE

## 2022-12-02 VITALS
WEIGHT: 300 LBS | HEIGHT: 72 IN | RESPIRATION RATE: 19 BRPM | BODY MASS INDEX: 40.63 KG/M2 | OXYGEN SATURATION: 99 % | HEART RATE: 75 BPM | SYSTOLIC BLOOD PRESSURE: 151 MMHG | TEMPERATURE: 96.8 F | DIASTOLIC BLOOD PRESSURE: 71 MMHG

## 2022-12-02 DIAGNOSIS — I48.91 ATRIAL FIBRILLATION, UNSPECIFIED TYPE (HCC): ICD-10-CM

## 2022-12-02 PROCEDURE — 77030010509 HC AIRWY LMA MSK TELE -A: Performed by: NURSE ANESTHETIST, CERTIFIED REGISTERED

## 2022-12-02 PROCEDURE — 74011000250 HC RX REV CODE- 250: Performed by: INTERNAL MEDICINE

## 2022-12-02 PROCEDURE — 74011250636 HC RX REV CODE- 250/636: Performed by: NURSE ANESTHETIST, CERTIFIED REGISTERED

## 2022-12-02 PROCEDURE — C1894 INTRO/SHEATH, NON-LASER: HCPCS | Performed by: INTERNAL MEDICINE

## 2022-12-02 PROCEDURE — 74011250636 HC RX REV CODE- 250/636: Performed by: INTERNAL MEDICINE

## 2022-12-02 PROCEDURE — 74011000250 HC RX REV CODE- 250: Performed by: NURSE ANESTHETIST, CERTIFIED REGISTERED

## 2022-12-02 PROCEDURE — 77030018729 HC ELECTRD DEFIB PAD CARD -B: Performed by: INTERNAL MEDICINE

## 2022-12-02 PROCEDURE — 77030027107 HC PTCH EXT REF CARTO3 J&J -F: Performed by: INTERNAL MEDICINE

## 2022-12-02 PROCEDURE — 76210000063 HC OR PH I REC FIRST 0.5 HR

## 2022-12-02 PROCEDURE — 76060000032 HC ANESTHESIA 0.5 TO 1 HR: Performed by: INTERNAL MEDICINE

## 2022-12-02 PROCEDURE — 93650 ICAR CATH ABLTJ AV NODE FUNC: CPT | Performed by: INTERNAL MEDICINE

## 2022-12-02 PROCEDURE — C1732 CATH, EP, DIAG/ABL, 3D/VECT: HCPCS | Performed by: INTERNAL MEDICINE

## 2022-12-02 RX ORDER — ACETAMINOPHEN 325 MG/1
650 TABLET ORAL
Status: DISCONTINUED | OUTPATIENT
Start: 2022-12-02 | End: 2022-12-02 | Stop reason: HOSPADM

## 2022-12-02 RX ORDER — PROPOFOL 10 MG/ML
INJECTION, EMULSION INTRAVENOUS AS NEEDED
Status: DISCONTINUED | OUTPATIENT
Start: 2022-12-02 | End: 2022-12-02 | Stop reason: HOSPADM

## 2022-12-02 RX ORDER — SODIUM CHLORIDE 9 MG/ML
INJECTION, SOLUTION INTRAVENOUS
Status: DISCONTINUED | OUTPATIENT
Start: 2022-12-02 | End: 2022-12-02 | Stop reason: HOSPADM

## 2022-12-02 RX ORDER — ONDANSETRON 2 MG/ML
INJECTION INTRAMUSCULAR; INTRAVENOUS AS NEEDED
Status: DISCONTINUED | OUTPATIENT
Start: 2022-12-02 | End: 2022-12-02 | Stop reason: HOSPADM

## 2022-12-02 RX ORDER — LIDOCAINE HYDROCHLORIDE 20 MG/ML
INJECTION, SOLUTION EPIDURAL; INFILTRATION; INTRACAUDAL; PERINEURAL AS NEEDED
Status: DISCONTINUED | OUTPATIENT
Start: 2022-12-02 | End: 2022-12-02 | Stop reason: HOSPADM

## 2022-12-02 RX ORDER — SODIUM CHLORIDE 0.9 % (FLUSH) 0.9 %
5-40 SYRINGE (ML) INJECTION AS NEEDED
Status: DISCONTINUED | OUTPATIENT
Start: 2022-12-02 | End: 2022-12-02 | Stop reason: HOSPADM

## 2022-12-02 RX ORDER — SODIUM CHLORIDE 0.9 % (FLUSH) 0.9 %
5-40 SYRINGE (ML) INJECTION EVERY 8 HOURS
Status: DISCONTINUED | OUTPATIENT
Start: 2022-12-02 | End: 2022-12-02 | Stop reason: HOSPADM

## 2022-12-02 RX ORDER — HYDROCODONE BITARTRATE AND ACETAMINOPHEN 5; 325 MG/1; MG/1
1 TABLET ORAL
Status: DISCONTINUED | OUTPATIENT
Start: 2022-12-02 | End: 2022-12-02 | Stop reason: HOSPADM

## 2022-12-02 RX ORDER — HEPARIN SODIUM 200 [USP'U]/100ML
INJECTION, SOLUTION INTRAVENOUS
Status: COMPLETED | OUTPATIENT
Start: 2022-12-02 | End: 2022-12-02

## 2022-12-02 RX ORDER — LIDOCAINE HYDROCHLORIDE 10 MG/ML
INJECTION INFILTRATION; PERINEURAL AS NEEDED
Status: DISCONTINUED | OUTPATIENT
Start: 2022-12-02 | End: 2022-12-02 | Stop reason: HOSPADM

## 2022-12-02 RX ADMIN — PHENYLEPHRINE HYDROCHLORIDE 100 MCG: 10 INJECTION INTRAVENOUS at 09:00

## 2022-12-02 RX ADMIN — PROPOFOL 110 MG: 10 INJECTION, EMULSION INTRAVENOUS at 09:00

## 2022-12-02 RX ADMIN — SODIUM CHLORIDE: 9 INJECTION, SOLUTION INTRAVENOUS at 08:53

## 2022-12-02 RX ADMIN — DEXMEDETOMIDINE HYDROCHLORIDE 5 MCG: 100 INJECTION, SOLUTION, CONCENTRATE INTRAVENOUS at 09:03

## 2022-12-02 RX ADMIN — LIDOCAINE HYDROCHLORIDE 80 MG: 20 INJECTION, SOLUTION INTRAVENOUS at 09:00

## 2022-12-02 RX ADMIN — ONDANSETRON HYDROCHLORIDE 4 MG: 2 INJECTION, SOLUTION INTRAMUSCULAR; INTRAVENOUS at 09:08

## 2022-12-02 RX ADMIN — DEXMEDETOMIDINE HYDROCHLORIDE 5 MCG: 100 INJECTION, SOLUTION, CONCENTRATE INTRAVENOUS at 09:08

## 2022-12-02 NOTE — Clinical Note
TRANSFER - IN REPORT:     Verbal report received from: recovery. Report consisted of patient's Situation, Background, Assessment and   Recommendations(SBAR). Opportunity for questions and clarification was provided. Assessment completed upon patient's arrival to unit and care assumed. Patient transported with a Registered Nurse and [address] [place] [address] / [facility].

## 2022-12-02 NOTE — PROGRESS NOTES
Cardiac Cath Lab Recovery Arrival Note:      Yaneth Alicea arrived to Cardiac Cath Lab, Recovery Area. Staff introduced to patient. Patient identifiers verified with NAME and DATE OF BIRTH. Procedure verified with patient. Consent forms reviewed and signed by patient or authorized representative and verified. Allergies verified. Patient and family oriented to department. Patient and family informed of procedure and plan of care. Questions answered with review. Patient prepped for procedure, per orders from physician, prior to arrival.    Patient on cardiac monitor, non-invasive blood pressure, SPO2 monitor. On room air. Patient is A&Ox 3. Patient reports no c/o. Patient in stretcher, in low position, with side rails up, call bell within reach, patient instructed to call if assistance as needed. Patient prep in: [address], [place]. Patient family has pager # none  Family in: none.    Prep by: CCL waiting area

## 2022-12-02 NOTE — ANESTHESIA PREPROCEDURE EVALUATION
Relevant Problems   RESPIRATORY SYSTEM   (+) HUSAIN (dyspnea on exertion)      CARDIOVASCULAR   (+) Atherosclerosis of coronary artery bypass graft   (+) Atrial fibrillation (HCC)   (+) SSS (sick sinus syndrome) (HCC)   (+) Unstable angina (HCC)      ENDOCRINE   (+) Hypothyroidism due to acquired atrophy of thyroid   (+) Severe obesity ([facility] [address]       Anesthetic History               Review of Systems / Medical History  Patient summary reviewed and pertinent labs reviewed    Pulmonary        Sleep apnea           Neuro/Psych              Cardiovascular    Hypertension        Dysrhythmias   Pacemaker and CAD    Exercise tolerance: <4 METS  Comments: Left Main  Mid LM lesion 60% stenosed. . Diagnostic coronary angiography shows negative for . Lesion is the culprit lesion. The lesion is type C. Lesion Area: 6 mm². Optical coherence tomography (OCT) was performed. Left Anterior Descending  Dist LAD lesion 100% stenosed. . Diagnostic coronary angiography shows positive for . Left Circumflex  Prox Cx lesion 40% stenosed. .  Right Coronary Artery  Dist RCA lesion 90% stenosed. .  Vein Graft To Ost RCA  Vein The graft was visualized by angiography and is moderate in size.  The graft is angiographically normal.       GI/Hepatic/Renal     GERD           Endo/Other      Hypothyroidism  Morbid obesity and arthritis     Other Findings              Physical Exam    Airway  Mallampati: III  TM Distance: 4 - 6 cm  Neck ROM: normal range of motion   Mouth opening: Normal     Cardiovascular  Regular rate and rhythm,  S1 and S2 normal,  no murmur, click, rub, or gallop  Rhythm: regular  Rate: normal         Dental  No notable dental hx       Pulmonary  Breath sounds clear to auscultation               Abdominal  GI exam deferred       Other Findings            Anesthetic Plan    ASA: 3  Anesthesia type: general          Induction: Intravenous  Anesthetic plan and risks discussed with: Patient

## 2022-12-02 NOTE — ANESTHESIA POSTPROCEDURE EVALUATION
Post-Anesthesia Evaluation and Assessment    Patient: Shantal Do MRN: [id number]  SSN: [id number]    YOB: 1945  Age: 76 y.o. Sex: male      I have evaluated the patient and they are stable and ready for discharge from the PACU. Cardiovascular Function/Vital Signs  Visit Vitals  /71   Pulse 75   Temp 36 °C (96.8 °F)   Resp 23   Ht 6' (1.829 m)   Wt 136.1 kg (300 lb)   SpO2 97%   BMI 40.69 kg/m²       Patient is status post MAC anesthesia for Procedure(s):  ABLATION AV NODE. Nausea/Vomiting: None    Postoperative hydration reviewed and adequate. Pain:  Pain Scale 1: Numeric (0 - 10) (12/02/22 0931)  Pain Intensity 1: 0 (12/02/22 0931)   Managed    Neurological Status: At baseline    Mental Status, Level of Consciousness: Alert and  oriented to person, place, and time    Pulmonary Status:   O2 Device: None (12/02/22 0933)   Adequate oxygenation and airway patent    Complications related to anesthesia: None    Post-anesthesia assessment completed.  No concerns    Signed By: Tee Alexander MD     December 2, 2022

## 2022-12-02 NOTE — DISCHARGE INSTRUCTIONS
[facility]  [address] [place] [address]  [phone]  WWW. [employer]   AV NODE ABLATION DISCHARGE INSTRUCTIONS    Patient ID:  Alissa Diaz  [id number]  59 y.o.  1945    Admit Date: 12/2/2022    Discharge Date: 12/2/2022     Admitting Physician: [unfilled]     Discharge Physician: Gayatri Duron MD    Admission Diagnoses:   Atrial fibrillation, unspecified type ([address] [I48.91]  [facility]) [I48.91]    Discharge Diagnoses: Active Problems:    [facility]) (12/2/2022)        Discharge Condition: Good    Cardiology Procedures this Admission:  AV NODE ablation. Disposition: home    Reference discharge instructions provided by nursing for diet and activity. Follow-up with Dr Abilio Cavanaugh or his Nurse Practitioners in 1-2 weeks. Call [phone] to make an appointment. Signed:  Gayatri Duron MD  12/2/2022  9:01 AM    S/P AV NODE ABLATION DISCHARGE INSTRUCTIONS    It is normal to feel tired the first couple days. Take it easy and follow the physicians instructions. CHECK THE CATHETER INSERTION SITE DAILY:  You may shower 24 hours after the procedure, remove the bandage during showering. Wash with soap and water and pat dry. Gentle cleaning of the site with soap and water is sufficient, cover with a dry clean dressing or bandage. Do not apply creams or powders to the area. Do not sit in a bathtub or pool of water for 7 days or until wound has completely healed. Temporary bruising and discomfort is normal and may last a few weeks. You may have a  formation of a small lump at the site which may last up to 6 weeks. CALL THE PHYSICIAN:  If the site becomes red, swollen or feels warm to the touch  If there is bleeding or drainage or if there is unusual pain at the groin or down the leg. If there is any bleeding, lie down, apply pressure or have someone apply pressure with a clean cloth until the bleeding stops.   If the bleeding continues, call 911 to be transported to the hospital.  DO NOT DRIVE YOURSELF, [phone]. Activity:      For the first 24-48 hours or as instructed by the physician:  No lifting, pushing or pulling over 5 pounds and no straining the insertion site. Do not lift grocery bags or the garbage can, do not run the vacuum  or  for 7 days. Start with short walks as in the hospital and gradually increase as tolerated each day. It is recommended to walk 30 minutes 5-7 days per week. Follow your physicians instructions on activity. Avoid walking outside in extremes of heat or cold. Walk inside when it is cold and windy or hot and humid. Things to keep in mind:  No driving for at least 5 days, or as designated by your physician. Limit the number of times you go up and down the stairs  Take rests and pace yourself with activity. Be careful and do not strain with bowel movements. Medications: Take all medications as prescribed  Call your physician if you have any questions  Keep an updated list of your medications with you at all times and give a list to your physician and pharmacist    Signs and Symptoms:  Be cautious of symptoms of angina or recurrent symptoms such as chest discomfort, unusual shortness of breath or fatigue, palpitations. After Care: Follow up with your physician as instructed. Follow a heart healthy diet with proper portion control, daily stress management, daily exercise, blood pressure and cholesterol control , and smoking cessation.

## 2022-12-02 NOTE — PROGRESS NOTES
TRANSFER - IN REPORT:    Verbal report received from Maddie Suarez RN on Ulises Maeer  being received from EP for routine progression of care. Report consisted of patients Situation, Background, Assessment and Recommendations(SBAR). Information from the following report(s) Procedure Summary and MAR was reviewed with the receiving clinician. Opportunity for questions and clarification was provided. Assessment completed upon patients arrival to [address] [place] [address] and care assumed. Cardiac Cath Lab Recovery Arrival Note:    Verneice Crigler arrived to [facility] recovery area. Patient procedure= AVN ablation. Patient on cardiac monitor, non-invasive blood pressure, SPO2 monitor. On room air. Patient status doing well without problems. Patient is A&Ox 3. Patient reports no c/o. PROCEDURE SITE CHECK:    Procedure site:without any bleeding and no hematoma, no pain/discomfort reported at procedure site. No change in patient status. Continue to monitor patient and status.

## 2022-12-02 NOTE — PROGRESS NOTES
Ambulate with pt in wilson to BR. Gait steady. Right groin dressing dry and intact. Pt denies c/o. DC instructions reviewed with pt and SO. Both verbalize understanding. SL dc'd without difficulty.   Pt wheeled to front door to be driven home by SO

## 2022-12-02 NOTE — PROGRESS NOTES
Primary Nurse Macy Chan RN and Pete Schwartz, RN performed a dual skin assessment on this patient No impairment noted  Charanjit score is 23  Mepilex refused

## 2022-12-02 NOTE — Clinical Note
TRANSFER - OUT REPORT:     Verbal report given to: recovery. Report consisted of patient's Situation, Background, Assessment and   Recommendations(SBAR). Opportunity for questions and clarification was provided. Patient transported with a Registered Nurse and [address] [place] [address] / [facility] [employer]. Patient transported to: recovery.

## 2022-12-04 PROCEDURE — 74011000250 HC RX REV CODE- 250: Performed by: INTERNAL MEDICINE

## 2022-12-12 ENCOUNTER — TELEPHONE (OUTPATIENT)
Dept: ORTHOPEDIC SURGERY | Age: 77
End: 2022-12-12

## 2022-12-12 RX ORDER — CEPHALEXIN 500 MG/1
500 CAPSULE ORAL
Qty: 4 CAPSULE | Refills: 0 | Status: SHIPPED | OUTPATIENT
Start: 2022-12-12 | End: 2022-12-12

## 2022-12-12 NOTE — TELEPHONE ENCOUNTER
Patient had a rt tkr on 8/30/21.  Needs an antibiotic for dental procedure taking place on 12/14/22    Pharmacy: [id number] in [place], [address] [facility]

## 2022-12-21 ENCOUNTER — OFFICE VISIT (OUTPATIENT)
Dept: INTERNAL MEDICINE CLINIC | Age: 77
End: 2022-12-21
Payer: MEDICARE

## 2022-12-21 VITALS
BODY MASS INDEX: 42.45 KG/M2 | RESPIRATION RATE: 16 BRPM | HEIGHT: 72 IN | HEART RATE: 75 BPM | WEIGHT: 313.4 LBS | DIASTOLIC BLOOD PRESSURE: 65 MMHG | OXYGEN SATURATION: 97 % | SYSTOLIC BLOOD PRESSURE: 135 MMHG | TEMPERATURE: 97.9 F

## 2022-12-21 DIAGNOSIS — I10 ESSENTIAL HYPERTENSION: ICD-10-CM

## 2022-12-21 DIAGNOSIS — Z99.89 OSA ON CPAP: ICD-10-CM

## 2022-12-21 DIAGNOSIS — G47.33 OSA ON CPAP: ICD-10-CM

## 2022-12-21 DIAGNOSIS — I49.5 SSS (SICK SINUS SYNDROME) (HCC): ICD-10-CM

## 2022-12-21 DIAGNOSIS — F41.1 GAD (GENERALIZED ANXIETY DISORDER): ICD-10-CM

## 2022-12-21 DIAGNOSIS — R42 VERTIGO: ICD-10-CM

## 2022-12-21 DIAGNOSIS — E66.01 CLASS 3 SEVERE OBESITY WITH SERIOUS COMORBIDITY AND BODY MASS INDEX (BMI) OF 40.0 TO 44.9 IN ADULT, UNSPECIFIED OBESITY TYPE (HCC): ICD-10-CM

## 2022-12-21 DIAGNOSIS — R73.03 PREDIABETES: ICD-10-CM

## 2022-12-21 DIAGNOSIS — R06.02 SOB (SHORTNESS OF BREATH) ON EXERTION: Primary | ICD-10-CM

## 2022-12-21 DIAGNOSIS — I25.10 CORONARY ARTERY DISEASE INVOLVING NATIVE CORONARY ARTERY OF NATIVE HEART WITHOUT ANGINA PECTORIS: ICD-10-CM

## 2022-12-21 PROCEDURE — G8754 DIAS BP LESS 90: HCPCS | Performed by: INTERNAL MEDICINE

## 2022-12-21 PROCEDURE — G8417 CALC BMI ABV UP PARAM F/U: HCPCS | Performed by: INTERNAL MEDICINE

## 2022-12-21 PROCEDURE — 99214 OFFICE O/P EST MOD 30 MIN: CPT | Performed by: INTERNAL MEDICINE

## 2022-12-21 PROCEDURE — G0463 HOSPITAL OUTPT CLINIC VISIT: HCPCS | Performed by: INTERNAL MEDICINE

## 2022-12-21 PROCEDURE — G8432 DEP SCR NOT DOC, RNG: HCPCS | Performed by: INTERNAL MEDICINE

## 2022-12-21 PROCEDURE — G8536 NO DOC ELDER MAL SCRN: HCPCS | Performed by: INTERNAL MEDICINE

## 2022-12-21 PROCEDURE — G8752 SYS BP LESS 140: HCPCS | Performed by: INTERNAL MEDICINE

## 2022-12-21 PROCEDURE — G8427 DOCREV CUR MEDS BY ELIG CLIN: HCPCS | Performed by: INTERNAL MEDICINE

## 2022-12-21 PROCEDURE — 1101F PT FALLS ASSESS-DOCD LE1/YR: CPT | Performed by: INTERNAL MEDICINE

## 2022-12-21 RX ORDER — PHENTERMINE HYDROCHLORIDE 37.5 MG/1
TABLET ORAL
COMMUNITY
End: 2022-12-21 | Stop reason: SDUPTHER

## 2022-12-21 RX ORDER — PHENTERMINE HYDROCHLORIDE 37.5 MG/1
TABLET ORAL
Qty: 30 TABLET | Refills: 3 | Status: SHIPPED | OUTPATIENT
Start: 2022-12-21 | End: 2022-12-23 | Stop reason: SDUPTHER

## 2022-12-21 NOTE — PROGRESS NOTES
1. \"Have you been to the ER, urgent care clinic since your last visit? Hospitalized since your last visit? \" No    2. \"Have you seen or consulted any other health care providers outside of the [address] [place] [address] since your last visit? \" Yes [facility] and [facility]      3. For patients aged 39-70: Has the patient had a colonoscopy / FIT/ Cologuard? NA - based on age      If the patient is female:    4. For patients aged 41-77: Has the patient had a mammogram within the past 2 years? NA - based on age or sex      11. For patients aged 21-65: Has the patient had a pap smear?  NA - based on age or sex

## 2022-12-21 NOTE — PROGRESS NOTES
Wenceslao Greenberg is a 76 y.o. male who presents for evaluation of routine follow up for numerous medical concerns. Biggest complaint today is vertigo, on/off now for about 3 months. He apparently called office with that complaint, and came in for nurse visit. Bp was normal.  I was never relayed his symptoms. He already had this appt with me,and was not offered any earlier visits. Struggles with lots of arthritis aches and pains. Also had atrial fib, ablated by dr Isabela Ho, and is now on eliquis. Having increased sob/isbell as well. Is exhausted by time he walks up a flight of stairs. He enjoys working in his basement making pens and wine bottle stoppers. Going to [facility] dec 26 to visit his son. ROS:  Constitutional: negative for fevers, chills, anorexia and weight loss  Eyes:   negative for visual disturbance and irritation  ENT:   negative for tinnitus,sore throat,nasal congestion,ear pain,hoarseness  Respiratory:  negative for cough, hemoptysis, dyspnea,wheezing  CV:   negative for chest pain, palpitations, lower extremity edema  GI:   negative for nausea, vomiting, diarrhea, abdominal pain,melena  Genitourinary: negative for frequency, dysuria and hematuria  Musculoskel: negative for myalgias, arthralgias, back pain, muscle weakness, joint pain  Neurological:  negative for headaches, dizziness, focal weakness, numbness  Psychiatric:     Negative for depression or anxiety      Past Medical History:   Diagnosis Date    AF (atrial fibrillation) (HCC)     s/p DCCV after his CABG, no recurrence    Anxiety     Arrhythmia     a fib    Arthritis     CAD (coronary artery disease)     s/p CABG in 1997    Cancer ([facility] 04/2021    prostate cancer    GERD (gastroesophageal reflux disease)     Goiter, nontoxic, multinodular     Hypertension     Long term current use of anticoagulant therapy     Low blood potassium     Myocardial infarct ([facility] ? 1997    MARIA ISABEL on CPAP     Other and unspecified hyperlipidemia     Pacemaker Unspecified sleep apnea        Past Surgical History:   Procedure Laterality Date    COLONOSCOPY N/A 2021    COLONOSCOPY performed by Yanely Rod MD at [facility] ENDOSCOPY    HX CATARACT REMOVAL  2006    bilateral    HX CHOLECYSTECTOMY      HX CORONARY ARTERY BYPASS GRAFT      side entry [facility]      HX KNEE REPLACEMENT      r tkr    HX ORTHOPAEDIC      Arthoscopic L. knee surgery     HX SEPTOPLASTY      HX SHOULDER ARTHROSCOPY  2012    left    CT CARDIAC SURG PROCEDURE UNLIST      bypass    CT INS NEW/RPLCMT PRM PM W/TRANSV ELTRD ATRIAL&VENT N/A 2019    INSERT PPM DUAL performed by Marleny Maher MD at [facility] CARDIAC CATH LAB    RIGHT HEART CATHETERIZATION      x3 states pt. (can't remember when)    RT/LT HEART CATHETERS  2013    PTCA    Heart Dr. Jammie Bryant       Family History   Problem Relation Age of Onset    Cancer Father         in his shoulder    Heart Attack Father     Heart Disease Father     Heart Disease Mother     Cancer Mother         abd     Cancer Son         wilm's tumor    Cancer Sister         abd    Thyroid Disease Neg Hx        Social History     Socioeconomic History    Marital status:      Spouse name: Not on file    Number of children: Not on file    Years of education: Not on file    Highest education level: Not on file   Occupational History    Not on file   Tobacco Use    Smoking status: Former     Packs/day: 4.50     Years: 20.00     Pack years: 90.00     Types: Cigarettes     Quit date: 1970     Years since quittin.7    Smokeless tobacco: Never   Vaping Use    Vaping Use: Never used   Substance and Sexual Activity    Alcohol use: Yes     Comment: 2 ounces of vodka daily    Drug use: No    Sexual activity: Yes     Partners: Female   Other Topics Concern    Not on file   Social History Narrative    Lives in [address] [place] [address],[address] with wife. Has 2 sons and 1 daughter. Works part time for [employer].   Worked at [employer] as an . Likes to travel and fish. Social Determinants of Health     Financial Resource Strain: Low Risk     Difficulty of Paying Living Expenses: Not hard at all   Food Insecurity: No Food Insecurity    Worried About Running Out of Food in the Last Year: Never true    Ran Out of Food in the Last Year: Never true   Transportation Needs: Not on file   Physical Activity: Not on file   Stress: Not on file   Social Connections: Not on file   Intimate Partner Violence: Not on file   Housing Stability: Not on file          Visit Vitals  /65 (BP 1 Location: Left upper arm, BP Patient Position: Sitting)   Pulse 75   Temp 97.9 °F (36.6 °C) (Temporal)   Resp 16   Ht 6' (1.829 m)   Wt 313 lb 6.4 oz (142.2 kg)   SpO2 97%   BMI 42.50 kg/m²       Physical Examination:   General - Well appearing male. overweight  HEENT - PERRL, TM no erythema/opacification, normal nasal turbinates, no oropharyngeal erythema or exudate, MMM  Neck - supple, no bruits, no thyroidomegaly, no lymphadenopathy  Pulm - clear to auscultation bilaterally  Cardio - RRR, normal S1 S2, no murmur  Abd - soft, nontender, no masses, no HSM  Extrem - no edema, +2 distal pulses  Neuro-  No focal deficits, CN intact     Assessment/Plan:     Obesity, bmi 42--refills given for adipex  Vertigo--handout given with exercises. Referral to pt as well  Pafib--nsr now, sp ablation. On toporol xl, eliquis  Cad whit hx cabg--on toporol xl, asa  Hyperlipids--on lipitor  Hx prostte cancer--sp radiation.   On lupron, casidex  Bph--on flomax  Diffuse osteoarthritis--supportive care  MARIA EUGENIA--contineu celexa    Rtc 3 months for awv        Angela Espinosa III, DO

## 2022-12-23 DIAGNOSIS — E66.01 CLASS 3 SEVERE OBESITY WITH SERIOUS COMORBIDITY AND BODY MASS INDEX (BMI) OF 40.0 TO 44.9 IN ADULT, UNSPECIFIED OBESITY TYPE (HCC): ICD-10-CM

## 2022-12-23 RX ORDER — PHENTERMINE HYDROCHLORIDE 37.5 MG/1
37.5 TABLET ORAL
Qty: 90 TABLET | Refills: 0 | Status: SHIPPED | OUTPATIENT
Start: 2022-12-23

## 2022-12-23 NOTE — TELEPHONE ENCOUNTER
PCP: Leroy Jama,     Last appt: 12/21/2022  Future Appointments   Date Time Provider Maged Faulkneri   1/9/2023 10:00 AM PULMONARY LAB [facility]   4/21/2023 11:40 AM Cheko Chavarria III, DO [facility] BS AMB       Requested Prescriptions     Pending Prescriptions Disp Refills    phentermine (ADIPEX-P) 37.5 mg tablet 90 Tablet 3     Sig: Take 1 Tablet by mouth every morning.  Max Daily Amount: 37.5 mg. phentermine 37.5 mg tablet

## 2023-01-09 ENCOUNTER — HOSPITAL ENCOUNTER (OUTPATIENT)
Dept: PULMONOLOGY | Age: 78
Discharge: HOME | End: 2023-01-09
Attending: INTERNAL MEDICINE
Payer: MEDICARE

## 2023-01-09 DIAGNOSIS — R06.02 SOB (SHORTNESS OF BREATH) ON EXERTION: ICD-10-CM

## 2023-01-09 PROCEDURE — 94726 PLETHYSMOGRAPHY LUNG VOLUMES: CPT

## 2023-01-09 PROCEDURE — 94729 DIFFUSING CAPACITY: CPT

## 2023-01-09 PROCEDURE — 94375 RESPIRATORY FLOW VOLUME LOOP: CPT

## 2023-01-12 NOTE — PROGRESS NOTES
Breathing test essentially normal.   Could try an inhaler to see if it helps with his symptoms, but suspect weight loss is the more important thing.

## 2023-01-12 NOTE — PROCEDURES
[address]  PULMONARY FUNCTION TEST    Name:  Sherry Burnett  MR#:  [id number]  :  1945  ACCOUNT #:  [de-identified]  DATE OF SERVICE:  2023    INTERPRETATION:  1. Spirometry shows forced vital capacity of 85%, FEV1 of 90%, FEV1/FVC of 69. Technically, this is mild obstructive lung disease. However, given such a good FVC and FEV1, this could be normal for the age. 2.  Lung volumes,TLC is 108% which is normal.  3.  Diffusion capacity is 92% which is mildly reduced. 4.  Flow-volume loop shows some obstruction in the smaller airways. Overall, considering the age of the patient, the pulmonary function test seems to be normal. If clinically indicated, however, then bronchodilators can be used to see if there is any improvement in symptoms.        Jayde Meraz MD      /[id number]  D:  2023 16:35  T:  2023 2:40  JOB #:  [id number]  CC:  Uche Chauhan MD

## 2023-01-13 NOTE — PROGRESS NOTES
Spoke with patient. Advised of results and verbalized understanding. Denies inhaler at the time. Per Dr. Bertrand Alfaro    \"Breathing test essentially normal.   Could try an inhaler to see if it helps with his symptoms, but suspect weight loss is the more important thing. \"

## 2023-01-17 ENCOUNTER — TELEPHONE (OUTPATIENT)
Dept: INTERNAL MEDICINE CLINIC | Age: 78
End: 2023-01-17

## 2023-01-17 DIAGNOSIS — R42 VERTIGO: Primary | ICD-10-CM

## 2023-01-17 NOTE — TELEPHONE ENCOUNTER
#[phone]  Roma at [facility] states that she needs a new order sent to them omitting the specific doctor for pt to see due to his insurance.     Please put PT for vertigo for [facility]    Fax #[phone]

## 2023-01-30 ENCOUNTER — HOSPITAL ENCOUNTER (OUTPATIENT)
Age: 78
Setting detail: OUTPATIENT SURGERY
Discharge: HOME | End: 2023-01-30
Attending: INTERNAL MEDICINE | Admitting: INTERNAL MEDICINE
Payer: MEDICARE

## 2023-01-30 VITALS
TEMPERATURE: 98.1 F | RESPIRATION RATE: 19 BRPM | DIASTOLIC BLOOD PRESSURE: 71 MMHG | HEIGHT: 72 IN | OXYGEN SATURATION: 98 % | HEART RATE: 75 BPM | BODY MASS INDEX: 40.09 KG/M2 | WEIGHT: 296 LBS | SYSTOLIC BLOOD PRESSURE: 126 MMHG

## 2023-01-30 DIAGNOSIS — R07.9 CHEST PAIN, UNSPECIFIED TYPE: ICD-10-CM

## 2023-01-30 PROCEDURE — 74011000250 HC RX REV CODE- 250: Performed by: INTERNAL MEDICINE

## 2023-01-30 PROCEDURE — 76937 US GUIDE VASCULAR ACCESS: CPT | Performed by: INTERNAL MEDICINE

## 2023-01-30 PROCEDURE — 77030019698 HC SYR ANGI MDLON MRTM -A: Performed by: INTERNAL MEDICINE

## 2023-01-30 PROCEDURE — C1769 GUIDE WIRE: HCPCS | Performed by: INTERNAL MEDICINE

## 2023-01-30 PROCEDURE — 77030010221 HC SPLNT WR POS TELE -B: Performed by: INTERNAL MEDICINE

## 2023-01-30 PROCEDURE — 99152 MOD SED SAME PHYS/QHP 5/>YRS: CPT | Performed by: INTERNAL MEDICINE

## 2023-01-30 PROCEDURE — 93459 L HRT ART/GRFT ANGIO: CPT | Performed by: INTERNAL MEDICINE

## 2023-01-30 PROCEDURE — 77030008543 HC TBNG MON PRSS MRTM -A: Performed by: INTERNAL MEDICINE

## 2023-01-30 PROCEDURE — 74011000636 HC RX REV CODE- 636: Performed by: INTERNAL MEDICINE

## 2023-01-30 PROCEDURE — 74011250636 HC RX REV CODE- 250/636: Performed by: INTERNAL MEDICINE

## 2023-01-30 PROCEDURE — 99153 MOD SED SAME PHYS/QHP EA: CPT | Performed by: INTERNAL MEDICINE

## 2023-01-30 PROCEDURE — 2709999900 HC NON-CHARGEABLE SUPPLY: Performed by: INTERNAL MEDICINE

## 2023-01-30 PROCEDURE — C1894 INTRO/SHEATH, NON-LASER: HCPCS | Performed by: INTERNAL MEDICINE

## 2023-01-30 PROCEDURE — 77030040934 HC CATH DIAG DXTERITY MEDT -A: Performed by: INTERNAL MEDICINE

## 2023-01-30 PROCEDURE — 74011250637 HC RX REV CODE- 250/637: Performed by: INTERNAL MEDICINE

## 2023-01-30 PROCEDURE — 77030019569 HC BND COMPR RAD TERU -B: Performed by: INTERNAL MEDICINE

## 2023-01-30 PROCEDURE — 77030015766: Performed by: INTERNAL MEDICINE

## 2023-01-30 RX ORDER — LIDOCAINE HYDROCHLORIDE 10 MG/ML
INJECTION, SOLUTION EPIDURAL; INFILTRATION; INTRACAUDAL; PERINEURAL AS NEEDED
Status: DISCONTINUED | OUTPATIENT
Start: 2023-01-30 | End: 2023-01-30 | Stop reason: HOSPADM

## 2023-01-30 RX ORDER — MIDAZOLAM HYDROCHLORIDE 1 MG/ML
INJECTION, SOLUTION INTRAMUSCULAR; INTRAVENOUS AS NEEDED
Status: DISCONTINUED | OUTPATIENT
Start: 2023-01-30 | End: 2023-01-30 | Stop reason: HOSPADM

## 2023-01-30 RX ORDER — GUAIFENESIN 100 MG/5ML
81 LIQUID (ML) ORAL
Status: COMPLETED | OUTPATIENT
Start: 2023-01-30 | End: 2023-01-30

## 2023-01-30 RX ORDER — FENTANYL CITRATE 50 UG/ML
INJECTION, SOLUTION INTRAMUSCULAR; INTRAVENOUS AS NEEDED
Status: DISCONTINUED | OUTPATIENT
Start: 2023-01-30 | End: 2023-01-30 | Stop reason: HOSPADM

## 2023-01-30 RX ORDER — HEPARIN SODIUM 200 [USP'U]/100ML
INJECTION, SOLUTION INTRAVENOUS
Status: COMPLETED | OUTPATIENT
Start: 2023-01-30 | End: 2023-01-30

## 2023-01-30 RX ORDER — HEPARIN SODIUM 1000 [USP'U]/ML
INJECTION, SOLUTION INTRAVENOUS; SUBCUTANEOUS AS NEEDED
Status: DISCONTINUED | OUTPATIENT
Start: 2023-01-30 | End: 2023-01-30 | Stop reason: HOSPADM

## 2023-01-30 RX ORDER — SACUBITRIL AND VALSARTAN 24; 26 MG/1; MG/1
1 TABLET, FILM COATED ORAL 2 TIMES DAILY
Qty: 60 TABLET | Refills: 12 | Status: SHIPPED | OUTPATIENT
Start: 2023-01-30

## 2023-01-30 RX ORDER — VERAPAMIL HYDROCHLORIDE 2.5 MG/ML
INJECTION, SOLUTION INTRAVENOUS AS NEEDED
Status: DISCONTINUED | OUTPATIENT
Start: 2023-01-30 | End: 2023-01-30 | Stop reason: HOSPADM

## 2023-01-30 RX ADMIN — ASPIRIN 81 MG: 81 TABLET, CHEWABLE ORAL at 08:37

## 2023-01-30 NOTE — PROGRESS NOTES
Patient ambulated in hallway with steady gait; no complaints of discomfort, dizziness, sob. Right radial site clean dry and intact. Discharge instructions reviewed with patient and patients family; answered questions as needed. Transported patient in wheelchair to car.

## 2023-01-30 NOTE — Clinical Note
TRANSFER - OUT REPORT:     Verbal report given to: Nayeli Macias, (at bedside). Report consisted of patient's Situation, Background, Assessment and   Recommendations(SBAR). Opportunity for questions and clarification was provided. Patient transported with a Registered Nurse. Patient transported to: recovery.

## 2023-01-30 NOTE — PROGRESS NOTES
Cardiac Cath Lab Recovery Arrival Note:      Estella Boeck arrived to Cardiac Cath Lab, Recovery Area. Staff introduced to patient. Patient identifiers verified with NAME and DATE OF BIRTH. Procedure verified with patient. Consent forms reviewed and signed by patient or authorized representative and verified. Allergies verified. Patient and family oriented to department. Patient and family informed of procedure and plan of care. Questions answered with review. Patient prepped for procedure, per orders from physician, prior to arrival.    Patient on cardiac monitor, non-invasive blood pressure, SPO2 monitor. On RA. Patient is A&Ox 4. Patient reports no complaints. Patient in stretcher, in low position, with side rails up, call bell within reach, patient instructed to call if assistance as needed. Patient prep in: [address], [place]. Patient family has pager # none  Family in: girlfriend Baldomero Barry in [facility].    Prep by: Ankita Jolley

## 2023-01-30 NOTE — PROGRESS NOTES
Primary Nurse John Shabazz RN performed a dual skin assessment on this patient No impairment noted  Charanjit score is 23; meplix on sacrum

## 2023-01-30 NOTE — PROGRESS NOTES
TRANSFER - IN REPORT:    Verbal report received from kim weinstein  on Arpan Cordova  being received from cath lab for routine progression of care. Report consisted of patients Situation, Background, Assessment and Recommendations(SBAR). Information from the following report(s) SBAR was reviewed with the receiving clinician. Opportunity for questions and clarification was provided. Assessment completed upon patients arrival to [address] [place] [address] and care assumed. Cardiac Cath Lab Recovery Arrival Note:    Arpan Cordova arrived to [facility] recovery area. Patient procedure= heart cath. Patient on cardiac monitor, non-invasive blood pressure, SPO2 monitor. On RA . IV  of nacl on pump at 75 ml/hr. Patient status doing well without problems. Patient is A&Ox 4. Patient reports no complaints. PROCEDURE SITE CHECK:    Procedure site:without any bleeding and no hematoma, no pain/discomfort reported at procedure site. No change in patient status. Continue to monitor patient and status.

## 2023-01-31 NOTE — CARDIO/PULMONARY
Cardiopulmonary Rehab:    Chart reviewed. Pt is a 76 y.o. M admitted with Chest pain, unspecified type [R07.9] s/p cardiac cath. Per Dr. Ar Anders cath report [facility] remains completely revascularized with no progression to explain recent drop in EF. \"      Smoking history assessed. Patient is a former smoker, quit 1970.   Smoking Cessation Program link has not been added to the AVS.     EF is not available in EMR     Denia Gee RN

## 2023-02-14 ENCOUNTER — TRANSCRIBE ORDER (OUTPATIENT)
Dept: REGISTRATION | Age: 78
End: 2023-02-14

## 2023-02-14 ENCOUNTER — HOSPITAL ENCOUNTER (OUTPATIENT)
Dept: GENERAL RADIOLOGY | Age: 78
Discharge: HOME | End: 2023-02-14
Payer: MEDICARE

## 2023-02-14 DIAGNOSIS — I42.9 CARDIOMYOPATHY, UNSPECIFIED (HCC): Primary | ICD-10-CM

## 2023-02-14 DIAGNOSIS — I42.9 CARDIOMYOPATHY, UNSPECIFIED (HCC): ICD-10-CM

## 2023-02-14 PROCEDURE — 71046 X-RAY EXAM CHEST 2 VIEWS: CPT

## 2023-02-20 ENCOUNTER — APPOINTMENT (OUTPATIENT)
Dept: GENERAL RADIOLOGY | Age: 78
End: 2023-02-20
Attending: INTERNAL MEDICINE
Payer: MEDICARE

## 2023-02-20 ENCOUNTER — ANESTHESIA (OUTPATIENT)
Dept: CARDIAC CATH/INVASIVE PROCEDURES | Age: 78
End: 2023-02-20
Payer: MEDICARE

## 2023-02-20 ENCOUNTER — HOSPITAL ENCOUNTER (OUTPATIENT)
Age: 78
Discharge: HOME | End: 2023-02-20
Attending: INTERNAL MEDICINE | Admitting: INTERNAL MEDICINE
Payer: MEDICARE

## 2023-02-20 ENCOUNTER — ANESTHESIA EVENT (OUTPATIENT)
Dept: CARDIAC CATH/INVASIVE PROCEDURES | Age: 78
End: 2023-02-20
Payer: MEDICARE

## 2023-02-20 VITALS
TEMPERATURE: 97.7 F | WEIGHT: 296 LBS | HEIGHT: 72 IN | SYSTOLIC BLOOD PRESSURE: 153 MMHG | HEART RATE: 75 BPM | BODY MASS INDEX: 40.09 KG/M2 | RESPIRATION RATE: 17 BRPM | DIASTOLIC BLOOD PRESSURE: 68 MMHG | OXYGEN SATURATION: 98 %

## 2023-02-20 DIAGNOSIS — I42.9 CARDIOMYOPATHY, UNSPECIFIED TYPE (HCC): ICD-10-CM

## 2023-02-20 PROCEDURE — 74011000272 HC RX REV CODE- 272: Performed by: INTERNAL MEDICINE

## 2023-02-20 PROCEDURE — 74011000250 HC RX REV CODE- 250: Performed by: INTERNAL MEDICINE

## 2023-02-20 PROCEDURE — C1900 LEAD, CORONARY VENOUS: HCPCS | Performed by: INTERNAL MEDICINE

## 2023-02-20 PROCEDURE — 77030039825 HC MSK NSL PAP SUPERNO2VA VYRM -B: Performed by: ANESTHESIOLOGY

## 2023-02-20 PROCEDURE — C1751 CATH, INF, PER/CENT/MIDLINE: HCPCS | Performed by: INTERNAL MEDICINE

## 2023-02-20 PROCEDURE — C1887 CATHETER, GUIDING: HCPCS | Performed by: INTERNAL MEDICINE

## 2023-02-20 PROCEDURE — 74011000258 HC RX REV CODE- 258: Performed by: NURSE ANESTHETIST, CERTIFIED REGISTERED

## 2023-02-20 PROCEDURE — 77030012468 HC VLV BLEEDBK CNTRL ABBT -B: Performed by: INTERNAL MEDICINE

## 2023-02-20 PROCEDURE — 77030033819 HC SELCT VEIN WORLEY MRTM -C: Performed by: INTERNAL MEDICINE

## 2023-02-20 PROCEDURE — 33249 INSJ/RPLCMT DEFIB W/LEAD(S): CPT | Performed by: INTERNAL MEDICINE

## 2023-02-20 PROCEDURE — C1777 LEAD, AICD, ENDO SINGLE COIL: HCPCS | Performed by: INTERNAL MEDICINE

## 2023-02-20 PROCEDURE — 74011000636 HC RX REV CODE- 636: Performed by: INTERNAL MEDICINE

## 2023-02-20 PROCEDURE — 74011250636 HC RX REV CODE- 250/636: Performed by: NURSE ANESTHETIST, CERTIFIED REGISTERED

## 2023-02-20 PROCEDURE — C1882 AICD, OTHER THAN SING/DUAL: HCPCS | Performed by: INTERNAL MEDICINE

## 2023-02-20 PROCEDURE — 71045 X-RAY EXAM CHEST 1 VIEW: CPT

## 2023-02-20 PROCEDURE — C1769 GUIDE WIRE: HCPCS | Performed by: INTERNAL MEDICINE

## 2023-02-20 PROCEDURE — 77030037400 HC ADH TISS HI VISC EXOFIN CHMP -B: Performed by: INTERNAL MEDICINE

## 2023-02-20 PROCEDURE — 33233 REMOVAL OF PM GENERATOR: CPT | Performed by: INTERNAL MEDICINE

## 2023-02-20 PROCEDURE — 77030018729 HC ELECTRD DEFIB PAD CARD -B: Performed by: INTERNAL MEDICINE

## 2023-02-20 PROCEDURE — 76060000033 HC ANESTHESIA 1 TO 1.5 HR: Performed by: INTERNAL MEDICINE

## 2023-02-20 PROCEDURE — C1893 INTRO/SHEATH, FIXED,NON-PEEL: HCPCS | Performed by: INTERNAL MEDICINE

## 2023-02-20 PROCEDURE — 77030028698 HC BLD TISS PLSM MEDT -D: Performed by: INTERNAL MEDICINE

## 2023-02-20 PROCEDURE — C1894 INTRO/SHEATH, NON-LASER: HCPCS | Performed by: INTERNAL MEDICINE

## 2023-02-20 PROCEDURE — 77030002996 HC SUT SLK J&J -A: Performed by: INTERNAL MEDICINE

## 2023-02-20 PROCEDURE — 77030022704 HC SUT VLOC COVD -B: Performed by: INTERNAL MEDICINE

## 2023-02-20 PROCEDURE — 74011250636 HC RX REV CODE- 250/636: Performed by: INTERNAL MEDICINE

## 2023-02-20 DEVICE — CARDIAC RESYNCHRONIZATION THERAPY DEFIBRILLATOR
Type: IMPLANTABLE DEVICE | Status: FUNCTIONAL
Brand: VIGILANT™ X4 CRT-D

## 2023-02-20 DEVICE — PACE/SENSE LEAD
Type: IMPLANTABLE DEVICE | Status: FUNCTIONAL
Brand: ACUITY™ X4 SPIRAL S

## 2023-02-20 DEVICE — LEAD DEFIB 64CM MODEL 0273 -- ENDOTAK RELIANCE S: Type: IMPLANTABLE DEVICE | Status: FUNCTIONAL

## 2023-02-20 RX ORDER — SODIUM CHLORIDE 0.9 % (FLUSH) 0.9 %
5-40 SYRINGE (ML) INJECTION EVERY 8 HOURS
Status: DISCONTINUED | OUTPATIENT
Start: 2023-02-20 | End: 2023-02-20 | Stop reason: HOSPADM

## 2023-02-20 RX ORDER — CEFAZOLIN SODIUM 1 G/3ML
INJECTION, POWDER, FOR SOLUTION INTRAMUSCULAR; INTRAVENOUS AS NEEDED
Status: DISCONTINUED | OUTPATIENT
Start: 2023-02-20 | End: 2023-02-20 | Stop reason: HOSPADM

## 2023-02-20 RX ORDER — HEPARIN SODIUM 200 [USP'U]/100ML
INJECTION, SOLUTION INTRAVENOUS
Status: COMPLETED | OUTPATIENT
Start: 2023-02-20 | End: 2023-02-20

## 2023-02-20 RX ORDER — HYDROCODONE BITARTRATE AND ACETAMINOPHEN 5; 325 MG/1; MG/1
1 TABLET ORAL
Status: DISCONTINUED | OUTPATIENT
Start: 2023-02-20 | End: 2023-02-20 | Stop reason: HOSPADM

## 2023-02-20 RX ORDER — FENTANYL CITRATE 50 UG/ML
INJECTION, SOLUTION INTRAMUSCULAR; INTRAVENOUS AS NEEDED
Status: DISCONTINUED | OUTPATIENT
Start: 2023-02-20 | End: 2023-02-20 | Stop reason: HOSPADM

## 2023-02-20 RX ORDER — SODIUM CHLORIDE 9 MG/ML
INJECTION, SOLUTION INTRAVENOUS
Status: DISCONTINUED | OUTPATIENT
Start: 2023-02-20 | End: 2023-02-20 | Stop reason: HOSPADM

## 2023-02-20 RX ORDER — LIDOCAINE HYDROCHLORIDE 10 MG/ML
INJECTION INFILTRATION; PERINEURAL AS NEEDED
Status: DISCONTINUED | OUTPATIENT
Start: 2023-02-20 | End: 2023-02-20 | Stop reason: HOSPADM

## 2023-02-20 RX ORDER — SODIUM CHLORIDE 0.9 % (FLUSH) 0.9 %
5-40 SYRINGE (ML) INJECTION AS NEEDED
Status: DISCONTINUED | OUTPATIENT
Start: 2023-02-20 | End: 2023-02-20 | Stop reason: HOSPADM

## 2023-02-20 RX ORDER — ONDANSETRON 2 MG/ML
INJECTION INTRAMUSCULAR; INTRAVENOUS AS NEEDED
Status: DISCONTINUED | OUTPATIENT
Start: 2023-02-20 | End: 2023-02-20 | Stop reason: HOSPADM

## 2023-02-20 RX ORDER — NALOXONE HYDROCHLORIDE 0.4 MG/ML
0.4 INJECTION, SOLUTION INTRAMUSCULAR; INTRAVENOUS; SUBCUTANEOUS AS NEEDED
Status: DISCONTINUED | OUTPATIENT
Start: 2023-02-20 | End: 2023-02-20 | Stop reason: HOSPADM

## 2023-02-20 RX ORDER — ACETAMINOPHEN 325 MG/1
650 TABLET ORAL
Status: DISCONTINUED | OUTPATIENT
Start: 2023-02-20 | End: 2023-02-20 | Stop reason: HOSPADM

## 2023-02-20 RX ORDER — PROPOFOL 10 MG/ML
INJECTION, EMULSION INTRAVENOUS
Status: DISCONTINUED | OUTPATIENT
Start: 2023-02-20 | End: 2023-02-20 | Stop reason: HOSPADM

## 2023-02-20 RX ADMIN — ONDANSETRON HYDROCHLORIDE 4 MG: 2 INJECTION, SOLUTION INTRAMUSCULAR; INTRAVENOUS at 08:56

## 2023-02-20 RX ADMIN — SODIUM CHLORIDE: 9 INJECTION, SOLUTION INTRAVENOUS at 07:55

## 2023-02-20 RX ADMIN — PROPOFOL 25 MCG/KG/MIN: 10 INJECTION, EMULSION INTRAVENOUS at 08:01

## 2023-02-20 RX ADMIN — FENTANYL CITRATE 25 MCG: 50 INJECTION, SOLUTION INTRAMUSCULAR; INTRAVENOUS at 08:36

## 2023-02-20 RX ADMIN — CEFAZOLIN 3 G: 1 INJECTION, POWDER, FOR SOLUTION INTRAMUSCULAR; INTRAVENOUS; PARENTERAL at 08:16

## 2023-02-20 RX ADMIN — FENTANYL CITRATE 25 MCG: 50 INJECTION, SOLUTION INTRAMUSCULAR; INTRAVENOUS at 08:38

## 2023-02-20 RX ADMIN — FENTANYL CITRATE 25 MCG: 50 INJECTION, SOLUTION INTRAMUSCULAR; INTRAVENOUS at 08:22

## 2023-02-20 RX ADMIN — FENTANYL CITRATE 25 MCG: 50 INJECTION, SOLUTION INTRAMUSCULAR; INTRAVENOUS at 08:01

## 2023-02-20 NOTE — ANESTHESIA PREPROCEDURE EVALUATION
Relevant Problems   RESPIRATORY SYSTEM   (+) HUSAIN (dyspnea on exertion)      CARDIOVASCULAR   (+) A-fib (HCC)   (+) Atherosclerosis of coronary artery bypass graft   (+) Atrial fibrillation (HCC)   (+) SSS (sick sinus syndrome) (HCC)   (+) Unstable angina (HCC)      ENDOCRINE   (+) Hypothyroidism due to acquired atrophy of thyroid   (+) Severe obesity ([address]       Anesthetic History               Review of Systems / Medical History  Patient summary reviewed and pertinent labs reviewed    Pulmonary        Sleep apnea           Neuro/Psych              Cardiovascular    Hypertension        Dysrhythmias   Pacemaker, CAD and CABG    Exercise tolerance: <4 METS  Comments: Left Main  Mid LM lesion 60% stenosed. . Diagnostic coronary angiography shows negative for . Lesion is the culprit lesion. The lesion is type C. Lesion Area: 6 mm². Optical coherence tomography (OCT) was performed. Left Anterior Descending  Dist LAD lesion 100% stenosed. . Diagnostic coronary angiography shows positive for . Left Circumflex  Prox Cx lesion 40% stenosed. .  Right Coronary Artery  Dist RCA lesion 90% stenosed. .  Vein Graft To Ost RCA  Vein The graft was visualized by angiography and is moderate in size.  The graft is angiographically normal.       GI/Hepatic/Renal     GERD           Endo/Other      Hypothyroidism  Morbid obesity and arthritis     Other Findings            Physical Exam    Airway  Mallampati: III  TM Distance: 4 - 6 cm  Neck ROM: normal range of motion   Mouth opening: Normal     Cardiovascular  Regular rate and rhythm,  S1 and S2 normal,  no murmur, click, rub, or gallop  Rhythm: regular  Rate: normal         Dental  No notable dental hx       Pulmonary  Breath sounds clear to auscultation               Abdominal  GI exam deferred       Other Findings            Anesthetic Plan    ASA: 3  Anesthesia type: MAC          Induction: Intravenous  Anesthetic plan and risks discussed with: Patient      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

## 2023-02-20 NOTE — PROGRESS NOTES
TRANSFER - IN REPORT:    Verbal report received from Alejandro Mejía and Jake Nichole CRNA on Isabell Boothe  being received from EP Lab for routine progression of care. Report consisted of patients Situation, Background, Assessment and Recommendations(SBAR). Information from the following report(s) SBAR, Procedure Summary, MAR, and Recent Results was reviewed with the receiving clinician. Opportunity for questions and clarification was provided. Assessment completed upon patients arrival to [address] [place] [address] and [facility]. Cardiac Cath Lab Recovery Arrival Note:    Isabell Boothe arrived to [facility] recovery area. Patient procedure= ICD insertion. Patient on cardiac monitor, non-invasive blood pressure, SPO2 monitor. On O2 @ 6 lpm via face mask. Patient status doing well without problems. Patient is A&Ox 4. Patient reports no complaints. PROCEDURE SITE CHECK:    Procedure site:without any bleeding and no hematoma, no pain/discomfort reported at procedure site. No change in patient status. Continue to monitor patient and status.

## 2023-02-20 NOTE — Clinical Note
TRANSFER - OUT REPORT:     Verbal report given to: recovery, (at bedside). Report consisted of patient's Situation, Background, Assessment and   Recommendations(SBAR). Opportunity for questions and clarification was provided. Patient transported with a Registered Nurse and [address] [place] [address] / [facility]. Patient transported to: recovery.

## 2023-02-20 NOTE — PROGRESS NOTES
Patient ambulated in recovery area with no assistance- at baseline function. Discharge instructions reviewed with patient and spouse and all questions and concerns were answered. Saline locked IV removed without complication. Patient wheeled to hospital entrance where spouse was waiting to drive home.

## 2023-02-20 NOTE — Clinical Note
TRANSFER - IN REPORT:     Verbal report received from: recovery. Report consisted of patient's Situation, Background, Assessment and   Recommendations(SBAR). Opportunity for questions and clarification was provided. Assessment completed upon patient's arrival to unit and care assumed. Patient transported with a Registered Nurse and [address] [place] [address] / [facility] [employer].

## 2023-02-20 NOTE — ANESTHESIA POSTPROCEDURE EVALUATION
Post-Anesthesia Evaluation and Assessment    Patient: Ashley Moore MRN: [id number]  SSN: [id number]    YOB: 1945  Age: 76 y.o. Sex: male      I have evaluated the patient and they are stable and ready for discharge from the PACU. Cardiovascular Function/Vital Signs  Visit Vitals  /65 (BP 1 Location: Right upper arm, BP Patient Position: At rest)   Pulse 70   Temp 36.5 °C (97.7 °F)   Resp 16   Ht 6' (1.829 m)   Wt 134.3 kg (296 lb)   SpO2 97%   BMI 40.14 kg/m²       Patient is status post MAC anesthesia for Procedure(s):  INSERT ICD BIV MULTI  VENOGRAM UPPER EXT LEFT. Nausea/Vomiting: None    Postoperative hydration reviewed and adequate. Pain:  Pain Scale 1: Numeric (0 - 10) (02/20/23 0930)  Pain Intensity 1: 0 (02/20/23 0930)   Managed    Neurological Status: At baseline    Mental Status, Level of Consciousness: Alert and  oriented to person, place, and time    Pulmonary Status:   O2 Device: None (Room air) (02/20/23 0930)   Adequate oxygenation and airway patent    Complications related to anesthesia: None    Post-anesthesia assessment completed. No concerns    Signed By: Nelsy Werner MD     February 20, 2023              Procedure(s):  INSERT ICD BIV MULTI  VENOGRAM UPPER EXT LEFT. MAC    <BSHSIANPOST>    INITIAL Post-op Vital signs:   Vitals Value Taken Time   /77 02/20/23 0947   Temp 36.5 °C (97.7 °F) 02/20/23 0917   Pulse 70 02/20/23 0949   Resp 13 02/20/23 0949   SpO2 97 % 02/20/23 0949   Vitals shown include unvalidated device data.

## 2023-02-20 NOTE — PROGRESS NOTES
Primary Nurse Nael Andrews RN and Ninfa Cooper RN performed a dual skin assessment on this patient No impairment noted  Charanjit score is 22    Patient refused sacrum meplex

## 2023-02-20 NOTE — DISCHARGE INSTRUCTIONS
[facility]  [address]  [phone]  [email or web address]   ICD INSERTION DISCHARGE INSTRUCTIONS    Patient ID:  Adriano Edwards  [id number]  89 y.o.  1945    Admit Date: 2/20/2023    Discharge Date: 2/20/2023     Admitting Physician: [unfilled]     Discharge Physician: [unfilled]    Admission Diagnoses:   Cardiomyopathy, unspecified type ([address] [I42.9]  Cardiomyopathy ([address] [I42.9]    Discharge Diagnoses: Active Problems:    Cardiomyopathy [facility]) (2/20/2023)        Discharge Condition: Good    Cardiology Procedures this Admission:  S/P ICD implantation. Disposition: home    Reference discharge instructions provided by nursing for diet and activity. Follow-up with ICD/PM clinic in 3 weeks. Call [phone] to make an appointment. Signed:  Sary Bianchi MD  2/20/2023  10:16 AM      DISCHARGE INSTRUCTIONS FOR PATIENTS WITH ICD'S    Remember to call for an appointment in 3 weeks [phone].    2. Medic Alert Bracelets are available from your pharmacist to wear at all times if you choose to wear one. 3. Carry your ID card for your ICD with you at all times. This card will be given to you in the hospital or mailed to you. 4. The ICD will bulge slightly under your skin. The bulge will decrease in size over the next few weeks. Please notify the doctor's office if you notice any of the following around your ICD site:   A.  A bruise that does not go away. B.  Soreness or yellow, green, or brown drainage from the site. C. Any swelling from the site. D. If you have a fever of 100 degrees or higher that lasts for a few days. INCISION CARE       1.  Leave the dressing over your site until your follow up visit. 2.  You may not shower until after follow up visit. 3.  For comfort, wear loose fitting clothing. 4.  Ice pack to affected shoulder for first 24 hours, wear your sling for 2 days.   5.  Report any signs of infection, fever, pain, swelling, redness, oozing, or heat at site especially if these symptoms increase after the first 3 to 4 days. ACTIVITY PRECAUTIONS     1. Avoid rough contact with the implant site. 2. No driving for 14 days. 3. Avoid lifting your arm over your head, carrying anything on the affected side, or lifting over 10 pounds for 90 days. For the first 2 days only bend your arm at the elbow. 4. Any extreme activity such as golf, weight lifting or exercise biking should be restricted for 60 days. 5. Do not carry objects by holding them against your implant site. 6.  No shooting rifles or any type of gun with the affected shoulder permanently. 7.  Welding and chainsaws are prohibited. SPECIAL PRECAUTIONS     1. You should avoid all strong magnetic fields, such as arc welding, large transformers, large motors. Some ICD devices will beep if it detects a strong magnet. If this occurs, move out of the area. 2.  You may or may not have an MRI which uses a strong magnet to take pictures. 3.  Treatments or surgery that requires diathermy or electrocautery should be discussed with your doctor before scheduled. 4.  Avoid radio frequency transmitters, including radar. 5.  Advise dentist or other medical personnel you see that you have an ICD. 6.  Cell phones and microwave oven use is okay. 7.  If you plan to move or take a trip to a new area, the doctor's office will give you a name of a doctor to contact for any problems. SPECIAL INSTRUCTIONS ON SHOCKS     1. Notify your doctor for any of the following:       A. Anytime a shock is received in a 24 hour period. An office visit is not usually required for a single shock. B.  Two or more shocks in a row. If you do not feel well, call the [facility] Squad, otherwise call your doctor. This may require an office visit. C. Two or more shocks spaced apart by several hours. This may require an office visit. 2.  Keep a record of events. Include date, time, symptoms and activity at that time. ANTIBIOTIC THERAPY    During the first 8 weeks after your ICD insertion, you may need antibiotics before any dental work or certain tests or operations. Let the dentist or doctor who is caring for you know that you have had an implanted device.

## 2023-02-20 NOTE — PROGRESS NOTES
Cardiac Cath Lab Recovery Arrival Note:      Melecio Molina arrived to Cardiac Cath Lab, Recovery Area. Staff introduced to patient. Patient identifiers verified with NAME and DATE OF BIRTH. Procedure verified with patient. Consent forms reviewed and signed by patient or authorized representative and verified. Allergies verified. Patient and family oriented to department. Patient and family informed of procedure and plan of care. Questions answered with review. Patient prepped for procedure, per orders from physician, prior to arrival.    Patient on cardiac monitor, non-invasive blood pressure, SPO2 monitor. On room air. Patient is A&Ox 4. Patient reports no complaints. Patient in stretcher, in low position, with side rails up, call bell within reach, patient instructed to call if assistance as needed. Patient prep in: [address], [place]. Patient family has pager # na  Family in: significant other Page.    Prep by: Augusto Choudhary RN

## 2023-03-15 RX ORDER — TAMSULOSIN HYDROCHLORIDE 0.4 MG/1
CAPSULE ORAL
Qty: 180 CAPSULE | Refills: 1 | Status: SHIPPED | OUTPATIENT
Start: 2023-03-15

## 2023-04-21 ENCOUNTER — OFFICE VISIT (OUTPATIENT)
Dept: INTERNAL MEDICINE CLINIC | Age: 78
End: 2023-04-21

## 2023-04-21 VITALS
DIASTOLIC BLOOD PRESSURE: 64 MMHG | OXYGEN SATURATION: 94 % | HEART RATE: 76 BPM | HEIGHT: 72 IN | WEIGHT: 306.2 LBS | BODY MASS INDEX: 41.47 KG/M2 | SYSTOLIC BLOOD PRESSURE: 100 MMHG | RESPIRATION RATE: 16 BRPM | TEMPERATURE: 97.9 F

## 2023-04-21 DIAGNOSIS — C61 PROSTATE CANCER (HCC): ICD-10-CM

## 2023-04-21 DIAGNOSIS — I25.10 CORONARY ARTERY DISEASE INVOLVING NATIVE CORONARY ARTERY OF NATIVE HEART WITHOUT ANGINA PECTORIS: ICD-10-CM

## 2023-04-21 DIAGNOSIS — N52.8 OTHER MALE ERECTILE DYSFUNCTION: ICD-10-CM

## 2023-04-21 DIAGNOSIS — Z00.00 MEDICARE ANNUAL WELLNESS VISIT, SUBSEQUENT: Primary | ICD-10-CM

## 2023-04-21 DIAGNOSIS — I48.0 PAROXYSMAL ATRIAL FIBRILLATION (HCC): ICD-10-CM

## 2023-04-21 DIAGNOSIS — Z99.89 OSA ON CPAP: ICD-10-CM

## 2023-04-21 DIAGNOSIS — G89.29 CHRONIC LOW BACK PAIN WITHOUT SCIATICA, UNSPECIFIED BACK PAIN LATERALITY: ICD-10-CM

## 2023-04-21 DIAGNOSIS — G47.33 OSA ON CPAP: ICD-10-CM

## 2023-04-21 DIAGNOSIS — E78.2 MIXED HYPERLIPIDEMIA: ICD-10-CM

## 2023-04-21 DIAGNOSIS — E66.01 OBESITY, CLASS III, BMI 40-49.9 (MORBID OBESITY) (HCC): ICD-10-CM

## 2023-04-21 DIAGNOSIS — I49.5 SSS (SICK SINUS SYNDROME) (HCC): ICD-10-CM

## 2023-04-21 DIAGNOSIS — R73.03 PREDIABETES: ICD-10-CM

## 2023-04-21 DIAGNOSIS — M54.50 CHRONIC LOW BACK PAIN WITHOUT SCIATICA, UNSPECIFIED BACK PAIN LATERALITY: ICD-10-CM

## 2023-04-21 RX ORDER — ZOSTER VACCINE RECOMBINANT, ADJUVANTED 50 MCG/0.5
0.5 KIT INTRAMUSCULAR ONCE
Qty: 0.5 ML | Refills: 1 | Status: SHIPPED | OUTPATIENT
Start: 2023-04-21 | End: 2023-04-21

## 2023-04-21 RX ORDER — DIAPER,BRIEF,INFANT-TODD,DISP
EACH MISCELLANEOUS
COMMUNITY

## 2023-04-21 RX ORDER — TORSEMIDE 10 MG/1
TABLET ORAL
COMMUNITY

## 2023-04-21 RX ORDER — CETIRIZINE HYDROCHLORIDE 10 MG/1
TABLET ORAL
COMMUNITY

## 2023-04-21 RX ORDER — TETANUS TOXOID, REDUCED DIPHTHERIA TOXOID AND ACELLULAR PERTUSSIS VACCINE, ADSORBED 5; 2.5; 8; 8; 2.5 [IU]/.5ML; [IU]/.5ML; UG/.5ML; UG/.5ML; UG/.5ML
0.5 SUSPENSION INTRAMUSCULAR ONCE
Qty: 0.5 ML | Refills: 0 | Status: SHIPPED | OUTPATIENT
Start: 2023-04-21 | End: 2023-04-21

## 2023-04-21 RX ORDER — DOCUSATE SODIUM 100 MG/1
CAPSULE, LIQUID FILLED ORAL
COMMUNITY

## 2023-04-21 NOTE — PATIENT INSTRUCTIONS
Medicare Wellness Visit, Male    The best way to live healthy is to have a lifestyle where you eat a well-balanced diet, exercise regularly, limit alcohol use, and quit all forms of tobacco/nicotine, if applicable. Regular preventive services are another way to keep healthy. Preventive services (vaccines, screening tests, monitoring & exams) can help personalize your care plan, which helps you manage your own care. Screening tests can find health problems at the earliest stages, when they are easiest to treat. [facility] follows the current, evidence-based guidelines published by the [facility] Evelio Michelle ([facility]) when recommending preventive services for our patients. Because we follow these guidelines, sometimes recommendations change over time as research supports it. (For example, a prostate screening blood test is no longer routinely recommended for men with no symptoms). Of course, you and your doctor may decide to screen more often for some diseases, based on your risk and co-morbidities (chronic disease you are already diagnosed with). Preventive services for you include:  - Medicare offers their members a free annual wellness visit, which is time for you and your primary care provider to discuss and plan for your preventive service needs.  Take advantage of this benefit every year!    -Over the age of 72 should receive the recommended pneumonia vaccines.   -All adults should have a flu vaccine yearly.  -All adults should receive a tetanus vaccine every 10 years.  -Over the age of 48 should receive the shingles vaccines.    -All adults should be screened once for Hepatitis C.  -All adults age 38-68 who are overweight should have a diabetes screening test once every three years.   -Other screening tests & preventive services for persons with diabetes include: an eye exam to screen for diabetic retinopathy, a kidney function test, a foot exam, and stricter control over your cholesterol.   -Cardiovascular screening for adults with routine risk involves an electrocardiogram (ECG) at intervals determined by the provider.     -Colorectal cancer screening should be done for adults age 43-69 with no increased risk factors for colorectal cancer. There are a number of acceptable methods of screening for this type of cancer. Each test has its own benefits and drawbacks. Discuss with your provider what is most appropriate for you during your annual wellness visit. The different tests include: colonoscopy (considered the best screening method), a fecal occult blood test, a fecal DNA test, and sigmoidoscopy.    -Lung cancer screening is recommended annually with a low dose CT scan for adults between age 54 and 68, who have smoked at least 30 pack years (equivalent of 1 pack per day for 30 days), and who is a current smoker or quit less than 15 years ago. -An Abdominal Aortic Aneurysm (AAA) Screening is recommended for men age 73-68 who has ever smoked in their lifetime.      Here is a list of your current Health Maintenance items (your personalized list of preventive services) with a due date:  Health Maintenance Due   Topic Date Due    DTaP/Tdap/Td  (1 - Tdap) Never done    Shingles Vaccine (1 of 2) Never done    COVID-19 Vaccine (3 - Booster for Radha Look series) 06/11/2021    Depresssion Screening  02/22/2023    Cholesterol Test   02/28/2023

## 2023-04-21 NOTE — PROGRESS NOTES
Sparkle Amanda is a 76 y.o. male who presents for evaluation of AWV. Last seen by me dec 21,2022. Since then he had his PPM upgraded, and is planning to have watchman procedure done on may 17 by dr Hiro Saxena. Bp has been running low, feels lack of energy and fatigued most days. Also struggling with right knee pain, needs TKA, but can't have it done until he is off eliquis, which will be about 6 more months. Also having more low back pain. Weight is down 7 lbs from last visit. Also recently got started on eye injections for his macular degeneration. ROS:  Constitutional: negative for fevers, chills, anorexia and weight loss  Eyes:   negative for visual disturbance and irritation  ENT:   negative for tinnitus,sore throat,nasal congestion,ear pain,hoarseness  Respiratory:  negative for cough, hemoptysis, dyspnea,wheezing  CV:   negative for chest pain, palpitations, lower extremity edema  GI:   negative for nausea, vomiting, diarrhea, abdominal pain,melena  Genitourinary: negative for frequency, dysuria and hematuria  Musculoskel: negative for myalgias, arthralgias, back pain, muscle weakness. ++left knee joint pain  Neurological:  negative for headaches, dizziness, focal weakness, numbness  Psychiatric:     Negative for depression or anxiety      Past Medical History:   Diagnosis Date    AF (atrial fibrillation) (HCC)     s/p DCCV after his CABG, no recurrence    Anxiety     Arrhythmia     a fib    Arthritis     CAD (coronary artery disease)     s/p CABG in 1997    Cancer ([facility] 04/2021    prostate cancer    GERD (gastroesophageal reflux disease)     Goiter, nontoxic, multinodular     Hypertension     Long term current use of anticoagulant therapy     Low blood potassium     Myocardial infarct ([facility] ? 1997    MARIA ISABEL on CPAP     Other and unspecified hyperlipidemia     Pacemaker     Unspecified sleep apnea        Past Surgical History:   Procedure Laterality Date    COLONOSCOPY N/A 4/1/2021    COLONOSCOPY performed by Franc Pascual MD at [facility] ENDOSCOPY    HX CATARACT REMOVAL      bilateral    HX CHOLECYSTECTOMY      HX CORONARY ARTERY BYPASS GRAFT      side entry [facility]      HX KNEE REPLACEMENT      r tkr    HX ORTHOPAEDIC      Arthoscopic L. knee surgery     HX SEPTOPLASTY      HX SHOULDER ARTHROSCOPY      left    CT INS NEW/RPLCMT PRM PM W/TRANSV ELTRD ATRIAL&VENT N/A 2019    INSERT PPM DUAL performed by Pancho Perkins MD at [address]    bypass    RIGHT HEART CATHETERIZATION      x3 states pt. (can't remember when)    RT/LT HEART CATHETERS  2013    PTCA    Heart Dr. Chávez Cassette       Family History   Problem Relation Age of Onset    Cancer Father         in his shoulder    Heart Attack Father     Heart Disease Father     Heart Disease Mother     Cancer Mother         abd     Cancer Son         wilm's tumor    Cancer Sister         abd    Thyroid Disease Neg Hx        Social History     Socioeconomic History    Marital status: SINGLE     Spouse name: Not on file    Number of children: Not on file    Years of education: Not on file    Highest education level: Not on file   Occupational History    Not on file   Tobacco Use    Smoking status: Former     Packs/day: 4.50     Years: 20.00     Pack years: 90.00     Types: Cigarettes     Quit date: 1970     Years since quittin.0    Smokeless tobacco: Never   Vaping Use    Vaping Use: Never used   Substance and Sexual Activity    Alcohol use: Yes     Comment: 2 ounces of vodka daily    Drug use: No    Sexual activity: Yes     Partners: Female   Other Topics Concern    Not on file   Social History Narrative    Lives in [address] [place] [address],[address] with wife. Has 2 sons and 1 daughter. Works part time for [employer]. Worked at [employer] as an . Likes to travel and fish.      Social Determinants of Health     Financial Resource Strain: Low Risk     Difficulty of Paying Living Expenses: Not hard at all   Food Insecurity: No Food Insecurity    Worried About Running Out of Food in the Last Year: Never true    Ran Out of Food in the Last Year: Never true   Transportation Needs: Not on file   Physical Activity: Not on file   Stress: Not on file   Social Connections: Not on file   Intimate Partner Violence: Not on file   Housing Stability: Not on file          Visit Vitals  /64 (BP 1 Location: Left upper arm, BP Patient Position: Sitting)   Pulse 76   Temp 97.9 °F (36.6 °C) (Temporal)   Resp 16   Ht 6' (1.829 m)   Wt 306 lb 3.2 oz (138.9 kg)   SpO2 94%   BMI 41.53 kg/m²       Physical Examination:   General - Well appearing male. overweight  HEENT - PERRL, TM no erythema/opacification, normal nasal turbinates, no oropharyngeal erythema or exudate, MMM  Neck - supple, no bruits, no thyroidomegaly, no lymphadenopathy  Pulm - clear to auscultation bilaterally  Cardio - RRR, normal S1 S2, no murmur  Abd - soft, nontender, no masses, no HSM  Extrem - no edema, +2 distal pulses  Neuro-  No focal deficits, CN intact     Assessment/Plan:       Hypotension--stop norvasc. Continue toprol xl, entresto, demadex  Pafib--in nsr now, on toprol xl, eliquis. Plans to have watchman procedure done next month  Chronic chf--on low dose entresto, demadex, toprol xl  Cad with cabg--on asa, toprol xl  PN--continue lyrica  Chronic back pain--referral to dr Sy Emmanuel  Hyperlipids--on lipitor  Hx prostate cancer--on lupron, casodex  Bph--on flomax  Obesity, bmi 41.53--stopped adipex. Working on limiting carbs/calories. Rx given for tdap and shingrix  Rtc 6 months        Patricia Stage III, DO              This is the Subsequent Medicare Annual Wellness Exam, performed 12 months or more after the Initial AWV or the last Subsequent AWV    I have reviewed the patient's medical history in detail and updated the computerized patient record.        Assessment/Plan Education and counseling provided:  Are appropriate based on today's review and evaluation  End-of-Life planning (with patient's consent)  Pneumococcal Vaccine  Influenza Vaccine  Prostate cancer screening tests (PSA, covered annually)    1. Medicare annual wellness visit, subsequent       Depression Risk Factor Screening     3 most recent PHQ Screens 4/21/2023   Little interest or pleasure in doing things Not at all   Feeling down, depressed, irritable, or hopeless Not at all   Total Score PHQ 2 0   Trouble falling or staying asleep, or sleeping too much -   Feeling tired or having little energy -   Poor appetite, weight loss, or overeating -   Feeling bad about yourself - or that you are a failure or have let yourself or your family down -   Trouble concentrating on things such as school, work, reading, or watching TV -   Moving or speaking so slowly that other people could have noticed; or the opposite being so fidgety that others notice -   Thoughts of being better off dead, or hurting yourself in some way -   PHQ 9 Score -   How difficult have these problems made it for you to do your work, take care of your home and get along with others -       Alcohol & Drug Abuse Risk Screen    Do you average more than 1 drink per night or more than 7 drinks a week: No    In the past three months have you have had more than 4 drinks containing alcohol on one occasion: No          Functional Ability and Level of Safety    Hearing: The patient wears hearing aids. Activities of Daily Living: The home contains: handrails and grab bars  Patient does total self care      Ambulation: with mild difficulty. Uses cane or rollator at times. Fall Risk:  Fall Risk Assessment, last 12 mths 4/21/2023   Able to walk? Yes   Fall in past 12 months? 0   Do you feel unsteady? 0   Are you worried about falling 0   Is TUG test greater than 12 seconds?  -   Is the gait abnormal? -   Number of falls in past 12 months -   Fall with injury?  -      Abuse Screen:  Patient is not abused       Cognitive Screening    Has your family/caregiver stated any concerns about your memory: no     Cognitive Screening: Normal - MMSE (Mini Mental Status Exam)    Health Maintenance Due     Health Maintenance Due   Topic Date Due    DTaP/Tdap/Td series (1 - Tdap) Never done    Shingles Vaccine (1 of 2) Never done    COVID-19 Vaccine (3 - Booster for Moderna series) 06/11/2021    Depression Screen  02/22/2023    Lipid Screen  02/28/2023       Patient Care Team   Patient Care Team:  Tad Morris DO as PCP - General (Internal Medicine Physician)  Tad Morris DO as PCP - [facility] Empaneled Provider  Yudelka Romero MD as Physician (Cardiovascular Disease Physician)  Zuleika Waggoner NP as Nurse Practitioner (Nurse Practitioner)  Candy Smith MD (Cardiovascular Disease Physician)  Sebastien Perez MD (Orthopedic Surgery)  Cindy Pacheco MD (Urology)    History     Patient Active Problem List   Diagnosis Code    Hyperlipidemia, mixed E78.2    Atherosclerosis of coronary artery bypass graft I25.810    Atrial fibrillation ([address] I48.91    Goiter, nontoxic, multinodular E04.2    Unstable angina (HCC) I20.0    S/P coronary artery stent placement Z95.5    HUSAIN (dyspnea on exertion) R06.09    Benign essential tremor syndrome G25.0    Memory difficulties R41.3    B12 deficiency E53.8    Vitamin D deficiency E55.9    Hypothyroidism due to acquired atrophy of thyroid E03.4    Irregular heartbeat I49.9    Severe obesity (HCC) E66.01    SSS (sick sinus syndrome) ([address] I49.5    Knee osteoarthritis M17.9    A-fib (HCC) I48.91    Cardiomyopathy ([address] I42.9     Past Medical History:   Diagnosis Date    AF (atrial fibrillation) ([address]     s/p DCCV after his CABG, no recurrence    Anxiety     Arrhythmia     a fib    Arthritis     CAD (coronary artery disease)     s/p CABG in 1997    Cancer ([address] 04/2021    prostate cancer    GERD (gastroesophageal reflux disease)     Goiter, nontoxic, multinodular     Hypertension     Long term current use of anticoagulant therapy     Low blood potassium     Myocardial infarct ([facility] ? 1997    MARIA ISABEL on CPAP     Other and unspecified hyperlipidemia     Pacemaker     Unspecified sleep apnea       Past Surgical History:   Procedure Laterality Date    COLONOSCOPY N/A 4/1/2021    COLONOSCOPY performed by Arline Ball MD at [facility] ENDOSCOPY    HX CATARACT REMOVAL  2006    bilateral    HX CHOLECYSTECTOMY  1970's    HX CORONARY ARTERY BYPASS GRAFT      side entry [facility]      HX KNEE REPLACEMENT  2021    r tkr    HX ORTHOPAEDIC      Arthoscopic L. knee surgery     HX SEPTOPLASTY  1980's    HX SHOULDER ARTHROSCOPY  2012    left    NE INS NEW/RPLCMT PRM PM W/TRANSV ELTRD ATRIAL&VENT N/A 11/1/2019    INSERT PPM DUAL performed by Tamanna Rojas MD at [address]    bypass    RIGHT HEART CATHETERIZATION      x3 states pt. (can't remember when)    RT/LT HEART CATHETERS  12/2013    PTCA    Heart Dr. Robbie Saravia     Current Outpatient Medications   Medication Sig Dispense Refill    cetirizine (ZYRTEC) 10 mg tablet cetirizine 10 mg tablet   Take 1 tablet every day by oral route. docusate sodium (COLACE) 100 mg capsule Stool Softener 100 mg capsule   Take 1 capsule every day by oral route. torsemide (DEMADEX) 10 mg tablet torsemide 10 mg tablet   Take 1 tablet every day by oral route. hydrocortisone (CORTAID) 0.5 % topical cream hydrocortisone      tamsulosin (FLOMAX) 0.4 mg capsule TAKE 1 CAPSULE TWICE DAILY AFTER MEALS 180 Capsule 1    sacubitriL-valsartan (Entresto) 24-26 mg tablet Take 1 Tablet by mouth two (2) times a day. 60 Tablet 12    phentermine (ADIPEX-P) 37.5 mg tablet Take 1 Tablet by mouth every morning.  Max Daily Amount: 37.5 mg. phentermine 37.5 mg tablet 90 Tablet 0    vit A/vit C/vit E/zinc/copper (PRESERVISION AREDS PO) apixaban (ELIQUIS) 5 mg tablet Take 1 Tablet by mouth two (2) times a day. pregabalin (LYRICA) 50 mg capsule Take 1 Capsule by mouth two (2) times a day. sildenafil citrate (VIAGRA) 50 mg tablet Take 1 Tablet by mouth daily as needed for Erectile Dysfunction. 30 Tablet 1    betamethasone valerate (VALISONE) 0.1 % topical cream Apply  to affected area two (2) times a day. 45 g 3    pantoprazole (PROTONIX) 40 mg tablet TAKE 1 TABLET EVERY DAY (STOP PRILOSEC) 90 Tablet 3    atorvastatin (LIPITOR) 40 mg tablet TAKE 1 TABLET EVERY DAY.  (STOP  SIMVASTATIN) 90 Tablet 3    metoprolol succinate (TOPROL-XL) 25 mg XL tablet TAKE 1/2 TABLET EVERY NIGHT 45 Tablet 1    amLODIPine (NORVASC) 2.5 mg tablet TAKE 1 TABLET EVERY DAY 90 Tablet 1    bicalutamide (CASODEX) 50 mg tablet       OTHER Hormone tx got prostate cancer- Radiation for 9 weeks daily-       leuprolide acetate (LUPRON DEPOT, 3 MONTH, IM) by IntraMUSCular route. Every 3 months      diclofenac (VOLTAREN) 1 % gel Apply  to affected area four (4) times daily. melatonin 1 mg tablet Take 5 Tablets by mouth nightly. vit C/E/Zn/coppr/lutein/zeaxan (PRESERVISION AREDS 2 PO) Take  by mouth two (2) times a day. Potassium Gluconate 595 mg (99 mg) tablet Take 1 Tablet by mouth two (2) times a day. OXYGEN-AIR DELIVERY SYSTEMS (HORIZON NASAL CPAP SYSTEM) by Does Not Apply route. acetaminophen (TYLENOL) 650 mg TbER Take 1 Tablet by mouth four (4) times daily. omega-3 fatty acids-vitamin e 1,000 mg cap Take 2 Capsules by mouth daily. multivitamin, stress formula (STRESS TAB) tablet Take 1 Tablet by mouth daily. aspirin 81 mg tablet Take 2 Tablets by mouth daily. GLUC HCL/GLUC DESHPANDE/AC-D-GLUCOS (GLUCOSAMINE COMPLEX PO) Take 1 Tab by mouth daily.        Allergies   Allergen Reactions    Demerol [Meperidine] Other (comments)     hallucinations  hallucinations       Family History   Problem Relation Age of Onset    Cancer Father in his shoulder    Heart Attack Father     Heart Disease Father     Heart Disease Mother     Cancer Mother         abd     Cancer Son         wilm's tumor    Cancer Sister         abd    Thyroid Disease Neg Hx      Social History     Tobacco Use    Smoking status: Former     Packs/day: 4.50     Years: 20.00     Pack years: 90.00     Types: Cigarettes     Quit date: 1970     Years since quittin.0    Smokeless tobacco: Never   Substance Use Topics    Alcohol use: Yes     Comment: 2 ounces of vodka daily         Ankit Nettles III DO

## 2023-04-21 NOTE — PROGRESS NOTES
1. \"Have you been to the ER, urgent care clinic since your last visit? Hospitalized since your last visit? \" Yes see encounters    2. \"Have you seen or consulted any other health care providers outside of the [address] [place] [address] since your last visit? \" Yes cardiology      3. For patients aged 39-70: Has the patient had a colonoscopy / FIT/ Cologuard? NA - based on age      If the patient is female:    4. For patients aged 41-77: Has the patient had a mammogram within the past 2 years? NA - based on age or sex      11. For patients aged 21-65: Has the patient had a pap smear?  NA - based on age or sex

## 2023-04-23 LAB
ALBUMIN SERPL-MCNC: 4.2 G/DL (ref 3.5–5)
ALBUMIN/GLOB SERPL: 1.4 (ref 1.1–2.2)
ALP SERPL-CCNC: 62 U/L (ref 45–117)
ALT SERPL-CCNC: 31 U/L (ref 12–78)
ANION GAP SERPL CALC-SCNC: 6 MMOL/L (ref 5–15)
AST SERPL-CCNC: 19 U/L (ref 15–37)
BASOPHILS # BLD: 0 K/UL (ref 0–0.1)
BASOPHILS NFR BLD: 1 % (ref 0–1)
BILIRUB SERPL-MCNC: 0.4 MG/DL (ref 0.2–1)
BUN SERPL-MCNC: 24 MG/DL (ref 6–20)
BUN/CREAT SERPL: 27 (ref 12–20)
CALCIUM SERPL-MCNC: 9.7 MG/DL (ref 8.5–10.1)
CHLORIDE SERPL-SCNC: 103 MMOL/L (ref 97–108)
CHOLEST SERPL-MCNC: 131 MG/DL
CO2 SERPL-SCNC: 26 MMOL/L (ref 21–32)
CREAT SERPL-MCNC: 0.89 MG/DL (ref 0.7–1.3)
DIFFERENTIAL METHOD BLD: ABNORMAL
EOSINOPHIL # BLD: 0.1 K/UL (ref 0–0.4)
EOSINOPHIL NFR BLD: 3 % (ref 0–7)
ERYTHROCYTE [DISTWIDTH] IN BLOOD BY AUTOMATED COUNT: 13.5 % (ref 11.5–14.5)
EST. AVERAGE GLUCOSE BLD GHB EST-MCNC: 100 MG/DL
GLOBULIN SER CALC-MCNC: 3 G/DL (ref 2–4)
GLUCOSE SERPL-MCNC: 94 MG/DL (ref 65–100)
HBA1C MFR BLD: 5.1 % (ref 4–5.6)
HCT VFR BLD AUTO: 39.8 % (ref 36.6–50.3)
HDLC SERPL-MCNC: 47 MG/DL
HDLC SERPL: 2.8 (ref 0–5)
HGB BLD-MCNC: 12.6 G/DL (ref 12.1–17)
IMM GRANULOCYTES # BLD AUTO: 0 K/UL (ref 0–0.04)
IMM GRANULOCYTES NFR BLD AUTO: 0 % (ref 0–0.5)
LDLC SERPL CALC-MCNC: 54 MG/DL (ref 0–100)
LYMPHOCYTES # BLD: 1.1 K/UL (ref 0.8–3.5)
LYMPHOCYTES NFR BLD: 26 % (ref 12–49)
MCH RBC QN AUTO: 31.7 PG (ref 26–34)
MCHC RBC AUTO-ENTMCNC: 31.7 G/DL (ref 30–36.5)
MCV RBC AUTO: 100.3 FL (ref 80–99)
MONOCYTES # BLD: 0.4 K/UL (ref 0–1)
MONOCYTES NFR BLD: 8 % (ref 5–13)
NEUTS SEG # BLD: 2.6 K/UL (ref 1.8–8)
NEUTS SEG NFR BLD: 62 % (ref 32–75)
NRBC # BLD: 0 K/UL (ref 0–0.01)
NRBC BLD-RTO: 0 PER 100 WBC
PLATELET # BLD AUTO: 202 K/UL (ref 150–400)
PMV BLD AUTO: 10.3 FL (ref 8.9–12.9)
POTASSIUM SERPL-SCNC: 4.4 MMOL/L (ref 3.5–5.1)
PROT SERPL-MCNC: 7.2 G/DL (ref 6.4–8.2)
PSA SERPL-MCNC: 0 NG/ML (ref 0.01–4)
RBC # BLD AUTO: 3.97 M/UL (ref 4.1–5.7)
SODIUM SERPL-SCNC: 135 MMOL/L (ref 136–145)
TRIGL SERPL-MCNC: 150 MG/DL (ref ?–150)
TSH SERPL DL<=0.05 MIU/L-ACNC: 1.34 UIU/ML (ref 0.36–3.74)
VLDLC SERPL CALC-MCNC: 30 MG/DL
WBC # BLD AUTO: 4.2 K/UL (ref 4.1–11.1)

## 2023-05-11 ENCOUNTER — HOSPITAL ENCOUNTER (OUTPATIENT)
Facility: HOSPITAL | Age: 78
End: 2023-05-11
Payer: MEDICARE

## 2023-05-11 ENCOUNTER — HOSPITAL ENCOUNTER (OUTPATIENT)
Facility: HOSPITAL | Age: 78
Discharge: HOME | End: 2023-05-11
Payer: MEDICARE

## 2023-05-11 VITALS
DIASTOLIC BLOOD PRESSURE: 61 MMHG | SYSTOLIC BLOOD PRESSURE: 118 MMHG | TEMPERATURE: 98.5 F | HEIGHT: 70 IN | RESPIRATION RATE: 18 BRPM | BODY MASS INDEX: 44.53 KG/M2 | HEART RATE: 74 BPM | WEIGHT: 311.07 LBS | OXYGEN SATURATION: 96 %

## 2023-05-11 DIAGNOSIS — Z01.811 PRE-OP CHEST EXAM: ICD-10-CM

## 2023-05-11 LAB
ANION GAP SERPL CALC-SCNC: 4 MMOL/L (ref 5–15)
APPEARANCE UR: CLEAR
BACTERIA URNS QL MICRO: NEGATIVE /HPF
BILIRUB UR QL: NEGATIVE
BUN SERPL-MCNC: 20 MG/DL (ref 6–20)
BUN/CREAT SERPL: 23 (ref 12–20)
CALCIUM SERPL-MCNC: 9 MG/DL (ref 8.5–10.1)
CHLORIDE SERPL-SCNC: 109 MMOL/L (ref 97–108)
CO2 SERPL-SCNC: 25 MMOL/L (ref 21–32)
COLOR UR: NORMAL
CREAT SERPL-MCNC: 0.86 MG/DL (ref 0.7–1.3)
EPITH CASTS URNS QL MICRO: NORMAL /LPF
ERYTHROCYTE [DISTWIDTH] IN BLOOD BY AUTOMATED COUNT: 13.7 % (ref 11.5–14.5)
GLUCOSE SERPL-MCNC: 93 MG/DL (ref 65–100)
GLUCOSE UR STRIP.AUTO-MCNC: NEGATIVE MG/DL
HCT VFR BLD AUTO: 37.5 % (ref 36.6–50.3)
HGB BLD-MCNC: 12.3 G/DL (ref 12.1–17)
HGB UR QL STRIP: NEGATIVE
HYALINE CASTS URNS QL MICRO: NORMAL /LPF (ref 0–2)
KETONES UR QL STRIP.AUTO: NEGATIVE MG/DL
LEUKOCYTE ESTERASE UR QL STRIP.AUTO: NEGATIVE
MCH RBC QN AUTO: 31.5 PG (ref 26–34)
MCHC RBC AUTO-ENTMCNC: 32.8 G/DL (ref 30–36.5)
MCV RBC AUTO: 95.9 FL (ref 80–99)
NITRITE UR QL STRIP.AUTO: NEGATIVE
NRBC # BLD: 0 K/UL (ref 0–0.01)
NRBC BLD-RTO: 0 PER 100 WBC
PH UR STRIP: 5.5 (ref 5–8)
PLATELET # BLD AUTO: 183 K/UL (ref 150–400)
PMV BLD AUTO: 9.5 FL (ref 8.9–12.9)
POTASSIUM SERPL-SCNC: 4.2 MMOL/L (ref 3.5–5.1)
PROT UR STRIP-MCNC: NEGATIVE MG/DL
RBC # BLD AUTO: 3.91 M/UL (ref 4.1–5.7)
RBC #/AREA URNS HPF: NORMAL /HPF (ref 0–5)
SODIUM SERPL-SCNC: 138 MMOL/L (ref 136–145)
SP GR UR REFRACTOMETRY: 1.02
URINE CULTURE IF INDICATED: NORMAL
UROBILINOGEN UR QL STRIP.AUTO: 0.2 EU/DL (ref 0.2–1)
WBC # BLD AUTO: 4.2 K/UL (ref 4.1–11.1)
WBC URNS QL MICRO: NORMAL /HPF (ref 0–4)

## 2023-05-11 PROCEDURE — 80048 BASIC METABOLIC PNL TOTAL CA: CPT

## 2023-05-11 PROCEDURE — 36415 COLL VENOUS BLD VENIPUNCTURE: CPT

## 2023-05-11 PROCEDURE — 71046 X-RAY EXAM CHEST 2 VIEWS: CPT

## 2023-05-11 PROCEDURE — 85027 COMPLETE CBC AUTOMATED: CPT

## 2023-05-11 PROCEDURE — 81001 URINALYSIS AUTO W/SCOPE: CPT

## 2023-05-11 RX ORDER — ASPIRIN 81 MG/1
162 TABLET ORAL DAILY
COMMUNITY

## 2023-05-11 RX ORDER — TORSEMIDE 10 MG/1
10 TABLET ORAL DAILY
COMMUNITY

## 2023-05-11 RX ORDER — DIAPER,BRIEF,INFANT-TODD,DISP
EACH MISCELLANEOUS PRN
COMMUNITY

## 2023-05-11 RX ORDER — DOCUSATE SODIUM 100 MG/1
100 CAPSULE, LIQUID FILLED ORAL 2 TIMES DAILY
COMMUNITY

## 2023-05-11 RX ORDER — CETIRIZINE HYDROCHLORIDE 10 MG/1
10 TABLET ORAL DAILY
COMMUNITY

## 2023-05-11 ASSESSMENT — PAIN SCALES - GENERAL: PAINLEVEL_OUTOF10: 1

## 2023-05-12 LAB
EKG ATRIAL RATE: 78 BPM
EKG DIAGNOSIS: NORMAL
EKG Q-T INTERVAL: 456 MS
EKG QRS DURATION: 160 MS
EKG QTC CALCULATION (BAZETT): 519 MS
EKG R AXIS: 252 DEGREES
EKG T AXIS: 93 DEGREES
EKG VENTRICULAR RATE: 78 BPM

## 2023-05-17 ENCOUNTER — APPOINTMENT (OUTPATIENT)
Facility: HOSPITAL | Age: 78
DRG: 229 | End: 2023-05-17
Attending: INTERNAL MEDICINE
Payer: MEDICARE

## 2023-05-17 ENCOUNTER — HOSPITAL ENCOUNTER (INPATIENT)
Facility: HOSPITAL | Age: 78
LOS: 1 days | Discharge: HOME | DRG: 229 | End: 2023-05-17
Attending: INTERNAL MEDICINE | Admitting: INTERNAL MEDICINE
Payer: MEDICARE

## 2023-05-17 ENCOUNTER — ANESTHESIA EVENT (OUTPATIENT)
Facility: HOSPITAL | Age: 78
DRG: 229 | End: 2023-05-17
Payer: MEDICARE

## 2023-05-17 ENCOUNTER — ANESTHESIA (OUTPATIENT)
Facility: HOSPITAL | Age: 78
DRG: 229 | End: 2023-05-17
Payer: MEDICARE

## 2023-05-17 VITALS
OXYGEN SATURATION: 95 % | RESPIRATION RATE: 26 BRPM | DIASTOLIC BLOOD PRESSURE: 72 MMHG | HEART RATE: 82 BPM | HEIGHT: 70 IN | WEIGHT: 304 LBS | TEMPERATURE: 98.1 F | SYSTOLIC BLOOD PRESSURE: 124 MMHG | BODY MASS INDEX: 43.52 KG/M2

## 2023-05-17 DIAGNOSIS — I48.91 A-FIB (HCC): ICD-10-CM

## 2023-05-17 DIAGNOSIS — I49.9 IRREGULAR HEARTBEAT: Primary | ICD-10-CM

## 2023-05-17 LAB
ACT BLD: 269 SECS (ref 79–138)
ECHO BSA: 2.61 M2

## 2023-05-17 PROCEDURE — C1893 INTRO/SHEATH, FIXED,NON-PEEL: HCPCS | Performed by: INTERNAL MEDICINE

## 2023-05-17 PROCEDURE — C1889 IMPLANT/INSERT DEVICE, NOC: HCPCS | Performed by: INTERNAL MEDICINE

## 2023-05-17 PROCEDURE — 2500000003 HC RX 250 WO HCPCS: Performed by: NURSE ANESTHETIST, CERTIFIED REGISTERED

## 2023-05-17 PROCEDURE — 93312 ECHO TRANSESOPHAGEAL: CPT

## 2023-05-17 PROCEDURE — 02H73DZ INSERTION OF INTRALUMINAL DEVICE INTO LEFT ATRIUM, PERCUTANEOUS APPROACH: ICD-10-PCS | Performed by: INTERNAL MEDICINE

## 2023-05-17 PROCEDURE — 02PA3DZ REMOVAL OF INTRALUMINAL DEVICE FROM HEART, PERCUTANEOUS APPROACH: ICD-10-PCS | Performed by: INTERNAL MEDICINE

## 2023-05-17 PROCEDURE — 2709999900 HC NON-CHARGEABLE SUPPLY: Performed by: INTERNAL MEDICINE

## 2023-05-17 PROCEDURE — 6360000002 HC RX W HCPCS: Performed by: NURSE ANESTHETIST, CERTIFIED REGISTERED

## 2023-05-17 PROCEDURE — 2580000003 HC RX 258: Performed by: NURSE ANESTHETIST, CERTIFIED REGISTERED

## 2023-05-17 PROCEDURE — B24BZZ4 ULTRASONOGRAPHY OF HEART WITH AORTA, TRANSESOPHAGEAL: ICD-10-PCS | Performed by: INTERNAL MEDICINE

## 2023-05-17 PROCEDURE — 6370000000 HC RX 637 (ALT 250 FOR IP): Performed by: INTERNAL MEDICINE

## 2023-05-17 PROCEDURE — C1894 INTRO/SHEATH, NON-LASER: HCPCS | Performed by: INTERNAL MEDICINE

## 2023-05-17 PROCEDURE — 2060000000 HC ICU INTERMEDIATE R&B

## 2023-05-17 PROCEDURE — 85347 COAGULATION TIME ACTIVATED: CPT

## 2023-05-17 PROCEDURE — 3700000000 HC ANESTHESIA ATTENDED CARE: Performed by: INTERNAL MEDICINE

## 2023-05-17 PROCEDURE — 3700000001 HC ADD 15 MINUTES (ANESTHESIA): Performed by: INTERNAL MEDICINE

## 2023-05-17 RX ORDER — FENTANYL CITRATE 50 UG/ML
INJECTION, SOLUTION INTRAMUSCULAR; INTRAVENOUS PRN
Status: DISCONTINUED | OUTPATIENT
Start: 2023-05-17 | End: 2023-05-17 | Stop reason: SDUPTHER

## 2023-05-17 RX ORDER — SODIUM CHLORIDE 0.9 % (FLUSH) 0.9 %
5-40 SYRINGE (ML) INJECTION PRN
Status: DISCONTINUED | OUTPATIENT
Start: 2023-05-17 | End: 2023-05-17 | Stop reason: HOSPADM

## 2023-05-17 RX ORDER — DEXMEDETOMIDINE HYDROCHLORIDE 100 UG/ML
INJECTION, SOLUTION INTRAVENOUS PRN
Status: DISCONTINUED | OUTPATIENT
Start: 2023-05-17 | End: 2023-05-17 | Stop reason: SDUPTHER

## 2023-05-17 RX ORDER — SODIUM CHLORIDE 0.9 % (FLUSH) 0.9 %
5-40 SYRINGE (ML) INJECTION EVERY 12 HOURS SCHEDULED
Status: DISCONTINUED | OUTPATIENT
Start: 2023-05-17 | End: 2023-05-17 | Stop reason: HOSPADM

## 2023-05-17 RX ORDER — PROTAMINE SULFATE 10 MG/ML
INJECTION, SOLUTION INTRAVENOUS PRN
Status: DISCONTINUED | OUTPATIENT
Start: 2023-05-17 | End: 2023-05-17 | Stop reason: SDUPTHER

## 2023-05-17 RX ORDER — SODIUM CHLORIDE 9 MG/ML
INJECTION, SOLUTION INTRAVENOUS PRN
Status: DISCONTINUED | OUTPATIENT
Start: 2023-05-17 | End: 2023-05-17 | Stop reason: HOSPADM

## 2023-05-17 RX ORDER — HEPARIN SODIUM 1000 [USP'U]/ML
INJECTION, SOLUTION INTRAVENOUS; SUBCUTANEOUS PRN
Status: DISCONTINUED | OUTPATIENT
Start: 2023-05-17 | End: 2023-05-17 | Stop reason: SDUPTHER

## 2023-05-17 RX ORDER — ROCURONIUM BROMIDE 10 MG/ML
INJECTION, SOLUTION INTRAVENOUS PRN
Status: DISCONTINUED | OUTPATIENT
Start: 2023-05-17 | End: 2023-05-17 | Stop reason: SDUPTHER

## 2023-05-17 RX ORDER — ACETAMINOPHEN 325 MG/1
650 TABLET ORAL EVERY 4 HOURS PRN
Status: DISCONTINUED | OUTPATIENT
Start: 2023-05-17 | End: 2023-05-17 | Stop reason: HOSPADM

## 2023-05-17 RX ORDER — HEPARIN SODIUM 10000 [USP'U]/ML
INJECTION, SOLUTION INTRAVENOUS; SUBCUTANEOUS PRN
Status: DISCONTINUED | OUTPATIENT
Start: 2023-05-17 | End: 2023-05-17 | Stop reason: HOSPADM

## 2023-05-17 RX ORDER — ONDANSETRON 2 MG/ML
INJECTION INTRAMUSCULAR; INTRAVENOUS PRN
Status: DISCONTINUED | OUTPATIENT
Start: 2023-05-17 | End: 2023-05-17 | Stop reason: SDUPTHER

## 2023-05-17 RX ORDER — CLOPIDOGREL BISULFATE 75 MG/1
75 TABLET ORAL DAILY
Qty: 90 TABLET | Refills: 1 | Status: SHIPPED | OUTPATIENT
Start: 2023-05-17

## 2023-05-17 RX ORDER — 0.9 % SODIUM CHLORIDE 0.9 %
INTRAVENOUS SOLUTION INTRAVENOUS PRN
Status: DISCONTINUED | OUTPATIENT
Start: 2023-05-17 | End: 2023-05-17 | Stop reason: SDUPTHER

## 2023-05-17 RX ORDER — DEXAMETHASONE SODIUM PHOSPHATE 4 MG/ML
INJECTION, SOLUTION INTRA-ARTICULAR; INTRALESIONAL; INTRAMUSCULAR; INTRAVENOUS; SOFT TISSUE PRN
Status: DISCONTINUED | OUTPATIENT
Start: 2023-05-17 | End: 2023-05-17 | Stop reason: SDUPTHER

## 2023-05-17 RX ORDER — SUCCINYLCHOLINE CHLORIDE 20 MG/ML
INJECTION INTRAMUSCULAR; INTRAVENOUS PRN
Status: DISCONTINUED | OUTPATIENT
Start: 2023-05-17 | End: 2023-05-17 | Stop reason: SDUPTHER

## 2023-05-17 RX ADMIN — DEXMEDETOMIDINE HYDROCHLORIDE 6 MCG: 100 INJECTION, SOLUTION, CONCENTRATE INTRAVENOUS at 15:33

## 2023-05-17 RX ADMIN — ACETAMINOPHEN 650 MG: 325 TABLET ORAL at 13:31

## 2023-05-17 RX ADMIN — SUCCINYLCHOLINE CHLORIDE 200 MG: 20 INJECTION, SOLUTION INTRAMUSCULAR; INTRAVENOUS at 14:48

## 2023-05-17 RX ADMIN — ROCURONIUM BROMIDE 10 MG: 10 INJECTION INTRAVENOUS at 15:19

## 2023-05-17 RX ADMIN — PROPOFOL 120 MG: 10 INJECTION, EMULSION INTRAVENOUS at 14:48

## 2023-05-17 RX ADMIN — ROCURONIUM BROMIDE 5 MG: 10 INJECTION INTRAVENOUS at 14:48

## 2023-05-17 RX ADMIN — PROTAMINE SULFATE 100 MG: 10 INJECTION, SOLUTION INTRAVENOUS at 15:31

## 2023-05-17 RX ADMIN — FENTANYL CITRATE 50 MCG: 50 INJECTION, SOLUTION INTRAMUSCULAR; INTRAVENOUS at 14:43

## 2023-05-17 RX ADMIN — DEXAMETHASONE SODIUM PHOSPHATE 4 MG: 4 INJECTION, SOLUTION INTRAMUSCULAR; INTRAVENOUS at 14:55

## 2023-05-17 RX ADMIN — ROCURONIUM BROMIDE 10 MG: 10 INJECTION INTRAVENOUS at 14:55

## 2023-05-17 RX ADMIN — HEPARIN SODIUM 10000 UNITS: 1000 INJECTION, SOLUTION INTRAVENOUS; SUBCUTANEOUS at 15:08

## 2023-05-17 RX ADMIN — LIDOCAINE HYDROCHLORIDE 100 MG: 20 INJECTION, SOLUTION EPIDURAL; INFILTRATION; INTRACAUDAL; PERINEURAL at 14:48

## 2023-05-17 RX ADMIN — ONDANSETRON HYDROCHLORIDE 4 MG: 2 INJECTION, SOLUTION INTRAMUSCULAR; INTRAVENOUS at 14:55

## 2023-05-17 RX ADMIN — DEXMEDETOMIDINE HYDROCHLORIDE 4 MCG: 100 INJECTION, SOLUTION, CONCENTRATE INTRAVENOUS at 15:40

## 2023-05-17 RX ADMIN — FENTANYL CITRATE 50 MCG: 50 INJECTION, SOLUTION INTRAMUSCULAR; INTRAVENOUS at 14:49

## 2023-05-17 RX ADMIN — SODIUM CHLORIDE 100 ML: 9 INJECTION, SOLUTION INTRAVENOUS at 14:58

## 2023-05-17 RX ADMIN — SUGAMMADEX 200 MG: 100 INJECTION, SOLUTION INTRAVENOUS at 15:35

## 2023-05-17 ASSESSMENT — ENCOUNTER SYMPTOMS: SHORTNESS OF BREATH: 1

## 2023-05-17 NOTE — DISCHARGE INSTRUCTIONS
[facility]  [address] [facility], [address] [facility]  [phone]  [facility]. [facility] [address] [place] [address] INSTRUCTIONS    Patient ID:  Holden Sanchez  [id number]  89 y.o.  1945    Admit Date: 5/17/2023    Discharge Date: 5/17/2023     Admitting Physician: [unfilled]     Discharge Physician: Isac Diaz MD    Admission Diagnoses:   A-fib [facility]) [I48.91]    Discharge Diagnoses:   [unfilled]    Discharge Condition: Good    Cardiology Procedures this Admission:  WATCHMAN implant    Disposition: home    Reference discharge instructions provided by nursing for diet and activity. Follow-up as per schedule. Signed:  Isac Diza MD  5/17/2023  3:03 PM    S/P WATCHMAN DISCHARGE INSTRUCTIONS    It is normal to feel tired the first couple days. Take it easy and follow the physicians instructions. CHECK THE CATHETER INSERTION SITE DAILY:  You may shower 24 hours after the procedure, remove the bandage during showering. Wash with soap and water and pat dry. Gentle cleaning of the site with soap and water is sufficient, cover with a dry clean dressing or bandage. Do not apply creams or powders to the area. Do not sit in a bathtub or pool of water for 7 days or until wound has completely healed. Temporary bruising and discomfort is normal and may last a few weeks. You may have a  formation of a small lump at the site which may last up to 6 weeks. CALL THE PHYSICIAN:  If the site becomes red, swollen or feels warm to the touch  If there is bleeding or drainage or if there is unusual pain at the groin or down the leg. If there is any bleeding, lie down, apply pressure or have someone apply pressure with a clean cloth until the bleeding stops. If the bleeding continues, call 911 to be transported to the hospital.  DO NOT DRIVE YOURSELF, [facility].     Activity:      For the first 24-48 hours or as instructed by the

## 2023-05-17 NOTE — ANESTHESIA PRE PROCEDURE
Department of Anesthesiology  Preprocedure Note       Name:  Thai Harley   Age:  76 y.o.  :  1945                                          MRN:  [id number]         Date:  2023      Surgeon: Tenzin Giang):  Candy Miguel MD    Procedure: Procedure(s): Watchman benja closure device    Medications prior to admission:   Prior to Admission medications    Medication Sig Start Date End Date Taking? Authorizing Provider   aspirin 81 MG EC tablet Take 2 tablets by mouth daily    Historical Provider, MD   cetirizine (ZYRTEC) 10 MG tablet Take 1 tablet by mouth daily    Historical Provider, MD   docusate sodium (COLACE) 100 MG capsule Take 1 capsule by mouth 2 times daily    Historical Provider, MD   hydrocortisone 0.5 % cream Apply topically as needed Apply topically 2 times daily.     Historical Provider, MD   Multiple Vitamins-Minerals (MULTIVITAMIN MEN PO) Take 1 tablet by mouth daily    Historical Provider, MD   Potassium 99 MG TABS Take 1 tablet by mouth in the morning, at noon, and at bedtime    Historical Provider, MD   torsemide (DEMADEX) 10 MG tablet Take 1 tablet by mouth daily    Historical Provider, MD   Multiple Vitamins-Minerals (PRESERVISION AREDS 2 PO) Take 1 capsule by mouth in the morning and at bedtime    Historical Provider, MD   METAMUCIL FIBER PO Take by mouth as needed    Historical Provider, MD   acetaminophen (TYLENOL) 650 MG extended release tablet Take 1 tablet by mouth 4 times daily    Ar Automatic Reconciliation   apixaban (ELIQUIS) 5 MG TABS tablet Take 1 tablet by mouth 2 times daily    Ar Automatic Reconciliation   atorvastatin (LIPITOR) 40 MG tablet TAKE 1 TABLET EVERY DAY.  (STOP  SIMVASTATIN) 22   Ar Automatic Reconciliation   diclofenac sodium (VOLTAREN) 1 % GEL Apply topically 4 times daily    Ar Automatic Reconciliation   melatonin 10 MG CAPS capsule Take 1 capsule by mouth nightly as needed    Ar Automatic Reconciliation   metoprolol succinate (TOPROL XL) 25 MG

## 2023-05-17 NOTE — ANESTHESIA POSTPROCEDURE EVALUATION
Department of Anesthesiology  Postprocedure Note    Patient: Rosana Shankar  MRN: [id number]  YOB: 1945  Date of evaluation: 5/17/2023      Procedure Summary     Date: 05/17/23 Room / Location: [facility] EP LAB / [facility] CARDIAC CATH LAB    Anesthesia Start: 1991 Anesthesia Stop: 8711    Procedure: Watchman benja closure device [facility]: [facility]    Providers: Charu Naik MD Responsible Provider: Tyler Freed MD    Anesthesia Type: General ASA Status: 3          Anesthesia Type: General    Marily Phase I: Marily Score: 10    Marily Phase II:        Anesthesia Post Evaluation    Patient location during evaluation: PACU  Patient participation: complete - patient participated  Level of consciousness: awake  Airway patency: patent  Nausea & Vomiting: no vomiting  Complications: no  Cardiovascular status: hemodynamically stable  Respiratory status: acceptable  Hydration status: euvolemic

## 2023-05-17 NOTE — ANESTHESIA PROCEDURE NOTES
Procedure Performed: HIMA       Start Time:        End Time:        General Procedure Information  Diagnostic Indications for Echo:  assessment of surgical repair and suspected pericardial effusion        Anesthesia Information      Echocardiogram Comments:       Brief HIMA for watchman placement. LV fn mildly diminished, ef 40%. JEFF small 15 mm*20mm in multiple views but on fluoro the JEFF was bilobed. With a small ant lobe and a larger post lobe. Multiple attempts at placement of 27 watchman device failed hence the procedure was aborted. No effusion.

## 2023-05-23 ENCOUNTER — TELEPHONE (OUTPATIENT)
Age: 78
End: 2023-05-23

## 2023-05-26 NOTE — PROGRESS NOTES
Cardiac Cath Lab Recovery Arrival Note:      Carson Monterroso arrived to Cardiac Cath Lab, Recovery Area. Staff introduced to patient. Patient identifiers verified with NAME and DATE OF BIRTH. Procedure verified with patient. Consent forms reviewed and signed by patient or authorized representative and verified. Allergies verified. Patient and family oriented to department. Patient and family informed of procedure and plan of care. Questions answered with review. Patient prepped for procedure, per orders from physician, prior to arrival.    Patient on cardiac monitor, non-invasive blood pressure, SPO2 monitor. On room air. Patient is A&Ox 4. Patient reports no complaints. Patient in stretcher, in low position, with side rails up, call bell within reach, patient instructed to call if assistance as needed. Patient prep in: [address], [place]. Patient family has pager # [phone]  Family in: Page (wife) in lobby. Prep by: Valetta Kawasaki      Dual skin assessment performed by Lizet Aaron and Harriett Pierre no impairment noted.
Discharge instruction discussed with patient. Patient and wife verbalized understanding of instructions. Right groin site is CDI. Figure 8 stitch intact- night shift aware. Patient instructed he will need to ambulate in floyd before discharge. Patient verbalized understanding.
TRANSFER - IN REPORT:    Verbal report received from  on [facility]  being received from John Garay CRNA and [place] for routine progression of care. Report consisted of patients Situation, Background, Assessment and Recommendations(SBAR). Information from the following report(s) post procedure was reviewed with the receiving clinician. Opportunity for questions and clarification was provided. Assessment completed upon patients arrival to [address] [place] [address] and care assumed. Cardiac Cath Lab Recovery Arrival Note:    [facility] arrived to [facility] recovery area. Patient procedure= Watchman. Patient on cardiac monitor, non-invasive blood pressure, SPO2 monitor. On room air. Patient status doing well without problems. Patient is A&Ox 4. Patient reports no complaints. PROCEDURE SITE CHECK:    Procedure site:without any bleeding and no hematoma, No pain/discomfort reported at procedure site. No change in patient status. Continue to monitor patient and status.
Information could not be obtained

## 2023-05-28 RX ORDER — PANTOPRAZOLE SODIUM 40 MG/1
TABLET, DELAYED RELEASE ORAL
Qty: 90 TABLET | Refills: 3 | OUTPATIENT
Start: 2023-05-28

## 2023-05-29 RX ORDER — CETIRIZINE HYDROCHLORIDE 10 MG/1
TABLET ORAL
COMMUNITY

## 2023-05-29 RX ORDER — DIAPER,BRIEF,INFANT-TODD,DISP
EACH MISCELLANEOUS
COMMUNITY

## 2023-05-29 RX ORDER — PSEUDOEPHEDRINE HCL 30 MG
TABLET ORAL
COMMUNITY

## 2023-05-29 RX ORDER — TORSEMIDE 10 MG/1
TABLET ORAL
COMMUNITY

## 2023-06-06 LAB — ECHO BSA: 2.61 M2

## 2023-08-17 ENCOUNTER — TELEPHONE (OUTPATIENT)
Age: 78
End: 2023-08-17

## 2023-08-17 DIAGNOSIS — E66.01 MORBID (SEVERE) OBESITY DUE TO EXCESS CALORIES (HCC): Primary | ICD-10-CM

## 2023-08-17 NOTE — TELEPHONE ENCOUNTER
Pt states that [facility] has faxed over several times a form that states pt would like to participate in a protein-based weight loss clinic. Desiree Troy runs this and Mr. Sarah Fu  would like Dr. Sharon Cunha to approve this program for him. He can not start without doctor's approval.    Please call pt to give status of this.

## 2023-08-18 NOTE — TELEPHONE ENCOUNTER
ALEK Ortega III, DO  You 22 hours ago (5:25 PM)     SC  I have received the forms, but no absolutely nothing about this program, therefore I won't sign off on it. We can refer him to [facility] weight clinic with dr Taylor Estrada, but I can't approve him to attend this program that I know nothing about and is not supervised by any physicians.       Patient notified of response from PCP     Is interested in seeing Dr. Vanessa Johns    Patient wants an appt to see PCP for rash to head of penis that has worsened   Has used neosporin and triple antibiotics   Advised to try OTC miconazole cream

## 2023-08-22 ENCOUNTER — TELEPHONE (OUTPATIENT)
Age: 78
End: 2023-08-22

## 2023-08-22 NOTE — TELEPHONE ENCOUNTER
Called patient to follow up on referral to [facility]. No answer, left voicemail advising patient to return call.

## 2023-08-23 ENCOUNTER — CLINICAL DOCUMENTATION (OUTPATIENT)
Age: 78
End: 2023-08-23

## 2023-08-23 NOTE — PROGRESS NOTES
Patient attended our VIRTUAL Medically Supervised Weight Loss New Patient Orientation on Wednesday August 23, 2023 where we discussed:  New Direction Very Low and the Low Calorie diet details  Medical Supervision  Nutrition education  Cost of Meal Replacements  Patient has been emailed the new patient paperwork to complete along with the copy of the power point presentation. Once patient returns the paperwork to our office, we will contact them to schedule a consultation.

## 2023-09-20 ENCOUNTER — HOSPITAL ENCOUNTER (OUTPATIENT)
Facility: HOSPITAL | Age: 78
Discharge: HOME | End: 2023-09-23

## 2023-09-20 VITALS
SYSTOLIC BLOOD PRESSURE: 115 MMHG | DIASTOLIC BLOOD PRESSURE: 75 MMHG | HEIGHT: 71 IN | HEART RATE: 66 BPM | RESPIRATION RATE: 22 BRPM | BODY MASS INDEX: 43.4 KG/M2 | WEIGHT: 310 LBS

## 2023-09-20 DIAGNOSIS — C61 PROSTATE CANCER (HCC): Primary | ICD-10-CM

## 2023-09-20 NOTE — PROGRESS NOTES
RADIATION ONCOLOGY PROGRESS NOTE    Patient Name: James Varghese  Patient YOB: 1945   Medical Record Number: [id number]  Referring Physician: Mariluz Crowley MD  [facility] [place]  [address] [facility],  [address] [place] [address]  Primary Care Provider: Nik Berry DO    DIAGNOSIS & STAGING:  Cancer Staging   Prostate cancer [facility])  Staging form: Prostate, AJCC 8th Edition  - Clinical stage from 1/14/2021: Stage IIIC (cT1c, cN0, cM0, PSA: 15, Grade Group: 5) - Unsigned      ICD-10-CM    1. Prostate cancer ([address] )  C61         AJCC Staging has been reviewed      CHIEF COMPLAINT: High risk prostate cancer, María 4 + 5, PSA 14.99, T1c. ADT with Lupron started 2/17/21, with a plan for 2 years. Definitive radiation with 81 Gy IMRT to the prostate, and 45 Gy to pelvic lymph nodes, completed 6/17/2021. Germline genetic testing negative (VUS: [facility] heterozygous), Modanisa 85 gene panel, report dated 2/14/2021. RADIATION HISTORY:    Treatment Summary to Date:  Course: C1    Treatment Site Ref. ID Energy Dose/Fx (cGy) #Fx Dose Correction (cGy) Total Dose (cGy) Start Date End Date Elapsed Days   PROSTATE SV LN [id number]_4500 6X 180 25 / 25 0 4,500 4/14/2021 5/19/2021 35   PROSTATE SV PTV 8100 6X 180 15 / 20 0 2,700 5/20/2021 6/10/2021 21         RETURN VISITS:  9/20/23: He is accompanied by his female partner. Today is approximately 2 years, 3 months after the completion of radiation. He received his last 3-month Lupron injection 11/30/22. His PSA is <0.014 ng/mL from 9/18/23, with testosterone at 10.26 ng/dL. He denies blood in urine or stool. His urinary symptoms are severe which he contributes to diuretic from cardiology. He takes tamsulosin BID. IPSS = 23 , QOL = 3/mixed. BETITO =1. He denies GI complaints. He next sees Dr. Tarah Brooke on 12/13/23.         HISTORY OF PRESENT ILLNESS AT CONSULT:      Mr Sherice Arnett is a 66year old white man who was referred to urological attention for elevated PSA, most recently measured as 15.68

## 2023-10-24 ENCOUNTER — OFFICE VISIT (OUTPATIENT)
Age: 78
End: 2023-10-24
Payer: MEDICARE

## 2023-10-24 VITALS
HEIGHT: 71 IN | BODY MASS INDEX: 42 KG/M2 | OXYGEN SATURATION: 97 % | WEIGHT: 300 LBS | DIASTOLIC BLOOD PRESSURE: 59 MMHG | TEMPERATURE: 97.7 F | HEART RATE: 75 BPM | RESPIRATION RATE: 16 BRPM | SYSTOLIC BLOOD PRESSURE: 94 MMHG

## 2023-10-24 DIAGNOSIS — I48.0 PAROXYSMAL ATRIAL FIBRILLATION (HCC): ICD-10-CM

## 2023-10-24 DIAGNOSIS — C61 MALIGNANT NEOPLASM OF PROSTATE (HCC): ICD-10-CM

## 2023-10-24 DIAGNOSIS — I95.2 HYPOTENSION DUE TO DRUGS: Primary | ICD-10-CM

## 2023-10-24 DIAGNOSIS — I25.10 CORONARY ARTERY DISEASE INVOLVING NATIVE CORONARY ARTERY OF NATIVE HEART WITHOUT ANGINA PECTORIS: ICD-10-CM

## 2023-10-24 DIAGNOSIS — G62.9 NEUROPATHY: ICD-10-CM

## 2023-10-24 DIAGNOSIS — I50.32 CHRONIC DIASTOLIC CHF (CONGESTIVE HEART FAILURE) (HCC): ICD-10-CM

## 2023-10-24 PROCEDURE — 1123F ACP DISCUSS/DSCN MKR DOCD: CPT | Performed by: INTERNAL MEDICINE

## 2023-10-24 PROCEDURE — G8427 DOCREV CUR MEDS BY ELIG CLIN: HCPCS | Performed by: INTERNAL MEDICINE

## 2023-10-24 PROCEDURE — 99214 OFFICE O/P EST MOD 30 MIN: CPT | Performed by: INTERNAL MEDICINE

## 2023-10-24 PROCEDURE — G8484 FLU IMMUNIZE NO ADMIN: HCPCS | Performed by: INTERNAL MEDICINE

## 2023-10-24 PROCEDURE — 1036F TOBACCO NON-USER: CPT | Performed by: INTERNAL MEDICINE

## 2023-10-24 PROCEDURE — G8417 CALC BMI ABV UP PARAM F/U: HCPCS | Performed by: INTERNAL MEDICINE

## 2023-10-24 RX ORDER — ZOSTER VACCINE RECOMBINANT, ADJUVANTED 50 MCG/0.5
0.5 KIT INTRAMUSCULAR SEE ADMIN INSTRUCTIONS
Qty: 0.5 ML | Refills: 0 | Status: SHIPPED | OUTPATIENT
Start: 2023-10-24 | End: 2024-04-21

## 2023-10-24 RX ORDER — METOPROLOL SUCCINATE 25 MG/1
12.5 TABLET, EXTENDED RELEASE ORAL DAILY
Qty: 30 TABLET | Refills: 5
Start: 2023-10-24

## 2023-10-24 RX ORDER — AMLODIPINE BESYLATE 2.5 MG/1
1 TABLET ORAL
COMMUNITY
Start: 2019-05-14 | End: 2023-10-24

## 2023-10-24 RX ORDER — OMEGA-3-ACID ETHYL ESTERS 1 G/1
2 CAPSULE, LIQUID FILLED ORAL 2 TIMES DAILY
COMMUNITY

## 2023-10-24 RX ORDER — POTASSIUM CHLORIDE 20 MEQ/1
TABLET, EXTENDED RELEASE ORAL
COMMUNITY
Start: 2012-11-06

## 2023-10-24 RX ORDER — CLOPIDOGREL BISULFATE 75 MG/1
1 TABLET ORAL DAILY
COMMUNITY
Start: 2021-11-15 | End: 2023-10-24 | Stop reason: ALTCHOICE

## 2023-10-24 RX ORDER — ASPIRIN 81 MG/1
TABLET ORAL
COMMUNITY
End: 2023-10-24 | Stop reason: SDUPTHER

## 2023-10-24 SDOH — ECONOMIC STABILITY: INCOME INSECURITY: HOW HARD IS IT FOR YOU TO PAY FOR THE VERY BASICS LIKE FOOD, HOUSING, MEDICAL CARE, AND HEATING?: NOT HARD AT ALL

## 2023-10-24 SDOH — ECONOMIC STABILITY: FOOD INSECURITY: WITHIN THE PAST 12 MONTHS, THE FOOD YOU BOUGHT JUST DIDN'T LAST AND YOU DIDN'T HAVE MONEY TO GET MORE.: NEVER TRUE

## 2023-10-24 SDOH — ECONOMIC STABILITY: FOOD INSECURITY: WITHIN THE PAST 12 MONTHS, YOU WORRIED THAT YOUR FOOD WOULD RUN OUT BEFORE YOU GOT MONEY TO BUY MORE.: NEVER TRUE

## 2023-10-24 SDOH — ECONOMIC STABILITY: HOUSING INSECURITY
IN THE LAST 12 MONTHS, WAS THERE A TIME WHEN YOU DID NOT HAVE A STEADY PLACE TO SLEEP OR SLEPT IN A SHELTER (INCLUDING NOW)?: NO

## 2023-10-24 ASSESSMENT — PATIENT HEALTH QUESTIONNAIRE - PHQ9
SUM OF ALL RESPONSES TO PHQ QUESTIONS 1-9: 0
SUM OF ALL RESPONSES TO PHQ QUESTIONS 1-9: 0
SUM OF ALL RESPONSES TO PHQ9 QUESTIONS 1 & 2: 0
SUM OF ALL RESPONSES TO PHQ QUESTIONS 1-9: 0
1. LITTLE INTEREST OR PLEASURE IN DOING THINGS: 0
SUM OF ALL RESPONSES TO PHQ QUESTIONS 1-9: 0
2. FEELING DOWN, DEPRESSED OR HOPELESS: 0

## 2023-10-30 RX ORDER — TAMSULOSIN HYDROCHLORIDE 0.4 MG/1
CAPSULE ORAL
Qty: 180 CAPSULE | Refills: 0 | Status: SHIPPED | OUTPATIENT
Start: 2023-10-30

## 2024-01-08 ASSESSMENT — ENCOUNTER SYMPTOMS: SHORTNESS OF BREATH: 0

## 2024-01-08 NOTE — PROGRESS NOTES
HISTORY OF PRESENT ILLNESS  Manuel Valdez is a 78 y.o. male.  HPI    Pt of Dr. Ortega. He presents for acute care.  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    States he passed 4 kidney stones since his last visit   He went to ED in [place] for this   He also saw his urologist since then his symptoms are resolved, had cystoscopy which showed no stones     Wt today is 280 lbs  States he has lost 40 lbs he is working on weight loss    Patient Active Problem List   Diagnosis    Memory difficulties    Hypothyroidism due to acquired atrophy of thyroid    S/P coronary artery stent placement    Hyperlipidemia, mixed    Knee osteoarthritis    SSS (sick sinus syndrome) (HCC)    ZAIDI (dyspnea on exertion)    Goiter, nontoxic, multinodular    Vitamin D deficiency    Benign essential tremor syndrome    Atrial fibrillation (HCC)    Severe obesity (HCC)    B12 deficiency    Unstable angina (HCC)    Atherosclerosis of coronary artery bypass graft    Irregular heartbeat    A-fib (HCC)    Cardiomyopathy (HCC)    Prostate cancer (HCC)     Current Outpatient Medications   Medication Sig Dispense Refill    tamsulosin (FLOMAX) 0.4 MG capsule TAKE 1 CAPSULE TWICE DAILY AFTER MEALS 180 capsule 0    Ferrous Sulfate (IRON PO) Take 1 tablet by mouth daily      potassium chloride (KLOR-CON M) 20 MEQ extended release tablet 1 tablet by mouth as directed Oral _select Frequency for 0      omega-3 acid ethyl esters (LOVAZA) 1 g capsule Take 2 capsules by mouth 2 times daily      metoprolol succinate

## 2024-01-09 ENCOUNTER — TELEPHONE (OUTPATIENT)
Age: 79
End: 2024-01-09

## 2024-01-09 ENCOUNTER — OFFICE VISIT (OUTPATIENT)
Age: 79
End: 2024-01-09
Payer: MEDICARE

## 2024-01-09 VITALS
SYSTOLIC BLOOD PRESSURE: 104 MMHG | BODY MASS INDEX: 39.26 KG/M2 | DIASTOLIC BLOOD PRESSURE: 66 MMHG | WEIGHT: 280.4 LBS | HEART RATE: 80 BPM | TEMPERATURE: 98.2 F | OXYGEN SATURATION: 94 % | RESPIRATION RATE: 20 BRPM | HEIGHT: 71 IN

## 2024-01-09 DIAGNOSIS — R21 RASH: Primary | ICD-10-CM

## 2024-01-09 DIAGNOSIS — C44.90 SKIN CANCER: ICD-10-CM

## 2024-01-09 DIAGNOSIS — R23.8 PAPULE: ICD-10-CM

## 2024-01-09 DIAGNOSIS — B37.9 CANDIDA INFECTION: ICD-10-CM

## 2024-01-09 DIAGNOSIS — E66.01 SEVERE OBESITY (HCC): ICD-10-CM

## 2024-01-09 PROCEDURE — 99213 OFFICE O/P EST LOW 20 MIN: CPT | Performed by: INTERNAL MEDICINE

## 2024-01-09 RX ORDER — FLUOCINOLONE ACETONIDE 0.11 MG/ML
OIL TOPICAL
Qty: 118.28 ML | Refills: 0 | Status: SHIPPED | OUTPATIENT
Start: 2024-01-09

## 2024-01-09 RX ORDER — KETOCONAZOLE 20 MG/G
CREAM TOPICAL
Qty: 30 G | Refills: 1 | Status: SHIPPED | OUTPATIENT
Start: 2024-01-09 | End: 2024-01-09

## 2024-01-09 RX ORDER — FLUOCINOLONE ACETONIDE 0.11 MG/ML
OIL TOPICAL
Qty: 118.28 ML | Refills: 0 | Status: SHIPPED | OUTPATIENT
Start: 2024-01-09 | End: 2024-01-09

## 2024-01-09 RX ORDER — TRIAMCINOLONE ACETONIDE 1 MG/G
CREAM TOPICAL
Qty: 30 G | Refills: 0 | Status: SHIPPED | OUTPATIENT
Start: 2024-01-09 | End: 2024-01-09

## 2024-01-09 RX ORDER — KETOCONAZOLE 20 MG/G
CREAM TOPICAL
Qty: 30 G | Refills: 1 | Status: SHIPPED | OUTPATIENT
Start: 2024-01-09

## 2024-01-09 RX ORDER — TRIAMCINOLONE ACETONIDE 1 MG/G
CREAM TOPICAL
Qty: 30 G | Refills: 0 | Status: SHIPPED | OUTPATIENT
Start: 2024-01-09

## 2024-01-09 ASSESSMENT — PATIENT HEALTH QUESTIONNAIRE - PHQ9
SUM OF ALL RESPONSES TO PHQ QUESTIONS 1-9: 0
SUM OF ALL RESPONSES TO PHQ9 QUESTIONS 1 & 2: 0
2. FEELING DOWN, DEPRESSED OR HOPELESS: 0
1. LITTLE INTEREST OR PLEASURE IN DOING THINGS: 0

## 2024-01-09 NOTE — TELEPHONE ENCOUNTER
Pt's medication from today went to the incorrect pharm     They have the [facility] at [facility] she will get it there.  She wanted it to go to [facility].

## 2024-01-09 NOTE — PROGRESS NOTES
\"Have you been to the ER, urgent care clinic since your last visit?  Hospitalized since your last visit?\"    YES - When: approximately 2 months ago.  Where and Why: 11/17/2023 for Bladder stones, [facility] .  “Have you seen or consulted any other health care providers outside of [facility] since your last visit?”    YES - When: approximately 2 months ago.  Where and Why: [facility].

## 2024-01-11 RX ORDER — TAMSULOSIN HYDROCHLORIDE 0.4 MG/1
CAPSULE ORAL
Qty: 180 CAPSULE | Refills: 3 | OUTPATIENT
Start: 2024-01-11

## 2024-04-24 ENCOUNTER — OFFICE VISIT (OUTPATIENT)
Age: 79
End: 2024-04-24
Payer: MEDICARE

## 2024-04-24 VITALS
DIASTOLIC BLOOD PRESSURE: 85 MMHG | HEART RATE: 75 BPM | TEMPERATURE: 97.5 F | WEIGHT: 265 LBS | BODY MASS INDEX: 37.1 KG/M2 | SYSTOLIC BLOOD PRESSURE: 100 MMHG | RESPIRATION RATE: 14 BRPM | OXYGEN SATURATION: 97 % | HEIGHT: 71 IN

## 2024-04-24 DIAGNOSIS — R73.03 PREDIABETES: ICD-10-CM

## 2024-04-24 DIAGNOSIS — Z00.00 MEDICARE ANNUAL WELLNESS VISIT, SUBSEQUENT: Primary | ICD-10-CM

## 2024-04-24 DIAGNOSIS — C61 MALIGNANT NEOPLASM OF PROSTATE (HCC): ICD-10-CM

## 2024-04-24 DIAGNOSIS — I10 ESSENTIAL (PRIMARY) HYPERTENSION: ICD-10-CM

## 2024-04-24 DIAGNOSIS — I50.32 CHRONIC DIASTOLIC CHF (CONGESTIVE HEART FAILURE) (HCC): ICD-10-CM

## 2024-04-24 DIAGNOSIS — E78.2 MIXED HYPERLIPIDEMIA: ICD-10-CM

## 2024-04-24 DIAGNOSIS — I25.10 CORONARY ARTERY DISEASE INVOLVING NATIVE CORONARY ARTERY OF NATIVE HEART WITHOUT ANGINA PECTORIS: ICD-10-CM

## 2024-04-24 DIAGNOSIS — I48.0 PAROXYSMAL ATRIAL FIBRILLATION (HCC): ICD-10-CM

## 2024-04-24 DIAGNOSIS — Z12.5 PROSTATE CANCER SCREENING: ICD-10-CM

## 2024-04-24 LAB
ALBUMIN SERPL-MCNC: 4 G/DL (ref 3.5–5)
ALBUMIN/GLOB SERPL: 1.3 (ref 1.1–2.2)
ALP SERPL-CCNC: 70 U/L (ref 45–117)
ALT SERPL-CCNC: 31 U/L (ref 12–78)
ANION GAP SERPL CALC-SCNC: 8 MMOL/L (ref 5–15)
AST SERPL-CCNC: 19 U/L (ref 15–37)
BASOPHILS # BLD: 0 K/UL (ref 0–0.1)
BASOPHILS NFR BLD: 0 % (ref 0–1)
BILIRUB SERPL-MCNC: 0.5 MG/DL (ref 0.2–1)
BUN SERPL-MCNC: 22 MG/DL (ref 6–20)
BUN/CREAT SERPL: 24 (ref 12–20)
CALCIUM SERPL-MCNC: 9.7 MG/DL (ref 8.5–10.1)
CHLORIDE SERPL-SCNC: 101 MMOL/L (ref 97–108)
CHOLEST SERPL-MCNC: 106 MG/DL
CO2 SERPL-SCNC: 26 MMOL/L (ref 21–32)
CREAT SERPL-MCNC: 0.92 MG/DL (ref 0.7–1.3)
DIFFERENTIAL METHOD BLD: ABNORMAL
EOSINOPHIL # BLD: 0.1 K/UL (ref 0–0.4)
EOSINOPHIL NFR BLD: 1 % (ref 0–7)
ERYTHROCYTE [DISTWIDTH] IN BLOOD BY AUTOMATED COUNT: 14.3 % (ref 11.5–14.5)
EST. AVERAGE GLUCOSE BLD GHB EST-MCNC: 91 MG/DL
GLOBULIN SER CALC-MCNC: 3 G/DL (ref 2–4)
GLUCOSE SERPL-MCNC: 80 MG/DL (ref 65–100)
HBA1C MFR BLD: 4.8 % (ref 4–5.6)
HCT VFR BLD AUTO: 36.9 % (ref 36.6–50.3)
HDLC SERPL-MCNC: 45 MG/DL
HDLC SERPL: 2.4 (ref 0–5)
HGB BLD-MCNC: 12.1 G/DL (ref 12.1–17)
IMM GRANULOCYTES # BLD AUTO: 0 K/UL (ref 0–0.04)
IMM GRANULOCYTES NFR BLD AUTO: 0 % (ref 0–0.5)
LDLC SERPL CALC-MCNC: 47 MG/DL (ref 0–100)
LYMPHOCYTES # BLD: 1.7 K/UL (ref 0.8–3.5)
LYMPHOCYTES NFR BLD: 34 % (ref 12–49)
MCH RBC QN AUTO: 31.7 PG (ref 26–34)
MCHC RBC AUTO-ENTMCNC: 32.8 G/DL (ref 30–36.5)
MCV RBC AUTO: 96.6 FL (ref 80–99)
MONOCYTES # BLD: 0.4 K/UL (ref 0–1)
MONOCYTES NFR BLD: 7 % (ref 5–13)
NEUTS SEG # BLD: 2.8 K/UL (ref 1.8–8)
NEUTS SEG NFR BLD: 58 % (ref 32–75)
NRBC # BLD: 0 K/UL (ref 0–0.01)
NRBC BLD-RTO: 0 PER 100 WBC
PLATELET # BLD AUTO: 158 K/UL (ref 150–400)
PMV BLD AUTO: 10.9 FL (ref 8.9–12.9)
POTASSIUM SERPL-SCNC: 4.1 MMOL/L (ref 3.5–5.1)
PROT SERPL-MCNC: 7 G/DL (ref 6.4–8.2)
PSA SERPL-MCNC: 0 NG/ML (ref 0.01–4)
RBC # BLD AUTO: 3.82 M/UL (ref 4.1–5.7)
SODIUM SERPL-SCNC: 135 MMOL/L (ref 136–145)
TRIGL SERPL-MCNC: 70 MG/DL
TSH SERPL DL<=0.05 MIU/L-ACNC: 3.5 UIU/ML (ref 0.36–3.74)
VLDLC SERPL CALC-MCNC: 14 MG/DL
WBC # BLD AUTO: 4.9 K/UL (ref 4.1–11.1)

## 2024-04-24 PROCEDURE — 3074F SYST BP LT 130 MM HG: CPT | Performed by: INTERNAL MEDICINE

## 2024-04-24 PROCEDURE — G0439 PPPS, SUBSEQ VISIT: HCPCS | Performed by: INTERNAL MEDICINE

## 2024-04-24 PROCEDURE — 3079F DIAST BP 80-89 MM HG: CPT | Performed by: INTERNAL MEDICINE

## 2024-04-24 PROCEDURE — G8417 CALC BMI ABV UP PARAM F/U: HCPCS | Performed by: INTERNAL MEDICINE

## 2024-04-24 PROCEDURE — 99213 OFFICE O/P EST LOW 20 MIN: CPT | Performed by: INTERNAL MEDICINE

## 2024-04-24 PROCEDURE — G8427 DOCREV CUR MEDS BY ELIG CLIN: HCPCS | Performed by: INTERNAL MEDICINE

## 2024-04-24 PROCEDURE — 1036F TOBACCO NON-USER: CPT | Performed by: INTERNAL MEDICINE

## 2024-04-24 PROCEDURE — 1123F ACP DISCUSS/DSCN MKR DOCD: CPT | Performed by: INTERNAL MEDICINE

## 2024-04-24 SDOH — ECONOMIC STABILITY: FOOD INSECURITY: WITHIN THE PAST 12 MONTHS, YOU WORRIED THAT YOUR FOOD WOULD RUN OUT BEFORE YOU GOT MONEY TO BUY MORE.: NEVER TRUE

## 2024-04-24 SDOH — ECONOMIC STABILITY: FOOD INSECURITY: WITHIN THE PAST 12 MONTHS, THE FOOD YOU BOUGHT JUST DIDN'T LAST AND YOU DIDN'T HAVE MONEY TO GET MORE.: NEVER TRUE

## 2024-04-24 SDOH — ECONOMIC STABILITY: INCOME INSECURITY: HOW HARD IS IT FOR YOU TO PAY FOR THE VERY BASICS LIKE FOOD, HOUSING, MEDICAL CARE, AND HEATING?: NOT HARD AT ALL

## 2024-04-24 ASSESSMENT — PATIENT HEALTH QUESTIONNAIRE - PHQ9
SUM OF ALL RESPONSES TO PHQ QUESTIONS 1-9: 0
SUM OF ALL RESPONSES TO PHQ QUESTIONS 1-9: 0
2. FEELING DOWN, DEPRESSED OR HOPELESS: NOT AT ALL
SUM OF ALL RESPONSES TO PHQ9 QUESTIONS 1 & 2: 0
SUM OF ALL RESPONSES TO PHQ QUESTIONS 1-9: 0
SUM OF ALL RESPONSES TO PHQ QUESTIONS 1-9: 0
1. LITTLE INTEREST OR PLEASURE IN DOING THINGS: NOT AT ALL

## 2024-04-24 NOTE — PATIENT INSTRUCTIONS
doing. This is an important first step to getting help. And when you have the help you need, you can stay as independent as possible.  How will a doctor assess your ADLs?  Asking about ADLs is part of a routine health checkup your doctor will likely do as you age. Your health check might be done in a doctor's office, in your home, or at a hospital. The goal is to find out if you are having any problems that could make it hard to care for yourself or that make it unsafe for you to be on your own.  To measure your ADLs, your doctor will ask how hard it is for you to do routine tasks. Your doctor may also want to know if you have changed the way you do a task because of a health problem. Your doctor may watch how you:  Walk back and forth.  Keep your balance while you stand or walk.  Move from sitting to standing or from a bed to a chair.  Button or unbutton a shirt or sweater.  Remove and put on your shoes.  It's common to feel a little worried or anxious if you find you can't do all the things you used to be able to do. Talking with your doctor about ADLs is a way to make sure you're as safe as possible and able to care for yourself as well as you can. You may want to bring a caregiver, friend, or family member to your checkup. They can help you talk to your doctor.  Follow-up care is a key part of your treatment and safety. Be sure to make and go to all appointments, and call your doctor if you are having problems. It's also a good idea to know your test results and keep a list of the medicines you take.  Current as of: October 24, 2023               Content Version: 14.0  © 2006-2024 [employer].   Care instructions adapted under license by [employer]. If you have questions about a medical condition or this instruction, always ask your healthcare professional. [employer] disclaims any warranty or liability for your use of this information.           Eating Healthy Foods: Care

## 2024-04-24 NOTE — PROGRESS NOTES
Chief Complaint   Patient presents with    Medicare AWV           \"Have you been to the ER, urgent care clinic since your last visit?  Hospitalized since your last visit?\"    YES - When: approximately 1 months ago.  Where and Why: urgent care in [place] for pain in left knee.    “Have you seen or consulted any other health care providers outside of [facility] since your last visit?”    NO            Click Here for Release of Records Request

## 2024-04-24 NOTE — PROGRESS NOTES
Medicare Annual Wellness Visit    Manuel Valdez is here for Medicare AWV    Assessment & Plan   Medicare annual wellness visit, subsequent  Recommendations for Preventive Services Due: see orders and patient instructions/AVS.  Recommended screening schedule for the next 5-10 years is provided to the patient in written form: see Patient Instructions/AVS.     No follow-ups on file.     Subjective       Patient's complete Health Risk Assessment and screening values have been reviewed and are found in Flowsheets. The following problems were reviewed today and where indicated follow up appointments were made and/or referrals ordered.    Positive Risk Factor Screenings with Interventions:    Fall Risk:  Do you feel unsteady or are you worried about falling? : (!) yes  2 or more falls in past year?: (!) yes  Fall with injury in past year?: no     Interventions:    Reviewed medications, home hazards, visual acuity, and co-morbidities that can increase risk for falls    Cognitive:      Words recalled: 2 Words Recalled     Total Score Interpretation: Abnormal Mini-Cog  Interventions:  Patient declines any further evaluation or treatment           General HRA Questions:  Select all that apply: (!) New or Increased Pain    Pain Interventions:  Patient declined any further interventions or treatment      Activity, Diet, and Weight:  On average, how many days per week do you engage in moderate to strenuous exercise (like a brisk walk)?: 0 days  On average, how many minutes do you engage in exercise at this level?: 0 min    Do you eat balanced/healthy meals regularly?: (!) No    Body mass index is 36.96 kg/m². (!) Abnormal      Inactivity Interventions:  Patient declined any further interventions or treatment  Do you eat balanced/healthy meals regularly Interventions:  Patient declines any further evaluation or treatment  Obesity Interventions:  Patient declines any further evaluation or treatment             Hearing Screen:  Do

## 2024-10-15 ENCOUNTER — TELEPHONE (OUTPATIENT)
Age: 79
End: 2024-10-15

## 2024-10-15 RX ORDER — CLINDAMYCIN HCL 300 MG
600 CAPSULE ORAL ONCE AS NEEDED
Qty: 2 CAPSULE | Refills: 3 | Status: SHIPPED | OUTPATIENT
Start: 2024-10-15 | End: 2025-10-15

## 2024-10-15 NOTE — TELEPHONE ENCOUNTER
Had tkr done a few years back. [facility] [place]

## 2024-10-29 ENCOUNTER — OFFICE VISIT (OUTPATIENT)
Age: 79
End: 2024-10-29
Payer: MEDICARE

## 2024-10-29 VITALS
BODY MASS INDEX: 33.18 KG/M2 | WEIGHT: 237 LBS | DIASTOLIC BLOOD PRESSURE: 67 MMHG | HEIGHT: 71 IN | TEMPERATURE: 97.7 F | HEART RATE: 77 BPM | OXYGEN SATURATION: 98 % | RESPIRATION RATE: 14 BRPM | SYSTOLIC BLOOD PRESSURE: 103 MMHG

## 2024-10-29 DIAGNOSIS — I48.0 PAROXYSMAL ATRIAL FIBRILLATION (HCC): ICD-10-CM

## 2024-10-29 DIAGNOSIS — I50.32 CHRONIC DIASTOLIC CHF (CONGESTIVE HEART FAILURE) (HCC): ICD-10-CM

## 2024-10-29 DIAGNOSIS — Z01.818 PREOP EXAMINATION: Primary | ICD-10-CM

## 2024-10-29 DIAGNOSIS — M17.12 OSTEOARTHROSIS, LOCALIZED, PRIMARY, KNEE, LEFT: ICD-10-CM

## 2024-10-29 DIAGNOSIS — I25.10 CORONARY ARTERY DISEASE INVOLVING NATIVE CORONARY ARTERY OF NATIVE HEART WITHOUT ANGINA PECTORIS: ICD-10-CM

## 2024-10-29 PROCEDURE — 99214 OFFICE O/P EST MOD 30 MIN: CPT | Performed by: INTERNAL MEDICINE

## 2024-10-29 NOTE — PROGRESS NOTES
\"Have you been to the ER, urgent care clinic since your last visit?  Hospitalized since your last visit?\"    NO    “Have you seen or consulted any other health care providers outside our system since your last visit?”    NO           
performed by Mack Gomez MD at [facility] ENDOSCOPY    CORONARY ANGIOPLASTY WITH STENT PLACEMENT      CORONARY ARTERY BYPASS GRAFT      side entry     EP DEVICE PROCEDURE N/A 2023    Watchman benja closure device performed by Hans Cabrera MD at [facility] CARDIAC CATH LAB    INS NEW/RPLCMT PRM PM W/TRANSV ELTRD ATRIAL&VENT N/A 2019    INSERT PPM DUAL performed by Hans Cabrera MD at [facility] CARDIAC CATH LAB    ORTHOPEDIC SURGERY Right     NE UNLISTED PROCEDURE CARDIAC SURGERY      bypass    RIGHT HEART CATHETERIZATION      multiple    RT/LT HEART CATHETERS  2013    PTCA    Heart Dr. Bernal    SEPTOPLASTY      SHOULDER ARTHROSCOPY      left    TOTAL KNEE ARTHROPLASTY      r tkr       Family History   Problem Relation Age of Onset    Cancer Mother         abd     Heart Disease Mother     Heart Disease Father     Cancer Father         in his shoulder    Heart Attack Father     Cancer Sister         ovarian    No Known Problems Brother     Cancer Son         wilm's tumor    Thyroid Disease Neg Hx        Social History     Socioeconomic History    Marital status: Single     Spouse name: Not on file    Number of children: Not on file    Years of education: Not on file    Highest education level: Not on file   Occupational History    Not on file   Tobacco Use    Smoking status: Former     Current packs/day: 0.00     Average packs/day: 4.5 packs/day for 17.0 years (76.5 ttl pk-yrs)     Types: Cigarettes     Start date:      Quit date:      Years since quittin.8    Smokeless tobacco: Former   Vaping Use    Vaping status: Never Used   Substance and Sexual Activity    Alcohol use: Yes     Alcohol/week: 7.0 standard drinks of alcohol     Types: 2 Glasses of wine, 2 Cans of beer, 3 Shots of liquor per week     Comment: 1 drink per day (either beer, wine or liquor)    Drug use: No    Sexual activity: Yes     Partners: Female   Other Topics Concern    Not on file   Social History

## 2024-10-29 NOTE — PATIENT INSTRUCTIONS
Great job getting your weight down.  Keep working at it.  Good luck with your knee replacement surgery.

## 2024-12-26 ENCOUNTER — TELEPHONE (OUTPATIENT)
Age: 79
End: 2024-12-26

## 2024-12-26 PROBLEM — M17.12 OSTEOARTHRITIS OF LEFT KNEE: Status: ACTIVE | Noted: 2024-12-26

## 2024-12-26 NOTE — TELEPHONE ENCOUNTER
Patient states gave their pre-op form for their knee surgery, which was scheduled on 1/7/25, at their last appointment 10/9/24.     Patient states the surgeon's office did not receive the pre-op form and told patient they would be unable to do the surgery on 1/7/25.     Please advise

## 2024-12-26 NOTE — TELEPHONE ENCOUNTER
Called/Spoke with pt     Pt stated that given form at last visit- and PCP promised to complete.     Informed pt that form is still in office and some spots have been completed; stated that unable to fax due to no signature.  Pt stated if surgery is cancelled; will look for another PCP.    Called/Spoke with Dr. Ghotra's Office.     Inquired if another provider will be able to sign for surgery- and I fax what is currently filled out on the Pre-op form.    Nurse's is to return call in response to clearance to direct line.

## 2024-12-26 NOTE — TELEPHONE ENCOUNTER
Called/Spoke with Celia.     Stated that Pre op exam was done 2 months too soon- Needed to be 30 days prior.     Celia asked if pcp is will to hold off on forms until clarification is set.   Also inquired if pcp would be willing to write a date in real time instead of using the 10/29/24 Exam date.     Informed will route to provider for further instructions;

## 2024-12-27 ENCOUNTER — HOSPITAL ENCOUNTER (OUTPATIENT)
Facility: HOSPITAL | Age: 79
Discharge: HOME | End: 2024-12-30
Payer: MEDICARE

## 2024-12-27 VITALS
SYSTOLIC BLOOD PRESSURE: 103 MMHG | BODY MASS INDEX: 33.89 KG/M2 | HEART RATE: 75 BPM | DIASTOLIC BLOOD PRESSURE: 65 MMHG | HEIGHT: 71 IN | TEMPERATURE: 97.7 F | WEIGHT: 242.06 LBS | RESPIRATION RATE: 18 BRPM

## 2024-12-27 LAB
ABO + RH BLD: NORMAL
ANION GAP SERPL CALC-SCNC: 6 MMOL/L (ref 2–12)
APPEARANCE UR: CLEAR
BACTERIA URNS QL MICRO: NEGATIVE /HPF
BILIRUB UR QL: NEGATIVE
BLOOD GROUP ANTIBODIES SERPL: NORMAL
BUN SERPL-MCNC: 22 MG/DL (ref 6–20)
BUN/CREAT SERPL: 22 (ref 12–20)
CALCIUM SERPL-MCNC: 9.4 MG/DL (ref 8.5–10.1)
CHLORIDE SERPL-SCNC: 102 MMOL/L (ref 97–108)
CO2 SERPL-SCNC: 28 MMOL/L (ref 21–32)
COLOR UR: NORMAL
CREAT SERPL-MCNC: 1.01 MG/DL (ref 0.7–1.3)
EPITH CASTS URNS QL MICRO: NORMAL /LPF
ERYTHROCYTE [DISTWIDTH] IN BLOOD BY AUTOMATED COUNT: 13.9 % (ref 11.5–14.5)
EST. AVERAGE GLUCOSE BLD GHB EST-MCNC: 97 MG/DL
GLUCOSE SERPL-MCNC: 79 MG/DL (ref 65–100)
GLUCOSE UR STRIP.AUTO-MCNC: NEGATIVE MG/DL
HBA1C MFR BLD: 5 % (ref 4–5.6)
HCT VFR BLD AUTO: 35 % (ref 36.6–50.3)
HGB BLD-MCNC: 11.8 G/DL (ref 12.1–17)
HGB UR QL STRIP: NEGATIVE
HYALINE CASTS URNS QL MICRO: NORMAL /LPF (ref 0–5)
INR PPP: 1 (ref 0.9–1.1)
KETONES UR QL STRIP.AUTO: NEGATIVE MG/DL
LEUKOCYTE ESTERASE UR QL STRIP.AUTO: NEGATIVE
MCH RBC QN AUTO: 32.6 PG (ref 26–34)
MCHC RBC AUTO-ENTMCNC: 33.7 G/DL (ref 30–36.5)
MCV RBC AUTO: 96.7 FL (ref 80–99)
NITRITE UR QL STRIP.AUTO: NEGATIVE
NRBC # BLD: 0 K/UL (ref 0–0.01)
NRBC BLD-RTO: 0 PER 100 WBC
PH UR STRIP: 6.5 (ref 5–8)
PLATELET # BLD AUTO: 147 K/UL (ref 150–400)
PMV BLD AUTO: 10.8 FL (ref 8.9–12.9)
POTASSIUM SERPL-SCNC: 4.4 MMOL/L (ref 3.5–5.1)
PROT UR STRIP-MCNC: NEGATIVE MG/DL
PROTHROMBIN TIME: 10.6 SEC (ref 9–11.1)
RBC # BLD AUTO: 3.62 M/UL (ref 4.1–5.7)
RBC #/AREA URNS HPF: NORMAL /HPF (ref 0–5)
SODIUM SERPL-SCNC: 136 MMOL/L (ref 136–145)
SP GR UR REFRACTOMETRY: 1.01 (ref 1–1.03)
SPECIMEN EXP DATE BLD: NORMAL
URINE CULTURE IF INDICATED: NORMAL
UROBILINOGEN UR QL STRIP.AUTO: 0.2 EU/DL (ref 0.2–1)
WBC # BLD AUTO: 4.6 K/UL (ref 4.1–11.1)
WBC URNS QL MICRO: NORMAL /HPF (ref 0–4)

## 2024-12-27 PROCEDURE — 83036 HEMOGLOBIN GLYCOSYLATED A1C: CPT

## 2024-12-27 PROCEDURE — 80048 BASIC METABOLIC PNL TOTAL CA: CPT

## 2024-12-27 PROCEDURE — 85610 PROTHROMBIN TIME: CPT

## 2024-12-27 PROCEDURE — 85027 COMPLETE CBC AUTOMATED: CPT

## 2024-12-27 PROCEDURE — 36415 COLL VENOUS BLD VENIPUNCTURE: CPT

## 2024-12-27 PROCEDURE — 86900 BLOOD TYPING SEROLOGIC ABO: CPT

## 2024-12-27 PROCEDURE — 81001 URINALYSIS AUTO W/SCOPE: CPT

## 2024-12-27 PROCEDURE — 86901 BLOOD TYPING SEROLOGIC RH(D): CPT

## 2024-12-27 PROCEDURE — 86850 RBC ANTIBODY SCREEN: CPT

## 2024-12-27 RX ORDER — DOCUSATE SODIUM 100 MG/1
100 CAPSULE, LIQUID FILLED ORAL DAILY
COMMUNITY

## 2024-12-27 ASSESSMENT — PROMIS GLOBAL HEALTH SCALE
IN THE PAST 7 DAYS, HOW OFTEN HAVE YOU BEEN BOTHERED BY EMOTIONAL PROBLEMS, SUCH AS FEELING ANXIOUS, DEPRESSED, OR IRRITABLE [ON A SCALE FROM 1 (NEVER) TO 5 (ALWAYS)]?: NEVER
IN GENERAL, WOULD YOU SAY YOUR QUALITY OF LIFE IS...[ON A SCALE OF 1 (POOR) TO 5 (EXCELLENT)]: EXCELLENT
TO WHAT EXTENT ARE YOU ABLE TO CARRY OUT YOUR EVERYDAY PHYSICAL ACTIVITIES SUCH AS WALKING, CLIMBING STAIRS, CARRYING GROCERIES, OR MOVING A CHAIR [ON A SCALE OF 1 (NOT AT ALL) TO 5 (COMPLETELY)]?: A LITTLE
SUM OF RESPONSES TO QUESTIONS 2, 4, 5, & 10: 19
IN THE PAST 7 DAYS, HOW WOULD YOU RATE YOUR PAIN ON AVERAGE [ON A SCALE FROM 0 (NO PAIN) TO 10 (WORST IMAGINABLE PAIN)]?: 8
IN GENERAL, HOW WOULD YOU RATE YOUR SATISFACTION WITH YOUR SOCIAL ACTIVITIES AND RELATIONSHIPS [ON A SCALE OF 1 (POOR) TO 5 (EXCELLENT)]?: VERY GOOD
HOW IS THE PROMIS V1.1 BEING ADMINISTERED?: PAPER
SUM OF RESPONSES TO QUESTIONS 3, 6, 7, & 8: 16
WHO IS THE PERSON COMPLETING THE PROMIS V1.1 SURVEY?: SURROGATE
IN GENERAL, HOW WOULD YOU RATE YOUR MENTAL HEALTH, INCLUDING YOUR MOOD AND YOUR ABILITY TO THINK [ON A SCALE OF 1 (POOR) TO 5 (EXCELLENT)]?: EXCELLENT
IN GENERAL, WOULD YOU SAY YOUR HEALTH IS...[ON A SCALE OF 1 (POOR) TO 5 (EXCELLENT)]: GOOD
IN GENERAL, PLEASE RATE HOW WELL YOU CARRY OUT YOUR USUAL SOCIAL ACTIVITIES (INCLUDES ACTIVITIES AT HOME, AT WORK, AND IN YOUR COMMUNITY, AND RESPONSIBILITIES AS A PARENT, CHILD, SPOUSE, EMPLOYEE, FRIEND, ETC) [ON A SCALE OF 1 (POOR) TO 5 (EXCELLENT)]?: VERY GOOD
IN GENERAL, HOW WOULD YOU RATE YOUR PHYSICAL HEALTH [ON A SCALE OF 1 (POOR) TO 5 (EXCELLENT)]?: GOOD
IN THE PAST 7 DAYS, HOW WOULD YOU RATE YOUR FATIGUE ON AVERAGE [ON A SCALE FROM 1 (NONE) TO 5 (VERY SEVERE)]?: MODERATE

## 2024-12-27 ASSESSMENT — KOOS JR
HOW SEVERE IS YOUR KNEE STIFFNESS AFTER FIRST WAKING IN MORNING: MILD
TWISING OR PIVOTING ON KNEE: EXTREME
STRAIGHTENING KNEE FULLY: SEVERE
KOOS JR TOTAL INTERVAL SCORE: 44.905
GOING UP OR DOWN STAIRS: MODERATE
STANDING UPRIGHT: MILD
RISING FROM SITTING: MODERATE
BENDING TO THE FLOOR TO PICK UP OBJECT: EXTREME

## 2024-12-27 ASSESSMENT — PAIN SCALES - GENERAL: PAINLEVEL_OUTOF10: 0

## 2024-12-27 NOTE — PERIOP NOTE
clean.  Do not allow pets to sleep in your bed with you or touch your surgical wound.  Do not smoke - smoking delays wound healing. This may be a good time to stop smoking.  If you have diabetes, it is important for you to manage your blood sugar levels properly before your surgery as well as after your surgery. Poorly managed blood sugar levels slow down wound healing and prevent you from healing completely.        Testing for Staphylococcus aureus on your skin before surgery    Staphylococcus aureus (staph) is a common bacteria that is found on the body. It normally does not cause infection on healthy skin. Before surgery, you will be tested to see if you have staph by swabbing the inside of your nose. When you have an incision with surgery, the goal is to protect that incision from infection. Removal of the staph bacteria before surgery can decrease the risk of a surgical site infection.    If your nose swab is positive for staph you will be called. Your treatment will include 2 steps:  Prescription for Mupirocin ointment to be used in each nostril twice a day for 5 days.  Showering with Chlorhexidine (CHG) liquid soap for 5 days prior to surgery (follow same CHG Shower Instructions as above).    How to use Mupirocin ointment in your nose   the prescription from your pharmacy. You will receive a large tube of ointment which will be big enough for all of your treatments. You will apply this ointment to each nostril 2 times a day for 5 days.  Wash your hands with  gel or soap and water for 20 seconds before using ointment.  Place a pea-sized amount of ointment on a cotton Q-tip.  Apply ointment just inside of each nostril with the Q-tip. Do not push Q-tip or ointment deep inside you nose.  Press your nostrils together and massage for a few seconds.  Wash your hands with  gel or soap and water after you are finished.  Do not get ointment near your eyes. If it gets into your eyes, rinse them  with cool water.  If you need to use nasal spray, clean the tip of the bottle with alcohol before use and do not use both at the same time.  If you are scheduled for COVID testing during the 5 days, do NOT apply morning dose until after the COVID test has been performed.        Follow all instructions so your surgery won’t be cancelled.  Please, be on time.                    If a situation occurs and you are delayed the day of surgery, call [phone]/ [phone].    If your physical condition changes (like a fever, cold, flu, etc.) call your surgeon as soon as possible.    Home medication(s) reviewed and verified via during PAT appointment/call.    The  PT AND WIFE  WERE contacted in person.     They verbalized understanding of all instructions AND DO NOT need reinforcement.

## 2024-12-28 LAB
BACTERIA SPEC CULT: NORMAL
BACTERIA SPEC CULT: NORMAL
SERVICE CMNT-IMP: NORMAL

## 2025-01-07 ENCOUNTER — ANESTHESIA EVENT (OUTPATIENT)
Facility: HOSPITAL | Age: 80
End: 2025-01-07
Payer: MEDICARE

## 2025-01-07 ENCOUNTER — HOSPITAL ENCOUNTER (OUTPATIENT)
Facility: HOSPITAL | Age: 80
Setting detail: OBSERVATION
Discharge: HOME HEALTH | End: 2025-01-08
Attending: ORTHOPAEDIC SURGERY | Admitting: ORTHOPAEDIC SURGERY
Payer: MEDICARE

## 2025-01-07 ENCOUNTER — ANESTHESIA (OUTPATIENT)
Facility: HOSPITAL | Age: 80
End: 2025-01-07
Payer: MEDICARE

## 2025-01-07 DIAGNOSIS — Z96.652 S/P TOTAL KNEE ARTHROPLASTY, LEFT: Primary | ICD-10-CM

## 2025-01-07 PROBLEM — M17.12 PRIMARY OSTEOARTHRITIS OF LEFT KNEE: Status: ACTIVE | Noted: 2025-01-07

## 2025-01-07 LAB
GLUCOSE BLD STRIP.AUTO-MCNC: 93 MG/DL (ref 65–117)
SERVICE CMNT-IMP: NORMAL

## 2025-01-07 PROCEDURE — 6370000000 HC RX 637 (ALT 250 FOR IP): Performed by: ORTHOPAEDIC SURGERY

## 2025-01-07 PROCEDURE — 97530 THERAPEUTIC ACTIVITIES: CPT

## 2025-01-07 PROCEDURE — 2500000003 HC RX 250 WO HCPCS: Performed by: NURSE ANESTHETIST, CERTIFIED REGISTERED

## 2025-01-07 PROCEDURE — P9045 ALBUMIN (HUMAN), 5%, 250 ML: HCPCS | Performed by: ANESTHESIOLOGY

## 2025-01-07 PROCEDURE — 6360000002 HC RX W HCPCS: Performed by: PHYSICIAN ASSISTANT

## 2025-01-07 PROCEDURE — C1776 JOINT DEVICE (IMPLANTABLE): HCPCS | Performed by: ORTHOPAEDIC SURGERY

## 2025-01-07 PROCEDURE — 2580000003 HC RX 258: Performed by: ANESTHESIOLOGY

## 2025-01-07 PROCEDURE — 6360000002 HC RX W HCPCS: Performed by: NURSE ANESTHETIST, CERTIFIED REGISTERED

## 2025-01-07 PROCEDURE — G0378 HOSPITAL OBSERVATION PER HR: HCPCS

## 2025-01-07 PROCEDURE — 6360000002 HC RX W HCPCS: Performed by: ANESTHESIOLOGY

## 2025-01-07 PROCEDURE — 6370000000 HC RX 637 (ALT 250 FOR IP): Performed by: PHYSICIAN ASSISTANT

## 2025-01-07 PROCEDURE — 3600000015 HC SURGERY LEVEL 5 ADDTL 15MIN: Performed by: ORTHOPAEDIC SURGERY

## 2025-01-07 PROCEDURE — 27447 TOTAL KNEE ARTHROPLASTY: CPT | Performed by: PHYSICIAN ASSISTANT

## 2025-01-07 PROCEDURE — 2500000003 HC RX 250 WO HCPCS: Performed by: PHYSICIAN ASSISTANT

## 2025-01-07 PROCEDURE — 2580000003 HC RX 258: Performed by: NURSE ANESTHETIST, CERTIFIED REGISTERED

## 2025-01-07 PROCEDURE — 7100000000 HC PACU RECOVERY - FIRST 15 MIN: Performed by: ORTHOPAEDIC SURGERY

## 2025-01-07 PROCEDURE — 2709999900 HC NON-CHARGEABLE SUPPLY: Performed by: ORTHOPAEDIC SURGERY

## 2025-01-07 PROCEDURE — 97161 PT EVAL LOW COMPLEX 20 MIN: CPT

## 2025-01-07 PROCEDURE — 2580000003 HC RX 258: Performed by: PHYSICIAN ASSISTANT

## 2025-01-07 PROCEDURE — 82962 GLUCOSE BLOOD TEST: CPT

## 2025-01-07 PROCEDURE — 97116 GAIT TRAINING THERAPY: CPT

## 2025-01-07 PROCEDURE — 3700000001 HC ADD 15 MINUTES (ANESTHESIA): Performed by: ORTHOPAEDIC SURGERY

## 2025-01-07 PROCEDURE — 2720000010 HC SURG SUPPLY STERILE: Performed by: ORTHOPAEDIC SURGERY

## 2025-01-07 PROCEDURE — 3700000000 HC ANESTHESIA ATTENDED CARE: Performed by: ORTHOPAEDIC SURGERY

## 2025-01-07 PROCEDURE — 20985 CPTR-ASST DIR MS PX: CPT | Performed by: ORTHOPAEDIC SURGERY

## 2025-01-07 PROCEDURE — 27447 TOTAL KNEE ARTHROPLASTY: CPT | Performed by: ORTHOPAEDIC SURGERY

## 2025-01-07 PROCEDURE — C1713 ANCHOR/SCREW BN/BN,TIS/BN: HCPCS | Performed by: ORTHOPAEDIC SURGERY

## 2025-01-07 PROCEDURE — 64447 NJX AA&/STRD FEMORAL NRV IMG: CPT | Performed by: STUDENT IN AN ORGANIZED HEALTH CARE EDUCATION/TRAINING PROGRAM

## 2025-01-07 PROCEDURE — 7100000001 HC PACU RECOVERY - ADDTL 15 MIN: Performed by: ORTHOPAEDIC SURGERY

## 2025-01-07 PROCEDURE — 3600000005 HC SURGERY LEVEL 5 BASE: Performed by: ORTHOPAEDIC SURGERY

## 2025-01-07 DEVICE — IMPL CAPPED KNEE K1 TOTAL/HEMI STD CEMENTED DJO: Type: IMPLANTABLE DEVICE | Site: KNEE | Status: FUNCTIONAL

## 2025-01-07 DEVICE — DOMED TRI-PEG PATELLA, 35X9MM, E-PLUS
Type: IMPLANTABLE DEVICE | Site: KNEE | Status: FUNCTIONAL
Brand: DJO SURGICAL

## 2025-01-07 DEVICE — DJO EMPOWR KNEETM, FIN BP, NP, 11L
Type: IMPLANTABLE DEVICE | Site: KNEE | Status: FUNCTIONAL
Brand: DJO SURGICAL

## 2025-01-07 DEVICE — CEMENT BNE MED VISC 80 GM GENTAMICIN PALACOS MV+G PRO: Type: IMPLANTABLE DEVICE | Site: KNEE | Status: FUNCTIONAL

## 2025-01-07 DEVICE — EMPOWR 3D KNEETM INS, 11L 12MM, VE
Type: IMPLANTABLE DEVICE | Site: KNEE | Status: FUNCTIONAL
Brand: DJO SURGICAL

## 2025-01-07 DEVICE — EMPOWR 3D KNEETM FEMUR, NP, 11L
Type: IMPLANTABLE DEVICE | Site: KNEE | Status: FUNCTIONAL
Brand: DJO SURGICAL

## 2025-01-07 RX ORDER — HYDROMORPHONE HYDROCHLORIDE 1 MG/ML
0.5 INJECTION, SOLUTION INTRAMUSCULAR; INTRAVENOUS; SUBCUTANEOUS EVERY 5 MIN PRN
Status: COMPLETED | OUTPATIENT
Start: 2025-01-07 | End: 2025-01-07

## 2025-01-07 RX ORDER — ACETAMINOPHEN 500 MG
500 TABLET ORAL
Status: DISCONTINUED | OUTPATIENT
Start: 2025-01-07 | End: 2025-01-08 | Stop reason: HOSPADM

## 2025-01-07 RX ORDER — SENNA AND DOCUSATE SODIUM 50; 8.6 MG/1; MG/1
1 TABLET, FILM COATED ORAL 2 TIMES DAILY
Status: DISCONTINUED | OUTPATIENT
Start: 2025-01-07 | End: 2025-01-08 | Stop reason: HOSPADM

## 2025-01-07 RX ORDER — SODIUM CHLORIDE 0.9 % (FLUSH) 0.9 %
5-40 SYRINGE (ML) INJECTION EVERY 12 HOURS SCHEDULED
Status: DISCONTINUED | OUTPATIENT
Start: 2025-01-07 | End: 2025-01-07 | Stop reason: HOSPADM

## 2025-01-07 RX ORDER — NALOXONE HYDROCHLORIDE 0.4 MG/ML
INJECTION, SOLUTION INTRAMUSCULAR; INTRAVENOUS; SUBCUTANEOUS PRN
Status: DISCONTINUED | OUTPATIENT
Start: 2025-01-07 | End: 2025-01-07 | Stop reason: HOSPADM

## 2025-01-07 RX ORDER — FENTANYL CITRATE 50 UG/ML
25 INJECTION, SOLUTION INTRAMUSCULAR; INTRAVENOUS PRN
Status: DISCONTINUED | OUTPATIENT
Start: 2025-01-07 | End: 2025-01-07 | Stop reason: HOSPADM

## 2025-01-07 RX ORDER — ATORVASTATIN CALCIUM 40 MG/1
40 TABLET, FILM COATED ORAL DAILY
Status: DISCONTINUED | OUTPATIENT
Start: 2025-01-08 | End: 2025-01-08 | Stop reason: HOSPADM

## 2025-01-07 RX ORDER — METOPROLOL SUCCINATE 25 MG/1
12.5 TABLET, EXTENDED RELEASE ORAL NIGHTLY
Status: DISCONTINUED | OUTPATIENT
Start: 2025-01-07 | End: 2025-01-08 | Stop reason: HOSPADM

## 2025-01-07 RX ORDER — FENTANYL CITRATE 50 UG/ML
50 INJECTION, SOLUTION INTRAMUSCULAR; INTRAVENOUS PRN
Status: DISCONTINUED | OUTPATIENT
Start: 2025-01-07 | End: 2025-01-07 | Stop reason: HOSPADM

## 2025-01-07 RX ORDER — SODIUM CHLORIDE 0.9 % (FLUSH) 0.9 %
5-40 SYRINGE (ML) INJECTION PRN
Status: DISCONTINUED | OUTPATIENT
Start: 2025-01-07 | End: 2025-01-07 | Stop reason: HOSPADM

## 2025-01-07 RX ORDER — SODIUM CHLORIDE, SODIUM LACTATE, POTASSIUM CHLORIDE, CALCIUM CHLORIDE 600; 310; 30; 20 MG/100ML; MG/100ML; MG/100ML; MG/100ML
INJECTION, SOLUTION INTRAVENOUS CONTINUOUS
Status: DISCONTINUED | OUTPATIENT
Start: 2025-01-07 | End: 2025-01-07 | Stop reason: HOSPADM

## 2025-01-07 RX ORDER — DEXAMETHASONE SODIUM PHOSPHATE 4 MG/ML
INJECTION, SOLUTION INTRA-ARTICULAR; INTRALESIONAL; INTRAMUSCULAR; INTRAVENOUS; SOFT TISSUE
Status: DISCONTINUED | OUTPATIENT
Start: 2025-01-07 | End: 2025-01-07 | Stop reason: SDUPTHER

## 2025-01-07 RX ORDER — KETOROLAC TROMETHAMINE 30 MG/ML
15 INJECTION, SOLUTION INTRAMUSCULAR; INTRAVENOUS EVERY 6 HOURS
Status: DISCONTINUED | OUTPATIENT
Start: 2025-01-07 | End: 2025-01-08 | Stop reason: HOSPADM

## 2025-01-07 RX ORDER — FENTANYL CITRATE 50 UG/ML
25 INJECTION, SOLUTION INTRAMUSCULAR; INTRAVENOUS EVERY 5 MIN PRN
Status: DISCONTINUED | OUTPATIENT
Start: 2025-01-07 | End: 2025-01-07 | Stop reason: HOSPADM

## 2025-01-07 RX ORDER — ACETAMINOPHEN 500 MG
1000 TABLET ORAL ONCE
Status: COMPLETED | OUTPATIENT
Start: 2025-01-07 | End: 2025-01-07

## 2025-01-07 RX ORDER — SODIUM CHLORIDE 0.9 % (FLUSH) 0.9 %
5-40 SYRINGE (ML) INJECTION PRN
Status: DISCONTINUED | OUTPATIENT
Start: 2025-01-07 | End: 2025-01-08 | Stop reason: HOSPADM

## 2025-01-07 RX ORDER — OXYCODONE HYDROCHLORIDE 5 MG/1
2.5 TABLET ORAL
Status: DISCONTINUED | OUTPATIENT
Start: 2025-01-07 | End: 2025-01-08 | Stop reason: HOSPADM

## 2025-01-07 RX ORDER — LABETALOL HYDROCHLORIDE 5 MG/ML
10 INJECTION, SOLUTION INTRAVENOUS
Status: DISCONTINUED | OUTPATIENT
Start: 2025-01-07 | End: 2025-01-07 | Stop reason: HOSPADM

## 2025-01-07 RX ORDER — 0.9 % SODIUM CHLORIDE 0.9 %
500 INTRAVENOUS SOLUTION INTRAVENOUS PRN
Status: DISCONTINUED | OUTPATIENT
Start: 2025-01-07 | End: 2025-01-08 | Stop reason: HOSPADM

## 2025-01-07 RX ORDER — LIDOCAINE HYDROCHLORIDE 20 MG/ML
INJECTION, SOLUTION EPIDURAL; INFILTRATION; INTRACAUDAL; PERINEURAL
Status: DISCONTINUED | OUTPATIENT
Start: 2025-01-07 | End: 2025-01-07 | Stop reason: SDUPTHER

## 2025-01-07 RX ORDER — ASPIRIN 81 MG/1
81 TABLET ORAL 2 TIMES DAILY
Status: DISCONTINUED | OUTPATIENT
Start: 2025-01-07 | End: 2025-01-08 | Stop reason: HOSPADM

## 2025-01-07 RX ORDER — DIPHENHYDRAMINE HYDROCHLORIDE 50 MG/ML
12.5 INJECTION INTRAMUSCULAR; INTRAVENOUS
Status: DISCONTINUED | OUTPATIENT
Start: 2025-01-07 | End: 2025-01-07 | Stop reason: HOSPADM

## 2025-01-07 RX ORDER — BISACODYL 10 MG
10 SUPPOSITORY, RECTAL RECTAL DAILY PRN
Status: DISCONTINUED | OUTPATIENT
Start: 2025-01-07 | End: 2025-01-08 | Stop reason: HOSPADM

## 2025-01-07 RX ORDER — ONDANSETRON 4 MG/1
4 TABLET, ORALLY DISINTEGRATING ORAL EVERY 8 HOURS PRN
Status: DISCONTINUED | OUTPATIENT
Start: 2025-01-07 | End: 2025-01-08 | Stop reason: HOSPADM

## 2025-01-07 RX ORDER — ROPIVACAINE HYDROCHLORIDE 5 MG/ML
30 INJECTION, SOLUTION EPIDURAL; INFILTRATION; PERINEURAL ONCE
Status: DISCONTINUED | OUTPATIENT
Start: 2025-01-07 | End: 2025-01-07 | Stop reason: HOSPADM

## 2025-01-07 RX ORDER — OXYCODONE HYDROCHLORIDE 5 MG/1
5 TABLET ORAL
Status: DISCONTINUED | OUTPATIENT
Start: 2025-01-07 | End: 2025-01-07 | Stop reason: HOSPADM

## 2025-01-07 RX ORDER — SODIUM CHLORIDE 0.9 % (FLUSH) 0.9 %
5-40 SYRINGE (ML) INJECTION EVERY 12 HOURS SCHEDULED
Status: DISCONTINUED | OUTPATIENT
Start: 2025-01-07 | End: 2025-01-08 | Stop reason: HOSPADM

## 2025-01-07 RX ORDER — PANTOPRAZOLE SODIUM 40 MG/1
40 TABLET, DELAYED RELEASE ORAL
Status: DISCONTINUED | OUTPATIENT
Start: 2025-01-08 | End: 2025-01-08 | Stop reason: HOSPADM

## 2025-01-07 RX ORDER — POTASSIUM CHLORIDE 750 MG/1
10 TABLET, EXTENDED RELEASE ORAL 2 TIMES DAILY
Status: DISCONTINUED | OUTPATIENT
Start: 2025-01-08 | End: 2025-01-08 | Stop reason: HOSPADM

## 2025-01-07 RX ORDER — PROCHLORPERAZINE EDISYLATE 5 MG/ML
5 INJECTION INTRAMUSCULAR; INTRAVENOUS
Status: DISCONTINUED | OUTPATIENT
Start: 2025-01-07 | End: 2025-01-07 | Stop reason: HOSPADM

## 2025-01-07 RX ORDER — SODIUM CHLORIDE 9 MG/ML
INJECTION, SOLUTION INTRAVENOUS PRN
Status: DISCONTINUED | OUTPATIENT
Start: 2025-01-07 | End: 2025-01-07 | Stop reason: HOSPADM

## 2025-01-07 RX ORDER — LIDOCAINE HYDROCHLORIDE 10 MG/ML
1 INJECTION, SOLUTION EPIDURAL; INFILTRATION; INTRACAUDAL; PERINEURAL
Status: DISCONTINUED | OUTPATIENT
Start: 2025-01-07 | End: 2025-01-07 | Stop reason: HOSPADM

## 2025-01-07 RX ORDER — ONDANSETRON 2 MG/ML
4 INJECTION INTRAMUSCULAR; INTRAVENOUS EVERY 6 HOURS PRN
Status: DISCONTINUED | OUTPATIENT
Start: 2025-01-07 | End: 2025-01-08 | Stop reason: HOSPADM

## 2025-01-07 RX ORDER — SODIUM CHLORIDE 9 MG/ML
INJECTION, SOLUTION INTRAVENOUS CONTINUOUS
Status: DISCONTINUED | OUTPATIENT
Start: 2025-01-07 | End: 2025-01-07 | Stop reason: HOSPADM

## 2025-01-07 RX ORDER — SODIUM CHLORIDE 9 MG/ML
INJECTION, SOLUTION INTRAVENOUS CONTINUOUS
Status: DISCONTINUED | OUTPATIENT
Start: 2025-01-07 | End: 2025-01-08 | Stop reason: HOSPADM

## 2025-01-07 RX ORDER — PROPOFOL 10 MG/ML
INJECTION, EMULSION INTRAVENOUS
Status: DISCONTINUED | OUTPATIENT
Start: 2025-01-07 | End: 2025-01-07 | Stop reason: SDUPTHER

## 2025-01-07 RX ORDER — ONDANSETRON 2 MG/ML
INJECTION INTRAMUSCULAR; INTRAVENOUS
Status: DISCONTINUED | OUTPATIENT
Start: 2025-01-07 | End: 2025-01-07 | Stop reason: SDUPTHER

## 2025-01-07 RX ORDER — MIDAZOLAM HYDROCHLORIDE 2 MG/2ML
2 INJECTION, SOLUTION INTRAMUSCULAR; INTRAVENOUS PRN
Status: DISCONTINUED | OUTPATIENT
Start: 2025-01-07 | End: 2025-01-07 | Stop reason: HOSPADM

## 2025-01-07 RX ORDER — PHENYLEPHRINE HCL IN 0.9% NACL 0.4MG/10ML
SYRINGE (ML) INTRAVENOUS
Status: DISCONTINUED | OUTPATIENT
Start: 2025-01-07 | End: 2025-01-07 | Stop reason: SDUPTHER

## 2025-01-07 RX ORDER — ONDANSETRON 2 MG/ML
4 INJECTION INTRAMUSCULAR; INTRAVENOUS
Status: DISCONTINUED | OUTPATIENT
Start: 2025-01-07 | End: 2025-01-07 | Stop reason: HOSPADM

## 2025-01-07 RX ORDER — ACETAMINOPHEN 325 MG/1
650 TABLET ORAL ONCE
Status: DISCONTINUED | OUTPATIENT
Start: 2025-01-07 | End: 2025-01-07 | Stop reason: HOSPADM

## 2025-01-07 RX ORDER — HYDROMORPHONE HYDROCHLORIDE 1 MG/ML
0.5 INJECTION, SOLUTION INTRAMUSCULAR; INTRAVENOUS; SUBCUTANEOUS EVERY 4 HOURS PRN
Status: DISCONTINUED | OUTPATIENT
Start: 2025-01-07 | End: 2025-01-08 | Stop reason: HOSPADM

## 2025-01-07 RX ORDER — OXYCODONE HYDROCHLORIDE 5 MG/1
5 TABLET ORAL
Status: DISCONTINUED | OUTPATIENT
Start: 2025-01-07 | End: 2025-01-08 | Stop reason: HOSPADM

## 2025-01-07 RX ORDER — HYDROXYZINE HYDROCHLORIDE 10 MG/1
10 TABLET, FILM COATED ORAL EVERY 8 HOURS PRN
Status: DISCONTINUED | OUTPATIENT
Start: 2025-01-07 | End: 2025-01-08 | Stop reason: HOSPADM

## 2025-01-07 RX ORDER — SODIUM CHLORIDE 9 MG/ML
INJECTION, SOLUTION INTRAVENOUS PRN
Status: DISCONTINUED | OUTPATIENT
Start: 2025-01-07 | End: 2025-01-08 | Stop reason: HOSPADM

## 2025-01-07 RX ORDER — MIDAZOLAM HYDROCHLORIDE 2 MG/2ML
2 INJECTION, SOLUTION INTRAMUSCULAR; INTRAVENOUS
Status: DISCONTINUED | OUTPATIENT
Start: 2025-01-07 | End: 2025-01-07 | Stop reason: HOSPADM

## 2025-01-07 RX ORDER — CELECOXIB 200 MG/1
200 CAPSULE ORAL ONCE
Status: COMPLETED | OUTPATIENT
Start: 2025-01-07 | End: 2025-01-07

## 2025-01-07 RX ORDER — PREGABALIN 25 MG/1
50 CAPSULE ORAL 3 TIMES DAILY
Status: DISCONTINUED | OUTPATIENT
Start: 2025-01-07 | End: 2025-01-08 | Stop reason: HOSPADM

## 2025-01-07 RX ORDER — EPHEDRINE SULFATE 50 MG/ML
INJECTION INTRAVENOUS
Status: DISCONTINUED | OUTPATIENT
Start: 2025-01-07 | End: 2025-01-07 | Stop reason: SDUPTHER

## 2025-01-07 RX ORDER — ROPIVACAINE HYDROCHLORIDE 5 MG/ML
INJECTION, SOLUTION EPIDURAL; INFILTRATION; PERINEURAL
Status: COMPLETED | OUTPATIENT
Start: 2025-01-07 | End: 2025-01-07

## 2025-01-07 RX ORDER — ALBUMIN HUMAN 50 G/1000ML
12.5 SOLUTION INTRAVENOUS ONCE
Status: COMPLETED | OUTPATIENT
Start: 2025-01-07 | End: 2025-01-07

## 2025-01-07 RX ORDER — TAMSULOSIN HYDROCHLORIDE 0.4 MG/1
0.4 CAPSULE ORAL DAILY
Status: DISCONTINUED | OUTPATIENT
Start: 2025-01-08 | End: 2025-01-08 | Stop reason: HOSPADM

## 2025-01-07 RX ADMIN — ALBUMIN (HUMAN) 12.5 G: 12.5 INJECTION, SOLUTION INTRAVENOUS at 14:50

## 2025-01-07 RX ADMIN — MEPIVACAINE HYDROCHLORIDE 52.5 MG: 15 INJECTION, SOLUTION EPIDURAL; INFILTRATION at 09:03

## 2025-01-07 RX ADMIN — ONDANSETRON 4 MG: 2 INJECTION INTRAMUSCULAR; INTRAVENOUS at 10:45

## 2025-01-07 RX ADMIN — MIDAZOLAM 1 MG: 1 INJECTION INTRAMUSCULAR; INTRAVENOUS at 08:41

## 2025-01-07 RX ADMIN — Medication 120 MCG: at 09:06

## 2025-01-07 RX ADMIN — HYDROMORPHONE HYDROCHLORIDE 0.5 MG: 1 INJECTION, SOLUTION INTRAMUSCULAR; INTRAVENOUS; SUBCUTANEOUS at 12:59

## 2025-01-07 RX ADMIN — ASPIRIN 81 MG: 81 TABLET, COATED ORAL at 20:38

## 2025-01-07 RX ADMIN — WATER 2000 MG: 1 INJECTION INTRAMUSCULAR; INTRAVENOUS; SUBCUTANEOUS at 17:05

## 2025-01-07 RX ADMIN — EPHEDRINE SULFATE 5 MG: 50 INJECTION INTRAVENOUS at 10:05

## 2025-01-07 RX ADMIN — LIDOCAINE HYDROCHLORIDE 40 MG: 20 INJECTION, SOLUTION EPIDURAL; INFILTRATION; INTRACAUDAL; PERINEURAL at 08:55

## 2025-01-07 RX ADMIN — Medication 120 MCG: at 09:02

## 2025-01-07 RX ADMIN — SODIUM CHLORIDE, POTASSIUM CHLORIDE, SODIUM LACTATE AND CALCIUM CHLORIDE: 600; 310; 30; 20 INJECTION, SOLUTION INTRAVENOUS at 08:21

## 2025-01-07 RX ADMIN — ACETAMINOPHEN 500 MG: 500 TABLET ORAL at 17:05

## 2025-01-07 RX ADMIN — EPHEDRINE SULFATE 10 MG: 50 INJECTION INTRAVENOUS at 11:20

## 2025-01-07 RX ADMIN — ACETAMINOPHEN 1000 MG: 500 TABLET ORAL at 08:11

## 2025-01-07 RX ADMIN — PROPOFOL 30 MG: 10 INJECTION, EMULSION INTRAVENOUS at 08:55

## 2025-01-07 RX ADMIN — WATER 2000 MG: 1 INJECTION INTRAMUSCULAR; INTRAVENOUS; SUBCUTANEOUS at 09:10

## 2025-01-07 RX ADMIN — EPHEDRINE SULFATE 5 MG: 50 INJECTION INTRAVENOUS at 09:33

## 2025-01-07 RX ADMIN — DEXAMETHASONE SODIUM PHOSPHATE 4 MG: 4 INJECTION INTRA-ARTICULAR; INTRALESIONAL; INTRAMUSCULAR; INTRAVENOUS; SOFT TISSUE at 10:45

## 2025-01-07 RX ADMIN — Medication 80 MCG: at 10:05

## 2025-01-07 RX ADMIN — CELECOXIB 200 MG: 200 CAPSULE ORAL at 08:11

## 2025-01-07 RX ADMIN — OXYCODONE 5 MG: 5 TABLET ORAL at 20:35

## 2025-01-07 RX ADMIN — PREGABALIN 50 MG: 25 CAPSULE ORAL at 20:38

## 2025-01-07 RX ADMIN — KETOROLAC TROMETHAMINE 15 MG: 30 INJECTION, SOLUTION INTRAMUSCULAR at 17:32

## 2025-01-07 RX ADMIN — SODIUM CHLORIDE, PRESERVATIVE FREE 10 ML: 5 INJECTION INTRAVENOUS at 20:40

## 2025-01-07 RX ADMIN — PHENYLEPHRINE HYDROCHLORIDE 50 MCG/MIN: 10 INJECTION INTRAVENOUS at 09:02

## 2025-01-07 RX ADMIN — ROPIVACAINE HYDROCHLORIDE 30 ML: 5 INJECTION, SOLUTION EPIDURAL; INFILTRATION; PERINEURAL at 08:45

## 2025-01-07 RX ADMIN — ACETAMINOPHEN 500 MG: 500 TABLET ORAL at 20:40

## 2025-01-07 RX ADMIN — TRANEXAMIC ACID 1000 MG: 100 INJECTION, SOLUTION INTRAVENOUS at 09:20

## 2025-01-07 RX ADMIN — PROPOFOL 30 MG: 10 INJECTION, EMULSION INTRAVENOUS at 08:58

## 2025-01-07 RX ADMIN — HYDROMORPHONE HYDROCHLORIDE 0.5 MG: 1 INJECTION, SOLUTION INTRAMUSCULAR; INTRAVENOUS; SUBCUTANEOUS at 12:17

## 2025-01-07 RX ADMIN — PROPOFOL 50 MCG/KG/MIN: 10 INJECTION, EMULSION INTRAVENOUS at 08:55

## 2025-01-07 RX ADMIN — FENTANYL CITRATE 50 MCG: 50 INJECTION INTRAMUSCULAR; INTRAVENOUS at 08:41

## 2025-01-07 RX ADMIN — SENNOSIDES AND DOCUSATE SODIUM 1 TABLET: 50; 8.6 TABLET ORAL at 20:38

## 2025-01-07 ASSESSMENT — PAIN DESCRIPTION - ORIENTATION
ORIENTATION: LEFT

## 2025-01-07 ASSESSMENT — PAIN DESCRIPTION - LOCATION
LOCATION: KNEE

## 2025-01-07 ASSESSMENT — ENCOUNTER SYMPTOMS: SHORTNESS OF BREATH: 1

## 2025-01-07 ASSESSMENT — PAIN - FUNCTIONAL ASSESSMENT
PAIN_FUNCTIONAL_ASSESSMENT: NONE - DENIES PAIN
PAIN_FUNCTIONAL_ASSESSMENT: ACTIVITIES ARE NOT PREVENTED

## 2025-01-07 ASSESSMENT — PAIN SCALES - GENERAL
PAINLEVEL_OUTOF10: 3
PAINLEVEL_OUTOF10: 6
PAINLEVEL_OUTOF10: 3

## 2025-01-07 ASSESSMENT — PAIN DESCRIPTION - DESCRIPTORS
DESCRIPTORS: ACHING
DESCRIPTORS: DISCOMFORT;ACHING
DESCRIPTORS: DISCOMFORT

## 2025-01-07 NOTE — DISCHARGE INSTRUCTIONS
Post-op Discharge Instructions Following Total Joint Replacement  Floyd Ghotra MD  [facility]  [phone]  Follow-up Office Visit  See Dr. Ghotra approximately 3-4 weeks from date of surgery. Call [phone] to make an appointment.  If you have any postop questions for Dr. Ghotra's clinical team, please call [phone].  Activity  Use your walker for ambulation.  Weight bearing as tolerated unless instructed otherwise by the physical therapist. Get up every hour you are awake and take a brief walk. Lengthen walking distance daily as your strength improves.  Continue using your walker until seen in the office for your first follow up visit.  Practice your exercises 3 times daily as instructed by the physical therapist. Ice for 20 minutes after exercising.  No driving until seen in the office for your first follow up visit.  Incision Care  The surgical dressing is waterproof and is to remain on your incision for 7 days. On the 7th day, carefully lift the edge of the dressing to break the adhesive seal and gently peel it off.  If your surgical dressing comes loose or falls off before the 7th day, replace it with a dry sterile gauze dressing and change this dressing daily. Once there is no drainage on the bandage, you mean leave the incision open to air.  You may take a shower with the surgical dressing in place. After you remove the surgical dressing on day 7, you may continue to shower and get your incision wet in the shower. Do not submerge your incision under water in a bathtub, hot tub, swimming pool, etc. until after you have been evaluated at your first office visit.  Medications  Blood Clot Prevention: Take medication as prescribed by your physician for 4 weeks postop.  Pain Management: Take pain medication as prescribed; wean yourself off of pain medication as your pain lessens. Take with food.  You make also take Tylenol every 4-6 hours as needed for pain.  Do not exceed 3 grams (3000mg) per

## 2025-01-07 NOTE — FLOWSHEET NOTE
01/07/25 0927   Family Communication    Relationship to Patient Other (Comment)    Phone Number Marleny [phone]   Family/Significant Other Update Called   Delivery Origin Nurse   Message Disposition Family present - message delivered   Update Given Yes   Family Communication   Family Update Message Procedure started

## 2025-01-07 NOTE — PLAN OF CARE
Problem: Chronic Conditions and Co-morbidities  Goal: Patient's chronic conditions and co-morbidity symptoms are monitored and maintained or improved  1/7/2025 1632 by Yaneth Luque, RN  Outcome: Progressing     Problem: Safety - Adult  Goal: Free from fall injury  1/7/2025 1632 by Yaneth Luque, RN  Outcome: Progressing     Problem: Pain  Goal: Verbalizes/displays adequate comfort level or baseline comfort level  Outcome: Progressing

## 2025-01-07 NOTE — PROGRESS NOTES
Ortho NP Note    POD# 0  s/p LEFT TOTAL KNEE ARTHROPLASTY (SPINAL WITH BLOCK)   Pt seen with caregiver at bedside.    Pt assisted from stretcher to ortho bed. Awake and alert.   Currently denies postop knee pain, made aware of prn oxycodone   Tolerating clears. No nausea.   Postop plan reviewed - No complaints/concerns. Motivated to work with PT this afternoon.   Hx Afib, CHF, EF 30-35% - IV fluids decreased to 75ml. Denies CP/SOB/palpitations     VSS Afebrile.  BP at baseline, asymptomatic.     Visit Vitals  BP 99/60   Pulse 74   Temp 98 °F (36.7 °C) (Oral)   Resp 16   SpO2 97%       Voiding status: due to void          Labs    Lab Results   Component Value Date/Time    HGB 11.8 12/27/2024 03:10 PM      Lab Results   Component Value Date/Time    INR 1.0 12/27/2024 03:10 PM      Lab Results   Component Value Date/Time     12/27/2024 03:10 PM    K 4.4 12/27/2024 03:10 PM     12/27/2024 03:10 PM    CO2 28 12/27/2024 03:10 PM    BUN 22 12/27/2024 03:10 PM     Recent Glucose Results:   Glucose   Date Value Ref Range Status   12/27/2024 79 65 - 100 mg/dL Final   04/24/2024 80 65 - 100 mg/dL Final   05/11/2023 93 65 - 100 mg/dL Final           There is no height or weight on file to calculate BMI. : A BMI > 30 is classified as obesity and > 40 is classified as morbid obesity.       Ace wrap dressing c.d.i  Cryotherapy in place over incision  Calves soft and supple; No pain with passive stretch  Sensation and motor intact. +PF/DF/EHL intact 5/5, +pp  SCDs for mechanical DVT proph while in bed     PLAN:  1) PT BID - WBAT  2) Aspirin 81 mg PO BID for DVT Prophylaxis. Encouraged early mobilization, bed exercises, and SCD use.  3) Pain control - scheduled tylenol  and toradol, and prn  oxycodone  .  4) Post op care - Continue bowel regimen, encouraged IS. Straight cath per protocol. Dressing to remain in place x 7 day unless integrity is lost.    5) Readniess for discharge:     [x] Vital Signs stable    [] Hgb  stable    [] + Voiding    [x] Wound intact, drainage minimal    [x] Tolerating PO intake     [] Cleared by PT (OT if applicable)     [] Stair training completed (if applicable)    [] Independent / Contact Guard Assist (household distance)     [] Bed mobility     [] Car transfers     [] ADL’s    [x] Adequate pain control on oral medication alone     Plans to return home with HH and caregiver's support.     Isabel Escamilla, APRN - NP

## 2025-01-07 NOTE — ANESTHESIA PROCEDURE NOTES
Spinal Block    Patient location during procedure: OR  End time: 1/7/2025 9:03 AM  Reason for block: primary anesthetic  Staffing  Performed: anesthesiologist   Anesthesiologist: Floyd Cash MD  Performed by: Floyd Cash MD  Authorized by: Floyd Cash MD    Spinal Block  Patient position: sitting  Prep: Betadine and DuraPrep  Patient monitoring: cardiac monitor, continuous pulse ox, frequent blood pressure checks and oxygen  Approach: midline  Location: L4/L5  Provider prep: mask and sterile gloves  Needle  Needle type: pencil-tip   Needle gauge: 24 G  Needle length: 4 in  Assessment  Sensory level: T4  Events: None  Swirl obtained: Yes  CSF: clear  Attempts: 2  Hemodynamics: stable  Preanesthetic Checklist  Completed: patient identified, IV checked, site marked, risks and benefits discussed, surgical/procedural consents, equipment checked, pre-op evaluation, timeout performed, anesthesia consent given, oxygen available, monitors applied/VS acknowledged and fire risk safety assessment completed and verbalized

## 2025-01-07 NOTE — ANESTHESIA PROCEDURE NOTES
Peripheral Block    Patient location during procedure: pre-op  Reason for block: post-op pain management and at surgeon's request  Start time: 1/7/2025 8:40 AM  End time: 1/7/2025 8:45 AM  Staffing  Performed: anesthesiologist   Anesthesiologist: Floyd Cash MD  Performed by: Floyd Cash MD  Authorized by: Floyd Cash MD    Preanesthetic Checklist  Completed: patient identified, IV checked, site marked, risks and benefits discussed, surgical/procedural consents, equipment checked, pre-op evaluation, timeout performed, anesthesia consent given, oxygen available, monitors applied/VS acknowledged and fire risk safety assessment completed and verbalized  Peripheral Block   Patient position: supine  Prep: ChloraPrep  Provider prep: mask and sterile gloves  Patient monitoring: cardiac monitor, continuous pulse ox, frequent blood pressure checks, IV access and oxygen  Block type: Saphenous  Laterality: left  Injection technique: single-shot  Guidance: ultrasound guided  Local infiltration: ropivacaine  Infiltration strength: 0.5 %  Local infiltration: ropivacaineDose: 30 mL    Needle   Needle type: insulated echogenic nerve stimulator needle   Needle gauge: 21 G  Needle localization: anatomical landmarks and ultrasound guidance  Needle length: 10 cm  Assessment   Injection assessment: negative aspiration for heme, no paresthesia on injection, local visualized surrounding nerve on ultrasound and no intravascular symptoms  Paresthesia pain: none  Slow fractionated injection: yes  Hemodynamics: stable  Outcomes: uncomplicated and patient tolerated procedure well    Medications Administered  ropivacaine (NAROPIN) injection 0.5% - Perineural   30 mL - 1/7/2025 8:45:00 AM

## 2025-01-07 NOTE — BRIEF OP NOTE
Brief Postoperative Note      Patient: Manuel Valdez  YOB: 1945  MRN: [id number]    Date of Procedure: 1/7/2025    Pre-Op Diagnosis Codes:      * Osteoarthritis of left knee [M17.12]    Post-Op Diagnosis: Same       Procedure(s):  LEFT TOTAL KNEE ARTHROPLASTY (SPINAL WITH BLOCK)    Surgeon(s):  Floyd Ghotra MD    Assistant:  Surgical Assistant: Christen Gotti  Physician Assistant: Sasha Reaves PA-C    Anesthesia: Spinal    Estimated Blood Loss (mL): 200     Complications: None    Specimens:   * No specimens in log *    Implants:  Implant Name Type Inv. Item Serial No.  Lot No. LRB No. Used Action   CEMENT BNE MED VISC 80 GM GENTAMICIN PALACOS MV+G PRO - SN/A  CEMENT BNE MED VISC 80 GM GENTAMICIN PALACOS MV+G PRO N/A [facility] [id number] Left 1 Implanted   COMPONENT FEM SZ 11 LT KNEE NP EMPOWR 3D - SN/A  COMPONENT FEM SZ 11 LT KNEE NP EMPOWR 3D N/A [facility] [id number] Left 1 Implanted   COMPONENT PATELLAR 3 PEG 35X9 MM KNEE DOMED POLYETH E-PLUS - SN/A  COMPONENT PATELLAR 3 PEG 35X9 MM KNEE DOMED POLYETH E-PLUS N/A [employer] [facility] [id number] Left 1 Implanted   INSERT TIB SZ 11 [id number] LT KNEE EMPOWR 3D E + - SN/A  INSERT TIB SZ 11 [id number] LT KNEE EMPOWR 3D E + N/A [employer] [facility] [id number] Left 1 Implanted   BASEPLATE TIB SZ 11 LT KNEE NP STEMMABLE EMPOWR - SN/A  BASEPLATE TIB SZ 11 LT KNEE NP STEMMABLE EMPOWR N/A [facility] [id number] Left 1 Implanted         Drains: * No LDAs found *    Findings:  Infection Present At Time Of Surgery (PATOS) (choose all levels that have infection present):  No infection present  Other Findings: left knee oa    Electronically signed by Sasha Reaves PA-C on 1/7/2025 at 10:13 AM

## 2025-01-07 NOTE — ANESTHESIA PRE PROCEDURE
Department of Anesthesiology  Preprocedure Note       Name:  Manuel Valdez   Age:  79 y.o.  :  1945                                          MRN:  [id number]         Date:  2025      Surgeon: Surgeon(s):  Floyd Ghotra MD    Procedure: Procedure(s):  LEFT TOTAL KNEE ARTHROPLASTY (SPINAL WITH BLOCK)    Medications prior to admission:   Prior to Admission medications    Medication Sig Start Date End Date Taking? Authorizing Provider   Calcium-Magnesium (MARIAM-MAG PO) Take 1 tablet by mouth in the morning and at bedtime   Yes Judd Ladd MD   Melatonin-Pyridoxine (MELATONIN CR PO) Take by mouth as needed   Yes Judd Ladd MD   docusate sodium (COLACE) 100 MG capsule Take 1 capsule by mouth daily   Yes Judd Ladd MD   OXYCODONE HCL PO Take 0.5 tablets by mouth in the morning and at bedtime Max Daily Amount: 1 tablet   Yes Judd Ladd MD   fluocinolone (DERMA-SMOOTHE) 0.01 % OIL external oil Apply to scalp bid prn 24  Yes Marlene Gomez MD   tamsulosin (FLOMAX) 0.4 MG capsule TAKE 1 CAPSULE TWICE DAILY AFTER MEALS 10/30/23  Yes JAMIE Dale MD   potassium chloride (KLOR-CON M) 20 MEQ extended release tablet Take 0.5 tablets by mouth 2 times daily 12  Yes Judd Ladd MD   omega-3 acid ethyl esters (LOVAZA) 1 g capsule Take 2 capsules by mouth 2 times daily   Yes Judd Ladd MD   metoprolol succinate (TOPROL XL) 25 MG extended release tablet Take 0.5 tablets by mouth daily  Patient taking differently: Take 0.5 tablets by mouth at bedtime 10/24/23  Yes Woodrow Ortega III, DO   aspirin 81 MG EC tablet Take 1 tablet by mouth daily   Yes Judd Ladd MD   cetirizine (ZYRTEC) 10 MG tablet Take 1 tablet by mouth daily   Yes Judd Ladd MD   hydrocortisone 0.5 % cream Apply topically as needed Apply topically 2 times daily.   Yes Judd Ladd MD   torsemide (DEMADEX) 10 MG tablet Take 1 tablet by mouth

## 2025-01-08 VITALS
DIASTOLIC BLOOD PRESSURE: 64 MMHG | OXYGEN SATURATION: 97 % | TEMPERATURE: 97.4 F | HEART RATE: 75 BPM | RESPIRATION RATE: 16 BRPM | SYSTOLIC BLOOD PRESSURE: 103 MMHG

## 2025-01-08 LAB
ANION GAP SERPL CALC-SCNC: 6 MMOL/L (ref 2–12)
BUN SERPL-MCNC: 22 MG/DL (ref 6–20)
BUN/CREAT SERPL: 23 (ref 12–20)
CALCIUM SERPL-MCNC: 8.3 MG/DL (ref 8.5–10.1)
CHLORIDE SERPL-SCNC: 104 MMOL/L (ref 97–108)
CO2 SERPL-SCNC: 25 MMOL/L (ref 21–32)
CREAT SERPL-MCNC: 0.94 MG/DL (ref 0.7–1.3)
GLUCOSE SERPL-MCNC: 93 MG/DL (ref 65–100)
HCT VFR BLD AUTO: 26.1 % (ref 36.6–50.3)
HGB BLD-MCNC: 8.8 G/DL (ref 12.1–17)
POTASSIUM SERPL-SCNC: 4.5 MMOL/L (ref 3.5–5.1)
SODIUM SERPL-SCNC: 135 MMOL/L (ref 136–145)

## 2025-01-08 PROCEDURE — G0378 HOSPITAL OBSERVATION PER HR: HCPCS

## 2025-01-08 PROCEDURE — 85018 HEMOGLOBIN: CPT

## 2025-01-08 PROCEDURE — 96376 TX/PRO/DX INJ SAME DRUG ADON: CPT

## 2025-01-08 PROCEDURE — 2500000003 HC RX 250 WO HCPCS: Performed by: PHYSICIAN ASSISTANT

## 2025-01-08 PROCEDURE — APPNB60 APP NON BILLABLE TIME 46-60 MINS: Performed by: NURSE PRACTITIONER

## 2025-01-08 PROCEDURE — 97116 GAIT TRAINING THERAPY: CPT

## 2025-01-08 PROCEDURE — 80048 BASIC METABOLIC PNL TOTAL CA: CPT

## 2025-01-08 PROCEDURE — 85014 HEMATOCRIT: CPT

## 2025-01-08 PROCEDURE — 97530 THERAPEUTIC ACTIVITIES: CPT

## 2025-01-08 PROCEDURE — 6360000002 HC RX W HCPCS: Performed by: PHYSICIAN ASSISTANT

## 2025-01-08 PROCEDURE — 96374 THER/PROPH/DIAG INJ IV PUSH: CPT

## 2025-01-08 PROCEDURE — 97110 THERAPEUTIC EXERCISES: CPT

## 2025-01-08 PROCEDURE — 6370000000 HC RX 637 (ALT 250 FOR IP): Performed by: PHYSICIAN ASSISTANT

## 2025-01-08 RX ORDER — ASPIRIN 81 MG/1
81 TABLET ORAL 2 TIMES DAILY
Qty: 60 TABLET | Refills: 0 | Status: SHIPPED | OUTPATIENT
Start: 2025-01-08 | End: 2025-02-07

## 2025-01-08 RX ORDER — SENNA AND DOCUSATE SODIUM 50; 8.6 MG/1; MG/1
1 TABLET, FILM COATED ORAL 2 TIMES DAILY
Qty: 60 TABLET | Refills: 0 | Status: SHIPPED | OUTPATIENT
Start: 2025-01-08

## 2025-01-08 RX ORDER — OXYCODONE HYDROCHLORIDE 5 MG/1
2.5-5 TABLET ORAL EVERY 4 HOURS PRN
Qty: 42 TABLET | Refills: 0 | Status: SHIPPED | OUTPATIENT
Start: 2025-01-08 | End: 2025-01-14 | Stop reason: ALTCHOICE

## 2025-01-08 RX ADMIN — PANTOPRAZOLE SODIUM 40 MG: 40 TABLET, DELAYED RELEASE ORAL at 07:25

## 2025-01-08 RX ADMIN — WATER 2000 MG: 1 INJECTION INTRAMUSCULAR; INTRAVENOUS; SUBCUTANEOUS at 00:52

## 2025-01-08 RX ADMIN — ATORVASTATIN CALCIUM 40 MG: 40 TABLET, FILM COATED ORAL at 09:03

## 2025-01-08 RX ADMIN — OXYCODONE 5 MG: 5 TABLET ORAL at 14:20

## 2025-01-08 RX ADMIN — TAMSULOSIN HYDROCHLORIDE 0.4 MG: 0.4 CAPSULE ORAL at 09:03

## 2025-01-08 RX ADMIN — OXYCODONE 5 MG: 5 TABLET ORAL at 07:25

## 2025-01-08 RX ADMIN — ASPIRIN 81 MG: 81 TABLET, COATED ORAL at 09:03

## 2025-01-08 RX ADMIN — POTASSIUM CHLORIDE 10 MEQ: 750 TABLET, EXTENDED RELEASE ORAL at 09:03

## 2025-01-08 RX ADMIN — KETOROLAC TROMETHAMINE 15 MG: 30 INJECTION, SOLUTION INTRAMUSCULAR at 07:24

## 2025-01-08 RX ADMIN — KETOROLAC TROMETHAMINE 15 MG: 30 INJECTION, SOLUTION INTRAMUSCULAR at 00:52

## 2025-01-08 RX ADMIN — ACETAMINOPHEN 500 MG: 500 TABLET ORAL at 09:06

## 2025-01-08 RX ADMIN — SENNOSIDES AND DOCUSATE SODIUM 1 TABLET: 50; 8.6 TABLET ORAL at 09:03

## 2025-01-08 RX ADMIN — PREGABALIN 50 MG: 25 CAPSULE ORAL at 09:03

## 2025-01-08 RX ADMIN — ACETAMINOPHEN 500 MG: 500 TABLET ORAL at 07:43

## 2025-01-08 ASSESSMENT — PAIN - FUNCTIONAL ASSESSMENT: PAIN_FUNCTIONAL_ASSESSMENT: ACTIVITIES ARE NOT PREVENTED

## 2025-01-08 ASSESSMENT — PAIN SCALES - GENERAL
PAINLEVEL_OUTOF10: 5
PAINLEVEL_OUTOF10: 6
PAINLEVEL_OUTOF10: 6
PAINLEVEL_OUTOF10: 5

## 2025-01-08 ASSESSMENT — PAIN DESCRIPTION - DESCRIPTORS: DESCRIPTORS: ACHING

## 2025-01-08 ASSESSMENT — PAIN DESCRIPTION - ORIENTATION: ORIENTATION: LEFT

## 2025-01-08 ASSESSMENT — PAIN SCALES - WONG BAKER
WONGBAKER_NUMERICALRESPONSE: NO HURT
WONGBAKER_NUMERICALRESPONSE: NO HURT

## 2025-01-08 NOTE — PROGRESS NOTES
Ortho NP Note    POD# 1  s/p LEFT TOTAL KNEE ARTHROPLASTY (SPINAL WITH BLOCK)   Pt seen with caregiver at bedside.    Pt sitting in recliner chair comfortably.   Reports ambulating in room this morning, pain tolerable with oxycodone   Very motivated to work with PT and return home today.   Tolerating regular diet. No nausea.   Postop plan reviewed - No complaints/concerns.  Hx Afib, CHF, EF 30-35% - Denies CP/SOB/palpitations     VSS Afebrile.  BP at baseline, asymptomatic.     Visit Vitals  /64   Pulse 75   Temp 97.4 °F (36.3 °C) (Oral)   Resp 16   SpO2 97%       Voiding status: voiding          Labs    Lab Results   Component Value Date/Time    HGB 8.8 01/08/2025 02:08 AM      Lab Results   Component Value Date/Time    INR 1.0 12/27/2024 03:10 PM      Lab Results   Component Value Date/Time     01/08/2025 02:08 AM    K 4.5 01/08/2025 02:08 AM     01/08/2025 02:08 AM    CO2 25 01/08/2025 02:08 AM    BUN 22 01/08/2025 02:08 AM     Recent Glucose Results:   Glucose   Date Value Ref Range Status   01/08/2025 93 65 - 100 mg/dL Final   12/27/2024 79 65 - 100 mg/dL Final   04/24/2024 80 65 - 100 mg/dL Final           There is no height or weight on file to calculate BMI. : A BMI > 30 is classified as obesity and > 40 is classified as morbid obesity.       Ace wrap removed. Dressing c.d.I - scant bruising   Cryotherapy in place over incision  Calves soft and supple; No pain with passive stretch  Sensation and motor intact. +PF/DF/EHL intact 5/5, +pp  SCDs for mechanical DVT proph while in bed     PLAN:  1) PT BID - WBAT  2) Aspirin 81 mg PO BID for DVT Prophylaxis. Encouraged early mobilization, bed exercises, and SCD use.  3) Pain control - scheduled tylenol  and toradol, and prn  oxycodone  .  4) Post op care - Continue bowel regimen, encouraged IS. Straight cath per protocol. Dressing to remain in place x 7 day unless integrity is lost.    5) Readniess for discharge:     [x] Vital Signs stable    [x]  Hgb stable    [x] + Voiding    [x] Wound intact, drainage minimal    [x] Tolerating PO intake     [] Cleared by PT (OT if applicable)     [] Stair training completed (if applicable)    [] Independent / Contact Guard Assist (household distance)     [] Bed mobility     [] Car transfers     [] ADL’s    [x] Adequate pain control on oral medication alone     Discharge home today with HH and caregiver's support.     Isabel Escamilla, RIGO - NP

## 2025-01-08 NOTE — PLAN OF CARE
Problem: Physical Therapy - Adult  Goal: By Discharge: Performs mobility at highest level of function for planned discharge setting.  See evaluation for individualized goals.  Description: FUNCTIONAL STATUS PRIOR TO ADMISSION: Patient was independent and active without use of DME.    HOME SUPPORT PRIOR TO ADMISSION: The patient lived with wife but did not require assistance.    Physical Therapy Goals  Initiated 1/7/2025  1.  Patient will move from supine to sit and sit to supine and scoot up and down in bed with modified independence within 4 day(s).    2.  Patient will perform sit to stand with modified independence within 4 day(s).  3.  Patient will transfer from bed to chair and chair to bed with modified independence using the least restrictive device within 4 day(s).  4.  Patient will ambulate with modified independence for > 150 feet with the least restrictive device within 4 day(s).   5.  Patient will ascend/descend 4 stairs with one handrail(s) with supervision within 4 day(s).  6. Patient will perform TKR home exercise program per protocol with supervision/set-up within 4 days.  7. Patient will demonstrate AROM 0-90 degrees in operative joint within 4 days.     PHYSICAL THERAPY EVALUATION    Patient: Manuel Valdez (79 y.o. male)  Date: 1/7/2025  Primary Diagnosis: Osteoarthritis of left knee [M17.12]  Primary osteoarthritis of left knee [M17.12]  Procedure(s) (LRB):  LEFT TOTAL KNEE ARTHROPLASTY (SPINAL WITH BLOCK) (Left) Day of Surgery   Precautions: Restrictions/Precautions: Weight Bearing, Fall Risk   Lower Extremity Weight Bearing Restrictions  Left Lower Extremity Weight Bearing: Weight Bearing As Tolerated                  ASSESSMENT :   DEFICITS/IMPAIRMENTS:   The patient presents POD # 0 left TKR with pain left knee, decreased AROM/strength and function left leg, decreased activity tolerance, decline in functional mobility and impaired standing balance/gait with RW.  Pt mobilized easily with no  shortened;Left shortened  Swing Pattern: Left asymmetrical  Stance: Left decreased  Gait Abnormalities: Decreased step clearance                                                                                                                                                                                                                                                     Intervention/Education specific to: \"knee replacement\"    Education provided to avoid resting in external rotation or knee flexion while in bed. Discussed avoidance of resting with a pillow under the knee but that a pillow from calf to heel is acceptable for short periods if it supports knee extension. Encouraged use of cryo therapy to aide in pain and edema control.              Therapeutic Exercises:   Pt instructed and performed ankle pumps and demonstrated proper use of incentive spirometer - to be performed x 10 reps once hr when awake.  Pt instructed and performed quad sets, hamstring sets and heel slides - to be performed 3 - 5 reps once hr when awake as tolerated.  Written instructions provided on pt's communication board.     [employer] AM-PAC®      Basic Mobility Inpatient Short Form (6-Clicks) Version 2  How much HELP from another person do you currently need... (If the patient hasn't done an activity recently, how much help from another person do you think they would need if they tried?) Total A Lot A Little None   1.  Turning from your back to your side while in a flat bed without using bedrails? []  1 []  2 []  3  [x]  4   2.  Moving from lying on your back to sitting on the side of a flat bed without using bedrails? []  1 []  2 []  3  [x]  4   3.  Moving to and from a bed to a chair (including a wheelchair)? []  1 []  2 [x]  3  []  4   4. Standing up from a chair using your arms (e.g. wheelchair or bedside chair)? []  1 []  2 []  3  [x]  4   5.  Walking in hospital room? []  1 []  2 [x]  3  []  4   6.  Climbing 3-5 steps with a

## 2025-01-08 NOTE — PROGRESS NOTES
PHYSICAL THERAPY TREATMENT/DISCHARGE    Patient: Manuel Valdez (79 y.o. male)  Date: 1/8/2025  Diagnosis: Osteoarthritis of left knee [M17.12]  Primary osteoarthritis of left knee [M17.12] Osteoarthritis of left knee  Procedure(s) (LRB):  LEFT TOTAL KNEE ARTHROPLASTY (SPINAL WITH BLOCK) (Left) 1 Day Post-Op  Precautions:  (falls, LLE WBAT)   Left Lower Extremity Weight Bearing: Weight Bearing As Tolerated                  ASSESSMENT:    Pt tolerated PT services well and has essentially met PT POC goals. Pt received in bed and amenable to therapy services with high motivation for attempted OOB/GT activity and (hopeful) dc to home. Pt's spouse also present. Both confirm plan to stay at one of their other homes with 3 steps to enter, but no issue with water availability. Excellent effort t/o with most tasks performed with sup/sba including multiple functional sit <-> stand transfers, unit ambulation, stairs ascent/descent x 4 (x2 reps). Pt indicated preference for forward step to gait and practiced via R/L HR and also single HR and cane. Good foot clearance, safety and activity tolerance. Pt/spousal education provided re gait belt use to advance limb during bed mobility and also to provide additional assist with therex. Carryover x 100% and gait belt issued for dc to home. Pt education provided re clearance and pending dc from [facility] PT services. Both agreeable to the same and confirm readiness to go home, when able. Pt seated in bedside chair with all needs in reach and receiving RN Team care and tray set up assist when therapist departed.    Accordingly, will dc [facility] PT services and randi \"complete\".       PLAN:  Maximum therapeutic benefit has been met at current level of care and patient will be discharged from physical therapy at this time.    Rationale for discharge:  Goals achieved    Recommendation for discharge: (in order for the patient to meet his/her long term goals):   Intermittent physical therapy up to

## 2025-01-08 NOTE — DISCHARGE SUMMARY
Patient discharged, wanted to wait until lunch is finished before leaving.      DISCHARGE SUMMARY from Nurse    PATIENT INSTRUCTIONS:    After general anesthesia or intravenous sedation, for 24 hours or while taking prescription Narcotics:  Limit your activities  Do not drive and operate hazardous machinery  Do not make important personal or business decisions  Do  not drink alcoholic beverages  If you have not urinated within 8 hours after discharge, please contact your surgeon on call.    Report the following to your surgeon:  Excessive pain, swelling, redness or odor of or around the surgical area  Temperature over 100.5  Nausea and vomiting lasting longer than 4 hours or if unable to take medications  Any signs of decreased circulation or nerve impairment to extremity: change in color, persistent  numbness, tingling, coldness or increase pain  Any questions    What to do at Home:  Recommended activity: as listed per AVS packet    If you experience any of the following symptoms; as listed per AVS packet    *  Please give a list of your current medications to your Primary Care Provider.    *  Please update this list whenever your medications are discontinued, doses are      changed, or new medications (including over-the-counter products) are added.    *  Please carry medication information at all times in case of emergency situations.    These are general instructions for a healthy lifestyle:    No smoking/ No tobacco products/ Avoid exposure to second hand smoke  Surgeon General's Warning:  Quitting smoking now greatly reduces serious risk to your health.    Obesity, smoking, and sedentary lifestyle greatly increases your risk for illness    A healthy diet, regular physical exercise & weight monitoring are important for maintaining a healthy lifestyle    You may be retaining fluid if you have a history of heart failure or if you experience any of the following symptoms:  Weight gain of 3 pounds or more overnight

## 2025-01-08 NOTE — DISCHARGE SUMMARY
DISCHARGE SUMMARY from Nurse    PATIENT INSTRUCTIONS:    After general anesthesia or intravenous sedation, for 24 hours or while taking prescription Narcotics:  Limit your activities  Do not drive and operate hazardous machinery  Do not make important personal or business decisions  Do  not drink alcoholic beverages  If you have not urinated within 8 hours after discharge, please contact your surgeon on call.    Report the following to your surgeon:  Excessive pain, swelling, redness or odor of or around the surgical area  Temperature over 100.5  Nausea and vomiting lasting longer than 4 hours or if unable to take medications  Any signs of decreased circulation or nerve impairment to extremity: change in color, persistent  numbness, tingling, coldness or increase pain  Any questions    What to do at Home:  Recommended activity: as per AVS packet    If you experience any of the following symptoms;  as per AVS packet    *  Please give a list of your current medications to your Primary Care Provider.    *  Please update this list whenever your medications are discontinued, doses are      changed, or new medications (including over-the-counter products) are added.    *  Please carry medication information at all times in case of emergency situations.    These are general instructions for a healthy lifestyle:    No smoking/ No tobacco products/ Avoid exposure to second hand smoke  Surgeon General's Warning:  Quitting smoking now greatly reduces serious risk to your health.    Obesity, smoking, and sedentary lifestyle greatly increases your risk for illness    A healthy diet, regular physical exercise & weight monitoring are important for maintaining a healthy lifestyle    You may be retaining fluid if you have a history of heart failure or if you experience any of the following symptoms:  Weight gain of 3 pounds or more overnight or 5 pounds in a week, increased swelling in our hands or feet or shortness of breath

## 2025-01-08 NOTE — OP NOTE
[address]  [address]                            OPERATIVE REPORT      PATIENT NAME: CHAY VALENCIA                : 1945  MED REC NO: [id number]                       ROOM: [facility]  ACCOUNT NO: [id number]                       ADMIT DATE: 2025  PROVIDER: Floyd Ghotra MD    DATE OF SERVICE:  2025    PREOPERATIVE DIAGNOSES:  Osteoarthritis, left knee.    POSTOPERATIVE DIAGNOSES:  Osteoarthritis, left knee.    PROCEDURES PERFORMED:  Left total knee arthroplasty.    SURGEON:  Floyd Ghotra MD    ASSISTANT:  Sasha Reaves PA-C.    ANESTHESIA:  Spinal with sedation as well as adductor canal block.    ESTIMATED BLOOD LOSS:  200 mL.    SPECIMENS REMOVED:  None.     COMPLICATIONS:  None.    IMPLANTS:  [employer] 3D size 11 femur, size 11 tibial tray with 12 mm polyethylene insert and 35 mm patella.    INDICATIONS:  The patient is an 79-year-old gentleman with progressive left knee pain due to severe osteoarthritis.  Symptoms have progressed despite comprehensive conservative treatment and presents for left total knee replacement.  Risks, benefits, alternatives of procedure were reviewed with him in detail and he desires to proceed.  He understands increased risk for perioperative medical complications due to significant cardiac history.    DESCRIPTION OF PROCEDURE:  Anesthesia team performed an adductor canal block in the left thigh before taking the patient to the operating room, they also placed a spinal.  Preoperative IV antibiotics were administered.  A padded pneumatic tourniquet was placed around the left upper thigh.  Left lower extremity was prepped and draped in usual sterile fashion.  Tourniquet was inflated to 275.  Through a midline anterior knee incision, I performed a medial parapatellar arthrotomy.  Due to the valgus nature of the arthritis, no medial release was performed.  As soon as the knee was flexed, the

## 2025-01-08 NOTE — ANESTHESIA POSTPROCEDURE EVALUATION
Department of Anesthesiology  Postprocedure Note    Patient: Manuel Valdez  MRN: [id number]  YOB: 1945  Date of evaluation: 1/8/2025    Procedure Summary       Date: 01/07/25 Room / Location: [facility] MAIN OR [address] [place] [address] MAIN OR    Anesthesia Start: 0844 Anesthesia Stop: 1121    Procedure: LEFT TOTAL KNEE ARTHROPLASTY (SPINAL WITH BLOCK) (Left: Knee) Diagnosis:       Osteoarthritis of left knee      (Osteoarthritis of left knee [M17.12])    Providers: Floyd Ghotra MD Responsible Provider: Sasha Burnett MD    Anesthesia Type: Regional, MAC, Spinal ASA Status: 3            Anesthesia Type: Regional, MAC, Spinal    Marily Phase I: Marily Score: 9    Marily Phase II:      Anesthesia Post Evaluation    No notable events documented.

## 2025-01-08 NOTE — DISCHARGE SUMMARY
Ortho Discharge Summary    Patient ID:  Manuel Valdez  [id number]  male  79 y.o.  1945    Admit date: 1/7/2025    Discharge date: 1/8/2025    Admitting Physician: Floyd Ghotra MD     Consulting Physician(s):   Treatment Team:   Floyd Ghotra MD Hull, Jason, MD Timberlake, Trinia, Yaneth Amaral, FELTON Ortega, Haven Golden, PT  Kelly Zee    Date of Surgery:   1/7/2025     Preoperative Diagnosis:  Osteoarthritis of left knee [M17.12]    Postoperative Diagnosis:   * No post-op diagnosis entered *    Procedure(s):   LEFT TOTAL KNEE ARTHROPLASTY (SPINAL WITH BLOCK)     Anesthesia Type:   Spinal     Surgeon: Floyd Ghotra MD                            HPI:  Pt is a 79 y.o. male who has a history of Osteoarthritis of left knee [M17.12]  with pain and limitations of activities of daily living who presents at this time for a  LEFT TOTAL KNEE ARTHROPLASTY (SPINAL WITH BLOCK) following the failure of conservative management.    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 : A BMI > 30 is classified as obesity and > 40 is classified as morbid obesity.     Medications upon admission :   Prior to Admission Medications   Prescriptions Last Dose Informant Patient Reported? Taking?   Calcium-Magnesium (MARIAM-MAG PO) Past Week  Yes Yes   Sig: Take 1 tablet  tablet  Commonly known as: DEMADEX               Where to Get Your Medications        These medications were sent to [facility]/pharmacy #[id number] - [place], [place] - [address] - P [phone] - F [phone] [address] [address] [address]      Phone: [phone]   aspirin 81 MG EC tablet  oxyCODONE 5 MG immediate release tablet  sennosides-docusate sodium 8.6-50 MG tablet      per medical continuation form      -Follow up in office in 3 weeks      Signed:  Isabel Escamilla NP  Orthopaedic Nurse Practitioner    1/8/2025  12:16 PM

## 2025-01-08 NOTE — CARE COORDINATION
WADNA:    Home with HH - [facility] accepted  (Pt d/c either to own house if water is on or partner/spouse's house at [address], [place], HH aware)    - Family transport  - Already owns walker(s), rollators    CM met with Pt, confirmed face sheet and HH choices; OBS Pt         01/08/25 1128   Service Assessment   Patient Orientation Alert and Oriented   Cognition Alert   History Provided By Patient   Primary Caregiver Self   Accompanied By/Relationship partner   Support Systems Spouse/Significant Other   Patient's Healthcare Decision Maker is: Named in Scanned ACP Document   PCP Verified by CM Yes   Can patient return to prior living arrangement Yes   Ability to make needs known: Good   Family able to assist with home care needs: Yes   Would you like for me to discuss the discharge plan with any other family members/significant others, and if so, who? Yes   Discharge Planning   Patient expects to be discharged to: House   Services At/After Discharge   Confirm Follow Up Transport Family   Condition of Participation: Discharge Planning   The Plan for Transition of Care is related to the following treatment goals: Home Health   The Patient and/or Patient Representative was provided with a Choice of Provider? Patient   The Patient and/Or Patient Representative agree with the Discharge Plan? Yes   Freedom of Choice list was provided with basic dialogue that supports the patient's individualized plan of care/goals, treatment preferences, and shares the quality data associated with the providers?  Yes

## 2025-03-07 ENCOUNTER — CLINICAL DOCUMENTATION (OUTPATIENT)
Age: 80
End: 2025-03-07

## 2025-03-07 NOTE — PROGRESS NOTES
Pt needs new supplies, lov 10/7/20, will call back to schedule an appt to reestablish care as a new patient. NF

## 2025-04-28 ENCOUNTER — OFFICE VISIT (OUTPATIENT)
Age: 80
End: 2025-04-28
Payer: MEDICARE

## 2025-04-28 VITALS
WEIGHT: 253.9 LBS | SYSTOLIC BLOOD PRESSURE: 108 MMHG | BODY MASS INDEX: 35.55 KG/M2 | DIASTOLIC BLOOD PRESSURE: 67 MMHG | HEART RATE: 75 BPM | HEIGHT: 71 IN | TEMPERATURE: 97.7 F | RESPIRATION RATE: 14 BRPM | OXYGEN SATURATION: 97 %

## 2025-04-28 DIAGNOSIS — Z12.5 PROSTATE CANCER SCREENING: ICD-10-CM

## 2025-04-28 DIAGNOSIS — E66.01 MORBID (SEVERE) OBESITY DUE TO EXCESS CALORIES (HCC): ICD-10-CM

## 2025-04-28 DIAGNOSIS — I50.32 CHRONIC DIASTOLIC CHF (CONGESTIVE HEART FAILURE) (HCC): ICD-10-CM

## 2025-04-28 DIAGNOSIS — I10 ESSENTIAL (PRIMARY) HYPERTENSION: ICD-10-CM

## 2025-04-28 DIAGNOSIS — E78.2 MIXED HYPERLIPIDEMIA: ICD-10-CM

## 2025-04-28 DIAGNOSIS — F41.0 ANXIETY ATTACK: ICD-10-CM

## 2025-04-28 DIAGNOSIS — I48.0 PAROXYSMAL ATRIAL FIBRILLATION (HCC): ICD-10-CM

## 2025-04-28 DIAGNOSIS — Z85.46 HISTORY OF PROSTATE CANCER: ICD-10-CM

## 2025-04-28 DIAGNOSIS — Z00.00 MEDICARE ANNUAL WELLNESS VISIT, SUBSEQUENT: Primary | ICD-10-CM

## 2025-04-28 DIAGNOSIS — R73.03 PREDIABETES: ICD-10-CM

## 2025-04-28 DIAGNOSIS — I25.10 CORONARY ARTERY DISEASE INVOLVING NATIVE CORONARY ARTERY OF NATIVE HEART WITHOUT ANGINA PECTORIS: ICD-10-CM

## 2025-04-28 PROBLEM — C61 PROSTATE CANCER (HCC): Status: RESOLVED | Noted: 2023-09-20 | Resolved: 2025-04-28

## 2025-04-28 PROCEDURE — 1123F ACP DISCUSS/DSCN MKR DOCD: CPT | Performed by: INTERNAL MEDICINE

## 2025-04-28 PROCEDURE — 1036F TOBACCO NON-USER: CPT | Performed by: INTERNAL MEDICINE

## 2025-04-28 PROCEDURE — 1159F MED LIST DOCD IN RCRD: CPT | Performed by: INTERNAL MEDICINE

## 2025-04-28 PROCEDURE — 3074F SYST BP LT 130 MM HG: CPT | Performed by: INTERNAL MEDICINE

## 2025-04-28 PROCEDURE — G0439 PPPS, SUBSEQ VISIT: HCPCS | Performed by: INTERNAL MEDICINE

## 2025-04-28 PROCEDURE — 1160F RVW MEDS BY RX/DR IN RCRD: CPT | Performed by: INTERNAL MEDICINE

## 2025-04-28 PROCEDURE — 3078F DIAST BP <80 MM HG: CPT | Performed by: INTERNAL MEDICINE

## 2025-04-28 PROCEDURE — G8417 CALC BMI ABV UP PARAM F/U: HCPCS | Performed by: INTERNAL MEDICINE

## 2025-04-28 PROCEDURE — 99213 OFFICE O/P EST LOW 20 MIN: CPT | Performed by: INTERNAL MEDICINE

## 2025-04-28 PROCEDURE — G8427 DOCREV CUR MEDS BY ELIG CLIN: HCPCS | Performed by: INTERNAL MEDICINE

## 2025-04-28 RX ORDER — ASPIRIN 81 MG/1
81 TABLET ORAL DAILY
Qty: 90 TABLET | Refills: 3
Start: 2025-04-28

## 2025-04-28 RX ORDER — BUSPIRONE HYDROCHLORIDE 5 MG/1
TABLET ORAL
Qty: 30 TABLET | Refills: 0 | Status: SHIPPED | OUTPATIENT
Start: 2025-04-28

## 2025-04-28 SDOH — ECONOMIC STABILITY: FOOD INSECURITY: WITHIN THE PAST 12 MONTHS, YOU WORRIED THAT YOUR FOOD WOULD RUN OUT BEFORE YOU GOT MONEY TO BUY MORE.: NEVER TRUE

## 2025-04-28 SDOH — ECONOMIC STABILITY: FOOD INSECURITY: WITHIN THE PAST 12 MONTHS, THE FOOD YOU BOUGHT JUST DIDN'T LAST AND YOU DIDN'T HAVE MONEY TO GET MORE.: NEVER TRUE

## 2025-04-28 ASSESSMENT — PATIENT HEALTH QUESTIONNAIRE - PHQ9
SUM OF ALL RESPONSES TO PHQ QUESTIONS 1-9: 0
2. FEELING DOWN, DEPRESSED OR HOPELESS: NOT AT ALL
SUM OF ALL RESPONSES TO PHQ QUESTIONS 1-9: 0
1. LITTLE INTEREST OR PLEASURE IN DOING THINGS: NOT AT ALL
SUM OF ALL RESPONSES TO PHQ QUESTIONS 1-9: 0
SUM OF ALL RESPONSES TO PHQ QUESTIONS 1-9: 0

## 2025-04-28 ASSESSMENT — LIFESTYLE VARIABLES
HOW OFTEN DO YOU HAVE A DRINK CONTAINING ALCOHOL: NEVER
HOW MANY STANDARD DRINKS CONTAINING ALCOHOL DO YOU HAVE ON A TYPICAL DAY: PATIENT DOES NOT DRINK

## 2025-04-28 NOTE — PROGRESS NOTES
Medicare Annual Wellness Visit    Manuel Valdez is here for Medicare AWV    Assessment & Plan   Medicare annual wellness visit, subsequent     No follow-ups on file.     Subjective       Patient's complete Health Risk Assessment and screening values have been reviewed and are found in Flowsheets. The following problems were reviewed today and where indicated follow up appointments were made and/or referrals ordered.    Positive Risk Factor Screenings with Interventions:          Controlled Medication Review:    Today's Pain Level: No data recorded   Opioid Risk: (Low risk score <55) Opioid risk score: 8    Patient is low risk for opioid use disorder or overdose.    Last PDMP Ravi as Reviewed:  Review User Review Instant Review Result                     Abnormal BMI (obese):  Body mass index is 35.41 kg/m². (!) Abnormal  Interventions:  Patient declines any further evaluation or treatment          Vision Screen:  Do you have difficulty driving, watching TV, or doing any of your daily activities because of your eyesight?: (!) Yes  Have you had an eye exam within the past year?: Yes  Interventions:    Already follows with eye team.  Gets shots in left eye every 6 weeks for macular degeneration    Safety:  Do you always fasten your seatbelt when you are in a car?: (!) No  Interventions:  Patient declined any further interventions or treatment                   Objective   Vitals:    04/28/25 1620   BP: 108/67   BP Site: Left Upper Arm   Patient Position: Sitting   BP Cuff Size: Large Adult   Pulse: 75   Resp: 14   Temp: 97.7 °F (36.5 °C)   TempSrc: Temporal   SpO2: 97%   Weight: 115.2 kg (253 lb 14.4 oz)   Height: 1.803 m (5' 11\")      Body mass index is 35.41 kg/m².                    Allergies   Allergen Reactions    Meperidine Hallucinations           Meperidine Hcl Hallucinations and Other (See Comments)     Prior to Visit Medications    Medication Sig Taking? Authorizing Provider   cephALEXin (KEFLEX) 500 MG

## 2025-04-28 NOTE — PATIENT INSTRUCTIONS
breads, oatmeal, beans, brown rice, citrus fruits, and apples.     Eat lean proteins. Heart-healthy proteins include seafood, lean meats and poultry, eggs, beans, peas, nuts, seeds, and soy products.     Limit drinks and foods with added sugar. These include candy, desserts, and soda pop.   Heart-healthy lifestyle    If your doctor recommends it, get more exercise. For many people, walking is a good choice. Or you may want to swim, bike, or do other activities. Bit by bit, increase the time you're active every day. Try for at least 30 minutes on most days of the week.     Try to quit or cut back on using tobacco and other nicotine products. This includes smoking and vaping. If you need help quitting, talk to your doctor about stop-smoking programs and medicines. These can increase your chances of quitting for good. Quitting is one of the most important things you can do to protect your heart. It is never too late to quit. Try to avoid secondhand smoke too.     Stay at a weight that's healthy for you. Talk to your doctor if you need help losing weight.     Try to get 7 to 9 hours of sleep each night.     Limit alcohol to 2 drinks a day for men and 1 drink a day for women. Too much alcohol can cause health problems.     Manage other health problems such as diabetes, high blood pressure, and high cholesterol. If you think you may have a problem with alcohol or drug use, talk to your doctor.   Medicines    Take your medicines exactly as prescribed. Call your doctor if you think you are having a problem with your medicine.     If your doctor recommends aspirin, take the amount directed each day. Make sure you take aspirin and not another kind of pain reliever, such as acetaminophen (Tylenol).   When should you call for help?   Call 911 if you have symptoms of a heart attack. These may include:    Chest pain or pressure, or a strange feeling in the chest.     Sweating.     Shortness of breath.     Pain, pressure, or a

## 2025-05-05 ENCOUNTER — LAB (OUTPATIENT)
Age: 80
End: 2025-05-05

## 2025-05-05 DIAGNOSIS — I10 ESSENTIAL (PRIMARY) HYPERTENSION: ICD-10-CM

## 2025-05-05 DIAGNOSIS — E78.2 MIXED HYPERLIPIDEMIA: ICD-10-CM

## 2025-05-05 DIAGNOSIS — R73.03 PREDIABETES: ICD-10-CM

## 2025-05-05 DIAGNOSIS — I50.32 CHRONIC DIASTOLIC CHF (CONGESTIVE HEART FAILURE) (HCC): ICD-10-CM

## 2025-05-05 DIAGNOSIS — Z12.5 PROSTATE CANCER SCREENING: ICD-10-CM

## 2025-05-06 ENCOUNTER — RESULTS FOLLOW-UP (OUTPATIENT)
Age: 80
End: 2025-05-06

## 2025-05-06 LAB
ALBUMIN SERPL-MCNC: 3.9 G/DL (ref 3.5–5)
ALBUMIN/GLOB SERPL: 1.2 (ref 1.1–2.2)
ALP SERPL-CCNC: 67 U/L (ref 45–117)
ALT SERPL-CCNC: 30 U/L (ref 12–78)
ANION GAP SERPL CALC-SCNC: 4 MMOL/L (ref 2–12)
AST SERPL-CCNC: 22 U/L (ref 15–37)
BASOPHILS # BLD: 0.02 K/UL (ref 0–0.1)
BASOPHILS NFR BLD: 0.5 % (ref 0–1)
BILIRUB SERPL-MCNC: 0.6 MG/DL (ref 0.2–1)
BUN SERPL-MCNC: 18 MG/DL (ref 6–20)
BUN/CREAT SERPL: 21 (ref 12–20)
CALCIUM SERPL-MCNC: 9.4 MG/DL (ref 8.5–10.1)
CHLORIDE SERPL-SCNC: 104 MMOL/L (ref 97–108)
CHOLEST SERPL-MCNC: 120 MG/DL
CO2 SERPL-SCNC: 30 MMOL/L (ref 21–32)
CREAT SERPL-MCNC: 0.85 MG/DL (ref 0.7–1.3)
DIFFERENTIAL METHOD BLD: ABNORMAL
EOSINOPHIL # BLD: 0.23 K/UL (ref 0–0.4)
EOSINOPHIL NFR BLD: 6 % (ref 0–7)
ERYTHROCYTE [DISTWIDTH] IN BLOOD BY AUTOMATED COUNT: 14.6 % (ref 11.5–14.5)
EST. AVERAGE GLUCOSE BLD GHB EST-MCNC: 91 MG/DL
GLOBULIN SER CALC-MCNC: 3.2 G/DL (ref 2–4)
GLUCOSE SERPL-MCNC: 88 MG/DL (ref 65–100)
HBA1C MFR BLD: 4.8 % (ref 4–5.6)
HCT VFR BLD AUTO: 36.4 % (ref 36.6–50.3)
HDLC SERPL-MCNC: 63 MG/DL
HDLC SERPL: 1.9 (ref 0–5)
HGB BLD-MCNC: 12.6 G/DL (ref 12.1–17)
IMM GRANULOCYTES # BLD AUTO: 0.02 K/UL (ref 0–0.04)
IMM GRANULOCYTES NFR BLD AUTO: 0.5 % (ref 0–0.5)
LDLC SERPL CALC-MCNC: 46.2 MG/DL (ref 0–100)
LYMPHOCYTES # BLD: 1.1 K/UL (ref 0.8–3.5)
LYMPHOCYTES NFR BLD: 28.3 % (ref 12–49)
MCH RBC QN AUTO: 31.7 PG (ref 26–34)
MCHC RBC AUTO-ENTMCNC: 34.6 G/DL (ref 30–36.5)
MCV RBC AUTO: 91.7 FL (ref 80–99)
MONOCYTES # BLD: 0.28 K/UL (ref 0–1)
MONOCYTES NFR BLD: 7.3 % (ref 5–13)
NEUTS SEG # BLD: 2.25 K/UL (ref 1.8–8)
NEUTS SEG NFR BLD: 57.4 % (ref 32–75)
NRBC # BLD: 0 K/UL (ref 0–0.01)
NRBC BLD-RTO: 0 PER 100 WBC
PLATELET # BLD AUTO: 172 K/UL (ref 150–400)
POTASSIUM SERPL-SCNC: 4.1 MMOL/L (ref 3.5–5.1)
PROT SERPL-MCNC: 7.1 G/DL (ref 6.4–8.2)
PSA SERPL-MCNC: 0 NG/ML (ref 0.01–4)
RBC # BLD AUTO: 3.97 M/UL (ref 4.1–5.7)
RBC MORPH BLD: ABNORMAL
SODIUM SERPL-SCNC: 138 MMOL/L (ref 136–145)
TRIGL SERPL-MCNC: 54 MG/DL
TSH SERPL DL<=0.05 MIU/L-ACNC: 2.86 UIU/ML (ref 0.36–3.74)
VLDLC SERPL CALC-MCNC: 10.8 MG/DL
WBC # BLD AUTO: 3.9 K/UL (ref 4.1–11.1)

## 2025-05-06 NOTE — TELEPHONE ENCOUNTER
Pt notified via [employer]  Result letter mailed    Per Dr. TAYLOR  Labs look good.  No new concerns. Continue same meds. Stay active, stay well.

## 2025-05-26 RX ORDER — BUSPIRONE HYDROCHLORIDE 5 MG/1
TABLET ORAL
Qty: 30 TABLET | Refills: 0 | Status: SHIPPED | OUTPATIENT
Start: 2025-05-26

## 2025-07-06 RX ORDER — BUSPIRONE HYDROCHLORIDE 5 MG/1
TABLET ORAL
Qty: 30 TABLET | Refills: 0 | Status: SHIPPED | OUTPATIENT
Start: 2025-07-06

## (undated) DEVICE — NC TREK CORONARY DILATATION CATHETER 4.5 MM X 12 MM / RAPID-EXCHANGE: Brand: NC TREK

## (undated) DEVICE — SYR ART 700 CLEAR MARK 7 -- ARTERION

## (undated) DEVICE — WRAP KNEE UNIV E STRP HK AND LOOP W/ GEL PK MEDCOOL

## (undated) DEVICE — SOL IRR NACL 0.9% 500ML POUR --

## (undated) DEVICE — 3M™ TEGADERM™ TRANSPARENT FILM DRESSING FRAME STYLE, 1626W, 4 IN X 4-3/4 IN (10 CM X 12 CM), 50/CT 4CT/CASE: Brand: 3M™ TEGADERM™

## (undated) DEVICE — ANGIOGRAPHY KIT CUST [K0910930B] [MERIT MEDICAL SYSTEMS INC]

## (undated) DEVICE — PLASMABLADE PS200-040 4.0: Brand: PLASMABLADE™

## (undated) DEVICE — STRYKER PERFORMANCE SERIES SAGITTAL BLADE: Brand: STRYKER PERFORMANCE SERIES

## (undated) DEVICE — ADHESIVE SKIN CLSR 1ML TISS HI VISC EXOFIN

## (undated) DEVICE — GLOVE SURG SZ 65 THK91MIL LTX FREE SYN POLYISOPRENE

## (undated) DEVICE — CATHETER DIAG 6FR L110CM INTRO 6FR BLLN DIA9MM 1CC PULM ART

## (undated) DEVICE — CATH GUID COR EB375 6FR 100CM -- LAUNCHER

## (undated) DEVICE — MEDI-TRACE CADENCE ADULT, DEFIBRILLATION ELECTRODE -RTS  (10 PR/PK) - PHYSIO-CONTROL: Brand: MEDI-TRACE CADENCE

## (undated) DEVICE — SYRINGE 20ML LL S/C 50

## (undated) DEVICE — UNDERGLOVE SURG SZ 7.5 BLU LTX FREE SYN POLYISOPRENE

## (undated) DEVICE — 3M™ IOBAN™ 2 ANTIMICROBIAL INCISE DRAPE 6650EZ: Brand: IOBAN™ 2

## (undated) DEVICE — 4-PORT MANIFOLD: Brand: NEPTUNE 2

## (undated) DEVICE — NC TREK CORONARY DILATATION CATHETER 3.0 MM X 15 MM / RAPID-EXCHANGE: Brand: NC TREK

## (undated) DEVICE — HOOD, PEEL-AWAY: Brand: FLYTE

## (undated) DEVICE — ZIMMER® STERILE DISPOSABLE TOURNIQUET CUFF WITH PLC, DUAL PORT, SINGLE BLADDER, 34 IN. (86 CM)

## (undated) DEVICE — SUTURE ABS MF 2-0 CT1 27IN STRATAFIX PDS+ SXPP1B412

## (undated) DEVICE — HOOD WITH PEEL AWAY FACE SHIELD: Brand: T7PLUS

## (undated) DEVICE — REM POLYHESIVE ADULT PATIENT RETURN ELECTRODE: Brand: VALLEYLAB

## (undated) DEVICE — PRESSURE GUIDEWIRE: Brand: COMET

## (undated) DEVICE — TR BAND RADIAL ARTERY COMPRESSION DEVICE: Brand: TR BAND

## (undated) DEVICE — KIT INTRO 9FR L13CM DIA0.118IN SPLITTABLE HEMSTAT ROBUST

## (undated) DEVICE — GUIDEWIRE VASC L260CM 0.035IN J TIP L3MM PTFE FIX COR NAMIC

## (undated) DEVICE — SPLINT WR POS F/ARTERIAL ACC -- BX/10

## (undated) DEVICE — STERILE POLYISOPRENE POWDER-FREE SURGICAL GLOVES WITH EMOLLIENT COATING: Brand: PROTEXIS

## (undated) DEVICE — SUTURE MCRYL SZ 3-0 L27IN ABSRB UD L24MM PS-1 3/8 CIR PRIM Y936H

## (undated) DEVICE — Device: Brand: ASAHI SION BLUE

## (undated) DEVICE — BANDAGE COMPR W6INXL12FT SMOOTH FOR LIMB EXSANG ESMARCH

## (undated) DEVICE — MARKER,SKIN,WI/RULER AND LABELS: Brand: MEDLINE

## (undated) DEVICE — SUTURE ABSORBABLE BRAIDED 2-0 CT-1 27 IN UD VICRYL J259H

## (undated) DEVICE — INTRO SHTH 7FR 13X20CM -- TEARAWAY

## (undated) DEVICE — TIP SUCT CRV REG REDI

## (undated) DEVICE — CUSTOM KT PTCA INFL DEV K05 00053H

## (undated) DEVICE — PACEMAKER PACK: Brand: MEDLINE INDUSTRIES, INC.

## (undated) DEVICE — YANKAUER,FLEXIBLE HANDLE,REGLR CAPACITY: Brand: MEDLINE INDUSTRIES, INC.

## (undated) DEVICE — SYSTEM NAVIGATION PALM SZ PRECIS ALIGN TECHNOLOGY DISP FOR

## (undated) DEVICE — PACK PROCEDURE SURG HRT CATH

## (undated) DEVICE — BANDAGE COBAN 4 IN COMPR W4INXL5YD FOAM COHESIVE QUIK STK SELF ADH SFT

## (undated) DEVICE — LEAD PCMKR CAPSUR FIX NOVUS 58 --
Type: IMPLANTABLE DEVICE | Status: NON-FUNCTIONAL
Removed: 2023-02-20

## (undated) DEVICE — CONTAINER,SPECIMEN,4OZ,OR STRL: Brand: MEDLINE

## (undated) DEVICE — (D)ADHESIVE TISS HI VISC 1ML -- DISC USE ITEM 346585

## (undated) DEVICE — PCMKR AZURE XT DR MRI --
Type: IMPLANTABLE DEVICE | Status: NON-FUNCTIONAL
Removed: 2023-02-20

## (undated) DEVICE — STERILE POLYISOPRENE POWDER-FREE SURGICAL GLOVES: Brand: PROTEXIS

## (undated) DEVICE — HI-TORQUE VERSACORE FLOPPY GUIDE WIRE SYSTEM 145 CM: Brand: HI-TORQUE VERSACORE

## (undated) DEVICE — CATH NAVISTAR BIDIR FJ 8MM --

## (undated) DEVICE — SUTURE VCRL SZ 0 L27IN ABSRB UD L36MM CT-1 1/2 CIR J260H

## (undated) DEVICE — GLOVE SURG SZ 65 L12IN FNGR THK79MIL GRN LTX FREE

## (undated) DEVICE — BNDG,ELSTC,MATRIX,STRL,6"X5YD,LF,HOOK&LP: Brand: MEDLINE

## (undated) DEVICE — PADDING CAST W6INXL4YD NONSTERILE COT RAYON MICROPLEATED

## (undated) DEVICE — SUTURE VCRL SZ 1 L27IN ABSRB UD CT-1 L36MM 1/2 CIR J261H

## (undated) DEVICE — PATCH CARTO 3 EXT REF --

## (undated) DEVICE — APPLICATOR MEDICATED 26 CC SOLUTION HI LT ORNG CHLORAPREP

## (undated) DEVICE — SUTURE VCRL SZ 2-0 L27IN ABSRB UD L26MM CT-2 1/2 CIR J269H

## (undated) DEVICE — CUSTOM CAST PD STR

## (undated) DEVICE — CABLE RMFG EP MAP NAVISTAR RED -- F/CARTO 3 SYS - OEM 323590

## (undated) DEVICE — GLIDESHEATH SLENDER ACCESS KIT: Brand: GLIDESHEATH SLENDER

## (undated) DEVICE — GOWN,AURORA,FABRIC-REINFORCED,X-LARGE: Brand: MEDLINE

## (undated) DEVICE — INTENT OT USE PROVIDES A STERILE INTERFACE BETWEEN THE OPERATING ROOM SURGICAL LAMPS (NON-STERILE) AND THE SURGEON OR STAFF WORKING IN THE STERILE FIELD.: Brand: ASPEN® ALC PLUS LIGHT HANDLE COVER

## (undated) DEVICE — BANDAGE COMPR M W6INXL10YD WHT BGE VELC E MTRX HK AND LOOP

## (undated) DEVICE — DRAPE,EXTREMITY,89X128,STERILE: Brand: MEDLINE

## (undated) DEVICE — SHEET,DRAPE,53X77,STERILE: Brand: MEDLINE

## (undated) DEVICE — DRAPE,TOP,102X53,STERILE: Brand: MEDLINE

## (undated) DEVICE — SUTURE VCRL SZ 2-0 L36IN ABSRB UD L40MM CT 1/2 CIR J957H

## (undated) DEVICE — ATTUNE SOLO PINNING SYSTEM

## (undated) DEVICE — CATH GUID COR EB375 7FR 100CM -- LAUNCHER

## (undated) DEVICE — GUIDE CATHETER: Brand: ACUITY BREAK-AWAY™

## (undated) DEVICE — SYRINGE ANGIO 10 CC BRL STD PRNT POLYCARB LT BLU MEDALLION

## (undated) DEVICE — SUT SLK 0 30IN SH BLK --

## (undated) DEVICE — IMPACTOR SURG STR 3/8 INX32 MM UNIBODY ORTHALIGN PLUS

## (undated) DEVICE — COPILOT BLEEDBACK CONTROL VALVE: Brand: COPILOT

## (undated) DEVICE — GLOVE SURG SZ 65 L12IN FNGR THK94MIL STD WHT LTX FREE

## (undated) DEVICE — SET ADMIN IV PMP 20GTT 117IN -- 2 NDLS INJ SITE

## (undated) DEVICE — MARKER FIDUCIAL 0.018 INX5 MM STR PLAT LUMICOIL

## (undated) DEVICE — TUBING, SUCTION, 1/4" X 12', STRAIGHT: Brand: MEDLINE

## (undated) DEVICE — SYRINGE ANGIO 12ML COR CTRL ROT ADPT SLD PLUNG CLR BRL M

## (undated) DEVICE — RECIPROCATING BLADE HEAVY DUTY LONG, OFFSET  (77.6 X 0.77 X 11.2MM)

## (undated) DEVICE — FORCEPS BX L240CM JAW DIA2.8MM L CAP W/ NDL MIC MESH TOOTH

## (undated) DEVICE — STOPCOCK IV 4 W TRNSPAR

## (undated) DEVICE — NC TREK CORONARY DILATATION CATHETER 4.0 MM X 12 MM / RAPID-EXCHANGE: Brand: NC TREK

## (undated) DEVICE — PINNACLE INTRODUCER SHEATH: Brand: PINNACLE

## (undated) DEVICE — 2108 SERIES SAGITTAL BLADE, NO OFFSET  (12.4 X 1.19 X 82.1MM)

## (undated) DEVICE — TOTAL KNEE PACK: Brand: MEDLINE INDUSTRIES, INC.

## (undated) DEVICE — 3M™ TEGADERM™ HP TRANSPARENT FILM DRESSING FRAME STYLE, 9546HP, 4 IN X 4-1/2 IN (10 CM X 11.5 CM), 50/CT 4CT/CASE: Brand: 3M™ TEGADERM™

## (undated) DEVICE — (D)HANDPIECE IRR W/HI FLO TIP -- DUPLICATE USE ITEM 121586

## (undated) DEVICE — BOWL BNE CEM MIX SPAT CURET SMARTMIX CTS

## (undated) DEVICE — TOTAL JOINT - SMH: Brand: MEDLINE INDUSTRIES, INC.

## (undated) DEVICE — RADIFOCUS OPTITORQUE ANGIOGRAPHIC CATHETER: Brand: OPTITORQUE

## (undated) DEVICE — SCRUBIN SCRUB BRUSH DRY STER: Brand: MEDLINE INDUSTRIES, INC.

## (undated) DEVICE — SPONGE LAP SOFT 18X18 IN X RAY DETECTABLE

## (undated) DEVICE — TUBING PRSS MON L6IN PVC M FEM CONN

## (undated) DEVICE — SUTURE V LOC 90 4 0 L18IN ABSRB UD P 12 NDL VLOCM0023

## (undated) DEVICE — SOLUTION SURG PREP 26 CC PURPREP

## (undated) DEVICE — SYR POWER 150ML 8IN FILL TUBE --

## (undated) DEVICE — DRESSING SURG 4X14 IN POSTOP SIL BORD MEPILEX

## (undated) DEVICE — SUTURE MONOCRYL + SZ 3-0 L27IN ABSRB UD PS1 L24MM 3/8 CIR REV MCP936H

## (undated) DEVICE — DRESSING HYDROFIBER AQUACEL AG ADVANTAGE 3.5X14 IN

## (undated) DEVICE — SKIN CLOS DERMABND PRINEO 60CM -- DERMABOUND PRINEO

## (undated) DEVICE — INTRODUCER SHTH 8FR L63CM 8FR DIL GWIRE L145CM DIA0.038IN

## (undated) DEVICE — SUTURE ABSORBABLE MONOFILAMENT 1 CTX 36 CM 48 MM VIO PDS +

## (undated) DEVICE — AIRLIFE™  ADULT CUSHION NASAL CANNULA WITH 7 FOOT (2.1 M) CRUSH-RESISTANT OXYGEN TUBING, AND U/CONNECT-IT ADAPTER: Brand: AIRLIFE™

## (undated) DEVICE — CATHETER ETER ANGIO L110CM OD5FR ID046IN L75CM 038IN 145DEG CARD

## (undated) DEVICE — BLADE ELECTRODE: Brand: VALLEYLAB

## (undated) DEVICE — KIT ANGIOGRAPHY CUST MRMC

## (undated) DEVICE — Device

## (undated) DEVICE — GOWN,SIRUS,FABRNF,XL,20/CS: Brand: MEDLINE

## (undated) DEVICE — INTRO SHTH 9FR 13X20CM -- USE ITEM# 341577

## (undated) DEVICE — AIRLIFE™ ADULT CUSHION NASAL CANNULA 14 FOOT (4.3) CRUSH-RESISTANT OXYGEN TUBING, AND U/CONNECT-IT ADAPTER: Brand: AIRLIFE™

## (undated) DEVICE — PROVE COVER: Brand: UNBRANDED

## (undated) DEVICE — ENDOSCOPIC ULTRASOUND ASPIRATION NEEDLE: Brand: EXPECT SLIMLINE SL

## (undated) DEVICE — LIMB HOLDER, WRIST/ANKLE: Brand: DEROYAL

## (undated) DEVICE — SUTURE VICRYL 1 L27IN ABSRB CT BRAID COAT UD J281H

## (undated) DEVICE — TREK CORONARY DILATATION CATHETER 3.0 MM X 20 MM / RAPID-EXCHANGE: Brand: TREK

## (undated) DEVICE — INTENDED FOR TISSUE SEPARATION, AND OTHER PROCEDURES THAT REQUIRE A SHARP SURGICAL BLADE TO PUNCTURE OR CUT.: Brand: BARD-PARKER SAFETY BLADES SIZE 10, STERILE

## (undated) DEVICE — NEEDLE HYPO 21GA L1IN GRN S STL HUB POLYPR SHLD REG BVL

## (undated) DEVICE — ZIPPERED TOGA, 2X LARGE: Brand: FLYTE, SURGICOOL

## (undated) DEVICE — GUIDE WIRE WITH HYDROPHILIC COATING: Brand: ACUITY WHISPER VIEW™

## (undated) DEVICE — IRRIGATION KT PIST SYR 60ML -- CONVERT TO ITEM 116415

## (undated) DEVICE — Z DUP USE 2517066 CATHETER DIAG 2.7FR L135CM 0.014IN HYDRPHLC OCT IMAG DOC

## (undated) DEVICE — GLOVE SURG 7 BIOGEL PI ULTRATOUCH G

## (undated) DEVICE — CATHETER DIAG 5FR L75CM ID0.046IN HK CRV VEIN SEL UNIQUE

## (undated) DEVICE — ROSEN CURVED WIRE GUIDE: Brand: ROSEN

## (undated) DEVICE — SPONGE GZ W4XL4IN COT 12 PLY TYP VII WVN C FLD DSGN STERILE

## (undated) DEVICE — ZIPPERED TOGA, X-LARGE: Brand: FLYTE, SURGICOOL

## (undated) DEVICE — Z DISCONTINUED NO SUB IDED SET EXTN W/ 4 W STPCOCK M SPIN LOK 36IN

## (undated) DEVICE — DRESSING ANTIMIC FOAM OPTIFOAM POSTOP ADH 4X14 IN

## (undated) DEVICE — SYRINGE ANGIO 12ML COR CTRL FIX M LUER CONN RNG GRP SLD RNG

## (undated) DEVICE — SYRINGE MED 10ML LUERLOCK TIP W/O SFTY DISP

## (undated) DEVICE — SHEET,DRAPE,70X100,STERILE: Brand: MEDLINE

## (undated) DEVICE — ELECTRODE PT RET AD L9FT HI MOIST COND ADH HYDRGEL CORDED

## (undated) DEVICE — RADIFOCUS GLIDEWIRE: Brand: GLIDEWIRE

## (undated) DEVICE — TUBING, SUCTION, 9/32" X 12', STRAIGHT: Brand: MEDLINE INDUSTRIES, INC.

## (undated) DEVICE — (D)PREP SKN CHLRAPRP APPL 26ML -- CONVERT TO ITEM 371833

## (undated) DEVICE — HANDPIECE SET WITH BONE CLEANING TIP AND SUCTION TUBE: Brand: INTERPULSE

## (undated) DEVICE — SUTURE V-LOC 180 SZ 2-0 L12IN ABSRB VLT GS-21 L37MM 1/2 CIR VLOCM0315

## (undated) DEVICE — HYPODERMIC SAFETY NEEDLE: Brand: MAGELLAN

## (undated) DEVICE — PACEMAKER PACK: Brand: CARDINAL HEALTH

## (undated) DEVICE — SPONGE LAP W18XL18IN WHT COT 4 PLY FLD STRUNG RADPQ DISP ST 2 PER PACK

## (undated) DEVICE — GARMENT COMPR M FOR 13IN FT INTMIT SGL BLDR HEM FORC II

## (undated) DEVICE — KIT ACCS INTRO 4FR L10CM NDL 21GA L7CM GWIRE L40CM

## (undated) DEVICE — TRAY,IRRIGATION,PISTON SYRINGE,60ML,STRL: Brand: MEDLINE

## (undated) DEVICE — ZIP 8I SURGICAL SKIN CLOSURE DEVICE: Brand: ZIP 8I SURGICAL SKIN CLOSURE DEVICE

## (undated) DEVICE — DRAPE PRB US TRNSDCR 6X96IN --

## (undated) DEVICE — SUTURE VICRYL SZ 0 L27IN ABSRB UD L36MM CT-1 1/2 CIR J260H

## (undated) DEVICE — SOLUTION IRRIG 3000ML 0.9% SOD CHL USP UROMATIC PLAS CONT

## (undated) DEVICE — LIQUIBAND RAPID ADHESIVE 36/CS 0.8ML: Brand: MEDLINE

## (undated) DEVICE — CATHETER COR DIAG MP MPA 5FR 100CM 2 SIDE H DXTERITY